# Patient Record
Sex: MALE | Race: WHITE | NOT HISPANIC OR LATINO | Employment: OTHER | URBAN - METROPOLITAN AREA
[De-identification: names, ages, dates, MRNs, and addresses within clinical notes are randomized per-mention and may not be internally consistent; named-entity substitution may affect disease eponyms.]

---

## 2017-05-15 ENCOUNTER — OFFICE VISIT (OUTPATIENT)
Dept: OPHTHALMOLOGY | Facility: CLINIC | Age: 77
End: 2017-05-15
Payer: MEDICARE

## 2017-05-15 DIAGNOSIS — Z83.511 FAMILY HISTORY OF GLAUCOMA: ICD-10-CM

## 2017-05-15 DIAGNOSIS — H35.372 EPIRETINAL MEMBRANE, LEFT: Primary | ICD-10-CM

## 2017-05-15 DIAGNOSIS — Z96.1 PSEUDOPHAKIA: ICD-10-CM

## 2017-05-15 PROCEDURE — 99999 PR PBB SHADOW E&M-EST. PATIENT-LVL II: CPT | Mod: PBBFAC,,, | Performed by: OPHTHALMOLOGY

## 2017-05-15 PROCEDURE — 92134 CPTRZ OPH DX IMG PST SGM RTA: CPT | Mod: PBBFAC,PO | Performed by: OPHTHALMOLOGY

## 2017-05-15 PROCEDURE — 99212 OFFICE O/P EST SF 10 MIN: CPT | Mod: PBBFAC,PO | Performed by: OPHTHALMOLOGY

## 2017-05-15 PROCEDURE — 92014 COMPRE OPH EXAM EST PT 1/>: CPT | Mod: S$PBB,,, | Performed by: OPHTHALMOLOGY

## 2017-05-15 NOTE — PROGRESS NOTES
SUBJECTIVE:   Bharathi Parsons is a 77 y.o. male   Uncorrected distance visual acuity was 20/20 -2 in the right eye and 20/25 -1 in the left eye.   Chief Complaint   Patient presents with    Eye Exam     yearly exam and MOCT        HPI:  HPI     Eye Exam    Additional comments: yearly exam and MOCT           Comments   1. Foveopathy OD / ERM OS  2. PCIOL OD before 2003  PCIOL OS 12/19/07 with YAG 8/2/10  3. Fhx COAG- uncle  4. Fhx PKP       Last edited by Ann Medina MA on 5/15/2017  1:10 PM. (History)        Assessment /Plan :  1. Epiretinal membrane, left Stable no change will cont to follow with yearly Exams   2. Family history of glaucoma    3. Pseudophakia Stable     RTC 1 Year Dilation,MOCT

## 2017-05-15 NOTE — MR AVS SNAPSHOT
[facility]  [address] [facility] [place] [address]  Phone: [phone]  Fax: [phone]                  Bharathi Parsons   5/15/2017 1:15 PM   Office Visit    Description:  Male : 1940   Provider:  Dudley Olivo MD   Department:  [facility] - [facility]           Reason for Visit     Eye Exam           Diagnoses this Visit        Comments    Epiretinal membrane, left    -  Primary     Family history of glaucoma         Pseudophakia                To Do List           Goals (5 Years of Data)     None      [facility]     [facility] On [facility] Nurse Care Line -  Assistance  Unless otherwise directed by your provider, please contact [facility], our nurse care line that is available for  assistance.     Registered nurses in the [facility] On Call Center provide: appointment scheduling, clinical advisement, health education, and other advisory services.  Call: [phone] (toll free)               Medications           Message regarding Medications     Verify the changes and/or additions to your medication regime listed below are the same as discussed with your clinician today.  If any of these changes or additions are incorrect, please notify your healthcare provider.        STOP taking these medications     aspirin (ECOTRIN) 81 MG EC tablet Take 81 mg by mouth.           Verify that the below list of medications is an accurate representation of the medications you are currently taking.  If none reported, the list may be blank. If incorrect, please contact your healthcare provider. Carry this list with you in case of emergency.           Current Medications     azelastine-fluticasone (DYMISTA) 137-50 mcg/spray [place] by Nasal route.    cetirizine (ZYRTEC) 10 MG tablet Take 10 mg by mouth once daily.    ezetimibe (ZETIA) 10 mg tablet Take 10 mg by mouth once daily.    IBUPROFEN ORAL Take by mouth.    omeprazole (PRILOSEC) 20 MG capsule Take 40 mg by mouth.    pitavastatin 2  mg Tab tablet Take 2 mg by mouth once daily.           Clinical Reference Information           Allergies as of 5/15/2017     No Known Allergies      Immunizations Administered on Date of Encounter - 5/15/2017     None      Orders Placed During Today's Visit      Normal Orders This Visit    Posterior Segment OCT Retina-Both eyes       [facility] Sign-Up     Activating your [facility] account is as easy as 1-2-3!     1) Visit [email or web address], select Sign Up Now, enter this activation code and your date of birth, then select Next.  [email or web address]  Expires: 6/29/2017  1:45 PM      2) Create a username and password to use when you visit [facility] in the future and select a security question in case you lose your password and select Next.    3) Enter your e-mail address and click Sign Up!    Additional Information  If you have questions, please e-mail [email or web address] or call [phone] to talk to our [facility] staff. Remember, [facility] is NOT to be used for urgent needs. For medical emergencies, dial 911.         Language Assistance Services     ATTENTION: Language assistance services are available, free of charge. Please call [phone].      ATENCIÓN: Si habla aletha, tiene a macario disposición servicios gratuitos de asistencia lingüística. Llame al [phone].     CHÚ Ý: N?u b?n nói Ti?ng Vi?t, có các d?ch v? h? tr? ngôn ng? mi?n phí dành cho b?n. G?i s? [phone].         [facility] complies with applicable Federal civil rights laws and does not discriminate on the basis of race, color, national origin, age, disability, or sex.

## 2017-06-08 ENCOUNTER — OFFICE VISIT (OUTPATIENT)
Dept: PODIATRY | Facility: CLINIC | Age: 77
End: 2017-06-08
Payer: MEDICARE

## 2017-06-08 VITALS
HEIGHT: 72 IN | DIASTOLIC BLOOD PRESSURE: 107 MMHG | HEART RATE: 73 BPM | BODY MASS INDEX: 24.11 KG/M2 | WEIGHT: 178 LBS | SYSTOLIC BLOOD PRESSURE: 191 MMHG

## 2017-06-08 DIAGNOSIS — M21.6X2 ACQUIRED PRONATION DEFORMITY OF FOOT, LEFT: ICD-10-CM

## 2017-06-08 DIAGNOSIS — L84 CORN OR CALLUS: ICD-10-CM

## 2017-06-08 DIAGNOSIS — M20.42 HAMMERTOE OF LEFT FOOT: Primary | ICD-10-CM

## 2017-06-08 PROCEDURE — 99213 OFFICE O/P EST LOW 20 MIN: CPT | Mod: PBBFAC,PO | Performed by: PODIATRIST

## 2017-06-08 PROCEDURE — 1126F AMNT PAIN NOTED NONE PRSNT: CPT | Mod: ,,, | Performed by: PODIATRIST

## 2017-06-08 PROCEDURE — 1159F MED LIST DOCD IN RCRD: CPT | Mod: ,,, | Performed by: PODIATRIST

## 2017-06-08 PROCEDURE — 99213 OFFICE O/P EST LOW 20 MIN: CPT | Mod: S$PBB,,, | Performed by: PODIATRIST

## 2017-06-08 PROCEDURE — 99999 PR PBB SHADOW E&M-EST. PATIENT-LVL III: CPT | Mod: PBBFAC,,, | Performed by: PODIATRIST

## 2017-06-08 RX ORDER — PITAVASTATIN CALCIUM 4.18 MG/1
TABLET, FILM COATED ORAL EVERY OTHER DAY
COMMUNITY
Start: 2017-06-07 | End: 2018-05-24 | Stop reason: SDUPTHER

## 2017-06-09 ENCOUNTER — OFFICE VISIT (OUTPATIENT)
Dept: PODIATRY | Facility: CLINIC | Age: 77
End: 2017-06-09
Payer: MEDICARE

## 2017-06-09 VITALS
WEIGHT: 178 LBS | DIASTOLIC BLOOD PRESSURE: 98 MMHG | HEIGHT: 72 IN | SYSTOLIC BLOOD PRESSURE: 174 MMHG | HEART RATE: 63 BPM | BODY MASS INDEX: 24.11 KG/M2

## 2017-06-09 DIAGNOSIS — M20.42 HAMMERTOE OF LEFT FOOT: Primary | ICD-10-CM

## 2017-06-09 PROCEDURE — 28232 INCISION OF TOE TENDON: CPT | Mod: PBBFAC,PO | Performed by: PODIATRIST

## 2017-06-09 PROCEDURE — 28232 INCISION OF TOE TENDON: CPT | Mod: S$PBB,,, | Performed by: PODIATRIST

## 2017-06-09 PROCEDURE — 99999 PR PBB SHADOW E&M-EST. PATIENT-LVL III: CPT | Mod: PBBFAC,,, | Performed by: PODIATRIST

## 2017-06-09 PROCEDURE — 99213 OFFICE O/P EST LOW 20 MIN: CPT | Mod: PBBFAC,PO | Performed by: PODIATRIST

## 2017-06-09 PROCEDURE — 99499 UNLISTED E&M SERVICE: CPT | Mod: S$PBB,,, | Performed by: PODIATRIST

## 2017-06-09 RX ORDER — CEPHALEXIN 500 MG/1
500 CAPSULE ORAL 4 TIMES DAILY
Qty: 28 CAPSULE | Refills: 0 | Status: SHIPPED | OUTPATIENT
Start: 2017-06-09 | End: 2018-04-25

## 2017-06-09 NOTE — PROGRESS NOTES
Surgeon: Anurag Ventura DPM  Assistant: none  Procedure: percutaneous flexor tenotomy left second toe  Pre-op Dx: hammertoe deformity left fourth toe  Post-op Dx: same  Pathology: none  Anesthesia: 3 mL 0.25% plain marcaine  Hemostasis: none  EBL: < 3 mL  Materials: none  Injectables: none  Complications: none    Procedure in Detail    A time out was called verifying the patient, site, foot and procedure. Alcohol was used to prep the skin followed by skin anesthesia of injection site with the local anesthetic described above and into the toe described above. The surgical site was prepped with betadine and sterile draping applied. A sterile 18 gauze needle was entered into the plantar  aspect of the toe adjacent to the DIPJ region and used to incise the long flexor tendon while dorsiflexing the toe. The toe was noted to reduce and become more flexible. A sterile dressing consisting of xeroform, gauze and coban applied.    The patient tolerated the procedure well without complication and related no pain during the procedure.     He is to keep dressing clean, dry and intact x 3 days then may apply neosporin and bandaid daily.    RTC 1 week for wound check.

## 2017-06-09 NOTE — PATIENT INSTRUCTIONS
Leave dressing intact x 2 days then you may remove and wash the area while showering. Apply neosporin and band aid daily.

## 2017-06-10 NOTE — PROGRESS NOTES
Subjective:      Patient ID: Bharathi Parsons is a 77 y.o. male.    Chief Complaint: Callouses (bottom of left hallux)    Presents complaining of thickened left second toenail. History of medial column arthrodesis left foot. Ambulates with tennis shoes and orthotics. Complains that the toenails are beginning to thicken and he is unable to trim them.     6/8/17: Complains of pain at tip of left second toe for several weeks. Denies trauma, changes in activity or shoe gear. Wearing [employer].  Vitals:    06/08/17 1447   BP: (!) 191/107   Pulse: 73   Weight: 80.7 kg (178 lb)   Height: 6' (1.829 m)   PainSc: 0-No pain      Past Medical History:   Diagnosis Date    DJD (degenerative joint disease)     Hypertension        Past Surgical History:   Procedure Laterality Date    CATARACT EXTRACTION W/  INTRAOCULAR LENS IMPLANT  OD before 2003    CATARACT EXTRACTION W/  INTRAOCULAR LENS IMPLANT  OS 12/19/07 with YAG    FOOT ARTHRODESIS      medial column arthrodesis left foot  2011       Family History   Problem Relation Age of Onset    Glaucoma Maternal Uncle     Cataracts Mother        Social History     Social History    Marital status:      Spouse name: N/A    Number of children: N/A    Years of education: N/A     Social History Main Topics    Smoking status: Never Smoker    Smokeless tobacco: Never Used    Alcohol use Yes    Drug use: No    Sexual activity: Not Asked     Other Topics Concern    None     Social History Narrative    None       Current Outpatient Prescriptions   Medication Sig Dispense Refill    azelastine-fluticasone (DYMISTA) 137-50 mcg/spray [place] by Nasal route.      cetirizine (ZYRTEC) 10 MG tablet Take 10 mg by mouth once daily.      ezetimibe (ZETIA) 10 mg tablet Take 10 mg by mouth once daily.      IBUPROFEN ORAL Take by mouth.      LIVALO 4 mg Tab       omeprazole (PRILOSEC) 20 MG capsule Take 40 mg by mouth.      pitavastatin 2 mg Tab tablet Take 2 mg by mouth  once daily.      cephALEXin (KEFLEX) 500 MG capsule Take 1 capsule (500 mg total) by mouth 4 (four) times daily. 28 capsule 0     No current facility-administered medications for this visit.        No Known Allergies      Review of Systems   Constitution: Negative for chills, fever, weakness and malaise/fatigue.   HENT: Negative for headaches.    Cardiovascular: Negative for chest pain and claudication.   Respiratory: Negative for cough and shortness of breath.    Skin: Positive for nail changes. Negative for color change, itching and rash.   Musculoskeletal: Positive for arthritis. Negative for muscle cramps and muscle weakness.   Gastrointestinal: Negative for nausea and vomiting.   Neurological: Negative for numbness and paresthesias.   Psychiatric/Behavioral: Negative for altered mental status.           Objective:      Physical Exam   Constitutional: He is oriented to person, place, and time. He appears well-developed and well-nourished. No distress.   Cardiovascular:   Pulses:       Dorsalis pedis pulses are 2+ on the right side, and 2+ on the left side.        Posterior tibial pulses are 2+ on the right side, and 2+ on the left side.   CFT< 3 secs all toes bilateral foot, skin temp warm bilateral foot, diminished digital hair growth bilateral foot, no lower extremity edema bilateral.       Musculoskeletal:        Feet:    Semi-rigid pes plano valgus left foot. Hallux abducto valgus with dorsal contracture left foot. Elongated left second toe with semi-reducible hammertoe deformity. Gastrocnemius equinus left. Semi-reducible hammertoe deformities toes 2-5 bilateral foot. High arched foot type with inverted forefoot right foot. No pain with ROM or MMT bilateral foot.    Neurological: He is alert and oriented to person, place, and time. He has normal strength. No sensory deficit.   Skin: Skin is warm, dry and intact. Capillary refill takes less than 2 seconds. Lesion noted. No abrasion, no bruising, no burn, no  ecchymosis, no laceration, no petechiae and no rash noted. He is not diaphoretic. No cyanosis or erythema. No pallor. Nails show no clubbing.   Raised hyperkeratotic lesion plantar first met head left foot. Hyperkeratotic lesion distal left second toe. Dystrophic, yellow, thickened toenail left second and fifth toes and incurvated medial nail border right second toe from cross over second toe. No open lesions or macerations bilateral lower extremity.               Assessment:       Encounter Diagnoses   Name Primary?    Hammertoe of left foot Yes    Acquired pronation deformity of foot, left     Corn or callus          Plan:       [facility]    Diagnoses and all orders for this visit:    Hammertoe of left foot    Acquired pronation deformity of foot, left    Corn or callus      I counseled the patient on his conditions, their implications and medical management.    Discussed conservative treatment such as crest pad, adequate room in toe box for his toes vs surgical intervention consisting of percutaneous flexor tenotomy and or PIPJ arthrodesis of the toe.    With patient's verbal consent and as a courtesy, the hyperkeratotic lesion x 1 on left foot was trimmed  to healthy appearing skin using a sterile #15 scalpel. No anesthesia required. No blood loss. She tolerated the procedure well without complications.      RTC prn and to have percutaneous tenotomy.        .

## 2017-06-19 ENCOUNTER — OFFICE VISIT (OUTPATIENT)
Dept: PODIATRY | Facility: CLINIC | Age: 77
End: 2017-06-19
Payer: MEDICARE

## 2017-06-19 VITALS
HEIGHT: 72 IN | WEIGHT: 178 LBS | DIASTOLIC BLOOD PRESSURE: 100 MMHG | SYSTOLIC BLOOD PRESSURE: 157 MMHG | BODY MASS INDEX: 24.11 KG/M2 | HEART RATE: 67 BPM

## 2017-06-19 DIAGNOSIS — Z98.890 POSTOPERATIVE STATE: Primary | ICD-10-CM

## 2017-06-19 PROCEDURE — 99024 POSTOP FOLLOW-UP VISIT: CPT | Mod: ,,, | Performed by: PODIATRIST

## 2017-06-19 PROCEDURE — 99999 PR PBB SHADOW E&M-EST. PATIENT-LVL III: CPT | Mod: PBBFAC,,, | Performed by: PODIATRIST

## 2017-06-19 PROCEDURE — 99213 OFFICE O/P EST LOW 20 MIN: CPT | Mod: PBBFAC,PO | Performed by: PODIATRIST

## 2017-06-19 RX ORDER — AZELASTINE 1 MG/ML
2 SPRAY, METERED NASAL
COMMUNITY
Start: 2017-06-12 | End: 2023-11-07 | Stop reason: SDUPTHER

## 2017-06-19 RX ORDER — FLUTICASONE PROPIONATE 50 MCG
SPRAY, SUSPENSION (ML) NASAL
COMMUNITY
Start: 2017-06-12 | End: 2023-11-07 | Stop reason: SDUPTHER

## 2017-06-19 NOTE — PATIENT INSTRUCTIONS
Fusion/arthrodesis of first metatarsal phalangeal joint vs talonavicular joint arthrodesis to block pronation(rolling in motion) of the foot

## 2017-06-20 NOTE — PROGRESS NOTES
Wound site plantar left second toe healed and toe has assumed rectus alignment. Relates no pain. He does complain of big toe over lapping second toe. Discussed that abnormal pronation of his foot contributes to the hallux abducto valgus deformity, however it is not track bound. I believe it would be better to fuse TN joint and correct abnormal pronation with possible bunionectomy procedure vs 1 MTP arthrodesis. He was not interested in further surgical procedures at this time.

## 2018-04-25 ENCOUNTER — HOSPITAL ENCOUNTER (OUTPATIENT)
Dept: RADIOLOGY | Facility: HOSPITAL | Age: 78
Discharge: HOME | End: 2018-04-25
Attending: PEDIATRICS
Payer: MEDICARE

## 2018-04-25 ENCOUNTER — OFFICE VISIT (OUTPATIENT)
Dept: INTERNAL MEDICINE | Facility: CLINIC | Age: 78
End: 2018-04-25
Payer: MEDICARE

## 2018-04-25 VITALS
TEMPERATURE: 97 F | HEIGHT: 72 IN | WEIGHT: 178.38 LBS | OXYGEN SATURATION: 95 % | SYSTOLIC BLOOD PRESSURE: 172 MMHG | HEART RATE: 93 BPM | BODY MASS INDEX: 24.16 KG/M2 | DIASTOLIC BLOOD PRESSURE: 74 MMHG

## 2018-04-25 DIAGNOSIS — K21.00 GASTROESOPHAGEAL REFLUX DISEASE WITH ESOPHAGITIS: ICD-10-CM

## 2018-04-25 DIAGNOSIS — E03.9 HYPOTHYROIDISM, UNSPECIFIED TYPE: ICD-10-CM

## 2018-04-25 DIAGNOSIS — E78.00 HYPERCHOLESTEROLEMIA: ICD-10-CM

## 2018-04-25 DIAGNOSIS — S49.91XA INJURY OF RIGHT SHOULDER, INITIAL ENCOUNTER: ICD-10-CM

## 2018-04-25 DIAGNOSIS — Z00.00 WELL ADULT EXAM: Primary | ICD-10-CM

## 2018-04-25 DIAGNOSIS — J31.0 CHRONIC RHINITIS: ICD-10-CM

## 2018-04-25 DIAGNOSIS — R03.0 ELEVATED BP WITHOUT DIAGNOSIS OF HYPERTENSION: ICD-10-CM

## 2018-04-25 PROCEDURE — 73030 X-RAY EXAM OF SHOULDER: CPT | Mod: 26,RT,, | Performed by: RADIOLOGY

## 2018-04-25 PROCEDURE — 99214 OFFICE O/P EST MOD 30 MIN: CPT | Mod: PBBFAC,25,PO | Performed by: PEDIATRICS

## 2018-04-25 PROCEDURE — 99204 OFFICE O/P NEW MOD 45 MIN: CPT | Mod: S$PBB,,, | Performed by: PEDIATRICS

## 2018-04-25 PROCEDURE — 73030 X-RAY EXAM OF SHOULDER: CPT | Mod: TC,FY,PO,RT

## 2018-04-25 PROCEDURE — 99999 PR PBB SHADOW E&M-EST. PATIENT-LVL IV: CPT | Mod: PBBFAC,,, | Performed by: PEDIATRICS

## 2018-04-25 NOTE — PROGRESS NOTES
Subjective:       Patient ID: Bharathi Parsons is a 78 y.o. male.    Chief Complaint: Establish Care and Annual Exam    New patient here to establish care, his previous MD retired.    PMH:  GERD with esophagitis and esophageal stricture. Had EGD and dilatation several years ago. PPI completely successful  Allergies: See Dr Meneses, uses intranasal steroids and astelin successfully.  Hypercholesterol: on Livalo and zetia due to crestor and other statins causing muscle achiness.    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      Review of Systems   Constitutional: Negative for fever and unexpected weight change.   HENT: Negative for congestion and rhinorrhea.    Eyes: Negative for discharge and redness.   Respiratory: Negative for cough, shortness of breath and wheezing.    Cardiovascular: Negative for chest pain, palpitations and leg swelling.   Gastrointestinal: Negative for constipation, diarrhea and vomiting.   Endocrine: Negative for cold intolerance, heat intolerance, polydipsia, polyphagia and polyuria.   Genitourinary: Negative for decreased urine volume, difficulty urinating, dysuria and hematuria.   Musculoskeletal: Positive for arthralgias. Negative for back pain, gait problem, joint swelling, myalgias, neck pain and neck stiffness.   Skin: Negative for rash and wound.   Neurological: Negative for syncope and headaches.   Psychiatric/Behavioral: Negative for behavioral problems, dysphoric mood and sleep disturbance. The patient is not nervous/anxious.        Objective:       Physical Exam   Constitutional: He is oriented to person, place, and time. He appears well-developed and well-nourished. No distress.   Younger appearing than stated age   Neck: No JVD present. No thyromegaly present.   Cardiovascular: Normal rate, regular rhythm and normal heart sounds.    No murmur heard.  Pulmonary/Chest: Effort normal and breath sounds normal. No respiratory distress. He has no wheezes. He has no rales.   Abdominal: Soft. He exhibits no distension and no mass. There is no tenderness. There is no rebound and no guarding.   Musculoskeletal: He exhibits no edema.   Mild osteoarthritic changes in hands. Can abduct right shoulder well, has mild trouble with external rotation   Lymphadenopathy:     He has no cervical adenopathy.   Neurological: He is alert and oriented to person, place, and time. No cranial nerve deficit. Coordination normal.   Skin: No rash noted.   Psychiatric: He has a normal mood and affect. His behavior is normal. Thought content normal.       Assessment:       1. Well adult exam    2. Gastroesophageal reflux disease with esophagitis    3. Chronic rhinitis    4. Elevated BP without diagnosis of hypertension    5. Injury of right shoulder, initial encounter    6. Hypothyroidism, unspecified type    7. Hypercholesterolemia        Plan:       Well adult exam    Gastroesophageal reflux disease with esophagitis    Chronic rhinitis    Elevated BP without diagnosis of hypertension  -     Comprehensive metabolic panel; Future; Expected date: 04/25/2018  -     CBC auto differential; Future; Expected date: 04/25/2018    Injury of right shoulder, initial encounter  -     X-ray Shoulder 2 or More Views Right; Future; Expected date: 04/25/2018  -     Ambulatory Referral to Physical/Occupational Therapy    Hypothyroidism, unspecified type  -     TSH; Future; Expected date: 04/25/2018    Hypercholesterolemia  -     Lipid panel; Future; Expected date: 04/25/2018    ROSEMARY to review previous labs  and notes. I am inclined to not pursue PSA and colon cancer screening based on age but will await records. Monitor B/P at his wife's office(has B/P cuff there as she is psychologist) and record. F/U in about 4 weeks.

## 2018-05-01 ENCOUNTER — PATIENT OUTREACH (OUTPATIENT)
Dept: ADMINISTRATIVE | Facility: HOSPITAL | Age: 78
End: 2018-05-01

## 2018-05-09 ENCOUNTER — PATIENT OUTREACH (OUTPATIENT)
Dept: ADMINISTRATIVE | Facility: HOSPITAL | Age: 78
End: 2018-05-09

## 2018-05-17 ENCOUNTER — LAB VISIT (OUTPATIENT)
Dept: LAB | Facility: HOSPITAL | Age: 78
End: 2018-05-17
Attending: PEDIATRICS
Payer: MEDICARE

## 2018-05-17 DIAGNOSIS — E78.00 HYPERCHOLESTEROLEMIA: ICD-10-CM

## 2018-05-17 DIAGNOSIS — R03.0 ELEVATED BP WITHOUT DIAGNOSIS OF HYPERTENSION: ICD-10-CM

## 2018-05-17 DIAGNOSIS — E03.9 HYPOTHYROIDISM, UNSPECIFIED TYPE: ICD-10-CM

## 2018-05-17 LAB
ALBUMIN SERPL BCP-MCNC: 3.7 G/DL
ALP SERPL-CCNC: 70 U/L
ALT SERPL W/O P-5'-P-CCNC: 19 U/L
ANION GAP SERPL CALC-SCNC: 7 MMOL/L
AST SERPL-CCNC: 23 U/L
BASOPHILS # BLD AUTO: 0.03 K/UL
BASOPHILS NFR BLD: 0.4 %
BILIRUB SERPL-MCNC: 0.5 MG/DL
BUN SERPL-MCNC: 16 MG/DL
CALCIUM SERPL-MCNC: 9.4 MG/DL
CHLORIDE SERPL-SCNC: 108 MMOL/L
CHOLEST SERPL-MCNC: 158 MG/DL
CHOLEST/HDLC SERPL: 3 {RATIO}
CO2 SERPL-SCNC: 26 MMOL/L
CREAT SERPL-MCNC: 1.1 MG/DL
DIFFERENTIAL METHOD: ABNORMAL
EOSINOPHIL # BLD AUTO: 0.2 K/UL
EOSINOPHIL NFR BLD: 3.1 %
ERYTHROCYTE [DISTWIDTH] IN BLOOD BY AUTOMATED COUNT: 15 %
EST. GFR  (AFRICAN AMERICAN): >60 ML/MIN/1.73 M^2
EST. GFR  (NON AFRICAN AMERICAN): >60 ML/MIN/1.73 M^2
GLUCOSE SERPL-MCNC: 93 MG/DL
HCT VFR BLD AUTO: 44.9 %
HDLC SERPL-MCNC: 52 MG/DL
HDLC SERPL: 32.9 %
HGB BLD-MCNC: 14.4 G/DL
IMM GRANULOCYTES # BLD AUTO: 0.02 K/UL
IMM GRANULOCYTES NFR BLD AUTO: 0.3 %
LDLC SERPL CALC-MCNC: 91.4 MG/DL
LYMPHOCYTES # BLD AUTO: 1.2 K/UL
LYMPHOCYTES NFR BLD: 17.4 %
MCH RBC QN AUTO: 28.7 PG
MCHC RBC AUTO-ENTMCNC: 32.1 G/DL
MCV RBC AUTO: 89 FL
MONOCYTES # BLD AUTO: 0.5 K/UL
MONOCYTES NFR BLD: 6.7 %
NEUTROPHILS # BLD AUTO: 5.1 K/UL
NEUTROPHILS NFR BLD: 72.1 %
NONHDLC SERPL-MCNC: 106 MG/DL
NRBC BLD-RTO: 0 /100 WBC
PLATELET # BLD AUTO: 393 K/UL
PMV BLD AUTO: 9.7 FL
POTASSIUM SERPL-SCNC: 4.3 MMOL/L
PROT SERPL-MCNC: 6.6 G/DL
RBC # BLD AUTO: 5.02 M/UL
SODIUM SERPL-SCNC: 141 MMOL/L
TRIGL SERPL-MCNC: 73 MG/DL
TSH SERPL DL<=0.005 MIU/L-ACNC: 2.93 UIU/ML
WBC # BLD AUTO: 7.02 K/UL

## 2018-05-17 PROCEDURE — 80061 LIPID PANEL: CPT

## 2018-05-17 PROCEDURE — 84443 ASSAY THYROID STIM HORMONE: CPT

## 2018-05-17 PROCEDURE — 85025 COMPLETE CBC W/AUTO DIFF WBC: CPT

## 2018-05-17 PROCEDURE — 36415 COLL VENOUS BLD VENIPUNCTURE: CPT | Mod: PO

## 2018-05-17 PROCEDURE — 80053 COMPREHEN METABOLIC PANEL: CPT

## 2018-05-21 ENCOUNTER — OFFICE VISIT (OUTPATIENT)
Dept: OPHTHALMOLOGY | Facility: CLINIC | Age: 78
End: 2018-05-21
Payer: MEDICARE

## 2018-05-21 DIAGNOSIS — H35.372 EPIRETINAL MEMBRANE, LEFT: Primary | ICD-10-CM

## 2018-05-21 DIAGNOSIS — Z96.1 PSEUDOPHAKIA: ICD-10-CM

## 2018-05-21 DIAGNOSIS — H35.3131 EARLY DRY STAGE NONEXUDATIVE AGE-RELATED MACULAR DEGENERATION OF BOTH EYES: ICD-10-CM

## 2018-05-21 DIAGNOSIS — H52.7 REFRACTIVE ERROR: ICD-10-CM

## 2018-05-21 DIAGNOSIS — Z83.511 FAMILY HISTORY OF GLAUCOMA: ICD-10-CM

## 2018-05-21 PROCEDURE — 99212 OFFICE O/P EST SF 10 MIN: CPT | Mod: PBBFAC,PO | Performed by: OPHTHALMOLOGY

## 2018-05-21 PROCEDURE — 92014 COMPRE OPH EXAM EST PT 1/>: CPT | Mod: S$PBB,,, | Performed by: OPHTHALMOLOGY

## 2018-05-21 PROCEDURE — 99999 PR PBB SHADOW E&M-EST. PATIENT-LVL II: CPT | Mod: PBBFAC,,, | Performed by: OPHTHALMOLOGY

## 2018-05-21 PROCEDURE — 92015 DETERMINE REFRACTIVE STATE: CPT | Mod: ,,, | Performed by: OPHTHALMOLOGY

## 2018-05-21 PROCEDURE — 92134 CPTRZ OPH DX IMG PST SGM RTA: CPT | Mod: PBBFAC,PO | Performed by: OPHTHALMOLOGY

## 2018-05-21 NOTE — PROGRESS NOTES
SUBJECTIVE:   Bharathi Parsons is a 78 y.o. male   Uncorrected distance visual acuity was 20/25 in the right eye and 20/30 +2 in the left eye.   Chief Complaint   Patient presents with    Annual Exam     epiretinal membrane OS        HPI:  HPI     Annual Exam    Additional comments: epiretinal membrane OS           Comments   Pt here for annual exam. No pain or discomfort. VA stable. No gtts.    1. Foveopathy OD / ERM OS  2. PCIOL OD before 2003  PCIOL OS 12/19/07 with YAG 8/2/10  3. Fhx COAG- uncle  4. Fhx PKP       Last edited by Brown Jackson, Patient Care Assistant on 5/21/2018  1:10   PM. (History)        Assessment /Plan :  1. Epiretinal membrane, left  -- Condition stable, no therapeutic change required. Monitoring routinely.     2. Early dry stage nonexudative age-related macular degeneration of both eyes Exam consistent with mild age related macular degeneration OD > OS. Discussed clinical condition and recommend avoidance of tobacco products. Patient advised to call immediately if any visual changes occur. Regular follow up recommended.      3. Pseudophakia  -- Condition stable, no therapeutic change required. Monitoring routinely.     4. Family history of glaucoma No evidence of glaucoma at this time but based on risk factors recommend to continue monitoring.     5. Final spec RX       RTC 1 year

## 2018-05-23 ENCOUNTER — OFFICE VISIT (OUTPATIENT)
Dept: INTERNAL MEDICINE | Facility: CLINIC | Age: 78
End: 2018-05-23
Payer: MEDICARE

## 2018-05-23 VITALS
HEIGHT: 72 IN | HEART RATE: 92 BPM | TEMPERATURE: 98 F | SYSTOLIC BLOOD PRESSURE: 136 MMHG | OXYGEN SATURATION: 95 % | DIASTOLIC BLOOD PRESSURE: 82 MMHG | BODY MASS INDEX: 24.07 KG/M2 | WEIGHT: 177.69 LBS

## 2018-05-23 DIAGNOSIS — K21.00 GASTROESOPHAGEAL REFLUX DISEASE WITH ESOPHAGITIS: Primary | ICD-10-CM

## 2018-05-23 DIAGNOSIS — I10 ESSENTIAL HYPERTENSION: ICD-10-CM

## 2018-05-23 DIAGNOSIS — J31.0 CHRONIC RHINITIS: ICD-10-CM

## 2018-05-23 PROCEDURE — 99999 PR PBB SHADOW E&M-EST. PATIENT-LVL III: CPT | Mod: PBBFAC,,, | Performed by: PEDIATRICS

## 2018-05-23 PROCEDURE — 99214 OFFICE O/P EST MOD 30 MIN: CPT | Mod: S$PBB,,, | Performed by: PEDIATRICS

## 2018-05-23 PROCEDURE — 99213 OFFICE O/P EST LOW 20 MIN: CPT | Mod: PBBFAC,PO | Performed by: PEDIATRICS

## 2018-05-23 RX ORDER — LISINOPRIL 10 MG/1
10 TABLET ORAL DAILY
Qty: 90 TABLET | Refills: 3 | Status: SHIPPED | OUTPATIENT
Start: 2018-05-23 | End: 2019-06-14 | Stop reason: SDUPTHER

## 2018-05-23 NOTE — PROGRESS NOTES
Subjective:       Patient ID: Bharathi Parsons is a 78 y.o. male.    Chief Complaint: Follow-up (4wk/labs )    F/U of possible HTN. 50-60 % readings at home high, high in office. No HTNive symptoms. Labs reviewed. No past records in. Shoulder is better in PT. GERD good on PPI. Rhinitis is controlled.      Review of Systems   Constitutional: Negative for fever and unexpected weight change.   HENT: Negative for congestion and rhinorrhea.    Eyes: Negative for discharge and redness.   Respiratory: Negative for cough and wheezing.    Cardiovascular: Negative for chest pain, palpitations and leg swelling.   Gastrointestinal: Negative for constipation, diarrhea and vomiting.   Genitourinary: Negative for decreased urine volume and difficulty urinating.   Musculoskeletal: Positive for arthralgias (improved.). Negative for joint swelling.   Skin: Negative for rash and wound.   Neurological: Negative for syncope and headaches.   Psychiatric/Behavioral: Negative for behavioral problems and sleep disturbance.       Objective:      Physical Exam   Constitutional: He is oriented to person, place, and time.   Neck: No JVD present.   Cardiovascular: Normal rate, regular rhythm and normal heart sounds.  Exam reveals no friction rub.    No murmur heard.  Pulmonary/Chest: Effort normal and breath sounds normal. No respiratory distress. He has no wheezes.   Abdominal: Soft. He exhibits no distension and no mass. There is no tenderness. There is no guarding.   Musculoskeletal: He exhibits no edema.   Lymphadenopathy:     He has no cervical adenopathy.   Neurological: He is alert and oriented to person, place, and time. No cranial nerve deficit. Coordination normal.   Skin: Capillary refill takes less than 2 seconds.   Psychiatric: He has a normal mood and affect. His behavior is normal. Judgment and thought content normal.       Assessment:       1. Gastroesophageal reflux disease with esophagitis    2. Chronic rhinitis    3. Essential  hypertension        Plan:       Gastroesophageal reflux disease with esophagitis    Chronic rhinitis    Essential hypertension  -     lisinopril 10 MG tablet; Take 1 tablet (10 mg total) by mouth once daily.  Dispense: 90 tablet; Refill: 3  -     Basic metabolic panel; Future; Expected date: 05/23/2018    SE lisinopril d/w. Low salt, rich K diet. Self monitor b/p. NV 2 weeks. F/U 3 months with labs.

## 2018-05-24 RX ORDER — PITAVASTATIN CALCIUM 4.18 MG/1
1 TABLET, FILM COATED ORAL DAILY
Qty: 30 TABLET | Refills: 11 | Status: SHIPPED | OUTPATIENT
Start: 2018-05-24 | End: 2020-07-07 | Stop reason: SDUPTHER

## 2018-06-06 ENCOUNTER — CLINICAL SUPPORT (OUTPATIENT)
Dept: INTERNAL MEDICINE | Facility: CLINIC | Age: 78
End: 2018-06-06
Payer: MEDICARE

## 2018-06-06 VITALS — SYSTOLIC BLOOD PRESSURE: 126 MMHG | DIASTOLIC BLOOD PRESSURE: 82 MMHG

## 2018-06-06 NOTE — PROGRESS NOTES
Pt came into clinic for BP Check. BP was 126/82. I advised pt if he is experiencing high BP, to call clinic and schedule an appt to be seen. He verbalized understanding.

## 2018-06-11 ENCOUNTER — OFFICE VISIT (OUTPATIENT)
Dept: PODIATRY | Facility: CLINIC | Age: 78
End: 2018-06-11
Payer: MEDICARE

## 2018-06-11 VITALS
SYSTOLIC BLOOD PRESSURE: 134 MMHG | HEIGHT: 72 IN | HEART RATE: 56 BPM | DIASTOLIC BLOOD PRESSURE: 84 MMHG | BODY MASS INDEX: 23.98 KG/M2 | WEIGHT: 177 LBS

## 2018-06-11 DIAGNOSIS — L84 CORN OR CALLUS: ICD-10-CM

## 2018-06-11 DIAGNOSIS — M20.5X2 CROSSOVER TOE, LEFT: ICD-10-CM

## 2018-06-11 DIAGNOSIS — M20.12 HALLUX VALGUS OF LEFT FOOT: Primary | ICD-10-CM

## 2018-06-11 PROCEDURE — 99999 PR PBB SHADOW E&M-EST. PATIENT-LVL III: CPT | Mod: PBBFAC,,, | Performed by: PODIATRIST

## 2018-06-11 PROCEDURE — 99213 OFFICE O/P EST LOW 20 MIN: CPT | Mod: S$PBB,,, | Performed by: PODIATRIST

## 2018-06-11 PROCEDURE — 99213 OFFICE O/P EST LOW 20 MIN: CPT | Mod: PBBFAC,PO | Performed by: PODIATRIST

## 2018-06-12 NOTE — PROGRESS NOTES
Subjective:      Patient ID: Bharathi Parsons is a 78 y.o. male.    Chief Complaint: Foot Problem (left foot big hallux bottome of foot)    Presents for an annual foot check. Relates he is getting a thick callus on the medial side of the left forefoot. History of midfoot arthrodesis left foot. States he is still very much active hiking and walking long distances. Complains that he needs to have his orthotic resurfaced but [facility] no longer does this.     Vitals:    06/11/18 1147   BP: 134/84   Pulse: (!) 56   Weight: 80.3 kg (177 lb)   Height: 6' (1.829 m)   PainSc:   2      Past Medical History:   Diagnosis Date    DJD (degenerative joint disease)     Hypertension        Past Surgical History:   Procedure Laterality Date    APPENDECTOMY      as child    CATARACT EXTRACTION W/  INTRAOCULAR LENS IMPLANT  OD before 2003    CATARACT EXTRACTION W/  INTRAOCULAR LENS IMPLANT  OS 12/19/07 with YAG    FOOT ARTHRODESIS      medial column arthrodesis left foot  2011       Family History   Problem Relation Age of Onset    Glaucoma Maternal Uncle     Cataracts Mother     Heart disease Father        Social History     Social History    Marital status:      Spouse name: N/A    Number of children: N/A    Years of education: N/A     Social History Main Topics    Smoking status: Never Smoker    Smokeless tobacco: Never Used    Alcohol use Yes    Drug use: No    Sexual activity: Not on file     Other Topics Concern    Not on file     Social History Narrative    No narrative on file       Current Outpatient Prescriptions   Medication Sig Dispense Refill    azelastine (ASTELIN) 137 mcg (0.1 %) nasal spray 2 sprays 2 (two) times daily.       cetirizine (ZYRTEC) 10 MG tablet Take 10 mg by mouth once daily.      ezetimibe (ZETIA) 10 mg tablet Take 10 mg by mouth once daily.      fluticasone (FLONASE) 50 mcg/actuation nasal spray       IBUPROFEN ORAL Take by mouth.      lisinopril 10 MG tablet Take 1  tablet (10 mg total) by mouth once daily. 90 tablet 3    LIVALO 4 mg Tab Take 1 tablet by mouth once daily. 30 tablet 11    omeprazole (PRILOSEC) 20 MG capsule Take 40 mg by mouth.       No current facility-administered medications for this visit.        Review of patient's allergies indicates:  No Known Allergies      Review of Systems   Constitution: Negative for chills, fever, weakness and malaise/fatigue.   Cardiovascular: Negative for chest pain, claudication and leg swelling.   Respiratory: Negative for cough and shortness of breath.    Skin: Negative for color change, itching, poor wound healing and rash.   Musculoskeletal: Negative for back pain, joint pain, muscle cramps and muscle weakness.   Gastrointestinal: Negative for nausea and vomiting.   Neurological: Negative for numbness and paresthesias.   Psychiatric/Behavioral: Negative for altered mental status.           Objective:      Physical Exam   Constitutional: He is oriented to person, place, and time. He appears well-developed and well-nourished. No distress.   Cardiovascular: Intact distal pulses.    Pulses:       Dorsalis pedis pulses are 2+ on the right side, and 2+ on the left side.        Posterior tibial pulses are 2+ on the right side, and 2+ on the left side.   CFT< 3 secs all toes bilateral foot, skin temp warm bilateral foot, diminished digital hair growth bilateral foot, mild lower extremity edema bilateral.     Musculoskeletal:   Track bound hallux abducto valgus that over laps the second toe left foot.     Left foot with previous midfoot arthrodesis noting available STJ and CCJ ROM at MTJ. Valgus foot type left.    No pain with ROM or MMT left ankle and foot.   Neurological: He is alert and oriented to person, place, and time. He has normal strength. No sensory deficit.   Skin: Skin is warm, dry and intact. Capillary refill takes less than 2 seconds. Lesion noted. No ecchymosis, no petechiae and no rash noted. He is not diaphoretic.  No cyanosis or erythema. No pallor. Nails show no clubbing.   Raised hyperkeratotic lesion medial 1 MTP secondary to rotated hallux base.    No open lesions or macerations bilateral lower extremity.    Skin turgor and elasticity WNLs bilateral lower extremity               Assessment:       Encounter Diagnoses   Name Primary?    Hallux valgus of left foot Yes    Crossover toe, left     Corn or callus - Left Foot          Plan:       Bharathi was seen today for foot problem.    Diagnoses and all orders for this visit:    Hallux valgus of left foot    Crossover toe, left    Corn or callus - Left Foot      I counseled the patient on his conditions, their implications and medical management.      As a courtesy the hyperkeratotic skin medial left hallux 1 MTP was trimmed with a sterile #10 blade. No pain. He tolerated the procedure well without complication.    Discussed continued conservative care such as continued use of custom orthotics, activity modifications, splinting, padding vs surgical intervention consisting of 1 MTP arthrodesis in detail.    Recommend  or [facility] or [facility] for resurfacing.    RTC prn.  .

## 2019-06-10 ENCOUNTER — LAB VISIT (OUTPATIENT)
Dept: LAB | Facility: HOSPITAL | Age: 79
End: 2019-06-10
Attending: PEDIATRICS
Payer: MEDICARE

## 2019-06-10 ENCOUNTER — OFFICE VISIT (OUTPATIENT)
Dept: INTERNAL MEDICINE | Facility: CLINIC | Age: 79
End: 2019-06-10
Payer: MEDICARE

## 2019-06-10 VITALS
TEMPERATURE: 98 F | SYSTOLIC BLOOD PRESSURE: 126 MMHG | OXYGEN SATURATION: 97 % | DIASTOLIC BLOOD PRESSURE: 78 MMHG | WEIGHT: 171.75 LBS | BODY MASS INDEX: 23.26 KG/M2 | HEIGHT: 72 IN | HEART RATE: 67 BPM

## 2019-06-10 DIAGNOSIS — J31.0 CHRONIC RHINITIS: ICD-10-CM

## 2019-06-10 DIAGNOSIS — I10 ESSENTIAL HYPERTENSION: ICD-10-CM

## 2019-06-10 DIAGNOSIS — E78.00 HYPERCHOLESTEROLEMIA: ICD-10-CM

## 2019-06-10 DIAGNOSIS — K21.00 GASTROESOPHAGEAL REFLUX DISEASE WITH ESOPHAGITIS: ICD-10-CM

## 2019-06-10 DIAGNOSIS — Z00.00 WELL ADULT EXAM: Primary | ICD-10-CM

## 2019-06-10 DIAGNOSIS — Z12.5 PROSTATE CANCER SCREENING: ICD-10-CM

## 2019-06-10 LAB
ALT SERPL W/O P-5'-P-CCNC: 18 U/L (ref 10–44)
ANION GAP SERPL CALC-SCNC: 10 MMOL/L (ref 8–16)
AST SERPL-CCNC: 20 U/L (ref 10–40)
BUN SERPL-MCNC: 27 MG/DL (ref 8–23)
CALCIUM SERPL-MCNC: 9.5 MG/DL (ref 8.7–10.5)
CHLORIDE SERPL-SCNC: 108 MMOL/L (ref 95–110)
CHOLEST SERPL-MCNC: 196 MG/DL (ref 120–199)
CHOLEST/HDLC SERPL: 4.1 {RATIO} (ref 2–5)
CO2 SERPL-SCNC: 24 MMOL/L (ref 23–29)
CREAT SERPL-MCNC: 1.3 MG/DL (ref 0.5–1.4)
EST. GFR  (AFRICAN AMERICAN): 59.2 ML/MIN/1.73 M^2
EST. GFR  (NON AFRICAN AMERICAN): 51.2 ML/MIN/1.73 M^2
GLUCOSE SERPL-MCNC: 82 MG/DL (ref 70–110)
HDLC SERPL-MCNC: 48 MG/DL (ref 40–75)
HDLC SERPL: 24.5 % (ref 20–50)
LDLC SERPL CALC-MCNC: 116.2 MG/DL (ref 63–159)
NONHDLC SERPL-MCNC: 148 MG/DL
POTASSIUM SERPL-SCNC: 4.5 MMOL/L (ref 3.5–5.1)
SODIUM SERPL-SCNC: 142 MMOL/L (ref 136–145)
TRIGL SERPL-MCNC: 159 MG/DL (ref 30–150)

## 2019-06-10 PROCEDURE — 99397 PER PM REEVAL EST PAT 65+ YR: CPT | Mod: S$PBB,,, | Performed by: PEDIATRICS

## 2019-06-10 PROCEDURE — 99397 PR PREVENTIVE VISIT,EST,65 & OVER: ICD-10-PCS | Mod: S$PBB,,, | Performed by: PEDIATRICS

## 2019-06-10 PROCEDURE — 84460 ALANINE AMINO (ALT) (SGPT): CPT

## 2019-06-10 PROCEDURE — 80048 BASIC METABOLIC PNL TOTAL CA: CPT

## 2019-06-10 PROCEDURE — 84450 TRANSFERASE (AST) (SGOT): CPT

## 2019-06-10 PROCEDURE — 84153 ASSAY OF PSA TOTAL: CPT

## 2019-06-10 PROCEDURE — 99999 PR PBB SHADOW E&M-EST. PATIENT-LVL III: CPT | Mod: PBBFAC,,, | Performed by: PEDIATRICS

## 2019-06-10 PROCEDURE — 90472 IMMUNIZATION ADMIN EACH ADD: CPT | Mod: PBBFAC

## 2019-06-10 PROCEDURE — G0009 ADMIN PNEUMOCOCCAL VACCINE: HCPCS | Mod: PBBFAC

## 2019-06-10 PROCEDURE — 99213 OFFICE O/P EST LOW 20 MIN: CPT | Mod: PBBFAC | Performed by: PEDIATRICS

## 2019-06-10 PROCEDURE — 36415 COLL VENOUS BLD VENIPUNCTURE: CPT

## 2019-06-10 PROCEDURE — 80061 LIPID PANEL: CPT

## 2019-06-10 PROCEDURE — 90471 IMMUNIZATION ADMIN: CPT | Mod: PBBFAC

## 2019-06-10 PROCEDURE — 99999 PR PBB SHADOW E&M-EST. PATIENT-LVL III: ICD-10-PCS | Mod: PBBFAC,,, | Performed by: PEDIATRICS

## 2019-06-10 NOTE — PROGRESS NOTES
Subjective:       Patient ID: Bharathi Parsons is a 79 y.o. male.    Chief Complaint: No chief complaint on file.    GERD with esophagitis and esophageal stricture. Had EGD and dilatation several years ago. PPI completely successful  Allergies: See Dr Meneses, uses intranasal steroids and astelin successfully.  Hypercholesterol: on Livalo and zetia due to crestor and other statins causing muscle achiness.  HTN:Tolerating ACE. B/P good.    Review of Systems   Constitutional: Negative for fever and unexpected weight change.   HENT: Positive for congestion, postnasal drip and rhinorrhea.         Stable allergies   Eyes: Negative for discharge and redness.   Respiratory: Negative for cough and wheezing.    Cardiovascular: Negative for chest pain, palpitations and leg swelling.   Gastrointestinal: Negative for abdominal pain, constipation, diarrhea and vomiting.   Endocrine: Negative for polydipsia, polyphagia and polyuria.   Genitourinary: Negative for decreased urine volume, difficulty urinating, dysuria and urgency.   Musculoskeletal: Positive for arthralgias (in PT for shoulder, doing well). Negative for joint swelling.   Skin: Negative for rash and wound.   Neurological: Negative for syncope and headaches.   Psychiatric/Behavioral: Negative for behavioral problems and sleep disturbance.       Objective:      Physical Exam   Constitutional: He is oriented to person, place, and time. He appears well-developed and well-nourished. No distress.   HENT:   Right Ear: External ear normal.   Left Ear: External ear normal.   Nose: Nose normal.   Mouth/Throat: Oropharynx is clear and moist. No oropharyngeal exudate.   Eyes: Pupils are equal, round, and reactive to light. Conjunctivae and EOM are normal.   Neck: Normal range of motion. No JVD present. No thyromegaly present.   Cardiovascular: Normal rate, regular rhythm and normal heart sounds.   No murmur heard.  Pulmonary/Chest: Effort normal and breath sounds normal. No  respiratory distress. He has no wheezes. He has no rales.   Abdominal: Soft. He exhibits no distension and no mass. There is no tenderness. There is no rebound and no guarding.   Musculoskeletal: He exhibits no edema.   Lymphadenopathy:     He has no cervical adenopathy.   Neurological: He is alert and oriented to person, place, and time. No cranial nerve deficit. Coordination normal.   Skin: Capillary refill takes less than 2 seconds. No rash noted.   Psychiatric: He has a normal mood and affect. His behavior is normal. Judgment and thought content normal.       Assessment:       1. Well adult exam    2. Essential hypertension    3. Hypercholesterolemia    4. Chronic rhinitis    5. Gastroesophageal reflux disease with esophagitis        Plan:       Well adult exam    Essential hypertension  -     Basic metabolic panel; Future; Expected date: 06/10/2019    Hypercholesterolemia  -     ALT (SGPT); Future; Expected date: 06/10/2019  -     AST (SGOT); Future; Expected date: 06/10/2019  -     Lipid panel; Future; Expected date: 06/10/2019  -     Lipid panel; Future; Expected date: 06/10/2019  -     ALT (SGPT); Future; Expected date: 06/10/2019  -     AST (SGOT); Future; Expected date: 06/10/2019    Chronic rhinitis    Gastroesophageal reflux disease with esophagitis    await labs. Maintain meds. D&E as tolreated. We discussed colonoscopy and prostate cancer screening, due to his excellent health and family longevity, will continue to 83 and reassess. F/U 6 months.

## 2019-06-11 LAB — COMPLEXED PSA SERPL-MCNC: 1.4 NG/ML (ref 0–4)

## 2019-06-14 DIAGNOSIS — I10 ESSENTIAL HYPERTENSION: ICD-10-CM

## 2019-06-14 RX ORDER — LISINOPRIL 10 MG/1
TABLET ORAL
Qty: 90 TABLET | Refills: 3 | Status: SHIPPED | OUTPATIENT
Start: 2019-06-14 | End: 2020-08-13

## 2019-08-21 DIAGNOSIS — I10 ESSENTIAL HYPERTENSION: ICD-10-CM

## 2019-08-21 RX ORDER — LISINOPRIL 10 MG/1
TABLET ORAL
Qty: 90 TABLET | Refills: 3 | Status: SHIPPED | OUTPATIENT
Start: 2019-08-21 | End: 2020-01-02 | Stop reason: SDUPTHER

## 2019-12-06 ENCOUNTER — LAB VISIT (OUTPATIENT)
Dept: LAB | Facility: HOSPITAL | Age: 79
End: 2019-12-06
Attending: PEDIATRICS
Payer: MEDICARE

## 2019-12-06 DIAGNOSIS — E78.00 HYPERCHOLESTEROLEMIA: ICD-10-CM

## 2019-12-06 DIAGNOSIS — I10 ESSENTIAL HYPERTENSION: ICD-10-CM

## 2019-12-06 LAB
ALT SERPL W/O P-5'-P-CCNC: 19 U/L (ref 10–44)
ANION GAP SERPL CALC-SCNC: 10 MMOL/L (ref 8–16)
AST SERPL-CCNC: 22 U/L (ref 10–40)
BUN SERPL-MCNC: 26 MG/DL (ref 8–23)
CALCIUM SERPL-MCNC: 9.3 MG/DL (ref 8.7–10.5)
CHLORIDE SERPL-SCNC: 110 MMOL/L (ref 95–110)
CHOLEST SERPL-MCNC: 212 MG/DL (ref 120–199)
CHOLEST/HDLC SERPL: 4.5 {RATIO} (ref 2–5)
CO2 SERPL-SCNC: 23 MMOL/L (ref 23–29)
CREAT SERPL-MCNC: 1 MG/DL (ref 0.5–1.4)
EST. GFR  (AFRICAN AMERICAN): >60 ML/MIN/1.73 M^2
EST. GFR  (NON AFRICAN AMERICAN): >60 ML/MIN/1.73 M^2
GLUCOSE SERPL-MCNC: 86 MG/DL (ref 70–110)
HDLC SERPL-MCNC: 47 MG/DL (ref 40–75)
HDLC SERPL: 22.2 % (ref 20–50)
LDLC SERPL CALC-MCNC: 146.4 MG/DL (ref 63–159)
NONHDLC SERPL-MCNC: 165 MG/DL
POTASSIUM SERPL-SCNC: 4.5 MMOL/L (ref 3.5–5.1)
SODIUM SERPL-SCNC: 143 MMOL/L (ref 136–145)
TRIGL SERPL-MCNC: 93 MG/DL (ref 30–150)

## 2019-12-06 PROCEDURE — 80061 LIPID PANEL: CPT

## 2019-12-06 PROCEDURE — 36415 COLL VENOUS BLD VENIPUNCTURE: CPT

## 2019-12-06 PROCEDURE — 80048 BASIC METABOLIC PNL TOTAL CA: CPT

## 2019-12-06 PROCEDURE — 84460 ALANINE AMINO (ALT) (SGPT): CPT

## 2019-12-06 PROCEDURE — 84450 TRANSFERASE (AST) (SGOT): CPT

## 2019-12-10 ENCOUNTER — DOCUMENTATION ONLY (OUTPATIENT)
Dept: INTERNAL MEDICINE | Facility: CLINIC | Age: 79
End: 2019-12-10

## 2020-01-02 ENCOUNTER — OFFICE VISIT (OUTPATIENT)
Dept: INTERNAL MEDICINE | Facility: CLINIC | Age: 80
End: 2020-01-02
Payer: MEDICARE

## 2020-01-02 VITALS
SYSTOLIC BLOOD PRESSURE: 138 MMHG | WEIGHT: 173.5 LBS | HEIGHT: 72 IN | DIASTOLIC BLOOD PRESSURE: 82 MMHG | OXYGEN SATURATION: 97 % | TEMPERATURE: 98 F | RESPIRATION RATE: 16 BRPM | BODY MASS INDEX: 23.5 KG/M2 | HEART RATE: 90 BPM

## 2020-01-02 DIAGNOSIS — J31.0 CHRONIC RHINITIS: ICD-10-CM

## 2020-01-02 DIAGNOSIS — K21.00 GASTROESOPHAGEAL REFLUX DISEASE WITH ESOPHAGITIS: ICD-10-CM

## 2020-01-02 DIAGNOSIS — I10 ESSENTIAL HYPERTENSION: Primary | ICD-10-CM

## 2020-01-02 DIAGNOSIS — Z12.5 PROSTATE CANCER SCREENING: ICD-10-CM

## 2020-01-02 DIAGNOSIS — E78.00 HYPERCHOLESTEROLEMIA: ICD-10-CM

## 2020-01-02 DIAGNOSIS — H91.93 BILATERAL HEARING LOSS, UNSPECIFIED HEARING LOSS TYPE: ICD-10-CM

## 2020-01-02 PROCEDURE — 99999 PR PBB SHADOW E&M-EST. PATIENT-LVL III: ICD-10-PCS | Mod: PBBFAC,,, | Performed by: PEDIATRICS

## 2020-01-02 PROCEDURE — 99213 OFFICE O/P EST LOW 20 MIN: CPT | Mod: PBBFAC | Performed by: PEDIATRICS

## 2020-01-02 PROCEDURE — 99999 PR PBB SHADOW E&M-EST. PATIENT-LVL III: CPT | Mod: PBBFAC,,, | Performed by: PEDIATRICS

## 2020-01-02 PROCEDURE — 99214 PR OFFICE/OUTPT VISIT, EST, LEVL IV, 30-39 MIN: ICD-10-PCS | Mod: S$PBB,,, | Performed by: PEDIATRICS

## 2020-01-02 PROCEDURE — 99214 OFFICE O/P EST MOD 30 MIN: CPT | Mod: S$PBB,,, | Performed by: PEDIATRICS

## 2020-01-02 PROCEDURE — 1126F AMNT PAIN NOTED NONE PRSNT: CPT | Mod: ,,, | Performed by: PEDIATRICS

## 2020-01-02 PROCEDURE — 1159F MED LIST DOCD IN RCRD: CPT | Mod: ,,, | Performed by: PEDIATRICS

## 2020-01-02 PROCEDURE — 1159F PR MEDICATION LIST DOCUMENTED IN MEDICAL RECORD: ICD-10-PCS | Mod: ,,, | Performed by: PEDIATRICS

## 2020-01-02 PROCEDURE — 90471 IMMUNIZATION ADMIN: CPT | Mod: PBBFAC

## 2020-01-02 PROCEDURE — 1126F PR PAIN SEVERITY QUANTIFIED, NO PAIN PRESENT: ICD-10-PCS | Mod: ,,, | Performed by: PEDIATRICS

## 2020-01-02 NOTE — PROGRESS NOTES
Subjective:       Patient ID: Bharathi Parsons is a 79 y.o. male.     Chief Complaint: f/u     GERD with esophagitis and esophageal stricture. Had EGD and dilatation several years ago. On PPI quiet  Allergies: See Dr Meneses, uses intranasal steroids and astelin successfully.  Hypercholesterol: he has not been taking Livalo but is compliant with zetia due to crestor and other statins causing muscle achiness.  HTN:Tolerating ACE. B/P good.    LABS REVIEWED AND DISCUSSED WITH PATIENT      Review of Systems   Constitutional: Negative for fever and unexpected weight change.   HENT: Positive for congestion, postnasal drip and rhinorrhea.         Stable allergies   Eyes: Negative for discharge and redness.   Respiratory: Negative for cough and wheezing.    Cardiovascular: Negative for chest pain, palpitations and leg swelling.   Gastrointestinal: Negative for abdominal pain, constipation, diarrhea and vomiting.   Endocrine: Negative for polydipsia, polyphagia and polyuria.   Genitourinary: Negative for decreased urine volume, difficulty urinating, dysuria and urgency.   Musculoskeletal: Positive for stable, mild arthralgias -shoulder, had PT. Negative for joint swelling.   Skin: Negative for rash and wound.   Neurological: Negative for syncope and headaches.   Psychiatric/Behavioral: Negative for behavioral problems and sleep disturbance.       Objective:   Physical Exam   Constitutional: He is oriented to person, place, and time. He appears well-developed and well-nourished. No distress.   HENT:   Right Ear: External ear normal.   Left Ear: External ear normal.   Nose: Nose normal.   Mouth/Throat: Oropharynx is clear and moist. No oropharyngeal exudate.   Eyes: Pupils are equal, round, and reactive to light. Conjunctivae and EOM are normal.   Neck: Normal range of motion. No JVD present. No thyromegaly present.   Cardiovascular: Normal rate, regular rhythm and normal heart sounds.   No murmur heard.  Pulmonary/Chest: Effort  normal and breath sounds normal. No respiratory distress. He has no wheezes. He has no rales.   Abdominal: Soft. He exhibits no distension and no mass. There is no tenderness. There is no rebound and no guarding.   Musculoskeletal: He exhibits no edema.   Lymphadenopathy:     He has no cervical adenopathy.   Neurological: He is alert and oriented to person, place, and time. No cranial nerve deficit. Coordination normal.   Skin: Capillary refill takes less than 2 seconds. No rash noted.   Psychiatric: He has a normal mood and affect. His behavior is normal. Judgment and thought content normal.       Assessment:       1. Well adult exam    2. Essential hypertension    3. Hypercholesterolemia    4. Chronic rhinitis    5. Gastroesophageal reflux disease with esophagitis        Plan:     Maintain meds(resume livalo if he can tolerate with zetia). D&E as tolreated. As we discussed last visit, We discussed last visit, due to his excellent health and family longevity, will continue colonoscopy and prostate cancer screening to 83 and reassess. F/U 6 months. Shingrix through pharmacy. He wishes to see audiology for hearing aids. Hep A #2

## 2020-01-03 ENCOUNTER — CLINICAL SUPPORT (OUTPATIENT)
Dept: AUDIOLOGY | Facility: CLINIC | Age: 80
End: 2020-01-03
Payer: MEDICARE

## 2020-01-03 ENCOUNTER — OFFICE VISIT (OUTPATIENT)
Dept: OTOLARYNGOLOGY | Facility: CLINIC | Age: 80
End: 2020-01-03
Payer: MEDICARE

## 2020-01-03 VITALS
TEMPERATURE: 98 F | HEART RATE: 67 BPM | HEIGHT: 72 IN | WEIGHT: 175.69 LBS | BODY MASS INDEX: 23.8 KG/M2 | SYSTOLIC BLOOD PRESSURE: 145 MMHG | DIASTOLIC BLOOD PRESSURE: 90 MMHG

## 2020-01-03 DIAGNOSIS — H91.13 PRESBYCUSIS OF BOTH EARS: Primary | ICD-10-CM

## 2020-01-03 DIAGNOSIS — H69.91 ETD (EUSTACHIAN TUBE DYSFUNCTION), RIGHT: ICD-10-CM

## 2020-01-03 DIAGNOSIS — H90.3 SENSORINEURAL HEARING LOSS, BILATERAL: Primary | ICD-10-CM

## 2020-01-03 PROCEDURE — 92557 COMPREHENSIVE HEARING TEST: CPT | Mod: PBBFAC | Performed by: AUDIOLOGIST-HEARING AID FITTER

## 2020-01-03 PROCEDURE — 1159F PR MEDICATION LIST DOCUMENTED IN MEDICAL RECORD: ICD-10-PCS | Mod: ,,, | Performed by: OTOLARYNGOLOGY

## 2020-01-03 PROCEDURE — 1126F PR PAIN SEVERITY QUANTIFIED, NO PAIN PRESENT: ICD-10-PCS | Mod: ,,, | Performed by: OTOLARYNGOLOGY

## 2020-01-03 PROCEDURE — 99999 PR PBB SHADOW E&M-EST. PATIENT-LVL III: ICD-10-PCS | Mod: PBBFAC,,, | Performed by: OTOLARYNGOLOGY

## 2020-01-03 PROCEDURE — 1159F MED LIST DOCD IN RCRD: CPT | Mod: ,,, | Performed by: OTOLARYNGOLOGY

## 2020-01-03 PROCEDURE — 92567 TYMPANOMETRY: CPT | Mod: PBBFAC | Performed by: AUDIOLOGIST-HEARING AID FITTER

## 2020-01-03 PROCEDURE — 99203 OFFICE O/P NEW LOW 30 MIN: CPT | Mod: S$PBB,,, | Performed by: OTOLARYNGOLOGY

## 2020-01-03 PROCEDURE — 99213 OFFICE O/P EST LOW 20 MIN: CPT | Mod: PBBFAC,25 | Performed by: OTOLARYNGOLOGY

## 2020-01-03 PROCEDURE — 99999 PR PBB SHADOW E&M-EST. PATIENT-LVL III: CPT | Mod: PBBFAC,,, | Performed by: OTOLARYNGOLOGY

## 2020-01-03 PROCEDURE — 1126F AMNT PAIN NOTED NONE PRSNT: CPT | Mod: ,,, | Performed by: OTOLARYNGOLOGY

## 2020-01-03 PROCEDURE — 99203 PR OFFICE/OUTPT VISIT, NEW, LEVL III, 30-44 MIN: ICD-10-PCS | Mod: S$PBB,,, | Performed by: OTOLARYNGOLOGY

## 2020-01-03 RX ORDER — IPRATROPIUM BROMIDE 42 UG/1
2 SPRAY, METERED NASAL 3 TIMES DAILY
Qty: 15 ML | Refills: 6 | Status: SHIPPED | OUTPATIENT
Start: 2020-01-03 | End: 2020-07-07

## 2020-01-03 NOTE — PROGRESS NOTES
Referring Provider:Dr. Toribio Parsons was seen 01/03/2020 for an audiological evaluation.  Patient complains of hearing loss and chronic allergy issues. He believes he needs hearing aids. Previous audiogram at an outside clinic at least 4 years ago. He takes Cetirizine and Astelin daily for allergies. He has seen Dr. Meneses in the past. Has had PET's in the past as well.    Results reveal a mild-to-severe sensorineural hearing loss 250-8000 Hz for the right ear, and a mild-to-severe sensorineural hearing loss 250-8000 Hz for the left ear.   Speech Reception Thresholds were  50 dBHL for the right ear and 50 dBHL for the left ear.   Word recognition scores were excellent for the right ear and excellent for the left ear.   Tympanograms were Type C, abnormal for the right ear and Type A, normal for the left ear.    Patient was counseled on the above findings.    Recommendations:  1. ENT: Right ETD.  2. Annual Audiograms  3. Hearing aid consultation scheduled for Monday.

## 2020-01-03 NOTE — LETTER
January 3, 2020      [place]  [id number] [facility]  [place] [address]           [facility]  [address] [facility] [address]  Phone: [phone]  Fax: [phone]          Patient: Bharathi Parsons   MR Number: [id number]   YOB: 1940   Date of Visit: 1/3/2020       Dear Dr. THELMA Rooney Jr.:    Thank you for referring Bharathi Parsons to me for evaluation. Attached you will find relevant portions of my assessment and plan of care.    If you have questions, please do not hesitate to call me. I look forward to following Bharathi Parsons along with you.    Sincerely,    Lokesh Lino MD    Enclosure  CC:  No Recipients    If you would like to receive this communication electronically, please contact [email or web address] or [phone] to request more information on [employer] access.    For providers and/or their staff who would like to refer a patient to [facility], please contact us through our one-stop-shop provider referral line, [facility], at [phone].    If you feel you have received this communication in error or would no longer like to receive these types of communications, please e-mail [email or web address]

## 2020-01-06 ENCOUNTER — CLINICAL SUPPORT (OUTPATIENT)
Dept: AUDIOLOGY | Facility: CLINIC | Age: 80
End: 2020-01-06
Payer: MEDICARE

## 2020-01-06 DIAGNOSIS — Z71.89 HEARING AID CONSULTATION: Primary | ICD-10-CM

## 2020-01-06 NOTE — PROGRESS NOTES
Bharathi Elmoreeth was seen 01/06/2020 for a hearing aid consult to discuss hearing aids.     The following aids will be ordered:    A pair of Phonak Audeo M70 R's  Color: P5  Speaker Units:  2 R/L M or P ?  Pito:  Power     I will call patient once the hearing aids are received to set up the hearing aid fitting appointment.

## 2020-01-29 ENCOUNTER — CLINICAL SUPPORT (OUTPATIENT)
Dept: AUDIOLOGY | Facility: CLINIC | Age: 80
End: 2020-01-29
Payer: MEDICARE

## 2020-01-29 DIAGNOSIS — Z46.1 ENCOUNTER FOR HEARING AID FITTING: Primary | ICD-10-CM

## 2020-02-05 ENCOUNTER — CLINICAL SUPPORT (OUTPATIENT)
Dept: AUDIOLOGY | Facility: CLINIC | Age: 80
End: 2020-02-05
Payer: MEDICARE

## 2020-02-05 DIAGNOSIS — Z46.1 ENCOUNTER FOR FITTING AND ADJUSTMENT OF HEARING AID OF BOTH EARS: Primary | ICD-10-CM

## 2020-02-05 NOTE — PROGRESS NOTES
Bharathi Parsons was seen 02/05/2020 for hearing aid follow-up.  Patient reports he is doing well and has no trouble since his fitting. He was able to figure out his [employer] store password to download the [employer] today. I increased acclimation to 90% today in software. Downloaded [employer] and paired patients hearing aids to it. Gave tutorial on [employer]. He will see how he likes it and let me know if he has any difficulty. We scheduled a 2 week f/u but I told him to let me if he needed to be seen sooner.

## 2020-02-05 NOTE — PROGRESS NOTES
Bharathi Parsons was seen on 02/05/2020 for a hearing aid fitting.  Settings were set to prescribed level 80% acclimation based on current audiogram.  Patient was counseled on insertion, cleaning and maintenance. Patient was given a copy of the hearing aid purchase agreement and will pay in full today.  Patient's one month trail period will begin today. Patient has been scheduled for a hearing aid follow up in one week.  He could not log into his [employer] store today to download the [employer] federico. Will download next week when he returns.    :  Canara   Model:  HackSurfereo M 70 T  Color: Champagne  Speaker link: 2 M L/R  Right aid sn#: [id number]  Left aid sn#: [id number]  Dome size: Small Power  Repair and L/D warranty: 4/15/2023

## 2020-02-20 ENCOUNTER — CLINICAL SUPPORT (OUTPATIENT)
Dept: AUDIOLOGY | Facility: CLINIC | Age: 80
End: 2020-02-20
Payer: MEDICARE

## 2020-02-20 DIAGNOSIS — Z46.1 ENCOUNTER FOR FITTING AND ADJUSTMENT OF HEARING AID OF BOTH EARS: Primary | ICD-10-CM

## 2020-02-20 NOTE — PROGRESS NOTES
Bharathi Parsons was seen 02/20/2020 for hearing aid follow-up.  Patient reports he is finding women's voices too shrill and is hearing more background noise than speech up close. Reduced acclimation to 80% and increased moderate and loud speech sound (750-3 K Hz) two clicks in software. Patient reported my voice sounded much more natural than it did previously. Asked him to try these settings and return in 2 weeks. We will work on increasing his acclimation a little more slowly. He agreed.

## 2020-03-05 ENCOUNTER — CLINICAL SUPPORT (OUTPATIENT)
Dept: AUDIOLOGY | Facility: CLINIC | Age: 80
End: 2020-03-05
Payer: MEDICARE

## 2020-03-05 DIAGNOSIS — Z46.1 ENCOUNTER FOR FITTING AND ADJUSTMENT OF HEARING AID OF BOTH EARS: Primary | ICD-10-CM

## 2020-03-05 NOTE — PROGRESS NOTES
Bharathi SULLIVAN Jaqueline was seen 03/05/2020 for hearing aid follow-up.  Patient reports he is very pleased with aids since last adjustment. He would like another bump in mid frequency range for more speech clarity. I agreed and increased 750-3 K Hz . Went over how to change wax guards today. Patient was able to turn wheel successfully. He will call back if further adjustment is needed. Otherwise will see him PRN.

## 2020-06-18 ENCOUNTER — PATIENT OUTREACH (OUTPATIENT)
Dept: ADMINISTRATIVE | Facility: HOSPITAL | Age: 80
End: 2020-06-18

## 2020-06-18 NOTE — PROGRESS NOTES
Pre Visit Chart Audit Complete:     Health Maintenance: Updated  Immunizations: Abstracted  Care Everywhere: Abstracted   [facility] Search:  No results  Health Maintenance Due   Topic Date Due    Shingles Vaccine (1 of 2) [birth year]/1988

## 2020-06-22 ENCOUNTER — LAB VISIT (OUTPATIENT)
Dept: LAB | Facility: HOSPITAL | Age: 80
End: 2020-06-22
Attending: PEDIATRICS
Payer: MEDICARE

## 2020-06-22 DIAGNOSIS — Z12.5 PROSTATE CANCER SCREENING: ICD-10-CM

## 2020-06-22 DIAGNOSIS — E78.00 HYPERCHOLESTEROLEMIA: ICD-10-CM

## 2020-06-22 DIAGNOSIS — I10 ESSENTIAL HYPERTENSION: ICD-10-CM

## 2020-06-22 LAB
ALT SERPL W/O P-5'-P-CCNC: 21 U/L (ref 10–44)
ANION GAP SERPL CALC-SCNC: 10 MMOL/L (ref 8–16)
AST SERPL-CCNC: 23 U/L (ref 10–40)
BUN SERPL-MCNC: 25 MG/DL (ref 8–23)
CALCIUM SERPL-MCNC: 9.4 MG/DL (ref 8.7–10.5)
CHLORIDE SERPL-SCNC: 106 MMOL/L (ref 95–110)
CHOLEST SERPL-MCNC: 211 MG/DL (ref 120–199)
CHOLEST/HDLC SERPL: 4.3 {RATIO} (ref 2–5)
CO2 SERPL-SCNC: 24 MMOL/L (ref 23–29)
COMPLEXED PSA SERPL-MCNC: 1.4 NG/ML (ref 0–4)
CREAT SERPL-MCNC: 1.2 MG/DL (ref 0.5–1.4)
EST. GFR  (AFRICAN AMERICAN): >60 ML/MIN/1.73 M^2
EST. GFR  (NON AFRICAN AMERICAN): 56 ML/MIN/1.73 M^2
GLUCOSE SERPL-MCNC: 84 MG/DL (ref 70–110)
HDLC SERPL-MCNC: 49 MG/DL (ref 40–75)
HDLC SERPL: 23.2 % (ref 20–50)
LDLC SERPL CALC-MCNC: 142.2 MG/DL (ref 63–159)
NONHDLC SERPL-MCNC: 162 MG/DL
POTASSIUM SERPL-SCNC: 5.1 MMOL/L (ref 3.5–5.1)
SODIUM SERPL-SCNC: 140 MMOL/L (ref 136–145)
TRIGL SERPL-MCNC: 99 MG/DL (ref 30–150)

## 2020-06-22 PROCEDURE — 84460 ALANINE AMINO (ALT) (SGPT): CPT

## 2020-06-22 PROCEDURE — 80048 BASIC METABOLIC PNL TOTAL CA: CPT

## 2020-06-22 PROCEDURE — 84450 TRANSFERASE (AST) (SGOT): CPT

## 2020-06-22 PROCEDURE — 80061 LIPID PANEL: CPT

## 2020-06-22 PROCEDURE — 84153 ASSAY OF PSA TOTAL: CPT

## 2020-06-22 PROCEDURE — 36415 COLL VENOUS BLD VENIPUNCTURE: CPT

## 2020-06-23 ENCOUNTER — TELEPHONE (OUTPATIENT)
Dept: INTERNAL MEDICINE | Facility: CLINIC | Age: 80
End: 2020-06-23

## 2020-06-23 NOTE — TELEPHONE ENCOUNTER
----- Message from Tessa Cain sent at 6/23/2020 10:14 AM CDT -----  Contact: Bharathi Garvin was expose to the COVID19 and he does not have any signs of symptoms and he wants to know what should he do regards to getting tested. Please call him at [phone].    Thanks  Td

## 2020-06-23 NOTE — TELEPHONE ENCOUNTER
S/w pt stating he was exp to someone 06/16/20 that was dx this wk positive COVID-19, pt unsure of exact date. Pt denies s/s as reviewed as this time. Advised pt d/t exposure per CDC guidelines pt needs to self-isolated and quarantine for 14d from today, wear mask, practice handwashing, and to call our office ASAP if develops any s/s as reviewed. Pt voiced understanding, confirmed quarantine instrc, and to CB PRN

## 2020-06-30 ENCOUNTER — TELEPHONE (OUTPATIENT)
Dept: INTERNAL MEDICINE | Facility: CLINIC | Age: 80
End: 2020-06-30

## 2020-06-30 NOTE — TELEPHONE ENCOUNTER
Dr. Rooney out of clinic 06/22/20 - 07/06/20 d/t medical emergency, called pt to r/s 07/02/20 appt. No answer, lvm requesting pt return call to clinic to r/s appt. [employer] message sent as well.

## 2020-07-07 ENCOUNTER — OFFICE VISIT (OUTPATIENT)
Dept: INTERNAL MEDICINE | Facility: CLINIC | Age: 80
End: 2020-07-07
Payer: MEDICARE

## 2020-07-07 VITALS
HEIGHT: 72 IN | HEART RATE: 85 BPM | SYSTOLIC BLOOD PRESSURE: 132 MMHG | OXYGEN SATURATION: 97 % | RESPIRATION RATE: 16 BRPM | BODY MASS INDEX: 22.97 KG/M2 | WEIGHT: 169.56 LBS | TEMPERATURE: 98 F | DIASTOLIC BLOOD PRESSURE: 82 MMHG

## 2020-07-07 DIAGNOSIS — I10 ESSENTIAL HYPERTENSION: Primary | ICD-10-CM

## 2020-07-07 DIAGNOSIS — E78.00 HYPERCHOLESTEROLEMIA: ICD-10-CM

## 2020-07-07 DIAGNOSIS — K21.00 GASTROESOPHAGEAL REFLUX DISEASE WITH ESOPHAGITIS: ICD-10-CM

## 2020-07-07 DIAGNOSIS — J31.0 CHRONIC RHINITIS: ICD-10-CM

## 2020-07-07 PROCEDURE — 99214 OFFICE O/P EST MOD 30 MIN: CPT | Mod: PBBFAC | Performed by: PEDIATRICS

## 2020-07-07 PROCEDURE — 99214 PR OFFICE/OUTPT VISIT, EST, LEVL IV, 30-39 MIN: ICD-10-PCS | Mod: S$PBB,,, | Performed by: PEDIATRICS

## 2020-07-07 PROCEDURE — 99999 PR PBB SHADOW E&M-EST. PATIENT-LVL IV: CPT | Mod: PBBFAC,,, | Performed by: PEDIATRICS

## 2020-07-07 PROCEDURE — 99214 OFFICE O/P EST MOD 30 MIN: CPT | Mod: S$PBB,,, | Performed by: PEDIATRICS

## 2020-07-07 PROCEDURE — 99999 PR PBB SHADOW E&M-EST. PATIENT-LVL IV: ICD-10-PCS | Mod: PBBFAC,,, | Performed by: PEDIATRICS

## 2020-07-07 RX ORDER — PITAVASTATIN CALCIUM 4.18 MG/1
1 TABLET, FILM COATED ORAL DAILY
Qty: 30 TABLET | Refills: 11 | Status: SHIPPED | OUTPATIENT
Start: 2020-07-07 | End: 2021-08-09

## 2020-07-07 NOTE — PROGRESS NOTES
Subjective:       Patient ID: Bharathi Parsons is a 80 y.o. male.     Chief Complaint: f/u     GERD with esophagitis and esophageal stricture. Had EGD and dilatation several years ago. On PPI quiet  Allergies: See Dr Meneses, uses intranasal steroids and astelin successfully.  Hypercholesterol: he still has not been taking Livalo (but is compliant with zetia) due to crestor and other statins causing muscle achiness.  HTN:Tolerating ACE. B/P good.     LABS REVIEWED AND DISCUSSED WITH PATIENT        Review of Systems   Constitutional: Negative for fever and unexpected weight change.   HENT: Positive for congestion, postnasal drip and rhinorrhea.         Stable allergies   Eyes: Negative for discharge and redness.   Respiratory: Negative for cough and wheezing.    Cardiovascular: Negative for chest pain, palpitations and leg swelling.   Gastrointestinal: Negative for abdominal pain, constipation, diarrhea and vomiting.   Endocrine: Negative for polydipsia, polyphagia and polyuria.   Genitourinary: Negative for decreased urine volume, difficulty urinating, dysuria and urgency.   Musculoskeletal: Positive for stable, mild arthralgias -shoulder, had PT. Negative for joint swelling.   Skin: Negative for rash and wound.   Neurological: Negative for syncope and headaches.   Psychiatric/Behavioral: Negative for behavioral problems and sleep disturbance.       Objective:   Physical Exam   Constitutional: He is oriented to person, place, and time. He appears well-developed and well-nourished. No distress.   Neck: Normal range of motion. No JVD present. No thyromegaly present.   Cardiovascular: Normal rate, regular rhythm and normal heart sounds.   No murmur heard.  Pulmonary/Chest: Effort normal and breath sounds normal. No respiratory distress. He has no wheezes. He has no rales.   Abdominal: Soft. He exhibits no distension and no mass. There is no tenderness. There is no rebound and no guarding.   Musculoskeletal: He exhibits  no edema.   Lymphadenopathy:     He has no cervical adenopathy.   Neurological: He is alert and oriented to person, place, and time. No cranial nerve deficit. Coordination normal.   Skin: Capillary refill takes less than 2 seconds. No rash noted.   Psychiatric: He has a normal mood and affect. His behavior is normal. Judgment and thought content normal.       Assessment:       1. Well adult exam    2. Essential hypertension    3. Hypercholesterolemia    4. Chronic rhinitis    5. Gastroesophageal reflux disease with esophagitis        Plan:     Maintain meds(resume livalo). D&E as tolreated. As we discussed last visit, We discussed last visit, due to his excellent health and family longevity, will continue colonoscopy and prostate cancer screening to 83 and reassess. F/U 6 months. Shingrix through pharmacy.

## 2020-07-10 ENCOUNTER — TELEPHONE (OUTPATIENT)
Dept: INTERNAL MEDICINE | Facility: CLINIC | Age: 80
End: 2020-07-10

## 2020-07-10 NOTE — TELEPHONE ENCOUNTER
----- Message from Temi Owen sent at 7/10/2020  9:57 AM CDT -----  Type:  Pharmacy Calling to Clarify an RX    Name of Caller:Korin  Pharmacy Name:[facility]  Prescription Name:Livalo  What do they need to clarify?:Prior Authorization  Best Call Back Number:[phone]  Additional Information:

## 2020-07-10 NOTE — TELEPHONE ENCOUNTER
Returned call to pt's pharm, no ans, no vm. PA request already rec'vd, once PA initiated to ins and response rec'vd from ins, will notify pt's pharm.

## 2020-07-10 NOTE — TELEPHONE ENCOUNTER
Per pharm fax request rec'vd, initiated prior auth request to pt's pharm for Livalo 4mg rx #30/30, included documented statin intolerance, submitted online via [email or web address] Request [id number]; returned fax to pt's pharm notifying them PA request initiated and awaiting ins response, F#[phone]. Fax transmission confirmed.

## 2020-08-20 ENCOUNTER — TELEPHONE (OUTPATIENT)
Dept: INTERNAL MEDICINE | Facility: CLINIC | Age: 80
End: 2020-08-20

## 2020-08-20 NOTE — TELEPHONE ENCOUNTER
LATE ENTRY 07/15/20: Faxed PA APPROVAL rec'vd from pt's ins on Livalo 4mg rx DOS 07/12/2020 - 12/31/2020 to pt's pharm instrc them to fill rx and notify pt when rx ready, fax transmission confirmed F#[phone].

## 2020-10-01 ENCOUNTER — PATIENT MESSAGE (OUTPATIENT)
Dept: OTHER | Facility: OTHER | Age: 80
End: 2020-10-01

## 2020-10-12 ENCOUNTER — PATIENT OUTREACH (OUTPATIENT)
Dept: ADMINISTRATIVE | Facility: HOSPITAL | Age: 80
End: 2020-10-12

## 2020-12-11 ENCOUNTER — PATIENT MESSAGE (OUTPATIENT)
Dept: OTHER | Facility: OTHER | Age: 80
End: 2020-12-11

## 2021-01-05 ENCOUNTER — LAB VISIT (OUTPATIENT)
Dept: LAB | Facility: HOSPITAL | Age: 81
End: 2021-01-05
Attending: PEDIATRICS
Payer: MEDICARE

## 2021-01-05 DIAGNOSIS — E78.00 HYPERCHOLESTEROLEMIA: ICD-10-CM

## 2021-01-05 LAB
ALT SERPL W/O P-5'-P-CCNC: 19 U/L (ref 10–44)
AST SERPL-CCNC: 21 U/L (ref 10–40)
CHOLEST SERPL-MCNC: 181 MG/DL (ref 120–199)
CHOLEST/HDLC SERPL: 3.5 {RATIO} (ref 2–5)
HDLC SERPL-MCNC: 51 MG/DL (ref 40–75)
HDLC SERPL: 28.2 % (ref 20–50)
LDLC SERPL CALC-MCNC: 110.6 MG/DL (ref 63–159)
NONHDLC SERPL-MCNC: 130 MG/DL
TRIGL SERPL-MCNC: 97 MG/DL (ref 30–150)

## 2021-01-05 PROCEDURE — 36415 COLL VENOUS BLD VENIPUNCTURE: CPT

## 2021-01-05 PROCEDURE — 84460 ALANINE AMINO (ALT) (SGPT): CPT

## 2021-01-05 PROCEDURE — 84450 TRANSFERASE (AST) (SGOT): CPT

## 2021-01-05 PROCEDURE — 80061 LIPID PANEL: CPT

## 2021-01-06 ENCOUNTER — PATIENT MESSAGE (OUTPATIENT)
Dept: ADMINISTRATIVE | Facility: OTHER | Age: 81
End: 2021-01-06

## 2021-01-10 ENCOUNTER — IMMUNIZATION (OUTPATIENT)
Dept: INTERNAL MEDICINE | Facility: CLINIC | Age: 81
End: 2021-01-10
Payer: MEDICARE

## 2021-01-10 DIAGNOSIS — Z23 NEED FOR VACCINATION: ICD-10-CM

## 2021-01-10 PROCEDURE — 91300 COVID-19, MRNA, LNP-S, PF, 30 MCG/0.3 ML DOSE VACCINE: CPT | Mod: PBBFAC

## 2021-01-12 ENCOUNTER — OFFICE VISIT (OUTPATIENT)
Dept: INTERNAL MEDICINE | Facility: CLINIC | Age: 81
End: 2021-01-12
Payer: MEDICARE

## 2021-01-12 VITALS
BODY MASS INDEX: 23.44 KG/M2 | SYSTOLIC BLOOD PRESSURE: 138 MMHG | HEART RATE: 99 BPM | DIASTOLIC BLOOD PRESSURE: 78 MMHG | OXYGEN SATURATION: 97 % | RESPIRATION RATE: 18 BRPM | TEMPERATURE: 98 F | HEIGHT: 72 IN | WEIGHT: 173.06 LBS

## 2021-01-12 DIAGNOSIS — Z12.5 PROSTATE CANCER SCREENING: ICD-10-CM

## 2021-01-12 DIAGNOSIS — J31.0 CHRONIC RHINITIS: ICD-10-CM

## 2021-01-12 DIAGNOSIS — K21.00 GASTROESOPHAGEAL REFLUX DISEASE WITH ESOPHAGITIS WITHOUT HEMORRHAGE: ICD-10-CM

## 2021-01-12 DIAGNOSIS — E78.00 HYPERCHOLESTEROLEMIA: ICD-10-CM

## 2021-01-12 DIAGNOSIS — I10 ESSENTIAL HYPERTENSION: Primary | ICD-10-CM

## 2021-01-12 PROCEDURE — 99214 PR OFFICE/OUTPT VISIT, EST, LEVL IV, 30-39 MIN: ICD-10-PCS | Mod: S$PBB,,, | Performed by: PEDIATRICS

## 2021-01-12 PROCEDURE — 99214 OFFICE O/P EST MOD 30 MIN: CPT | Mod: S$PBB,,, | Performed by: PEDIATRICS

## 2021-01-12 PROCEDURE — 99999 PR PBB SHADOW E&M-EST. PATIENT-LVL IV: ICD-10-PCS | Mod: PBBFAC,,, | Performed by: PEDIATRICS

## 2021-01-12 PROCEDURE — 99214 OFFICE O/P EST MOD 30 MIN: CPT | Mod: PBBFAC | Performed by: PEDIATRICS

## 2021-01-12 PROCEDURE — 99999 PR PBB SHADOW E&M-EST. PATIENT-LVL IV: CPT | Mod: PBBFAC,,, | Performed by: PEDIATRICS

## 2021-01-31 ENCOUNTER — IMMUNIZATION (OUTPATIENT)
Dept: INTERNAL MEDICINE | Facility: CLINIC | Age: 81
End: 2021-01-31
Payer: MEDICARE

## 2021-01-31 DIAGNOSIS — Z23 NEED FOR VACCINATION: Primary | ICD-10-CM

## 2021-01-31 PROCEDURE — 0002A COVID-19, MRNA, LNP-S, PF, 30 MCG/0.3 ML DOSE VACCINE: CPT | Mod: PBBFAC

## 2021-01-31 PROCEDURE — 91300 COVID-19, MRNA, LNP-S, PF, 30 MCG/0.3 ML DOSE VACCINE: CPT | Mod: PBBFAC

## 2021-03-03 ENCOUNTER — TELEPHONE (OUTPATIENT)
Dept: INTERNAL MEDICINE | Facility: CLINIC | Age: 81
End: 2021-03-03

## 2021-06-08 ENCOUNTER — CLINICAL SUPPORT (OUTPATIENT)
Dept: AUDIOLOGY | Facility: CLINIC | Age: 81
End: 2021-06-08
Payer: MEDICARE

## 2021-06-08 DIAGNOSIS — Z46.1 ENCOUNTER FOR FITTING AND ADJUSTMENT OF HEARING AID OF BOTH EARS: Primary | ICD-10-CM

## 2021-06-17 ENCOUNTER — CLINICAL SUPPORT (OUTPATIENT)
Dept: AUDIOLOGY | Facility: CLINIC | Age: 81
End: 2021-06-17
Payer: MEDICARE

## 2021-06-17 DIAGNOSIS — Z46.1 ENCOUNTER FOR FITTING AND ADJUSTMENT OF HEARING AID OF BOTH EARS: Primary | ICD-10-CM

## 2021-06-17 DIAGNOSIS — H69.91 ETD (EUSTACHIAN TUBE DYSFUNCTION), RIGHT: ICD-10-CM

## 2021-06-17 DIAGNOSIS — H90.3 SENSORINEURAL HEARING LOSS, BILATERAL: Primary | ICD-10-CM

## 2021-06-17 PROCEDURE — 92552 PURE TONE AUDIOMETRY AIR: CPT | Mod: PBBFAC | Performed by: AUDIOLOGIST-HEARING AID FITTER

## 2021-06-17 PROCEDURE — 92567 TYMPANOMETRY: CPT | Mod: PBBFAC | Performed by: AUDIOLOGIST-HEARING AID FITTER

## 2021-06-23 ENCOUNTER — OFFICE VISIT (OUTPATIENT)
Dept: DERMATOLOGY | Facility: CLINIC | Age: 81
End: 2021-06-23
Payer: MEDICARE

## 2021-06-23 DIAGNOSIS — L82.1 SEBORRHEIC KERATOSES: ICD-10-CM

## 2021-06-23 DIAGNOSIS — D18.00 HEMANGIOMA, UNSPECIFIED SITE: ICD-10-CM

## 2021-06-23 DIAGNOSIS — L57.0 ACTINIC KERATOSIS: Primary | ICD-10-CM

## 2021-06-23 PROCEDURE — 17000 DESTRUCT PREMALG LESION: CPT | Mod: S$PBB,,, | Performed by: STUDENT IN AN ORGANIZED HEALTH CARE EDUCATION/TRAINING PROGRAM

## 2021-06-23 PROCEDURE — 99213 OFFICE O/P EST LOW 20 MIN: CPT | Mod: PBBFAC | Performed by: STUDENT IN AN ORGANIZED HEALTH CARE EDUCATION/TRAINING PROGRAM

## 2021-06-23 PROCEDURE — 17000 DESTRUCT PREMALG LESION: CPT | Mod: PBBFAC | Performed by: STUDENT IN AN ORGANIZED HEALTH CARE EDUCATION/TRAINING PROGRAM

## 2021-06-23 PROCEDURE — 99202 OFFICE O/P NEW SF 15 MIN: CPT | Mod: 25,S$PBB,, | Performed by: STUDENT IN AN ORGANIZED HEALTH CARE EDUCATION/TRAINING PROGRAM

## 2021-06-23 PROCEDURE — 17000 PR DESTRUCTION(LASER SURGERY,CRYOSURGERY,CHEMOSURGERY),PREMALIGNANT LESIONS,FIRST LESION: ICD-10-PCS | Mod: S$PBB,,, | Performed by: STUDENT IN AN ORGANIZED HEALTH CARE EDUCATION/TRAINING PROGRAM

## 2021-06-23 PROCEDURE — 99999 PR PBB SHADOW E&M-EST. PATIENT-LVL III: ICD-10-PCS | Mod: PBBFAC,,, | Performed by: STUDENT IN AN ORGANIZED HEALTH CARE EDUCATION/TRAINING PROGRAM

## 2021-06-23 PROCEDURE — 99999 PR PBB SHADOW E&M-EST. PATIENT-LVL III: CPT | Mod: PBBFAC,,, | Performed by: STUDENT IN AN ORGANIZED HEALTH CARE EDUCATION/TRAINING PROGRAM

## 2021-06-23 PROCEDURE — 99202 PR OFFICE/OUTPT VISIT, NEW, LEVL II, 15-29 MIN: ICD-10-PCS | Mod: 25,S$PBB,, | Performed by: STUDENT IN AN ORGANIZED HEALTH CARE EDUCATION/TRAINING PROGRAM

## 2021-06-23 RX ORDER — MUPIROCIN 20 MG/G
OINTMENT TOPICAL
Qty: 30 G | Refills: 1 | Status: SHIPPED | OUTPATIENT
Start: 2021-06-23 | End: 2021-07-21

## 2021-07-12 ENCOUNTER — PATIENT MESSAGE (OUTPATIENT)
Dept: INTERNAL MEDICINE | Facility: CLINIC | Age: 81
End: 2021-07-12

## 2021-07-12 DIAGNOSIS — Z12.5 PROSTATE CANCER SCREENING: Primary | ICD-10-CM

## 2021-07-14 ENCOUNTER — TELEPHONE (OUTPATIENT)
Dept: OPHTHALMOLOGY | Facility: CLINIC | Age: 81
End: 2021-07-14

## 2021-07-15 ENCOUNTER — LAB VISIT (OUTPATIENT)
Dept: LAB | Facility: HOSPITAL | Age: 81
End: 2021-07-15
Attending: PEDIATRICS
Payer: MEDICARE

## 2021-07-15 DIAGNOSIS — E78.00 HYPERCHOLESTEROLEMIA: ICD-10-CM

## 2021-07-15 DIAGNOSIS — Z12.5 PROSTATE CANCER SCREENING: ICD-10-CM

## 2021-07-15 DIAGNOSIS — I10 ESSENTIAL HYPERTENSION: ICD-10-CM

## 2021-07-15 LAB
ALBUMIN SERPL BCP-MCNC: 3.8 G/DL (ref 3.5–5.2)
ALP SERPL-CCNC: 63 U/L (ref 55–135)
ALT SERPL W/O P-5'-P-CCNC: 18 U/L (ref 10–44)
ANION GAP SERPL CALC-SCNC: 9 MMOL/L (ref 8–16)
AST SERPL-CCNC: 21 U/L (ref 10–40)
BILIRUB SERPL-MCNC: 0.4 MG/DL (ref 0.1–1)
BUN SERPL-MCNC: 28 MG/DL (ref 8–23)
CALCIUM SERPL-MCNC: 9.5 MG/DL (ref 8.7–10.5)
CHLORIDE SERPL-SCNC: 106 MMOL/L (ref 95–110)
CHOLEST SERPL-MCNC: 146 MG/DL (ref 120–199)
CHOLEST/HDLC SERPL: 2.9 {RATIO} (ref 2–5)
CO2 SERPL-SCNC: 24 MMOL/L (ref 23–29)
CREAT SERPL-MCNC: 1.5 MG/DL (ref 0.5–1.4)
EST. GFR  (AFRICAN AMERICAN): 49.1 ML/MIN/1.73 M^2
EST. GFR  (NON AFRICAN AMERICAN): 42.4 ML/MIN/1.73 M^2
GLUCOSE SERPL-MCNC: 86 MG/DL (ref 70–110)
HDLC SERPL-MCNC: 50 MG/DL (ref 40–75)
HDLC SERPL: 34.2 % (ref 20–50)
LDLC SERPL CALC-MCNC: 82 MG/DL (ref 63–159)
NONHDLC SERPL-MCNC: 96 MG/DL
POTASSIUM SERPL-SCNC: 4.9 MMOL/L (ref 3.5–5.1)
PROT SERPL-MCNC: 6.9 G/DL (ref 6–8.4)
SODIUM SERPL-SCNC: 139 MMOL/L (ref 136–145)
TRIGL SERPL-MCNC: 70 MG/DL (ref 30–150)

## 2021-07-15 PROCEDURE — 80053 COMPREHEN METABOLIC PANEL: CPT | Performed by: PEDIATRICS

## 2021-07-15 PROCEDURE — 84153 ASSAY OF PSA TOTAL: CPT | Performed by: PEDIATRICS

## 2021-07-15 PROCEDURE — 80061 LIPID PANEL: CPT | Performed by: PEDIATRICS

## 2021-07-15 PROCEDURE — 36415 COLL VENOUS BLD VENIPUNCTURE: CPT | Performed by: PEDIATRICS

## 2021-07-16 DIAGNOSIS — N17.9 AKI (ACUTE KIDNEY INJURY): Primary | ICD-10-CM

## 2021-07-16 LAB — COMPLEXED PSA SERPL-MCNC: 1.3 NG/ML (ref 0–4)

## 2021-07-19 ENCOUNTER — TELEPHONE (OUTPATIENT)
Dept: INTERNAL MEDICINE | Facility: CLINIC | Age: 81
End: 2021-07-19

## 2021-07-21 ENCOUNTER — LAB VISIT (OUTPATIENT)
Dept: LAB | Facility: HOSPITAL | Age: 81
End: 2021-07-21
Attending: PEDIATRICS
Payer: MEDICARE

## 2021-07-21 ENCOUNTER — OFFICE VISIT (OUTPATIENT)
Dept: INTERNAL MEDICINE | Facility: CLINIC | Age: 81
End: 2021-07-21
Payer: MEDICARE

## 2021-07-21 VITALS
HEART RATE: 82 BPM | SYSTOLIC BLOOD PRESSURE: 136 MMHG | OXYGEN SATURATION: 98 % | WEIGHT: 170 LBS | TEMPERATURE: 99 F | BODY MASS INDEX: 23.03 KG/M2 | HEIGHT: 72 IN | DIASTOLIC BLOOD PRESSURE: 72 MMHG

## 2021-07-21 DIAGNOSIS — N17.9 AKI (ACUTE KIDNEY INJURY): ICD-10-CM

## 2021-07-21 DIAGNOSIS — E78.00 HYPERCHOLESTEROLEMIA: ICD-10-CM

## 2021-07-21 DIAGNOSIS — I10 ESSENTIAL HYPERTENSION: Primary | ICD-10-CM

## 2021-07-21 DIAGNOSIS — K21.00 GASTROESOPHAGEAL REFLUX DISEASE WITH ESOPHAGITIS WITHOUT HEMORRHAGE: ICD-10-CM

## 2021-07-21 LAB
ANION GAP SERPL CALC-SCNC: 10 MMOL/L (ref 8–16)
BUN SERPL-MCNC: 31 MG/DL (ref 8–23)
CALCIUM SERPL-MCNC: 9.8 MG/DL (ref 8.7–10.5)
CHLORIDE SERPL-SCNC: 105 MMOL/L (ref 95–110)
CO2 SERPL-SCNC: 24 MMOL/L (ref 23–29)
CREAT SERPL-MCNC: 1.3 MG/DL (ref 0.5–1.4)
EST. GFR  (AFRICAN AMERICAN): 58.3 ML/MIN/1.73 M^2
EST. GFR  (NON AFRICAN AMERICAN): 50.5 ML/MIN/1.73 M^2
GLUCOSE SERPL-MCNC: 70 MG/DL (ref 70–110)
POTASSIUM SERPL-SCNC: 4.5 MMOL/L (ref 3.5–5.1)
SODIUM SERPL-SCNC: 139 MMOL/L (ref 136–145)

## 2021-07-21 PROCEDURE — 36415 COLL VENOUS BLD VENIPUNCTURE: CPT | Performed by: PEDIATRICS

## 2021-07-21 PROCEDURE — 99214 OFFICE O/P EST MOD 30 MIN: CPT | Mod: PBBFAC | Performed by: PEDIATRICS

## 2021-07-21 PROCEDURE — 99214 PR OFFICE/OUTPT VISIT, EST, LEVL IV, 30-39 MIN: ICD-10-PCS | Mod: S$PBB,,, | Performed by: PEDIATRICS

## 2021-07-21 PROCEDURE — 80048 BASIC METABOLIC PNL TOTAL CA: CPT | Performed by: PEDIATRICS

## 2021-07-21 PROCEDURE — 99999 PR PBB SHADOW E&M-EST. PATIENT-LVL IV: ICD-10-PCS | Mod: PBBFAC,,, | Performed by: PEDIATRICS

## 2021-07-21 PROCEDURE — 99999 PR PBB SHADOW E&M-EST. PATIENT-LVL IV: CPT | Mod: PBBFAC,,, | Performed by: PEDIATRICS

## 2021-07-21 PROCEDURE — 99214 OFFICE O/P EST MOD 30 MIN: CPT | Mod: S$PBB,,, | Performed by: PEDIATRICS

## 2021-07-21 RX ORDER — CETIRIZINE HYDROCHLORIDE 10 MG/1
TABLET ORAL
COMMUNITY
Start: 2021-05-24

## 2021-07-27 ENCOUNTER — PATIENT MESSAGE (OUTPATIENT)
Dept: INTERNAL MEDICINE | Facility: CLINIC | Age: 81
End: 2021-07-27

## 2021-08-16 ENCOUNTER — OFFICE VISIT (OUTPATIENT)
Dept: OPHTHALMOLOGY | Facility: CLINIC | Age: 81
End: 2021-08-16
Payer: MEDICARE

## 2021-08-16 DIAGNOSIS — H52.7 REFRACTIVE ERROR: ICD-10-CM

## 2021-08-16 DIAGNOSIS — H35.372 EPIRETINAL MEMBRANE (ERM) OF LEFT EYE: ICD-10-CM

## 2021-08-16 DIAGNOSIS — Z96.1 PSEUDOPHAKIA: Primary | ICD-10-CM

## 2021-08-16 DIAGNOSIS — H35.3131 EARLY DRY STAGE NONEXUDATIVE AGE-RELATED MACULAR DEGENERATION OF BOTH EYES: ICD-10-CM

## 2021-08-16 PROCEDURE — 92015 PR REFRACTION: ICD-10-PCS | Mod: ,,, | Performed by: OPHTHALMOLOGY

## 2021-08-16 PROCEDURE — 92134 CPTRZ OPH DX IMG PST SGM RTA: CPT | Mod: PBBFAC | Performed by: OPHTHALMOLOGY

## 2021-08-16 PROCEDURE — 92004 PR EYE EXAM, NEW PATIENT,COMPREHESV: ICD-10-PCS | Mod: S$PBB,,, | Performed by: OPHTHALMOLOGY

## 2021-08-16 PROCEDURE — 92134 POSTERIOR SEGMENT OCT RETINA (OCULAR COHERENCE TOMOGRAPHY)-BOTH EYES: ICD-10-PCS | Mod: 26,S$PBB,, | Performed by: OPHTHALMOLOGY

## 2021-08-16 PROCEDURE — 92015 DETERMINE REFRACTIVE STATE: CPT | Mod: ,,, | Performed by: OPHTHALMOLOGY

## 2021-08-16 PROCEDURE — 99999 PR PBB SHADOW E&M-EST. PATIENT-LVL II: ICD-10-PCS | Mod: PBBFAC,,, | Performed by: OPHTHALMOLOGY

## 2021-08-16 PROCEDURE — 99999 PR PBB SHADOW E&M-EST. PATIENT-LVL II: CPT | Mod: PBBFAC,,, | Performed by: OPHTHALMOLOGY

## 2021-08-16 PROCEDURE — 99212 OFFICE O/P EST SF 10 MIN: CPT | Mod: PBBFAC | Performed by: OPHTHALMOLOGY

## 2021-08-16 PROCEDURE — 92004 COMPRE OPH EXAM NEW PT 1/>: CPT | Mod: S$PBB,,, | Performed by: OPHTHALMOLOGY

## 2021-09-21 ENCOUNTER — IMMUNIZATION (OUTPATIENT)
Dept: PHARMACY | Facility: CLINIC | Age: 81
End: 2021-09-21
Payer: MEDICARE

## 2021-09-21 DIAGNOSIS — Z23 NEED FOR VACCINATION: Primary | ICD-10-CM

## 2021-10-18 ENCOUNTER — PATIENT OUTREACH (OUTPATIENT)
Dept: ADMINISTRATIVE | Facility: OTHER | Age: 81
End: 2021-10-18

## 2021-10-19 ENCOUNTER — OFFICE VISIT (OUTPATIENT)
Dept: DERMATOLOGY | Facility: CLINIC | Age: 81
End: 2021-10-19
Payer: MEDICARE

## 2021-10-19 DIAGNOSIS — L57.0 ACTINIC KERATOSES: ICD-10-CM

## 2021-10-19 DIAGNOSIS — L30.9 ECZEMA, UNSPECIFIED TYPE: Primary | ICD-10-CM

## 2021-10-19 PROCEDURE — 17000 PR DESTRUCTION(LASER SURGERY,CRYOSURGERY,CHEMOSURGERY),PREMALIGNANT LESIONS,FIRST LESION: ICD-10-PCS | Mod: S$PBB,,, | Performed by: STUDENT IN AN ORGANIZED HEALTH CARE EDUCATION/TRAINING PROGRAM

## 2021-10-19 PROCEDURE — 17003 DESTRUCT PREMALG LES 2-14: CPT | Mod: S$PBB,,, | Performed by: STUDENT IN AN ORGANIZED HEALTH CARE EDUCATION/TRAINING PROGRAM

## 2021-10-19 PROCEDURE — 17000 DESTRUCT PREMALG LESION: CPT | Mod: S$PBB,,, | Performed by: STUDENT IN AN ORGANIZED HEALTH CARE EDUCATION/TRAINING PROGRAM

## 2021-10-19 PROCEDURE — 99214 PR OFFICE/OUTPT VISIT, EST, LEVL IV, 30-39 MIN: ICD-10-PCS | Mod: 25,S$PBB,, | Performed by: STUDENT IN AN ORGANIZED HEALTH CARE EDUCATION/TRAINING PROGRAM

## 2021-10-19 PROCEDURE — 99213 OFFICE O/P EST LOW 20 MIN: CPT | Mod: PBBFAC | Performed by: STUDENT IN AN ORGANIZED HEALTH CARE EDUCATION/TRAINING PROGRAM

## 2021-10-19 PROCEDURE — 17003 DESTRUCT PREMALG LES 2-14: CPT | Mod: PBBFAC | Performed by: STUDENT IN AN ORGANIZED HEALTH CARE EDUCATION/TRAINING PROGRAM

## 2021-10-19 PROCEDURE — 99214 OFFICE O/P EST MOD 30 MIN: CPT | Mod: 25,S$PBB,, | Performed by: STUDENT IN AN ORGANIZED HEALTH CARE EDUCATION/TRAINING PROGRAM

## 2021-10-19 PROCEDURE — 99999 PR PBB SHADOW E&M-EST. PATIENT-LVL III: ICD-10-PCS | Mod: PBBFAC,,, | Performed by: STUDENT IN AN ORGANIZED HEALTH CARE EDUCATION/TRAINING PROGRAM

## 2021-10-19 PROCEDURE — 17000 DESTRUCT PREMALG LESION: CPT | Mod: PBBFAC | Performed by: STUDENT IN AN ORGANIZED HEALTH CARE EDUCATION/TRAINING PROGRAM

## 2021-10-19 PROCEDURE — 99999 PR PBB SHADOW E&M-EST. PATIENT-LVL III: CPT | Mod: PBBFAC,,, | Performed by: STUDENT IN AN ORGANIZED HEALTH CARE EDUCATION/TRAINING PROGRAM

## 2021-10-19 PROCEDURE — 17003 DESTRUCTION, PREMALIGNANT LESIONS; SECOND THROUGH 14 LESIONS: ICD-10-PCS | Mod: S$PBB,,, | Performed by: STUDENT IN AN ORGANIZED HEALTH CARE EDUCATION/TRAINING PROGRAM

## 2021-10-19 RX ORDER — TRIAMCINOLONE ACETONIDE 1 MG/G
CREAM TOPICAL 2 TIMES DAILY
Qty: 80 G | Refills: 1 | Status: SHIPPED | OUTPATIENT
Start: 2021-10-19

## 2021-11-10 ENCOUNTER — PATIENT OUTREACH (OUTPATIENT)
Dept: ADMINISTRATIVE | Facility: HOSPITAL | Age: 81
End: 2021-11-10
Payer: MEDICARE

## 2022-01-14 ENCOUNTER — LAB VISIT (OUTPATIENT)
Dept: LAB | Facility: HOSPITAL | Age: 82
End: 2022-01-14
Attending: PEDIATRICS
Payer: MEDICARE

## 2022-01-14 DIAGNOSIS — I10 ESSENTIAL HYPERTENSION: ICD-10-CM

## 2022-01-14 DIAGNOSIS — E78.00 HYPERCHOLESTEROLEMIA: ICD-10-CM

## 2022-01-14 LAB
ALBUMIN SERPL BCP-MCNC: 4 G/DL (ref 3.5–5.2)
ALP SERPL-CCNC: 71 U/L (ref 55–135)
ALT SERPL W/O P-5'-P-CCNC: 24 U/L (ref 10–44)
ANION GAP SERPL CALC-SCNC: 6 MMOL/L (ref 8–16)
AST SERPL-CCNC: 25 U/L (ref 10–40)
BILIRUB SERPL-MCNC: 0.4 MG/DL (ref 0.1–1)
BUN SERPL-MCNC: 24 MG/DL (ref 8–23)
CALCIUM SERPL-MCNC: 9.7 MG/DL (ref 8.7–10.5)
CHLORIDE SERPL-SCNC: 103 MMOL/L (ref 95–110)
CHOLEST SERPL-MCNC: 158 MG/DL (ref 120–199)
CHOLEST/HDLC SERPL: 3 {RATIO} (ref 2–5)
CO2 SERPL-SCNC: 27 MMOL/L (ref 23–29)
CREAT SERPL-MCNC: 1.2 MG/DL (ref 0.5–1.4)
EST. GFR  (AFRICAN AMERICAN): >60 ML/MIN/1.73 M^2
EST. GFR  (NON AFRICAN AMERICAN): 55.6 ML/MIN/1.73 M^2
GLUCOSE SERPL-MCNC: 86 MG/DL (ref 70–110)
HDLC SERPL-MCNC: 53 MG/DL (ref 40–75)
HDLC SERPL: 33.5 % (ref 20–50)
LDLC SERPL CALC-MCNC: 90 MG/DL (ref 63–159)
NONHDLC SERPL-MCNC: 105 MG/DL
POTASSIUM SERPL-SCNC: 5.6 MMOL/L (ref 3.5–5.1)
PROT SERPL-MCNC: 6.4 G/DL (ref 6–8.4)
SODIUM SERPL-SCNC: 136 MMOL/L (ref 136–145)
TRIGL SERPL-MCNC: 75 MG/DL (ref 30–150)

## 2022-01-14 PROCEDURE — 80061 LIPID PANEL: CPT | Performed by: PEDIATRICS

## 2022-01-14 PROCEDURE — 36415 COLL VENOUS BLD VENIPUNCTURE: CPT | Performed by: PEDIATRICS

## 2022-01-14 PROCEDURE — 80053 COMPREHEN METABOLIC PANEL: CPT | Performed by: PEDIATRICS

## 2022-01-21 ENCOUNTER — OFFICE VISIT (OUTPATIENT)
Dept: INTERNAL MEDICINE | Facility: CLINIC | Age: 82
End: 2022-01-21
Payer: MEDICARE

## 2022-01-21 VITALS
DIASTOLIC BLOOD PRESSURE: 84 MMHG | RESPIRATION RATE: 18 BRPM | WEIGHT: 171.75 LBS | TEMPERATURE: 96 F | SYSTOLIC BLOOD PRESSURE: 126 MMHG | BODY MASS INDEX: 23.29 KG/M2 | HEART RATE: 99 BPM | OXYGEN SATURATION: 99 %

## 2022-01-21 DIAGNOSIS — J31.0 CHRONIC RHINITIS: ICD-10-CM

## 2022-01-21 DIAGNOSIS — E78.00 HYPERCHOLESTEROLEMIA: ICD-10-CM

## 2022-01-21 DIAGNOSIS — Z12.5 PROSTATE CANCER SCREENING: ICD-10-CM

## 2022-01-21 DIAGNOSIS — I10 ESSENTIAL HYPERTENSION: Primary | ICD-10-CM

## 2022-01-21 DIAGNOSIS — K21.00 GASTROESOPHAGEAL REFLUX DISEASE WITH ESOPHAGITIS WITHOUT HEMORRHAGE: ICD-10-CM

## 2022-01-21 PROCEDURE — 99999 PR PBB SHADOW E&M-EST. PATIENT-LVL III: ICD-10-PCS | Mod: PBBFAC,,, | Performed by: PEDIATRICS

## 2022-01-21 PROCEDURE — 99999 PR PBB SHADOW E&M-EST. PATIENT-LVL III: CPT | Mod: PBBFAC,,, | Performed by: PEDIATRICS

## 2022-01-21 PROCEDURE — 99214 OFFICE O/P EST MOD 30 MIN: CPT | Mod: S$PBB,,, | Performed by: PEDIATRICS

## 2022-01-21 PROCEDURE — 99214 PR OFFICE/OUTPT VISIT, EST, LEVL IV, 30-39 MIN: ICD-10-PCS | Mod: S$PBB,,, | Performed by: PEDIATRICS

## 2022-01-21 PROCEDURE — 99213 OFFICE O/P EST LOW 20 MIN: CPT | Mod: PBBFAC | Performed by: PEDIATRICS

## 2022-01-21 NOTE — PROGRESS NOTES
Subjective:       Patient ID: Bharathi Parsons is a 81 y.o. male.    Chief Complaint: Follow-up    Bharathi Parsons is a 81 y.o. male who presents to clinic for a follow up    1) GERD with esophagitis and esophageal stricture. Had EGD and dilatation several years ago. On PPI quiet  2) Allergies: See Dr Meneses, uses intranasal steroids and astelin successfully.  3) Hypercholesterol: Is taking Livalo (4 mg 1/2 a day) and zetia due to crestor and other statins causing muscle achiness.  4) HTN:Tolerating ACE. B/P good.     LABS REVIEWED AND DISCUSSED WITH PATIENT: CMP and lipid panel    Review of Systems   Constitutional: Negative for activity change, appetite change, chills, diaphoresis, fatigue, fever and unexpected weight change.   HENT: Negative for nasal congestion, ear pain, mouth sores, nosebleeds, postnasal drip, rhinorrhea, sneezing and sore throat.    Eyes: Negative for photophobia, pain, discharge, redness and visual disturbance.   Respiratory: Negative for cough, chest tightness, shortness of breath, wheezing and stridor.    Cardiovascular: Negative for chest pain, palpitations and leg swelling.   Gastrointestinal: Negative for abdominal distention, blood in stool, constipation, diarrhea, nausea and vomiting.   Genitourinary: Negative for decreased urine volume, difficulty urinating, dysuria, flank pain, frequency, genital sores, hematuria and urgency.   Musculoskeletal: Negative for arthralgias, back pain, joint swelling, neck pain and neck stiffness.   Integumentary:  Negative for color change, pallor, rash and wound.   Neurological: Negative for dizziness, syncope, speech difficulty, weakness, light-headedness and headaches.   Hematological: Negative for adenopathy. Does not bruise/bleed easily.   Psychiatric/Behavioral: Negative for confusion, decreased concentration, dysphoric mood, hallucinations, sleep disturbance and suicidal ideas. The patient is not nervous/anxious.    All other systems reviewed and  are negative.        Objective:      Physical Exam  Vitals and nursing note reviewed.   Constitutional:       General: He is not in acute distress.     Appearance: He is well-developed.   Neck:      Thyroid: No thyromegaly.      Vascular: No JVD.   Cardiovascular:      Rate and Rhythm: Normal rate and regular rhythm.      Heart sounds: Normal heart sounds. No murmur heard.      Pulmonary:      Effort: Pulmonary effort is normal. No respiratory distress.      Breath sounds: Normal breath sounds. No wheezing or rales.   Abdominal:      General: There is no distension.      Palpations: Abdomen is soft. There is no mass.      Tenderness: There is no abdominal tenderness. There is no guarding.   Musculoskeletal:      Right lower leg: No edema.      Left lower leg: No edema.   Lymphadenopathy:      Cervical: No cervical adenopathy.   Skin:     Capillary Refill: Capillary refill takes less than 2 seconds.      Findings: No rash.   Neurological:      General: No focal deficit present.      Mental Status: He is alert and oriented to person, place, and time.      Cranial Nerves: No cranial nerve deficit.      Coordination: Coordination normal.   Psychiatric:         Mood and Affect: Mood normal.         Behavior: Behavior normal.         Thought Content: Thought content normal.         Judgment: Judgment normal.         Assessment:       Problem List Items Addressed This Visit     Gastroesophageal reflux disease with esophagitis    Chronic rhinitis    Essential hypertension - Primary    Relevant Orders    Comprehensive Metabolic Panel    Hypercholesterolemia    Relevant Orders    Lipid Panel      Other Visit Diagnoses     Prostate cancer screening        Relevant Orders    PSA, Screening          Plan:     Essential hypertension  -     Comprehensive Metabolic Panel; Future; Expected date: 01/21/2022    Hypercholesterolemia  -     Lipid Panel; Future; Expected date: 01/21/2022    Gastroesophageal reflux disease with  esophagitis without hemorrhage    Chronic rhinitis    Prostate cancer screening  -     PSA, Screening; Future; Expected date: 01/21/2023    B/P and lipids at goal. He is doing well. Maintain meds. F/U 6 months for yearly.  Scribe Attestation:   I, Mina Hdz, am scribing for, and in the presence of, Dr. Festus Rooney Jr. I performed the above scribed service and the documentation accurately describes the services I performed. I attest to the accuracy of the note.    I, Dr. Festus Rooney Jr, reviewed documentation as scribed above. I personally performed the services described in this documentation.  I agree that the record reflects my personal performance and is accurate and complete. Festus Rooney Jr., MD.  01/21/2022

## 2022-02-14 ENCOUNTER — OFFICE VISIT (OUTPATIENT)
Dept: DERMATOLOGY | Facility: CLINIC | Age: 82
End: 2022-02-14
Payer: MEDICARE

## 2022-02-14 DIAGNOSIS — L57.0 ACTINIC KERATOSES: ICD-10-CM

## 2022-02-14 DIAGNOSIS — D48.5 NEOPLASM OF UNCERTAIN BEHAVIOR OF SKIN: Primary | ICD-10-CM

## 2022-02-14 PROCEDURE — 99999 PR PBB SHADOW E&M-EST. PATIENT-LVL III: CPT | Mod: PBBFAC,,, | Performed by: STUDENT IN AN ORGANIZED HEALTH CARE EDUCATION/TRAINING PROGRAM

## 2022-02-14 PROCEDURE — 88304 PR  SURG PATH,LEVEL III: ICD-10-PCS | Mod: 26,,, | Performed by: DERMATOLOGY

## 2022-02-14 PROCEDURE — 99499 UNLISTED E&M SERVICE: CPT | Mod: S$PBB,,, | Performed by: STUDENT IN AN ORGANIZED HEALTH CARE EDUCATION/TRAINING PROGRAM

## 2022-02-14 PROCEDURE — 17004 PR DESTRUCTION, PREMALIGNANT LESIONS; 15 OR MORE LESIONS: ICD-10-PCS | Mod: S$PBB,,, | Performed by: STUDENT IN AN ORGANIZED HEALTH CARE EDUCATION/TRAINING PROGRAM

## 2022-02-14 PROCEDURE — 17004 DESTROY PREMAL LESIONS 15/>: CPT | Mod: S$PBB,,, | Performed by: STUDENT IN AN ORGANIZED HEALTH CARE EDUCATION/TRAINING PROGRAM

## 2022-02-14 PROCEDURE — 17004 DESTROY PREMAL LESIONS 15/>: CPT | Mod: PBBFAC | Performed by: STUDENT IN AN ORGANIZED HEALTH CARE EDUCATION/TRAINING PROGRAM

## 2022-02-14 PROCEDURE — 99999 PR PBB SHADOW E&M-EST. PATIENT-LVL III: ICD-10-PCS | Mod: PBBFAC,,, | Performed by: STUDENT IN AN ORGANIZED HEALTH CARE EDUCATION/TRAINING PROGRAM

## 2022-02-14 PROCEDURE — 99499 NO LOS: ICD-10-PCS | Mod: S$PBB,,, | Performed by: STUDENT IN AN ORGANIZED HEALTH CARE EDUCATION/TRAINING PROGRAM

## 2022-02-14 PROCEDURE — 88304 TISSUE EXAM BY PATHOLOGIST: CPT | Mod: 26,,, | Performed by: DERMATOLOGY

## 2022-02-14 PROCEDURE — 88304 TISSUE EXAM BY PATHOLOGIST: CPT | Performed by: DERMATOLOGY

## 2022-02-14 PROCEDURE — 11104 PR PUNCH BIOPSY, SKIN, SINGLE LESION: ICD-10-PCS | Mod: S$PBB,XS,, | Performed by: STUDENT IN AN ORGANIZED HEALTH CARE EDUCATION/TRAINING PROGRAM

## 2022-02-14 PROCEDURE — 11104 PUNCH BX SKIN SINGLE LESION: CPT | Mod: 59,PBBFAC | Performed by: STUDENT IN AN ORGANIZED HEALTH CARE EDUCATION/TRAINING PROGRAM

## 2022-02-14 PROCEDURE — 99213 OFFICE O/P EST LOW 20 MIN: CPT | Mod: PBBFAC,25 | Performed by: STUDENT IN AN ORGANIZED HEALTH CARE EDUCATION/TRAINING PROGRAM

## 2022-02-14 PROCEDURE — 11104 PUNCH BX SKIN SINGLE LESION: CPT | Mod: S$PBB,XS,, | Performed by: STUDENT IN AN ORGANIZED HEALTH CARE EDUCATION/TRAINING PROGRAM

## 2022-02-15 NOTE — PROGRESS NOTES
Patient Information  Name: Bharathi Parsons  : 1940  MRN: [id number]     Referring Physician:  Dr. Zamora ref. provider found   Primary Care Physician:  Dr. DAMON Rooney Jr, MD   Date of Visit: 2022      Subjective:       Bharathi Parsons is a 81 y.o. male who presents for   Chief Complaint   Patient presents with    pre-cancer     Pre-cancer spot on face      HPI  Patient with new complaint of lesion(s)  Location: scalp  Duration: months  Symptoms: scaly  Relieving factors/Previous treatments: none    Patient with new complaint of lesion(s)  Location: posterior neck  Duration: years  Symptoms: tender  Relieving factors/Previous treatments: none      Patient was last seen:10/19/2021     Prior notes by myself reviewed.   Clinical documentation obtained by nursing staff reviewed.    Review of Systems   Skin: Negative for itching and rash.        Objective:    Physical Exam   Constitutional: He appears well-developed and well-nourished. No distress.   Neurological: He is alert and oriented to person, place, and time. He is not disoriented.   Psychiatric: He has a normal mood and affect.   Skin:   Areas Examined (abnormalities noted in diagram):   Head / Face Inspection Performed  Neck Inspection Performed  Chest / Axilla Inspection Performed  Back Inspection Performed  RUE Inspected  LUE Inspection Performed              Diagram Legend     Erythematous scaling macule/papule c/w actinic keratosis       Vascular papule c/w angioma      Pigmented verrucoid papule/plaque c/w seborrheic keratosis      Yellow umbilicated papule c/w sebaceous hyperplasia      Irregularly shaped tan macule c/w lentigo     1-2 mm smooth white papules consistent with Milia      Movable subcutaneous cyst with punctum c/w epidermal inclusion cyst      Subcutaneous movable cyst c/w pilar cyst      Firm pink to brown papule c/w dermatofibroma      Pedunculated fleshy papule(s) c/w skin tag(s)      Evenly pigmented macule c/w junctional  nevus     Mildly variegated pigmented, slightly irregular-bordered macule c/w mildly atypical nevus      Flesh colored to evenly pigmented papule c/w intradermal nevus       Pink pearly papule/plaque c/w basal cell carcinoma      Erythematous hyperkeratotic cursted plaque c/w SCC      Surgical scar with no sign of skin cancer recurrence      Open and closed comedones      Inflammatory papules and pustules      Verrucoid papule consistent consistent with wart     Erythematous eczematous patches and plaques     Dystrophic onycholytic nail with subungual debris c/w onychomycosis     Umbilicated papule    Erythematous-base heme-crusted tan verrucoid plaque consistent with inflamed seborrheic keratosis     Erythematous Silvery Scaling Plaque c/w Psoriasis     See annotation          [] Data reviewed  [] Independent review of test  [] Management discussed with another provider    Assessment / Plan:      Pathology Orders:     Normal Orders This Visit    Specimen to Pathology, Dermatology     Comments:    Number of Specimens:->1  ------------------------->-------------------------  Spec 1 Procedure:->Biopsy  Spec 1 Clinical Impression:->EIC vs other  Spec 1 Source:->posterior neck  Release to patient->Immediate    Questions:    Procedure Type: Dermatology and skin neoplasms    Number of Specimens: 1    ------------------------: -------------------------    Spec 1 Procedure: Biopsy    Spec 1 Clinical Impression: EIC vs other    Spec 1 Source: posterior neck    Release to patient: Immediate        Neoplasm of uncertain behavior of skin  -     Specimen to Pathology, Dermatology  Punch biopsy procedure note:  Punch biopsy performed after verbal consent obtained. Area marked and prepped with alcohol. Approximately 1cc of 1% lidocaine with epinephrine injected. 5 mm disposable punch used to remove lesion. Hemostasis obtained and biopsy site closed with 1 - 2 prolene sutures. Wound care instructions reviewed with patient and  handout given.    Actinic keratoses  Cryosurgery Procedure Note    Verbal consent from the patient is obtained including, but not limited to, risk of hypopigmentation/hyperpigmentation, scar, recurrence of lesion. The patient is aware of the precancerous quality and need for treatment of these lesions. Liquid nitrogen cryosurgery is applied to the 20 actinic keratoses, as detailed in the physical exam, to produce a freeze injury. The patient is aware that blisters may form and is instructed on wound care with gentle cleansing and use of vaseline ointment to keep moist until healed. The patient is supplied a handout on cryosurgery and is instructed to call if lesions do not completely resolve.             LOS NUMBER AND COMPLEXITY OF PROBLEMS    COMPLEXITY OF DATA RISK TOTAL TIME (m)   53572  17003 [] 1 self-limited or minor problem [] Minimal to none [] No treatment recommended or patient to monitor 15-29  10-19   95260  10668 Low  [] 2 or > self limited or minor problems  [] 1 stable chronic illness  [] 1 acute, uncomplicated illness or injury Limited (2)  [] Prior external notes from each unique source  [] Review result of each unique test  [] Order each unique test []  Low  OTC medications, minor skin biopsy 30-44  20-29   88771  07613 Moderate  []  1 or > chronic illness with progression, exacerbation or SE of treatment  []  2 or more stable chronic illnesses  []  1 acute illness with systemic symptoms  []  1 acute complicated injury  []  1 undiagnosed new problem with uncertain prognosis Moderate (1/3 below)  []  3 or more data items        *Now includes assessment requiring independent historian  []  Independent interpretation of a test  []  Discuss management/test with another provider Moderate  []  Prescription drug mgmt  []  Minor surgery with risk discussed  []  Mgmt limited by social determinates 45-59  30-39   85452  41507 High  []  1 or more chronic illness with severe exacerbation, progression or SE of  treatment  []  1 acute or chronic illness/injury that poses a threat to life or bodily function Extensive (2/3 below)  []  3 or more data items        *Now includes assessment requiring independent historian.  []  Independent interpretation of a test  []  Discuss management/test with another provider High  []  Major surgery with risk discussed  []  Drug therapy requiring intensive monitoring for toxicity  []  Hospitalization  []  Decision for DNR 60-74  40-54      No follow-ups on file.    Ann Sanches MD, FAAD  [facility]

## 2022-02-21 ENCOUNTER — HOSPITAL ENCOUNTER (OUTPATIENT)
Dept: RADIOLOGY | Facility: CLINIC | Age: 82
Discharge: HOME | End: 2022-02-21
Attending: PHYSICIAN ASSISTANT
Payer: MEDICARE

## 2022-02-21 ENCOUNTER — OFFICE VISIT (OUTPATIENT)
Dept: URGENT CARE | Facility: CLINIC | Age: 82
End: 2022-02-21
Payer: MEDICARE

## 2022-02-21 VITALS
OXYGEN SATURATION: 98 % | HEART RATE: 91 BPM | TEMPERATURE: 98 F | SYSTOLIC BLOOD PRESSURE: 137 MMHG | DIASTOLIC BLOOD PRESSURE: 91 MMHG | RESPIRATION RATE: 18 BRPM

## 2022-02-21 DIAGNOSIS — M25.562 ACUTE PAIN OF LEFT KNEE: Primary | ICD-10-CM

## 2022-02-21 DIAGNOSIS — M25.562 ACUTE PAIN OF LEFT KNEE: ICD-10-CM

## 2022-02-21 PROCEDURE — 99214 PR OFFICE/OUTPT VISIT, EST, LEVL IV, 30-39 MIN: ICD-10-PCS | Mod: S$GLB,,, | Performed by: PHYSICIAN ASSISTANT

## 2022-02-21 PROCEDURE — 73562 X-RAY EXAM OF KNEE 3: CPT | Mod: LT,S$GLB,, | Performed by: RADIOLOGY

## 2022-02-21 PROCEDURE — 99214 OFFICE O/P EST MOD 30 MIN: CPT | Mod: S$GLB,,, | Performed by: PHYSICIAN ASSISTANT

## 2022-02-21 PROCEDURE — 73562 XR KNEE 3 VIEW LEFT: ICD-10-PCS | Mod: LT,S$GLB,, | Performed by: RADIOLOGY

## 2022-02-21 NOTE — PROGRESS NOTES
Subjective:       Patient ID: Bharathi Parsons is a 81 y.o. male.    Vitals:  temperature is 98.2 °F (36.8 °C). His blood pressure is 137/91 (abnormal) and his pulse is 91. His respiration is 18 and oxygen saturation is 98%.     Chief Complaint: Leg Injury (PT PRESENTS TO / WITH LEFT LEG INJURY SINCE Saturday,PT STATED HE GOT UP OUT HIS CHAIR AND HIS LEG GAVE OUT ON HIM AND ALL OF SUDDEN HE CANT WALK ON IT ANYMORE.)     80 yo male with hx of arthritis and HTN presents with L knee pain that started 3 days ago.  Pt recalls standing up from sofa, able to bear weight, but when he tried to take a step forward he had pain. He took some Ibuprofen for relief.  Pt states his pain was better this morning but gets progressively worse throughout the days.  He denies falling or hearing a pop.  Patient remarks that at home his wife commented that his leg looked swollen.  He typically walks unassisted and leans on counter surfaces.  Denies recent surgery or being bed ridden.  Denies any hx of gout. Denies CP, SOB, rashes, hip, thigh, or ankle pain.  NKDA.      Leg Pain   The incident occurred 2 days ago. The incident occurred at home. There was no injury mechanism. The pain is present in the left leg. The quality of the pain is described as aching. The pain is at a severity of 7/10. The pain is mild. Associated symptoms include an inability to bear weight. The symptoms are aggravated by weight bearing. He has tried nothing for the symptoms. The treatment provided no relief.       Constitution: Negative for chills and fever.   Cardiovascular: Negative for chest pain and leg swelling.   Respiratory: Negative for cough and shortness of breath.    Musculoskeletal: Positive for joint swelling, arthritis and pain with walking. Negative for trauma, joint pain and gout.   Skin: Negative for color change, rash, wound and erythema.   Neurological: Negative for passing out, loss of balance and loss of consciousness.       Objective:       Physical Exam   Constitutional: He is oriented to person, place, and time. He is cooperative.  Non-toxic appearance. He does not appear ill. No distress.   Cardiovascular: Normal rate, regular rhythm, normal heart sounds and normal pulses.   Pulmonary/Chest: Effort normal and breath sounds normal. No stridor. No respiratory distress. He has no wheezes. He has no rhonchi. He has no rales.   Abdominal: Normal appearance.   Musculoskeletal:         General: Swelling present. No tenderness or signs of injury.      Right knee: He exhibits no swelling, no erythema and no bony tenderness. No tenderness found.      Left knee: He exhibits swelling. He exhibits no erythema and no bony tenderness. No tenderness found.      Right ankle: Normal. No tenderness.      Left ankle: Normal. No tenderness.      Right lower leg: He exhibits no tenderness and no bony tenderness. No edema.      Left lower leg: He exhibits no tenderness and no bony tenderness. No edema.      Comments: Difficulty assessing FROM due to patient being in wheelchair. Patient able to flex and extend bilateral knees without pain. Able to flex bilateral hips without pain.  FROM on bilateral ankles. L knee is warm and edematous. No pain or cyst behind the knees. No erythema or overlying rash noted.    Neurological: He is alert and oriented to person, place, and time.   Skin: Skin is warm, dry, not diaphoretic, not pale and no rash. No bruising and No erythema jaundice  Psychiatric: His behavior is normal. Mood normal.   Nursing note and vitals reviewed.        Assessment:       1. Acute pain of left knee          Plan:         Acute pain of left knee  -     Cancel: X-Ray Knee Complete 4 or More Views Left; Future; Expected date: 02/21/2022  -     XR KNEE 3 VIEW LEFT; Future; Expected date: 02/21/2022  -     IMMOBILIZER FOR HOME USE  -     Ambulatory referral/consult to Orthopedics           Medical Decision Making:   Initial Assessment:   82 yo male with hx of  arthritis presents with unilateral L knee pain that started 3 days ago after trying to get up and walk from couch.  Denies fall or trauma.   Ibuprofen used for relief. States he feels better today and in the mornings but progressively gets worse, causing shorter walking distances for each attempt.   Patient walks unassisted and resting comfortably in room.     Differential Diagnosis:   DVT unlikely from Well's Score. No recent surgeries, no shortness of breath or cough, posterior knee pain, VSS.  Septic joint unlikely due to painless joint mobilization, patient is afebrile, and no overlying erythema      Independently Interpreted Test(s):   I have ordered and independently interpreted X-rays - see summary below.  Clinical Tests:   Radiological Study: Ordered and Reviewed  Urgent Care Management:  Ordered L knee X ray due to swelling and pain   Continue to take Ibuprofen/Tylenol as needed for pain          Xray shows concerns for patella displacement of left knee on wet read.  Pending formal read per radiologist.  Will update patient with new findings.  Will place patient in knee immobilizer.  Recommend decreased use of knee and minimize ambulation without assistance.  Referral placed to ortho.  Tylenol and/or Ibuprofen as needed for pain.  Return to clinic with new or worsening symptoms.         Pain Refusal Text: I offered to prescribe pain medication but the patient refused to take this medication.

## 2022-02-22 ENCOUNTER — TELEPHONE (OUTPATIENT)
Dept: SPORTS MEDICINE | Facility: CLINIC | Age: 82
End: 2022-02-22
Payer: MEDICARE

## 2022-02-22 NOTE — TELEPHONE ENCOUNTER
Called patient left message to return call to discuss further       ----- Message from Loretta Davidson sent at 2/22/2022  2:53 PM CST -----  Contact: self/[phone]  Type:  Sooner Apoointment Request    Caller is requesting a sooner appointment.  Caller declined first available appointment listed below.  Caller will not accept being placed on the waitlist and is requesting a message be sent to doctor.  Name of Caller:Bharathi Parsons  When is the first available appointment?  Symptoms:painful knee  Would the patient rather a call back or a response via [facility]? Call back   Best Call Back Number:[phone]  Additional Information:

## 2022-02-24 ENCOUNTER — OFFICE VISIT (OUTPATIENT)
Dept: SPORTS MEDICINE | Facility: CLINIC | Age: 82
End: 2022-02-24
Payer: MEDICARE

## 2022-02-24 VITALS — WEIGHT: 171.75 LBS | BODY MASS INDEX: 23.26 KG/M2 | HEIGHT: 72 IN

## 2022-02-24 DIAGNOSIS — M25.462 EFFUSION OF LEFT KNEE: Primary | ICD-10-CM

## 2022-02-24 DIAGNOSIS — S83.002A ACQUIRED SUBLUXATION OF LEFT PATELLA, INITIAL ENCOUNTER: ICD-10-CM

## 2022-02-24 DIAGNOSIS — Z79.899 DVT PROPHYLAXIS: ICD-10-CM

## 2022-02-24 DIAGNOSIS — M25.562 LEFT KNEE PAIN, UNSPECIFIED CHRONICITY: ICD-10-CM

## 2022-02-24 DIAGNOSIS — R60.0 LOCALIZED EDEMA: ICD-10-CM
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
## 2022-02-24 DIAGNOSIS — M25.562 LEFT KNEE PAIN, UNSPECIFIED CHRONICITY: Primary | ICD-10-CM

## 2022-02-24 PROCEDURE — 99999 PR PBB SHADOW E&M-EST. PATIENT-LVL III: ICD-10-PCS | Mod: PBBFAC,,, | Performed by: FAMILY MEDICINE

## 2022-02-24 PROCEDURE — 99214 PR OFFICE/OUTPT VISIT, EST, LEVL IV, 30-39 MIN: ICD-10-PCS | Mod: 25,S$PBB,, | Performed by: FAMILY MEDICINE

## 2022-02-24 PROCEDURE — 20610 DRAIN/INJ JOINT/BURSA W/O US: CPT | Mod: PBBFAC | Performed by: FAMILY MEDICINE

## 2022-02-24 PROCEDURE — 20610 LARGE JOINT ASPIRATION/INJECTION: L KNEE: ICD-10-PCS | Mod: S$PBB,LT,, | Performed by: FAMILY MEDICINE

## 2022-02-24 PROCEDURE — 99214 OFFICE O/P EST MOD 30 MIN: CPT | Mod: 25,S$PBB,, | Performed by: FAMILY MEDICINE

## 2022-02-24 PROCEDURE — 99213 OFFICE O/P EST LOW 20 MIN: CPT | Mod: PBBFAC,25 | Performed by: FAMILY MEDICINE

## 2022-02-24 PROCEDURE — 99999 PR PBB SHADOW E&M-EST. PATIENT-LVL III: CPT | Mod: PBBFAC,,, | Performed by: FAMILY MEDICINE

## 2022-02-24 RX ORDER — BETAMETHASONE SODIUM PHOSPHATE AND BETAMETHASONE ACETATE 3; 3 MG/ML; MG/ML
6 INJECTION, SUSPENSION INTRA-ARTICULAR; INTRALESIONAL; INTRAMUSCULAR; SOFT TISSUE
Status: DISCONTINUED | OUTPATIENT
Start: 2022-02-24 | End: 2022-02-24 | Stop reason: HOSPADM

## 2022-02-24 RX ADMIN — BETAMETHASONE SODIUM PHOSPHATE AND BETAMETHASONE ACETATE 6 MG: 3; 3 INJECTION, SUSPENSION INTRA-ARTICULAR; INTRALESIONAL; INTRAMUSCULAR at 03:02

## 2022-02-24 NOTE — PROGRESS NOTES
Subjective:     Patient ID: Bharathi Parsons is a 81 y.o. male.    Chief Complaint: Pain of the Left Knee      HPI:  Patient states he is added left ankle problems through the years and foot problems but not knee problems.      For 5 days ago after walking to an event with his wife  leaning on him for assistance he noticed more pain in the left knee and some swelling the next morning which has persisted.  He did not noticed warmth or redness and has not had gout mentions that he had some redness at the knee.    Patient states the problem is slightly better but he still has pain 7/10 and he has difficulty standing up and ambulating because of the severe pain with weight-bearing.  New line has very minimal pain when sitting.    No previous DVTs.    Past Medical History:   Diagnosis Date    DJD (degenerative joint disease)     Hypertension      Past Surgical History:   Procedure Laterality Date    APPENDECTOMY      as child    CATARACT EXTRACTION W/  INTRAOCULAR LENS IMPLANT  OD before 2003    CATARACT EXTRACTION W/  INTRAOCULAR LENS IMPLANT  OS 12/19/07 with YAG    FOOT ARTHRODESIS      medial column arthrodesis left foot  2011     Family History   Problem Relation Age of Onset    Glaucoma Maternal Uncle     Cataracts Mother     Heart disease Father      Social History     Socioeconomic History    Marital status:    Tobacco Use    Smoking status: Never Smoker    Smokeless tobacco: Never Used   Substance and Sexual Activity    Alcohol use: Yes    Drug use: No   Social History Narrative    , No smokers in household, 1 Dog.       Current Outpatient Medications:     azelastine (ASTELIN) 137 mcg (0.1 %) nasal spray, 2 sprays by Nasal route as needed. , Disp: , Rfl:     cetirizine (ZYRTEC) 10 MG tablet, , Disp: , Rfl:     ezetimibe (ZETIA) 10 mg tablet, Take 10 mg by mouth once daily., Disp: , Rfl:     fluticasone (FLONASE) 50 mcg/actuation nasal spray, , Disp: , Rfl:     IBUPROFEN ORAL, Take  by mouth as needed. , Disp: , Rfl:     lisinopriL 10 MG tablet, Take 1 tablet by mouth once daily, Disp: 90 tablet, Rfl: 3    LIVALO 4 mg Tab, Take 1 tablet by mouth once daily, Disp: 90 tablet, Rfl: 3    triamcinolone acetonide 0.1% (KENALOG) 0.1 % cream, Apply topically 2 (two) times daily., Disp: 80 g, Rfl: 1  Review of patient's allergies indicates:  No Known Allergies  Review of Systems   Constitutional: Negative for chills, fever and weight loss.   Respiratory: Negative for shortness of breath.    Cardiovascular: Negative for chest pain and palpitations.       Objective:   Body mass index is 23.29 kg/m².  There were no vitals filed for this visit.        Ortho/SPM Exam-alert and oriented thin well-nourished well-developed, sitting wheelchair in no acute distress    Respiratory effort is normal    Left knee-no acute deformity redness or abnormal warmth    2+ diffuse edema of the joint is noted    Mild tenderness at the joint lines    Range of motion is 5° to 100°./able to set on exam table with assistance    Patient can extend the knee against gravity to 5° but has some discomfort with terminal extension.  Patient feels the most pain in the popliteal region on this motion.    Mild varus and valgus laxity and negative Lachman's    Psychiatric good affect cognition    Plan-aspirate fluid from but edematous knee and inject cortisone.  Ice or cool compresses.  Knee stabilizer brace.    Ultrasound to rule out DVT and to check for possible ruptured Baker cyst  Patient Instructions   Knee brace supplied today in use as much as needed.    Ice for 15 minutes 3 times a day    Tylenol twice a day as needed for pain    Ultrasound within 24 hours to rule out DVT in checked for popliteal cyst      IMAGING: XR KNEE 3 VIEW LEFT  Narrative: EXAMINATION:  XR KNEE 3 VIEW LEFT    CLINICAL HISTORY:  Pain in left knee    TECHNIQUE:  AP, lateral, and Merchant views of the left knee were  performed.    COMPARISON:  None    FINDINGS:  There is mild-to-moderate joint space narrowing and osteophyte formation seen involving all 3 compartments of the left knee.  No acute fracture or dislocation.  Vascular calcifications are noted.  No significant joint effusion suggested.  There is some faint calcification seen medial to the joint line which is likely representative some chondrocalcinosis within the meniscus.  Impression: As above    Electronically signed by: Rubin Díaz DO  Date:    02/21/2022  Time:    12:39       Radiographs / Imaging : Relevant imaging results reviewed by me and interpreted by me, discussed with the patient and / or family -x-ray reviewed by me and agree with report      Assessment:     Encounter Diagnoses   Name Primary?    Effusion of left knee Yes    Left knee pain, unspecified chronicity     Acquired subluxation of left patella, initial encounter         Plan:   Effusion of left knee  -     Large Joint Aspiration/Injection: L knee    Left knee pain, unspecified chronicity    Acquired subluxation of left patella, initial encounter        The patient verbalized good understanding of the medical issues discussed today and expressed appreciation for the care provided.  Patient was given the opportunity to ask questions and be an active participant in their medical care. Patient had no further questions or concerns at this time.     The patient was encouraged to participate in appropriate physical activity.      Saad Price M.D.  [facility]        This note was dictated using voice recognition software and may have sound a like errors.

## 2022-02-24 NOTE — PATIENT INSTRUCTIONS
Knee brace supplied today in use as much as needed.    Ice for 15 minutes 3 times a day    Tylenol twice a day as needed for pain    Ultrasound within 24 hours to rule out DVT in checked for popliteal cyst

## 2022-02-24 NOTE — PROCEDURES
Large Joint Aspiration/Injection: L knee    Date/Time: 2/24/2022 3:20 PM  Performed by: Saad Price MD  Authorized by: Saad Price MD     Indications:  Pain  Site marked: the procedure site was marked    Timeout: prior to procedure the correct patient, procedure, and site was verified    Prep: patient was prepped and draped in usual sterile fashion    Local anesthetic:  Bupivacaine 0.25% without epinephrine and lidocaine 1% without epinephrine  Approach:  Anterolateral  Location:  Knee  Site:  L knee  Medications:  6 mg betamethasone acetate-betamethasone sodium phosphate 6 mg/mL  Patient tolerance:  Patient tolerated the procedure well with no immediate complications     3 cc serosanguineous fluid aspirated before injection

## 2022-02-28 ENCOUNTER — TELEPHONE (OUTPATIENT)
Dept: SPORTS MEDICINE | Facility: CLINIC | Age: 82
End: 2022-02-28
Payer: MEDICARE

## 2022-02-28 ENCOUNTER — CLINICAL SUPPORT (OUTPATIENT)
Dept: DERMATOLOGY | Facility: CLINIC | Age: 82
End: 2022-02-28
Payer: MEDICARE

## 2022-02-28 ENCOUNTER — PATIENT MESSAGE (OUTPATIENT)
Dept: SPORTS MEDICINE | Facility: CLINIC | Age: 82
End: 2022-02-28
Payer: MEDICARE

## 2022-02-28 ENCOUNTER — HOSPITAL ENCOUNTER (OUTPATIENT)
Dept: RADIOLOGY | Facility: HOSPITAL | Age: 82
Discharge: HOME | End: 2022-02-28
Attending: FAMILY MEDICINE
Payer: MEDICARE

## 2022-02-28 DIAGNOSIS — R60.0 LOCALIZED EDEMA: ICD-10-CM

## 2022-02-28 DIAGNOSIS — Z48.02 VISIT FOR SUTURE REMOVAL: Primary | ICD-10-CM
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
## 2022-02-28 PROCEDURE — 93971 EXTREMITY STUDY: CPT | Mod: 26,LT,, | Performed by: RADIOLOGY

## 2022-02-28 PROCEDURE — 93971 EXTREMITY STUDY: CPT | Mod: TC,LT

## 2022-02-28 PROCEDURE — 99024 POSTOP FOLLOW-UP VISIT: CPT | Mod: POP,,, | Performed by: STUDENT IN AN ORGANIZED HEALTH CARE EDUCATION/TRAINING PROGRAM

## 2022-02-28 PROCEDURE — 93971 US LOWER EXTREMITY VEINS LEFT: ICD-10-PCS | Mod: 26,LT,, | Performed by: RADIOLOGY

## 2022-02-28 PROCEDURE — 99024 PR POST-OP FOLLOW-UP VISIT: ICD-10-PCS | Mod: POP,,, | Performed by: STUDENT IN AN ORGANIZED HEALTH CARE EDUCATION/TRAINING PROGRAM

## 2022-02-28 NOTE — TELEPHONE ENCOUNTER
Attempted to discuss ultrasound results with patient and send referral to appropriate dept. Line busy.

## 2022-03-02 ENCOUNTER — OFFICE VISIT (OUTPATIENT)
Dept: SPORTS MEDICINE | Facility: CLINIC | Age: 82
End: 2022-03-02
Payer: MEDICARE

## 2022-03-02 VITALS — WEIGHT: 171 LBS | HEIGHT: 72 IN | BODY MASS INDEX: 23.16 KG/M2

## 2022-03-02 DIAGNOSIS — M25.562 LEFT KNEE PAIN, UNSPECIFIED CHRONICITY: Primary | ICD-10-CM

## 2022-03-02 PROCEDURE — 20610 DRAIN/INJ JOINT/BURSA W/O US: CPT | Mod: PBBFAC | Performed by: FAMILY MEDICINE

## 2022-03-02 PROCEDURE — 99214 PR OFFICE/OUTPT VISIT, EST, LEVL IV, 30-39 MIN: ICD-10-PCS | Mod: 25,S$PBB,, | Performed by: FAMILY MEDICINE

## 2022-03-02 PROCEDURE — 99213 OFFICE O/P EST LOW 20 MIN: CPT | Mod: PBBFAC | Performed by: FAMILY MEDICINE

## 2022-03-02 PROCEDURE — 99999 PR PBB SHADOW E&M-EST. PATIENT-LVL III: ICD-10-PCS | Mod: PBBFAC,,, | Performed by: FAMILY MEDICINE

## 2022-03-02 PROCEDURE — 99999 PR PBB SHADOW E&M-EST. PATIENT-LVL III: CPT | Mod: PBBFAC,,, | Performed by: FAMILY MEDICINE

## 2022-03-02 PROCEDURE — 20610 LARGE JOINT ASPIRATION/INJECTION: L KNEE: ICD-10-PCS | Mod: S$PBB,LT,, | Performed by: FAMILY MEDICINE

## 2022-03-02 PROCEDURE — 99214 OFFICE O/P EST MOD 30 MIN: CPT | Mod: 25,S$PBB,, | Performed by: FAMILY MEDICINE

## 2022-03-02 RX ORDER — BETAMETHASONE SODIUM PHOSPHATE AND BETAMETHASONE ACETATE 3; 3 MG/ML; MG/ML
6 INJECTION, SUSPENSION INTRA-ARTICULAR; INTRALESIONAL; INTRAMUSCULAR; SOFT TISSUE
Status: DISCONTINUED | OUTPATIENT
Start: 2022-03-02 | End: 2022-03-02 | Stop reason: HOSPADM

## 2022-03-02 RX ADMIN — BETAMETHASONE SODIUM PHOSPHATE AND BETAMETHASONE ACETATE 6 MG: 3; 3 INJECTION, SUSPENSION INTRA-ARTICULAR; INTRALESIONAL; INTRAMUSCULAR at 03:03

## 2022-03-02 NOTE — PROCEDURES
Large Joint Aspiration/Injection: L knee    Date/Time: 3/2/2022 3:00 PM  Performed by: Saad Price MD  Authorized by: Saad Price MD     Indications:  Pain  Site marked: the procedure site was marked    Timeout: prior to procedure the correct patient, procedure, and site was verified    Prep: patient was prepped and draped in usual sterile fashion    Local anesthetic:  Bupivacaine 0.25% without epinephrine and lidocaine 1% with epinephrine  Approach:  Anterolateral  Location:  Knee  Site:  L knee  Medications:  6 mg betamethasone acetate-betamethasone sodium phosphate 6 mg/mL

## 2022-03-02 NOTE — PATIENT INSTRUCTIONS
Ice for 15 minutes to 3 times a day for the next 2 days decreased activity during that time protect knee from stress or injury    Tylenol as needed    Recheck here in about 4 weeks own or if any problem    Continue wearing brace

## 2022-03-02 NOTE — PROGRESS NOTES
Subjective:     Patient ID: Bharathi Parsons is a 81 y.o. male.    Chief Complaint: Pain of the Left Knee      HPI:  Patient had been doing well but the last few days left knee has been hurting worse particularly in the anterior aspect.  No noticeable swelling however.    Patient been walking the dog and doing other activities until the worsens.  Pain is 6 7/10.  No recent fall      Recent ultrasound revealed small popliteal cyst but no DVT.  Patient has sinus start with cortisone injection as treatment but we offered to refer her for more advanced complex treatment if he desires letter.    Past Medical History:   Diagnosis Date    DJD (degenerative joint disease)     Hypertension      Past Surgical History:   Procedure Laterality Date    APPENDECTOMY      as child    CATARACT EXTRACTION W/  INTRAOCULAR LENS IMPLANT  OD before 2003    CATARACT EXTRACTION W/  INTRAOCULAR LENS IMPLANT  OS 12/19/07 with YAG    FOOT ARTHRODESIS      medial column arthrodesis left foot  2011     Family History   Problem Relation Age of Onset    Glaucoma Maternal Uncle     Cataracts Mother     Heart disease Father      Social History     Socioeconomic History    Marital status:    Tobacco Use    Smoking status: Never Smoker    Smokeless tobacco: Never Used   Substance and Sexual Activity    Alcohol use: Yes    Drug use: No   Social History Narrative    , No smokers in household, 1 Dog.       Current Outpatient Medications:     azelastine (ASTELIN) 137 mcg (0.1 %) nasal spray, 2 sprays by Nasal route as needed. , Disp: , Rfl:     cetirizine (ZYRTEC) 10 MG tablet, , Disp: , Rfl:     ezetimibe (ZETIA) 10 mg tablet, Take 10 mg by mouth once daily., Disp: , Rfl:     fluticasone (FLONASE) 50 mcg/actuation nasal spray, , Disp: , Rfl:     IBUPROFEN ORAL, Take by mouth as needed. , Disp: , Rfl:     lisinopriL 10 MG tablet, Take 1 tablet by mouth once daily, Disp: 90 tablet, Rfl: 3    LIVALO 4 mg Tab, Take 1 tablet  by mouth once daily, Disp: 90 tablet, Rfl: 3    triamcinolone acetonide 0.1% (KENALOG) 0.1 % cream, Apply topically 2 (two) times daily., Disp: 80 g, Rfl: 1  Review of patient's allergies indicates:  No Known Allergies  Review of Systems   Constitutional: Negative for chills, fever and weight loss.   Respiratory: Negative for shortness of breath.    Cardiovascular: Negative for chest pain and palpitations.       Objective:   Body mass index is 23.19 kg/m².  There were no vitals filed for this visit.        Ortho/SPM Exam-alert and oriented well-nourished well-developed pleasant adult male in wheelchair at present in no acute distress    Respiratory effort normal    Left knee-chronic bony changes but no acute deformity    Mild diffuse atrophy of musculature    Range of motion 5-90 degrees    1+ varus and valgus laxity and negative Lachman's    Mild tenderness at the medial and lateral joint lines    Neurovascular intact    Psychiatric good affect cognition    Discussion-and plan-cortisone injection to decrease pain improve mobility today.  Use ice and Voltaren gel    Avoid painful movements for the next few weeks and recheck here in 3-4 weeks or sooner if any problem    Patient Instructions   Ice for 15 minutes to 3 times a day for the next 2 days decreased activity during that time protect knee from stress or injury    Tylenol as needed    Recheck here in about 4 weeks own or if any problem    Continue wearing brace      IMAGING: US Lower Extremity Veins Left  Narrative: EXAMINATION:  US LOWER EXTREMITY VEINS LEFT    CLINICAL HISTORY:  Unspecified fall, initial encounter    TECHNIQUE:  Duplex and color flow Doppler evaluation and graded compression of the left lower extremity veins was performed.    COMPARISON:  None.    FINDINGS:  Left thigh veins: The common femoral, femoral, popliteal, upper greater saphenous, and deep femoral veins are patent and free of thrombus. The veins are normally compressible and have  normal phasic flow and augmentation response.    Left calf veins: The visualized calf veins are patent.    Miscellaneous: There is a 4.6 x 1.6 x 2.7 cm multi septated cystic lesion within the popliteal fossa likely representing a Baker cyst.  Impression: 1.  No evidence of deep venous thrombosis in the left lower extremity.    2.  4.6 x 1.6 x 2.7 cm multiseptated cystic lesion within the left popliteal fossa favored to represent a Baker cyst given its location.    Electronically signed by: Isiah Lockhart  Date:    02/28/2022  Time:    15:05       Radiographs / Imaging : Relevant imaging results reviewed by me and interpreted by me, discussed with the patient and / or family x-ray reviewed by me and agree with report and discussed his ultrasound as well.      Assessment:     Encounter Diagnosis   Name Primary?    Left knee pain, unspecified chronicity Yes        Plan:   Left knee pain, unspecified chronicity  -     Large Joint Aspiration/Injection: L knee        The patient verbalized good understanding of the medical issues discussed today and expressed appreciation for the care provided.  Patient was given the opportunity to ask questions and be an active participant in their medical care. Patient had no further questions or concerns at this time.     The patient was encouraged to participate in appropriate physical activity.      Saad Price M.D.  [facility]        This note was dictated using voice recognition software and may have sound a like errors.

## 2022-03-03 ENCOUNTER — OFFICE VISIT (OUTPATIENT)
Dept: INTERNAL MEDICINE | Facility: CLINIC | Age: 82
End: 2022-03-03
Payer: MEDICARE

## 2022-03-03 VITALS
TEMPERATURE: 98 F | OXYGEN SATURATION: 97 % | HEIGHT: 72 IN | HEART RATE: 118 BPM | WEIGHT: 168.88 LBS | SYSTOLIC BLOOD PRESSURE: 138 MMHG | BODY MASS INDEX: 22.87 KG/M2 | DIASTOLIC BLOOD PRESSURE: 82 MMHG

## 2022-03-03 DIAGNOSIS — M23.312 INTERNAL DERANGEMENT OF LEFT KNEE INVOLVING ANTERIOR HORN OF MEDIAL MENISCUS: Primary | ICD-10-CM

## 2022-03-03 PROCEDURE — 99999 PR PBB SHADOW E&M-EST. PATIENT-LVL IV: ICD-10-PCS | Mod: PBBFAC,,, | Performed by: PEDIATRICS

## 2022-03-03 PROCEDURE — 99213 PR OFFICE/OUTPT VISIT, EST, LEVL III, 20-29 MIN: ICD-10-PCS | Mod: S$PBB,,, | Performed by: PEDIATRICS

## 2022-03-03 PROCEDURE — 99213 OFFICE O/P EST LOW 20 MIN: CPT | Mod: S$PBB,,, | Performed by: PEDIATRICS

## 2022-03-03 PROCEDURE — 99999 PR PBB SHADOW E&M-EST. PATIENT-LVL IV: CPT | Mod: PBBFAC,,, | Performed by: PEDIATRICS

## 2022-03-03 PROCEDURE — 99214 OFFICE O/P EST MOD 30 MIN: CPT | Mod: PBBFAC | Performed by: PEDIATRICS

## 2022-03-03 NOTE — PROGRESS NOTES
Subjective:       Patient ID: Bharathi Parsons is a 81 y.o. male.    Chief Complaint: Knee Pain    Bharathi Parsons is a 81 y.o. male who presents to clinic for L knee pain. He believes it started with stepping wrong in a parking lot with helping wife walk. He also says it could have worsened from going upstairs at his house wrong. Has been using a knee brace and cane. He called me for an appointment but in meantime, has seen Dr. Price which has given an injection yesterday . Worsens with walking and stairs. Says there will sometimes be a sharp pain with straightening his leg but will quickly go away. Mild instability, uses cane with uneven surfaces.    Review of Systems   Constitutional: Negative for activity change, appetite change, chills, diaphoresis, fatigue, fever and unexpected weight change.   HENT: Negative for nasal congestion, ear pain, mouth sores, nosebleeds, postnasal drip, rhinorrhea, sneezing and sore throat.    Eyes: Negative for photophobia, pain, discharge, redness and visual disturbance.   Respiratory: Negative for cough, chest tightness, shortness of breath, wheezing and stridor.    Cardiovascular: Negative for chest pain, palpitations and leg swelling.   Gastrointestinal: Negative for abdominal distention, blood in stool, constipation, diarrhea, nausea and vomiting.   Genitourinary: Negative for decreased urine volume, difficulty urinating, dysuria, flank pain, frequency, genital sores, hematuria and urgency.   Musculoskeletal: Positive for arthralgias (L knee) and myalgias. Negative for back pain, joint swelling, neck pain and neck stiffness.   Integumentary:  Negative for color change, pallor, rash and wound.   Neurological: Negative for dizziness, syncope, speech difficulty, weakness, light-headedness and headaches.   Hematological: Negative for adenopathy. Does not bruise/bleed easily.   Psychiatric/Behavioral: Negative for confusion, decreased concentration, dysphoric mood, hallucinations,  sleep disturbance and suicidal ideas. The patient is not nervous/anxious.    All other systems reviewed and are negative.        Objective:      Physical Exam  Vitals and nursing note reviewed.   Constitutional:       General: He is not in acute distress.     Appearance: He is well-developed.   Neck:      Thyroid: No thyromegaly.      Vascular: No JVD.   Cardiovascular:      Rate and Rhythm: Normal rate and regular rhythm.      Heart sounds: Normal heart sounds. No murmur heard.  Pulmonary:      Effort: Pulmonary effort is normal. No respiratory distress.      Breath sounds: Normal breath sounds. No wheezing or rales.   Abdominal:      General: There is no distension.      Palpations: Abdomen is soft. There is no mass.      Tenderness: There is no abdominal tenderness. There is no guarding.   Musculoskeletal:      Right lower leg: No edema.      Left lower leg: No edema.      Comments: osteoarthritis changes of L knee, bakers cysts possibly palpated, mild tenderness at medial joint line, otherwise   Lymphadenopathy:      Cervical: No cervical adenopathy.   Skin:     Capillary Refill: Capillary refill takes less than 2 seconds.      Findings: No rash.   Neurological:      General: No focal deficit present.      Mental Status: He is alert and oriented to person, place, and time.      Cranial Nerves: No cranial nerve deficit.      Coordination: Coordination normal.   Psychiatric:         Mood and Affect: Mood normal.         Behavior: Behavior normal.         Thought Content: Thought content normal.         Judgment: Judgment normal.         Assessment:       Problem List Items Addressed This Visit    None     Visit Diagnoses     Internal derangement of left knee involving anterior horn of medial meniscus    -  Primary    Relevant Orders    Ambulatory referral/consult to Physical/Occupational Therapy          Plan:     Internal derangement of left knee involving anterior horn of medial meniscus  -     Ambulatory  referral/consult to Physical/Occupational Therapy; Future; Expected date: 03/10/2022    Suspect meniscal injury. Await prednisone affects, use knee sleeve and cane. Start PT in about 1 week and if lack of improvement, get MRI and reconsult Dr Price.  Amanuel Attestation:   I, Mina Hdz, am scribing for, and in the presence of, Dr. Festus Rooney Jr. I performed the above scribed service and the documentation accurately describes the services I performed. I attest to the accuracy of the note.    I, Dr. Festus Rooney Jr, reviewed documentation as scribed above. I personally performed the services described in this documentation.  I agree that the record reflects my personal performance and is accurate and complete. Festus Rooney Jr., MD.  03/03/2022

## 2022-03-11 ENCOUNTER — TELEPHONE (OUTPATIENT)
Dept: ADMINISTRATIVE | Facility: HOSPITAL | Age: 82
End: 2022-03-11
Payer: MEDICARE

## 2022-03-15 LAB
FINAL PATHOLOGIC DIAGNOSIS: NORMAL
GROSS: NORMAL
Lab: NORMAL
MICROSCOPIC EXAM: NORMAL

## 2022-04-12 ENCOUNTER — TELEPHONE (OUTPATIENT)
Dept: SPORTS MEDICINE | Facility: CLINIC | Age: 82
End: 2022-04-12
Payer: MEDICARE

## 2022-04-19 ENCOUNTER — TELEPHONE (OUTPATIENT)
Dept: DERMATOLOGY | Facility: CLINIC | Age: 82
End: 2022-04-19
Payer: MEDICARE

## 2022-04-19 NOTE — TELEPHONE ENCOUNTER
returned pt's call about biopsy results . Pt understood. ----- Message from Gail Brito sent at 4/19/2022 10:20 AM CDT -----  Contact: [phone]  Patient is returning a phone call.  Who left a message for the patient: Dr Sanches's office  Does patient know what this is regarding:  no  Would you like a call back, or a response through your [facility] portal?:  phone  Comments:

## 2022-04-27 ENCOUNTER — OFFICE VISIT (OUTPATIENT)
Dept: SPORTS MEDICINE | Facility: CLINIC | Age: 82
End: 2022-04-27
Payer: MEDICARE

## 2022-04-27 ENCOUNTER — HOSPITAL ENCOUNTER (OUTPATIENT)
Dept: RADIOLOGY | Facility: HOSPITAL | Age: 82
Discharge: HOME | End: 2022-04-27
Attending: FAMILY MEDICINE
Payer: MEDICARE

## 2022-04-27 VITALS — HEIGHT: 72 IN | WEIGHT: 168.88 LBS | BODY MASS INDEX: 22.87 KG/M2

## 2022-04-27 DIAGNOSIS — M25.561 RIGHT KNEE PAIN, UNSPECIFIED CHRONICITY: Primary | ICD-10-CM

## 2022-04-27 DIAGNOSIS — M25.561 RIGHT KNEE PAIN, UNSPECIFIED CHRONICITY: ICD-10-CM

## 2022-04-27 DIAGNOSIS — M25.562 LEFT KNEE PAIN, UNSPECIFIED CHRONICITY: Primary | ICD-10-CM

## 2022-04-27 DIAGNOSIS — M17.0 BILATERAL PRIMARY OSTEOARTHRITIS OF KNEE: ICD-10-CM

## 2022-04-27 PROCEDURE — 99214 OFFICE O/P EST MOD 30 MIN: CPT | Mod: S$PBB,,, | Performed by: FAMILY MEDICINE

## 2022-04-27 PROCEDURE — 99999 PR PBB SHADOW E&M-EST. PATIENT-LVL III: ICD-10-PCS | Mod: PBBFAC,,, | Performed by: FAMILY MEDICINE

## 2022-04-27 PROCEDURE — 99999 PR PBB SHADOW E&M-EST. PATIENT-LVL III: CPT | Mod: PBBFAC,,, | Performed by: FAMILY MEDICINE

## 2022-04-27 PROCEDURE — 99213 OFFICE O/P EST LOW 20 MIN: CPT | Mod: PBBFAC | Performed by: FAMILY MEDICINE

## 2022-04-27 PROCEDURE — 73562 X-RAY EXAM OF KNEE 3: CPT | Mod: TC,LT

## 2022-04-27 PROCEDURE — 73564 XR KNEE ORTHO RIGHT WITH FLEXION: ICD-10-PCS | Mod: 26,RT,, | Performed by: RADIOLOGY

## 2022-04-27 PROCEDURE — 99214 PR OFFICE/OUTPT VISIT, EST, LEVL IV, 30-39 MIN: ICD-10-PCS | Mod: S$PBB,,, | Performed by: FAMILY MEDICINE

## 2022-04-27 PROCEDURE — 73562 X-RAY EXAM OF KNEE 3: CPT | Mod: 26,LT,, | Performed by: RADIOLOGY

## 2022-04-27 PROCEDURE — 73564 X-RAY EXAM KNEE 4 OR MORE: CPT | Mod: 26,RT,, | Performed by: RADIOLOGY

## 2022-04-27 PROCEDURE — 73562 XR KNEE ORTHO RIGHT WITH FLEXION: ICD-10-PCS | Mod: 26,LT,, | Performed by: RADIOLOGY

## 2022-04-27 NOTE — PROGRESS NOTES
Subjective:     Patient ID: Bharathi Parsons is a 82 y.o. male.    Chief Complaint: Pain of the Left Knee      HPI: Patient is being seen for a left knee follow up visit. Reports great improvement with knee pain and strength. Denies any pain during office visit, but states pain can be 4/10 if left knee is flexed too far backwards. Participating in physical therapy once a week.     Patient is happy with results from his cortisone injections and the physical therapy.  He has an avid hiker.  He would like to get Visco injections to hopefully provide longer-term relief for his knees.    Past Medical History:   Diagnosis Date    DJD (degenerative joint disease)     Hypertension      Past Surgical History:   Procedure Laterality Date    APPENDECTOMY      as child    CATARACT EXTRACTION W/  INTRAOCULAR LENS IMPLANT  OD before 2003    CATARACT EXTRACTION W/  INTRAOCULAR LENS IMPLANT  OS 12/19/07 with YAG    FOOT ARTHRODESIS      medial column arthrodesis left foot  2011     Family History   Problem Relation Age of Onset    Glaucoma Maternal Uncle     Cataracts Mother     Heart disease Father      Social History     Socioeconomic History    Marital status:    Tobacco Use    Smoking status: Never Smoker    Smokeless tobacco: Never Used   Substance and Sexual Activity    Alcohol use: Yes    Drug use: No   Social History Narrative    , No smokers in household, 1 Dog.       Current Outpatient Medications:     azelastine (ASTELIN) 137 mcg (0.1 %) nasal spray, 2 sprays by Nasal route as needed. , Disp: , Rfl:     cetirizine (ZYRTEC) 10 MG tablet, , Disp: , Rfl:     ezetimibe (ZETIA) 10 mg tablet, Take 10 mg by mouth once daily., Disp: , Rfl:     fluticasone (FLONASE) 50 mcg/actuation nasal spray, , Disp: , Rfl:     IBUPROFEN ORAL, Take by mouth as needed. , Disp: , Rfl:     lisinopriL 10 MG tablet, Take 1 tablet by mouth once daily, Disp: 90 tablet, Rfl: 3    LIVALO 4 mg Tab, Take 1 tablet by  mouth once daily, Disp: 90 tablet, Rfl: 3    triamcinolone acetonide 0.1% (KENALOG) 0.1 % cream, Apply topically 2 (two) times daily. (Patient not taking: Reported on 4/27/2022), Disp: 80 g, Rfl: 1  Review of patient's allergies indicates:  No Known Allergies  Review of Systems   Constitutional: Negative for chills, fever and weight loss.   Respiratory: Negative for shortness of breath.    Cardiovascular: Negative for chest pain and palpitations.       Objective:   Body mass index is 22.9 kg/m².  There were no vitals filed for this visit.        Ortho/SPM Exam-alert and oriented well-nourished well-developed ambulatory in no acute distress    Respiratory effort normal    Bilateral knees-no acute deformity swelling or discoloration    Range of motion 0-110 degrees with some crepitus    The joints are stable with negative Lachman's    Mild tenderness at the knee joint lines when palpated    Strength is 4/5    Neurovascular intact     Psychiatric good affect cognition    Plan-patient will continue home exercise program and walking and we will order Visco injections bilateral    MEDICAL NECESSITY FOR VISCOSUPPLEMENTATION: After thorough evaluation of the patient, I have determined that visco-supplementation is medically necessary. The patient has painful DJD of the knee with failure of conservative therapy including lifestyle modifications and rehabilitation exercises. Oral analgesis/NSAIDs have not adequately controlled symptoms and there is radiographic evidence of joint space narrowing, subchondral sclerosis, and some early osteophytic changes, or in lack of radiographic evidence, there is arthroscopic or other evidence of chondrosis.  Kellgren- Ravi grade 2 or greater.     Patient Instructions   Apply ice to affected area three times a day for (15) fifteen minutes for the next 48 hours, and reduce activity during that time.  Voltaren gel three times a day to affected area if recommended.  Oral medication if  recommended.  Physical therapy if recommended at home or at clinic.  Keep recheck visit.     Will update right knee x-rays today    We will order viscosupplementation gel injections for the patient to inject in about 4 weeks.      IMAGING: X-ray Knee Ortho Right with Flexion  Narrative: EXAMINATION:  XR KNEE ORTHO RIGHT WITH FLEXION    CLINICAL HISTORY:  Pain in right knee    TECHNIQUE:  AP standing as well as PA flexion standing and Merchant views of both knees were performed.  A lateral view of the right knee is also performed.    COMPARISON:  Knee radiographs February 21, 2022    FINDINGS:  Generalized osteopenia.  No right knee fracture identified.  Moderate medial compartment predominant tricompartmental right knee osteoarthritis.  No osseous erosion or suspicious osseous lesion.  No right knee effusion.  There is an 8 mm calcification projecting within the posterior aspect of the right knee joint suspicious for intra-articular loose body.  Subtle tricompartmental chondrocalcinosis.  No acute soft tissue abnormality identified.    Limited evaluation of the left knee demonstrates mild to moderate lateral compartment predominant tricompartmental osteoarthritis.  Impression: As above.    Electronically signed by: Isiah Lockhart  Date:    04/27/2022  Time:    16:03       Radiographs / Imaging : Relevant imaging results reviewed by me and interpreted by me, discussed with the patient and / or family     Note-patient would like to start Visco injections since he is a serious hiker    MEDICAL NECESSITY FOR VISCOSUPPLEMENTATION: After thorough evaluation of the patient, I have determined that visco-supplementation is medically necessary. The patient has painful DJD of the knee with failure of conservative therapy including lifestyle modifications and rehabilitation exercises. Oral analgesis/NSAIDs have not adequately controlled symptoms and there is radiographic evidence of joint space narrowing, subchondral  sclerosis, and some early osteophytic changes, or in lack of radiographic evidence, there is arthroscopic or other evidence of chondrosis.  Kellgren- Ravi grade 2 or greater. Bilateral.  Assessment:     Encounter Diagnoses   Name Primary?    Left knee pain, unspecified chronicity Yes    Bilateral primary osteoarthritis of knee         Plan:   Left knee pain, unspecified chronicity    Bilateral primary osteoarthritis of knee        The patient verbalized good understanding of the medical issues discussed today and expressed appreciation for the care provided.  Patient was given the opportunity to ask questions and be an active participant in their medical care. Patient had no further questions or concerns at this time.     The patient was encouraged to participate in appropriate physical activity.      Saad Price M.D.  [facility]        This note was dictated using voice recognition software and may have sound a like errors.

## 2022-04-27 NOTE — PATIENT INSTRUCTIONS
Apply ice to affected area three times a day for (15) fifteen minutes for the next 48 hours, and reduce activity during that time.  Voltaren gel three times a day to affected area if recommended.  Oral medication if recommended.  Physical therapy if recommended at home or at clinic.  Keep recheck visit.     Will update right knee x-rays today    We will order viscosupplementation gel injections for the patient to inject in about 4 weeks.

## 2022-04-29 DIAGNOSIS — M17.0 BILATERAL PRIMARY OSTEOARTHRITIS OF KNEE: Primary | ICD-10-CM

## 2022-04-29 DIAGNOSIS — M25.562 LEFT KNEE PAIN, UNSPECIFIED CHRONICITY: Primary | ICD-10-CM

## 2022-05-04 ENCOUNTER — PATIENT MESSAGE (OUTPATIENT)
Dept: SPORTS MEDICINE | Facility: CLINIC | Age: 82
End: 2022-05-04
Payer: MEDICARE

## 2022-05-04 ENCOUNTER — IMMUNIZATION (OUTPATIENT)
Dept: PHARMACY | Facility: CLINIC | Age: 82
End: 2022-05-04
Payer: MEDICARE

## 2022-05-04 DIAGNOSIS — Z23 NEED FOR VACCINATION: Primary | ICD-10-CM

## 2022-05-23 ENCOUNTER — TELEPHONE (OUTPATIENT)
Dept: SPORTS MEDICINE | Facility: CLINIC | Age: 82
End: 2022-05-23
Payer: MEDICARE

## 2022-05-23 ENCOUNTER — PATIENT MESSAGE (OUTPATIENT)
Dept: SPORTS MEDICINE | Facility: CLINIC | Age: 82
End: 2022-05-23
Payer: MEDICARE

## 2022-06-02 ENCOUNTER — OFFICE VISIT (OUTPATIENT)
Dept: SPORTS MEDICINE | Facility: CLINIC | Age: 82
End: 2022-06-02
Payer: MEDICARE

## 2022-06-02 VITALS — BODY MASS INDEX: 22.75 KG/M2 | HEIGHT: 72 IN | WEIGHT: 168 LBS

## 2022-06-02 DIAGNOSIS — M17.0 BILATERAL PRIMARY OSTEOARTHRITIS OF KNEE: Primary | ICD-10-CM

## 2022-06-02 PROCEDURE — 20610 DRAIN/INJ JOINT/BURSA W/O US: CPT | Mod: 50,PBBFAC | Performed by: FAMILY MEDICINE

## 2022-06-02 PROCEDURE — 99499 UNLISTED E&M SERVICE: CPT | Mod: S$PBB,,, | Performed by: FAMILY MEDICINE

## 2022-06-02 PROCEDURE — 20610 LARGE JOINT ASPIRATION/INJECTION: BILATERAL KNEE: ICD-10-PCS | Mod: 50,S$PBB,, | Performed by: FAMILY MEDICINE

## 2022-06-02 PROCEDURE — 99999 PR PBB SHADOW E&M-EST. PATIENT-LVL III: CPT | Mod: PBBFAC,,, | Performed by: FAMILY MEDICINE

## 2022-06-02 PROCEDURE — 99499 NO LOS: ICD-10-PCS | Mod: S$PBB,,, | Performed by: FAMILY MEDICINE

## 2022-06-02 PROCEDURE — 99999 PR PBB SHADOW E&M-EST. PATIENT-LVL III: ICD-10-PCS | Mod: PBBFAC,,, | Performed by: FAMILY MEDICINE

## 2022-06-02 PROCEDURE — 99213 OFFICE O/P EST LOW 20 MIN: CPT | Mod: PBBFAC,25 | Performed by: FAMILY MEDICINE

## 2022-06-02 RX ADMIN — Medication 10 MG: at 09:06

## 2022-06-02 NOTE — PROCEDURES
Large Joint Aspiration/Injection: bilateral knee    Date/Time: 6/2/2022 9:00 AM  Performed by: Saad Price MD  Authorized by: Saad Price MD     Consent Done?:  Yes (Verbal)  Indications:  Arthritis and pain  Site marked: the procedure site was marked    Timeout: prior to procedure the correct patient, procedure, and site was verified    Prep: patient was prepped and draped in usual sterile fashion    Approach:  Anterolateral  Location:  Knee  Laterality:  Bilateral  Site:  Bilateral knee  Medications (Right):  10 mg sodium hyaluronate (EUFLEXXA) 10 mg/mL(mw 2.4 -3.6 million)  Medications (Left):  10 mg sodium hyaluronate (EUFLEXXA) 10 mg/mL(mw 2.4 -3.6 million)  Patient tolerance:  Patient tolerated the procedure well with no immediate complications

## 2022-06-16 ENCOUNTER — OFFICE VISIT (OUTPATIENT)
Dept: SPORTS MEDICINE | Facility: CLINIC | Age: 82
End: 2022-06-16
Payer: MEDICARE

## 2022-06-16 VITALS — WEIGHT: 168 LBS | HEIGHT: 72 IN | BODY MASS INDEX: 22.75 KG/M2

## 2022-06-16 DIAGNOSIS — M17.0 BILATERAL PRIMARY OSTEOARTHRITIS OF KNEE: Primary | ICD-10-CM

## 2022-06-16 PROCEDURE — 99499 NO LOS: ICD-10-PCS | Mod: S$PBB,,, | Performed by: FAMILY MEDICINE

## 2022-06-16 PROCEDURE — 20610 LARGE JOINT ASPIRATION/INJECTION: BILATERAL KNEE: ICD-10-PCS | Mod: 50,S$PBB,, | Performed by: FAMILY MEDICINE

## 2022-06-16 PROCEDURE — 99999 PR PBB SHADOW E&M-EST. PATIENT-LVL III: ICD-10-PCS | Mod: PBBFAC,,, | Performed by: FAMILY MEDICINE

## 2022-06-16 PROCEDURE — 99213 OFFICE O/P EST LOW 20 MIN: CPT | Mod: PBBFAC | Performed by: FAMILY MEDICINE

## 2022-06-16 PROCEDURE — 20610 DRAIN/INJ JOINT/BURSA W/O US: CPT | Mod: 50,PBBFAC | Performed by: FAMILY MEDICINE

## 2022-06-16 PROCEDURE — 99499 UNLISTED E&M SERVICE: CPT | Mod: S$PBB,,, | Performed by: FAMILY MEDICINE

## 2022-06-16 PROCEDURE — 99999 PR PBB SHADOW E&M-EST. PATIENT-LVL III: CPT | Mod: PBBFAC,,, | Performed by: FAMILY MEDICINE

## 2022-06-16 RX ADMIN — Medication 10 MG: at 09:06

## 2022-06-16 NOTE — PROCEDURES
Large Joint Aspiration/Injection: bilateral knee    Date/Time: 6/16/2022 9:40 AM  Performed by: Saad Price MD  Authorized by: Saad Price MD     Consent Done?:  Yes (Verbal)  Indications:  Arthritis and pain  Site marked: the procedure site was marked    Timeout: prior to procedure the correct patient, procedure, and site was verified    Prep: patient was prepped and draped in usual sterile fashion    Approach:  Anterolateral  Location:  Knee  Laterality:  Bilateral  Site:  Bilateral knee  Medications (Right):  10 mg sodium hyaluronate (EUFLEXXA) 10 mg/mL(mw 2.4 -3.6 million)  Medications (Left):  10 mg sodium hyaluronate (EUFLEXXA) 10 mg/mL(mw 2.4 -3.6 million)  Patient tolerance:  Patient tolerated the procedure well with no immediate complications

## 2022-06-16 NOTE — PATIENT INSTRUCTIONS
Apply ice to affected area three times a day for (15) fifteen minutes for the next 48 hours, and reduce activity during that time.  Voltaren gel three times a day to affected area if recommended.  Oral medication if recommended.  Physical therapy if recommended at home or at clinic.  Keep recheck visit.    None

## 2022-06-23 ENCOUNTER — OFFICE VISIT (OUTPATIENT)
Dept: SPORTS MEDICINE | Facility: CLINIC | Age: 82
End: 2022-06-23
Payer: MEDICARE

## 2022-06-23 VITALS — HEIGHT: 72 IN | BODY MASS INDEX: 22.75 KG/M2 | WEIGHT: 168 LBS

## 2022-06-23 DIAGNOSIS — M17.0 BILATERAL PRIMARY OSTEOARTHRITIS OF KNEE: Primary | ICD-10-CM

## 2022-06-23 PROCEDURE — 99213 OFFICE O/P EST LOW 20 MIN: CPT | Mod: PBBFAC,25 | Performed by: FAMILY MEDICINE

## 2022-06-23 PROCEDURE — 99999 PR PBB SHADOW E&M-EST. PATIENT-LVL III: ICD-10-PCS | Mod: PBBFAC,,, | Performed by: FAMILY MEDICINE

## 2022-06-23 PROCEDURE — 99499 NO LOS: ICD-10-PCS | Mod: S$PBB,,, | Performed by: FAMILY MEDICINE

## 2022-06-23 PROCEDURE — 99499 UNLISTED E&M SERVICE: CPT | Mod: S$PBB,,, | Performed by: FAMILY MEDICINE

## 2022-06-23 PROCEDURE — 99999 PR PBB SHADOW E&M-EST. PATIENT-LVL III: CPT | Mod: PBBFAC,,, | Performed by: FAMILY MEDICINE

## 2022-06-23 PROCEDURE — 20610 LARGE JOINT ASPIRATION/INJECTION: BILATERAL KNEE: ICD-10-PCS | Mod: 50,S$PBB,, | Performed by: FAMILY MEDICINE

## 2022-06-23 PROCEDURE — 20610 DRAIN/INJ JOINT/BURSA W/O US: CPT | Mod: 50,PBBFAC | Performed by: FAMILY MEDICINE

## 2022-06-23 RX ADMIN — Medication 10 MG: at 09:06

## 2022-06-23 NOTE — PROCEDURES
Large Joint Aspiration/Injection: bilateral knee    Date/Time: 6/23/2022 9:20 AM  Performed by: Saad Price MD  Authorized by: Saad Price MD     Consent Done?:  Yes (Verbal)  Indications:  Arthritis and pain  Site marked: the procedure site was marked    Timeout: prior to procedure the correct patient, procedure, and site was verified    Prep: patient was prepped and draped in usual sterile fashion    Approach:  Anterolateral  Location:  Knee  Laterality:  Bilateral  Site:  Bilateral knee  Medications (Right):  10 mg sodium hyaluronate (EUFLEXXA) 10 mg/mL(mw 2.4 -3.6 million)  Medications (Left):  10 mg sodium hyaluronate (EUFLEXXA) 10 mg/mL(mw 2.4 -3.6 million)  Patient tolerance:  Patient tolerated the procedure well with no immediate complications

## 2022-06-29 DIAGNOSIS — I10 ESSENTIAL HYPERTENSION: ICD-10-CM

## 2022-06-29 NOTE — TELEPHONE ENCOUNTER
No new care gaps identified.  [facility] Embedded Care Gaps. Reference number: [id number]. 6/29/2022   11:07:07 AM ELSA

## 2022-06-30 RX ORDER — LISINOPRIL 10 MG/1
TABLET ORAL
Qty: 90 TABLET | Refills: 3 | Status: SHIPPED | OUTPATIENT
Start: 2022-06-30 | End: 2023-07-27

## 2022-06-30 NOTE — TELEPHONE ENCOUNTER
Refill Routing Note   Medication(s) are not appropriate for processing by [facility] for the following reason(s):      - Required laboratory values are abnormal    [facility] action(s):  Defer          Medication reconciliation completed: No     Appointments  past 12m or future 3m with PCP    Date Provider   Last Visit   3/3/2022 THELMA Rooney Jr., MD   Next Visit   Visit date not found THELMA Rooney Jr., MD   ED visits in past 90 days: 0        Note composed:9:52 PM 06/29/2022

## 2022-07-21 ENCOUNTER — LAB VISIT (OUTPATIENT)
Dept: LAB | Facility: HOSPITAL | Age: 82
End: 2022-07-21
Attending: PEDIATRICS
Payer: MEDICARE

## 2022-07-21 DIAGNOSIS — Z12.5 PROSTATE CANCER SCREENING: ICD-10-CM

## 2022-07-21 DIAGNOSIS — E78.00 HYPERCHOLESTEROLEMIA: ICD-10-CM

## 2022-07-21 DIAGNOSIS — I10 ESSENTIAL HYPERTENSION: ICD-10-CM

## 2022-07-21 LAB
ALBUMIN SERPL BCP-MCNC: 3.6 G/DL (ref 3.5–5.2)
ALP SERPL-CCNC: 59 U/L (ref 55–135)
ALT SERPL W/O P-5'-P-CCNC: 19 U/L (ref 10–44)
ANION GAP SERPL CALC-SCNC: 11 MMOL/L (ref 8–16)
AST SERPL-CCNC: 22 U/L (ref 10–40)
BILIRUB SERPL-MCNC: 0.3 MG/DL (ref 0.1–1)
BUN SERPL-MCNC: 28 MG/DL (ref 8–23)
CALCIUM SERPL-MCNC: 9.1 MG/DL (ref 8.7–10.5)
CHLORIDE SERPL-SCNC: 110 MMOL/L (ref 95–110)
CHOLEST SERPL-MCNC: 140 MG/DL (ref 120–199)
CHOLEST/HDLC SERPL: 2.9 {RATIO} (ref 2–5)
CO2 SERPL-SCNC: 19 MMOL/L (ref 23–29)
COMPLEXED PSA SERPL-MCNC: 1.4 NG/ML (ref 0–4)
CREAT SERPL-MCNC: 1.2 MG/DL (ref 0.5–1.4)
EST. GFR  (AFRICAN AMERICAN): >60 ML/MIN/1.73 M^2
EST. GFR  (NON AFRICAN AMERICAN): 55.2 ML/MIN/1.73 M^2
GLUCOSE SERPL-MCNC: 77 MG/DL (ref 70–110)
HDLC SERPL-MCNC: 49 MG/DL (ref 40–75)
HDLC SERPL: 35 % (ref 20–50)
LDLC SERPL CALC-MCNC: 78.8 MG/DL (ref 63–159)
NONHDLC SERPL-MCNC: 91 MG/DL
POTASSIUM SERPL-SCNC: 4.4 MMOL/L (ref 3.5–5.1)
PROT SERPL-MCNC: 6.4 G/DL (ref 6–8.4)
SODIUM SERPL-SCNC: 140 MMOL/L (ref 136–145)
TRIGL SERPL-MCNC: 61 MG/DL (ref 30–150)

## 2022-07-21 PROCEDURE — 80061 LIPID PANEL: CPT | Performed by: PEDIATRICS

## 2022-07-21 PROCEDURE — 80053 COMPREHEN METABOLIC PANEL: CPT | Performed by: PEDIATRICS

## 2022-07-21 PROCEDURE — 36415 COLL VENOUS BLD VENIPUNCTURE: CPT | Performed by: PEDIATRICS

## 2022-07-21 PROCEDURE — 84153 ASSAY OF PSA TOTAL: CPT | Performed by: PEDIATRICS

## 2022-07-25 ENCOUNTER — TELEPHONE (OUTPATIENT)
Dept: INTERNAL MEDICINE | Facility: CLINIC | Age: 82
End: 2022-07-25
Payer: MEDICARE

## 2022-08-04 ENCOUNTER — OFFICE VISIT (OUTPATIENT)
Dept: SPORTS MEDICINE | Facility: CLINIC | Age: 82
End: 2022-08-04
Payer: MEDICARE

## 2022-08-04 VITALS — HEIGHT: 72 IN | BODY MASS INDEX: 22.75 KG/M2 | WEIGHT: 168 LBS

## 2022-08-04 DIAGNOSIS — M17.0 BILATERAL PRIMARY OSTEOARTHRITIS OF KNEE: Primary | ICD-10-CM

## 2022-08-04 PROCEDURE — 99999 PR PBB SHADOW E&M-EST. PATIENT-LVL III: ICD-10-PCS | Mod: PBBFAC,,, | Performed by: FAMILY MEDICINE

## 2022-08-04 PROCEDURE — 99214 OFFICE O/P EST MOD 30 MIN: CPT | Mod: S$PBB,,, | Performed by: FAMILY MEDICINE

## 2022-08-04 PROCEDURE — 99213 OFFICE O/P EST LOW 20 MIN: CPT | Mod: PBBFAC | Performed by: FAMILY MEDICINE

## 2022-08-04 PROCEDURE — 99214 PR OFFICE/OUTPT VISIT, EST, LEVL IV, 30-39 MIN: ICD-10-PCS | Mod: S$PBB,,, | Performed by: FAMILY MEDICINE

## 2022-08-04 PROCEDURE — 99999 PR PBB SHADOW E&M-EST. PATIENT-LVL III: CPT | Mod: PBBFAC,,, | Performed by: FAMILY MEDICINE

## 2022-08-04 NOTE — PATIENT INSTRUCTIONS
Patient Education  Work with physical therapist for functional exercises especially for inclined work and altitude.       Exercise and Movement   The Basics   Written by the doctors and editors at [facility]   What are the benefits of movement? -- Moving your body has many benefits. It can:  Burn calories, which helps people control their weight  Help control blood sugar levels in people with diabetes  Lower blood pressure, especially in people with high blood pressure  Lower stress and help with depression and anxiety  Keep bones strong, so they don't get thin and break easily  Lower the chance of dying from heart disease  Adding even small amounts of physical activity to your daily routine can improve your health.  What are the main types of exercise? -- There are 3 main types of exercise. They are:  Aerobic exercise - Aerobic exercise raises a person's heart rate. Examples of aerobic exercise are walking, running, dancing, riding a bike, or swimming.  Resistance training - Resistance training helps make your muscles stronger. People can do this type of exercise using weights, exercise bands, or weight machines. You can also do this type of exercise using your own body weight, as with push-ups, or by lifting items in your home, like jugs of water.   Stretching - Stretching exercises help your muscles and joints move more easily.  It's important to have all 3 types of exercise in your exercise program. That way, your body, muscles, and joints can be as healthy as possible.  Should I talk to my doctor or nurse before I start exercising? -- If you have not exercised before or have not exercised in a long time, talk with your doctor or nurse before you start a very active exercise program.  If you have heart disease or risk factors for heart disease (like high blood pressure or diabetes), your doctor or nurse might recommend that you have an exercise test before starting an exercise program.  When you start an exercise  program, start slowly. For example, do the exercise at a slow pace or for a few minutes only. Over time, you can exercise faster and for longer periods of time.  What should I do when I exercise? -- Each time you exercise, you should:  Warm up - Warming up can help keep you from hurting your muscles when you exercise. To warm up, do a light aerobic exercise (such as walking slowly) or stretch for 5 to 10 minutes.  Work out - You should try to get a mix of aerobic exercise, resistance training, and stretching. During an aerobic workout, you can walk fast, swim, run, or use an exercise machine, for example. Other activities, like dancing or playing tennis, are also forms of aerobic exercise. You should also take time to stretch all of your joints, including your neck, shoulders, back, hips, and knees. At least 2 times a week, you can do resistance training exercises as part of your workout.  Cool down - Cooling down helps keep you from feeling dizzy after you exercise and helps prevent muscle cramps. To cool down, you can stretch or do a light aerobic exercise for 5 minutes.  Some people go to a gym or do group exercise classes. But you can exercise even without these things. Some exercises can be done even in a small space. You can also try online videos or smartphone apps to get ideas for different types of exercise.   How often should I exercise? -- Doctors recommend that people exercise at least 30 minutes a day, on 5 or more days of the week.  If you can't exercise for 30 minutes straight, try to exercise for 10 minutes at a time, 3 or 4 times a day. Even exercising for shorter amounts of time is good for you, especially if it means spending less time sitting.   When should I call my doctor or nurse? -- If you have any of the following symptoms when you exercise, stop exercising and call your doctor or nurse right away:  Pain or pressure in your chest, arms, throat, jaw, or back  Nausea or vomiting  Feeling like  your heart is fluttering or racing very fast  Feeling dizzy or faint  What if I don't have time to exercise? -- Many people have very busy lives and might not think that they have time to exercise. But it's important to try to find time to exercise, even if you are tired or work a lot. Exercise can increase your energy level, which can make you feel better and might even help you get more work done.  Even if it's hard to set aside a lot of time to exercise, you can still improve your health by moving your body more. There are many ways that you can be more active. For example, you can:  Take the stairs instead of the elevator  Park in a parking space that is farther away from the door  Take a longer route when you walk from one place to another  Spending a lot of time sitting still - for example, watching television or working on the computer - can be bad for your health. Try to get up and move around whenever you can. Even small amounts of movement, like taking short walks, doing household chores, or gardening, can help improve your health. Finding activities you enjoy, or doing them with other people, can help you add more movement into your daily life.  What else should I do when I exercise? -- To exercise safely and avoid problems, it's important to:  Drink fluids during and after exercising (but avoid drinks with a lot of caffeine or sugar)  Avoid exercising outside if it is too hot or cold  Wear layers of clothes, so that you can take them off if you get too hot  Wear shoes that fit well and support your feet  Be aware of your surroundings if you exercise outside  All topics are updated as new evidence becomes available and our peer review process is complete.  This topic retrieved from [employer] on: Sep 21, 2021.  Topic 24949 Version 21.0  Release: 29.4.2 - C29.263  © 2021 UpToDate, Inc. and/or its affiliates. All rights reserved.  Consumer Information Use and Disclaimer   This information is not specific  "medical advice and does not replace information you receive from your health care provider. This is only a brief summary of general information. It does NOT include all information about conditions, illnesses, injuries, tests, procedures, treatments, therapies, discharge instructions or life-style choices that may apply to you. You must talk with your health care provider for complete information about your health and treatment options. This information should not be used to decide whether or not to accept your health care provider's advice, instructions or recommendations. Only your health care provider has the knowledge and training to provide advice that is right for you. The use of this information is governed by the [employer] End User License Agreement, available at https://[email or web address].[email or web address]/en/solutions/[email or web address]/[email or web address]/[email or web address].The use of [employer] content is governed by the [employer] Terms of Use. ©2021 UpToDate, Inc. All rights reserved.  Copyright   © 2021 UpToDate, Inc. and/or its affiliates. All rights reserved.  Patient Education       Knee Pain   The Basics   Written by the doctors and editors at [employer]   What causes knee pain? -- Many different conditions can cause knee pain. Some of the most common are listed below.  Bending or using the knee too much - This can cause pain in the front of the knee that worsens with running, climbing steps, or sitting for a long time.  Arthritis - Arthritis is a general term that means inflammation of the joints. There are lots of types of arthritis. The most common type, called osteoarthritis, often comes with age. It can cause pain, stiffness, and swelling (figure 1).  Bursitis - Bursitis happens when fluid-filled sacs around the knee (called "bursae") get irritated or swollen (figure 2). Bursitis can cause pain and swelling.  A collection of fluid in the knee - This can happen after a knee injury.  A tear in the meniscus - The meniscus is a cushion of rubbery material " "(cartilage) between the thigh bone and the leg bone (figure 3).  A tear in a ligament - Ligaments are bands of tissue that connect one bone to another. There are 4 ligaments in each knee (figure 3).  Muscle strain - Different leg muscles move the knee joint, causing the knee to bend and straighten. If one of these muscles doesn't work well, moving the knee can cause pain.  Other knee injuries, a knee joint infection, or a condition called gout, which causes crystals to form inside joints.  Conditions that don't involve the knee - For example, problems in the hip can sometimes cause knee pain.  Is there anything I can do on my own to feel better? -- Yes. To ease your symptoms, you can:  Put ice on the knee to reduce pain and swelling - For the first few weeks after an injury, or after an activity that makes your pain worse, you can try icing your knee. Put a cold gel pack, bag of ice, or bag of frozen vegetables on the injured area every 1 to 2 hours, for 15 minutes each time. Put a thin towel between the ice (or other cold object) and your skin. To reduce swelling, sit or lie down and raise your leg above the level of your heart when you put ice on it.  Rest your knee and avoid movements that worsen the pain - Try not to squat, kneel, or run. Also, don't use exercise machines, such as stair steppers or rowing machines. Instead, you can walk or swim (the front and back crawl strokes) for exercise.  Take a pain-relieving medicine, such as acetaminophen (sample brand name: Tylenol) or ibuprofen (sample brand names: Advil, Motrin).  Should I see a doctor or nurse? -- See your doctor or nurse if:  You are unable to put weight on your knee, your knee "locks" in place, or your knee "gives out"  Your knee is very swollen and painful  You have a fever with knee pain, swelling, and redness  Your knee pain doesn't get better or gets worse after you treat it on your own for a few days  How is knee pain treated? -- The right " "treatment for knee pain depends on what is causing it. Treatments might include:  Wearing a knee brace or shoe insert  Doing exercises to strengthen and stretch the muscles that move the knee joint - Ask your doctor or nurse which exercises can help with the cause of your pain.  Having physical therapy  Getting a shot of medicine in the knee  Other medicines  Surgery  All topics are updated as new evidence becomes available and our peer review process is complete.  This topic retrieved from [employer] on: Sep 21, 2021.  Topic 07589 Version 11.0  Release: 29.4.2 - C29.263  © 2021 UpToDate, Inc. and/or its affiliates. All rights reserved.  figure 1: Knee osteoarthritis     This drawing shows a normal knee joint next to a knee joint with osteoarthritis (OA). In the OA joint, the cartilage covering the ends of the bones roughens and becomes thin, while the bone underneath the cartilage grows thicker. Bony growths called "osteophytes" can form. The space between the bones also becomes narrower.  Graphic 878740 Version 2.0    figure 2: Knee bursa (prepatellar bursa)     Graphic 70805 Version 3.0    figure 3: Front view of the knee     This drawing shows the inner parts of the knee as seen from the front. A small bone (called the patella or the "knee cap") that sits in front of the knee has been removed so that you can see what is under that bone. The anterior cruciate ligament (ACL) is in the middle in white. It connects the thigh bone (called the "femur") to the shin bone (called the "tibia"). The meniscus is a cushion of rubbery material (cartilage) between the thigh bone and the shin bone.  Graphic 01048 Version 5.0    Consumer Information Use and Disclaimer   This information is not specific medical advice and does not replace information you receive from your health care provider. This is only a brief summary of general information. It does NOT include all information about conditions, illnesses, injuries, tests, " procedures, treatments, therapies, discharge instructions or life-style choices that may apply to you. You must talk with your health care provider for complete information about your health and treatment options. This information should not be used to decide whether or not to accept your health care provider's advice, instructions or recommendations. Only your health care provider has the knowledge and training to provide advice that is right for you. The use of this information is governed by the [employer] End User License Agreement, available at https://www.[employer]/en/solutions/[email or web address]/about/berkley.The use of [employer] content is governed by the [employer] Terms of Use. ©2021 [employer] All rights reserved.  Copyright   © 2021 UpToDate, Inc. and/or its affiliates. All rights reserved.

## 2022-08-04 NOTE — PROGRESS NOTES
Subjective:     Patient ID: Bharathi Parsons is a 82 y.o. male.    Chief Complaint: Pain of the Right Knee and Pain of the Left Knee      HPI: Patient is being seen for bilateral knee follow up after receiving Euflexxa injections at his last office visit. Reports his knees has been doing very well. Denies any pain during today's office, but states his pain is 2/10 when he crosses his legs. Takes ibuprofen as needed to help with pain relief. Is currently participating in physical therapy at [facility] once a week.     Last visco 6/23/22.[place] trip few [place].    Past Medical History:   Diagnosis Date    DJD (degenerative joint disease)     Hypertension      Past Surgical History:   Procedure Laterality Date    APPENDECTOMY      as child    CATARACT EXTRACTION W/  INTRAOCULAR LENS IMPLANT  OD before 2003    CATARACT EXTRACTION W/  INTRAOCULAR LENS IMPLANT  OS 12/19/07 with YAG    FOOT ARTHRODESIS      medial column arthrodesis left foot  2011     Family History   Problem Relation Age of Onset    Glaucoma Maternal Uncle     Cataracts Mother     Heart disease Father      Social History     Socioeconomic History    Marital status:    Tobacco Use    Smoking status: Never Smoker    Smokeless tobacco: Never Used   Substance and Sexual Activity    Alcohol use: Yes    Drug use: No   Social History Narrative    , No smokers in household, 1 Dog.       Current Outpatient Medications:     azelastine (ASTELIN) 137 mcg (0.1 %) nasal spray, 2 sprays by Nasal route as needed. , Disp: , Rfl:     cetirizine (ZYRTEC) 10 MG tablet, , Disp: , Rfl:     COVID-19 vac, jose,Pfizer,,PF, (PFIZER COVID-19 JOSE VACCN,PF,) 30 mcg/0.3 mL injection, Inject into the muscle., Disp: 0.3 mL, Rfl: 0    ezetimibe (ZETIA) 10 mg tablet, Take 10 mg by mouth once daily., Disp: , Rfl:     fluticasone (FLONASE) 50 mcg/actuation nasal spray, , Disp: , Rfl:     IBUPROFEN ORAL, Take by mouth as needed. , Disp: , Rfl:      lisinopriL 10 MG tablet, Take 1 tablet by mouth once daily, Disp: 90 tablet, Rfl: 3    LIVALO 4 mg Tab, Take 1 tablet by mouth once daily, Disp: 90 tablet, Rfl: 3    triamcinolone acetonide 0.1% (KENALOG) 0.1 % cream, Apply topically 2 (two) times daily., Disp: 80 g, Rfl: 1  Review of patient's allergies indicates:  No Known Allergies  Review of Systems   Constitutional: Negative for chills, fever and weight loss.   Respiratory: Negative for shortness of breath.    Cardiovascular: Negative for chest pain and palpitations.       Objective:   Body mass index is 22.78 kg/m².  There were no vitals filed for this visit.        Ortho/SPM Exam-= alert and oriented well-nourished well-developed ambulatory no acute distress    Respiratory effort normal    Knees-no acute deformity    No swelling or discoloration    Range of motion within normal limits for age in good stability with negative Lachman's    Strength is improving and 4 over    Range of motion is good    Neurovascular intact    Psychiatric good affect and cognition    Plan-that a good discussion own functional Training to get his knees in leg strength ready for a mound trip that he has in a few months and he will coordinate this with his physical therapy training.  At this point he does want to get Visco every 6 months for now.    Patient Instructions   Patient Education  Work with physical therapist for functional exercises especially for inclined work and altitude.       Exercise and Movement   The Basics   Written by the doctors and editors at [facility]   What are the benefits of movement? -- Moving your body has many benefits. It can:  · Burn calories, which helps people control their weight  · Help control blood sugar levels in people with diabetes  · Lower blood pressure, especially in people with high blood pressure  · Lower stress and help with depression and anxiety  · Keep bones strong, so they don't get thin and break easily  · Lower the chance of dying  from heart disease  Adding even small amounts of physical activity to your daily routine can improve your health.  What are the main types of exercise? -- There are 3 main types of exercise. They are:  · Aerobic exercise - Aerobic exercise raises a person's heart rate. Examples of aerobic exercise are walking, running, dancing, riding a bike, or swimming.  · Resistance training - Resistance training helps make your muscles stronger. People can do this type of exercise using weights, exercise bands, or weight machines. You can also do this type of exercise using your own body weight, as with push-ups, or by lifting items in your home, like jugs of water.   · Stretching - Stretching exercises help your muscles and joints move more easily.  It's important to have all 3 types of exercise in your exercise program. That way, your body, muscles, and joints can be as healthy as possible.  Should I talk to my doctor or nurse before I start exercising? -- If you have not exercised before or have not exercised in a long time, talk with your doctor or nurse before you start a very active exercise program.  If you have heart disease or risk factors for heart disease (like high blood pressure or diabetes), your doctor or nurse might recommend that you have an exercise test before starting an exercise program.  When you start an exercise program, start slowly. For example, do the exercise at a slow pace or for a few minutes only. Over time, you can exercise faster and for longer periods of time.  What should I do when I exercise? -- Each time you exercise, you should:  · Warm up - Warming up can help keep you from hurting your muscles when you exercise. To warm up, do a light aerobic exercise (such as walking slowly) or stretch for 5 to 10 minutes.  · Work out - You should try to get a mix of aerobic exercise, resistance training, and stretching. During an aerobic workout, you can walk fast, swim, run, or use an exercise machine,  for example. Other activities, like dancing or playing tennis, are also forms of aerobic exercise. You should also take time to stretch all of your joints, including your neck, shoulders, back, hips, and knees. At least 2 times a week, you can do resistance training exercises as part of your workout.  · Cool down - Cooling down helps keep you from feeling dizzy after you exercise and helps prevent muscle cramps. To cool down, you can stretch or do a light aerobic exercise for 5 minutes.  Some people go to a gym or do group exercise classes. But you can exercise even without these things. Some exercises can be done even in a small space. You can also try online videos or smartphone apps to get ideas for different types of exercise.   How often should I exercise? -- Doctors recommend that people exercise at least 30 minutes a day, on 5 or more days of the week.  If you can't exercise for 30 minutes straight, try to exercise for 10 minutes at a time, 3 or 4 times a day. Even exercising for shorter amounts of time is good for you, especially if it means spending less time sitting.   When should I call my doctor or nurse? -- If you have any of the following symptoms when you exercise, stop exercising and call your doctor or nurse right away:  · Pain or pressure in your chest, arms, throat, jaw, or back  · Nausea or vomiting  · Feeling like your heart is fluttering or racing very fast  · Feeling dizzy or faint  What if I don't have time to exercise? -- Many people have very busy lives and might not think that they have time to exercise. But it's important to try to find time to exercise, even if you are tired or work a lot. Exercise can increase your energy level, which can make you feel better and might even help you get more work done.  Even if it's hard to set aside a lot of time to exercise, you can still improve your health by moving your body more. There are many ways that you can be more active. For example, you  can:  · Take the stairs instead of the elevator  · Park in a parking space that is farther away from the door  · Take a longer route when you walk from one place to another  Spending a lot of time sitting still - for example, watching television or working on the computer - can be bad for your health. Try to get up and move around whenever you can. Even small amounts of movement, like taking short walks, doing household chores, or gardening, can help improve your health. Finding activities you enjoy, or doing them with other people, can help you add more movement into your daily life.  What else should I do when I exercise? -- To exercise safely and avoid problems, it's important to:  · Drink fluids during and after exercising (but avoid drinks with a lot of caffeine or sugar)  · Avoid exercising outside if it is too hot or cold  · Wear layers of clothes, so that you can take them off if you get too hot  · Wear shoes that fit well and support your feet  · Be aware of your surroundings if you exercise outside  All topics are updated as new evidence becomes available and our peer review process is complete.  This topic retrieved from [employer] on: Sep 21, 2021.  Topic 73394 Version 21.0  Release: 29.4.2 - C29.263  © 2021 UpToDate, Inc. and/or its affiliates. All rights reserved.  Consumer Information Use and Disclaimer   This information is not specific medical advice and does not replace information you receive from your health care provider. This is only a brief summary of general information. It does NOT include all information about conditions, illnesses, injuries, tests, procedures, treatments, therapies, discharge instructions or life-style choices that may apply to you. You must talk with your health care provider for complete information about your health and treatment options. This information should not be used to decide whether or not to accept your health care provider's advice, instructions or  "recommendations. Only your health care provider has the knowledge and training to provide advice that is right for you. The use of this information is governed by the [employer] End User License Agreement, available at https://www.[employer]Audio.OwnLocal/en/solutions/QR Wild/about/berkley.The use of [employer] content is governed by the [employer] Terms of Use. ©2021 UpToDate, Inc. All rights reserved.  Copyright   © 2021 UpToDate, Inc. and/or its affiliates. All rights reserved.  Patient Education       Knee Pain   The Basics   Written by the doctors and editors at [employer]   What causes knee pain? -- Many different conditions can cause knee pain. Some of the most common are listed below.  · Bending or using the knee too much - This can cause pain in the front of the knee that worsens with running, climbing steps, or sitting for a long time.  · Arthritis - Arthritis is a general term that means inflammation of the joints. There are lots of types of arthritis. The most common type, called osteoarthritis, often comes with age. It can cause pain, stiffness, and swelling (figure 1).  · Bursitis - Bursitis happens when fluid-filled sacs around the knee (called "bursae") get irritated or swollen (figure 2). Bursitis can cause pain and swelling.  · A collection of fluid in the knee - This can happen after a knee injury.  · A tear in the meniscus - The meniscus is a cushion of rubbery material (cartilage) between the thigh bone and the leg bone (figure 3).  · A tear in a ligament - Ligaments are bands of tissue that connect one bone to another. There are 4 ligaments in each knee (figure 3).  · Muscle strain - Different leg muscles move the knee joint, causing the knee to bend and straighten. If one of these muscles doesn't work well, moving the knee can cause pain.  · Other knee injuries, a knee joint infection, or a condition called gout, which causes crystals to form inside joints.  · Conditions that don't involve the knee - For " "example, problems in the hip can sometimes cause knee pain.  Is there anything I can do on my own to feel better? -- Yes. To ease your symptoms, you can:  · Put ice on the knee to reduce pain and swelling - For the first few weeks after an injury, or after an activity that makes your pain worse, you can try icing your knee. Put a cold gel pack, bag of ice, or bag of frozen vegetables on the injured area every 1 to 2 hours, for 15 minutes each time. Put a thin towel between the ice (or other cold object) and your skin. To reduce swelling, sit or lie down and raise your leg above the level of your heart when you put ice on it.  · Rest your knee and avoid movements that worsen the pain - Try not to squat, kneel, or run. Also, don't use exercise machines, such as stair steppers or rowing machines. Instead, you can walk or swim (the front and back crawl strokes) for exercise.  · Take a pain-relieving medicine, such as acetaminophen (sample brand name: Tylenol) or ibuprofen (sample brand names: Advil, Motrin).  Should I see a doctor or nurse? -- See your doctor or nurse if:  · You are unable to put weight on your knee, your knee "locks" in place, or your knee "gives out"  · Your knee is very swollen and painful  · You have a fever with knee pain, swelling, and redness  · Your knee pain doesn't get better or gets worse after you treat it on your own for a few days  How is knee pain treated? -- The right treatment for knee pain depends on what is causing it. Treatments might include:  · Wearing a knee brace or shoe insert  · Doing exercises to strengthen and stretch the muscles that move the knee joint - Ask your doctor or nurse which exercises can help with the cause of your pain.  · Having physical therapy  · Getting a shot of medicine in the knee  · Other medicines  · Surgery  All topics are updated as new evidence becomes available and our peer review process is complete.  This topic retrieved from [employer] on: Sep 21, " "2021.  Topic 59042 Version 11.0  Release: 29.4.2 - C29.263  © 2021 UpToDate, Inc. and/or its affiliates. All rights reserved.  figure 1: Knee osteoarthritis     This drawing shows a normal knee joint next to a knee joint with osteoarthritis (OA). In the OA joint, the cartilage covering the ends of the bones roughens and becomes thin, while the bone underneath the cartilage grows thicker. Bony growths called "osteophytes" can form. The space between the bones also becomes narrower.  Graphic 730113 Version 2.0    figure 2: Knee bursa (prepatellar bursa)     Graphic 10683 Version 3.0    figure 3: Front view of the knee     This drawing shows the inner parts of the knee as seen from the front. A small bone (called the patella or the "knee cap") that sits in front of the knee has been removed so that you can see what is under that bone. The anterior cruciate ligament (ACL) is in the middle in white. It connects the thigh bone (called the "femur") to the shin bone (called the "tibia"). The meniscus is a cushion of rubbery material (cartilage) between the thigh bone and the shin bone.  Graphic 82854 Version 5.0    Consumer Information Use and Disclaimer   This information is not specific medical advice and does not replace information you receive from your health care provider. This is only a brief summary of general information. It does NOT include all information about conditions, illnesses, injuries, tests, procedures, treatments, therapies, discharge instructions or life-style choices that may apply to you. You must talk with your health care provider for complete information about your health and treatment options. This information should not be used to decide whether or not to accept your health care provider's advice, instructions or recommendations. Only your health care provider has the knowledge and training to provide advice that is right for you. The use of this information is governed by the [employer] End User " License Agreement, available at https://www.[employer]/en/solutions/lexicomp/about/berkley.The use of [employer] content is governed by the [employer] Terms of Use. ©2021 UpToDate, Inc. All rights reserved.  Copyright   © 2021 UpToDate, Inc. and/or its affiliates. All rights reserved.        IMAGING: X-ray Knee Ortho Right with Flexion  Narrative: EXAMINATION:  XR KNEE ORTHO RIGHT WITH FLEXION    CLINICAL HISTORY:  Pain in right knee    TECHNIQUE:  AP standing as well as PA flexion standing and Merchant views of both knees were performed.  A lateral view of the right knee is also performed.    COMPARISON:  Knee radiographs February 21, 2022    FINDINGS:  Generalized osteopenia.  No right knee fracture identified.  Moderate medial compartment predominant tricompartmental right knee osteoarthritis.  No osseous erosion or suspicious osseous lesion.  No right knee effusion.  There is an 8 mm calcification projecting within the posterior aspect of the right knee joint suspicious for intra-articular loose body.  Subtle tricompartmental chondrocalcinosis.  No acute soft tissue abnormality identified.    Limited evaluation of the left knee demonstrates mild to moderate lateral compartment predominant tricompartmental osteoarthritis.  Impression: As above.    Electronically signed by: Isiah Lockhart  Date:    04/27/2022  Time:    16:03       Radiographs / Imaging : Relevant imaging results reviewed by me and interpreted by me, discussed with the patient and / or family -x-rays reviewed by me and agree with report and discussed with patient      Assessment:     Encounter Diagnosis   Name Primary?    Bilateral primary osteoarthritis of knee Yes        Plan:   Bilateral primary osteoarthritis of knee        The patient verbalized good understanding of the medical issues discussed today and expressed appreciation for the care provided.  Patient was given the opportunity to ask questions and be an active participant in their  medical care. Patient had no further questions or concerns at this time.     The patient was encouraged to participate in appropriate physical activity.      Saad Price M.D.  [facility]        This note was dictated using voice recognition software and may have sound a like errors.

## 2022-08-22 ENCOUNTER — OFFICE VISIT (OUTPATIENT)
Dept: OPHTHALMOLOGY | Facility: CLINIC | Age: 82
End: 2022-08-22
Payer: MEDICARE

## 2022-08-22 DIAGNOSIS — H35.3131 EARLY DRY STAGE NONEXUDATIVE AGE-RELATED MACULAR DEGENERATION OF BOTH EYES: ICD-10-CM

## 2022-08-22 DIAGNOSIS — Z96.1 PSEUDOPHAKIA: Primary | ICD-10-CM

## 2022-08-22 DIAGNOSIS — H52.7 REFRACTIVE ERROR: ICD-10-CM

## 2022-08-22 DIAGNOSIS — H35.372 EPIRETINAL MEMBRANE (ERM) OF LEFT EYE: ICD-10-CM

## 2022-08-22 DIAGNOSIS — G43.109 OCULAR MIGRAINE: ICD-10-CM

## 2022-08-22 PROCEDURE — 92015 PR REFRACTION: ICD-10-PCS | Mod: ,,, | Performed by: OPHTHALMOLOGY

## 2022-08-22 PROCEDURE — 99999 PR PBB SHADOW E&M-EST. PATIENT-LVL II: ICD-10-PCS | Mod: PBBFAC,,, | Performed by: OPHTHALMOLOGY

## 2022-08-22 PROCEDURE — 92134 CPTRZ OPH DX IMG PST SGM RTA: CPT | Mod: PBBFAC | Performed by: OPHTHALMOLOGY

## 2022-08-22 PROCEDURE — 92014 COMPRE OPH EXAM EST PT 1/>: CPT | Mod: S$PBB,,, | Performed by: OPHTHALMOLOGY

## 2022-08-22 PROCEDURE — 92015 DETERMINE REFRACTIVE STATE: CPT | Mod: ,,, | Performed by: OPHTHALMOLOGY

## 2022-08-22 PROCEDURE — 99212 OFFICE O/P EST SF 10 MIN: CPT | Mod: PBBFAC | Performed by: OPHTHALMOLOGY

## 2022-08-22 PROCEDURE — 92134 POSTERIOR SEGMENT OCT RETINA (OCULAR COHERENCE TOMOGRAPHY)-BOTH EYES: ICD-10-PCS | Mod: 26,S$PBB,, | Performed by: OPHTHALMOLOGY

## 2022-08-22 PROCEDURE — 99999 PR PBB SHADOW E&M-EST. PATIENT-LVL II: CPT | Mod: PBBFAC,,, | Performed by: OPHTHALMOLOGY

## 2022-08-22 PROCEDURE — 92014 PR EYE EXAM, EST PATIENT,COMPREHESV: ICD-10-PCS | Mod: S$PBB,,, | Performed by: OPHTHALMOLOGY

## 2022-08-22 NOTE — PROGRESS NOTES
SUBJECTIVE  Bharathi Parsons is 82 y.o. male  Corrected distance visual acuity was 20/20- in the right eye and 20/25- in the left eye.   Chief Complaint   Patient presents with    Annual Exam      Yearly with MOCT          HPI     Annual Exam      Additional comments:  Yearly with MOCT              Comments     States that his vision is and     1. Foveopathy OD / ERM OS   2. 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 Fhx COAG- uncle   4. Fhx PKP   5. Dry AMD (mild)            Last edited by Rita Ward on 8/22/2022  1:06 PM. (History)         Assessment /Plan :  1. Pseudophakia  -- Condition stable, no therapeutic change required. Monitoring routinely.     2. Early dry stage nonexudative age-related macular degeneration of both eyes Exam consistent with age related macular degeneration  with elevated risk findings. Discussed clinical condition and recommend avoidance of tobacco products.Patient instructed in the use of daily Amsler Grid, AREDS II formula. Patient advised to call immediately if any Amsler Grid / visual changes occur. Regular follow up recommended.      3. Epiretinal membrane (ERM) of left eye - monitor for now   4. Ocular migraine - discussed reassurance and precautions   5. Refractive error PAL Rx   RTC in 1 year for dilation and MOCT or prn any changes

## 2022-09-08 ENCOUNTER — OFFICE VISIT (OUTPATIENT)
Dept: INTERNAL MEDICINE | Facility: CLINIC | Age: 82
End: 2022-09-08
Payer: MEDICARE

## 2022-09-08 VITALS
DIASTOLIC BLOOD PRESSURE: 72 MMHG | SYSTOLIC BLOOD PRESSURE: 116 MMHG | WEIGHT: 172.19 LBS | BODY MASS INDEX: 23.35 KG/M2 | HEART RATE: 88 BPM | OXYGEN SATURATION: 98 %

## 2022-09-08 DIAGNOSIS — I10 ESSENTIAL HYPERTENSION: ICD-10-CM

## 2022-09-08 DIAGNOSIS — Z00.00 WELL ADULT EXAM: Primary | ICD-10-CM

## 2022-09-08 DIAGNOSIS — M17.0 BILATERAL PRIMARY OSTEOARTHRITIS OF KNEE: ICD-10-CM

## 2022-09-08 DIAGNOSIS — E78.00 HYPERCHOLESTEROLEMIA: ICD-10-CM

## 2022-09-08 DIAGNOSIS — D69.2 OTHER NONTHROMBOCYTOPENIC PURPURA: ICD-10-CM

## 2022-09-08 DIAGNOSIS — N18.31 STAGE 3A CHRONIC KIDNEY DISEASE: ICD-10-CM

## 2022-09-08 DIAGNOSIS — K21.00 GASTROESOPHAGEAL REFLUX DISEASE WITH ESOPHAGITIS WITHOUT HEMORRHAGE: ICD-10-CM

## 2022-09-08 PROCEDURE — 99397 PER PM REEVAL EST PAT 65+ YR: CPT | Mod: S$PBB,GZ,, | Performed by: PEDIATRICS

## 2022-09-08 PROCEDURE — 99999 PR PBB SHADOW E&M-EST. PATIENT-LVL IV: ICD-10-PCS | Mod: PBBFAC,,, | Performed by: PEDIATRICS

## 2022-09-08 PROCEDURE — 99397 PR PREVENTIVE VISIT,EST,65 & OVER: ICD-10-PCS | Mod: S$PBB,GZ,, | Performed by: PEDIATRICS

## 2022-09-08 PROCEDURE — 99214 OFFICE O/P EST MOD 30 MIN: CPT | Mod: PBBFAC | Performed by: PEDIATRICS

## 2022-09-08 PROCEDURE — 99999 PR PBB SHADOW E&M-EST. PATIENT-LVL IV: CPT | Mod: PBBFAC,,, | Performed by: PEDIATRICS

## 2022-09-08 RX ORDER — PANTOPRAZOLE SODIUM 40 MG/1
TABLET, DELAYED RELEASE ORAL
COMMUNITY
Start: 2022-06-29 | End: 2024-03-15 | Stop reason: SDUPTHER

## 2022-09-08 NOTE — PROGRESS NOTES
Subjective:       Patient ID: Bharathi Parsons is a 82 y.o. male.    Chief Complaint: Follow-up (6 month)    Bharathi Parsons is a 82 y.o. male who presents to clinic for a follow up/annual    1) GERD with esophagitis and esophageal stricture. Had EGD and dilatation recently by Dr Darby. On PPI, has flares with missing doses.   2) Allergies: See Dr Meneses, uses intranasal steroids and astelin successfully. Always has some degree of Sx  3) Hypercholesterol: Is taking Livalo (4 mg 1/2 a day) and zetia due to crestor and other statins causing muscle achiness.  4) HTN:Tolerating ACE. B/P good.   5) senile purpura: no other bleeding gums, hematuria, rectal bleeding, Evon  6) CKD3: creatinine improved to 1.2, EGFR is low due to age    LABS REVIEWED AND DISCUSSED WITH PATIENT: CMP, lipids, PSA     Review of Systems   Constitutional:  Negative for activity change, appetite change, chills, diaphoresis, fatigue, fever and unexpected weight change.   HENT:  Negative for nasal congestion, ear pain, mouth sores, nosebleeds, postnasal drip, rhinorrhea, sneezing and sore throat.    Eyes:  Negative for photophobia, pain, discharge, redness and visual disturbance.   Respiratory:  Negative for cough, chest tightness, shortness of breath, wheezing and stridor.    Cardiovascular:  Negative for chest pain, palpitations and leg swelling.   Gastrointestinal:  Negative for abdominal distention, blood in stool, constipation, diarrhea, nausea and vomiting.   Genitourinary:  Negative for decreased urine volume, difficulty urinating, dysuria, flank pain, frequency, genital sores, hematuria and urgency.   Musculoskeletal:  Positive for arthralgias (good with Sx Tx and PT). Negative for back pain, joint swelling, neck pain and neck stiffness.   Integumentary:  Negative for color change, pallor, rash and wound.   Neurological:  Negative for dizziness, syncope, speech difficulty, weakness, light-headedness and headaches.   Hematological:  Negative  for adenopathy. Bruises/bleeds easily (mild extremity bruising).   Psychiatric/Behavioral:  Negative for confusion, decreased concentration, dysphoric mood, hallucinations, sleep disturbance and suicidal ideas. The patient is not nervous/anxious.    All other systems reviewed and are negative.      Objective:      Physical Exam  Vitals and nursing note reviewed.   Constitutional:       General: He is not in acute distress.     Appearance: He is well-developed.   Neck:      Thyroid: No thyromegaly.      Vascular: No JVD.   Cardiovascular:      Rate and Rhythm: Normal rate and regular rhythm.      Heart sounds: Normal heart sounds. No murmur heard.  Pulmonary:      Effort: Pulmonary effort is normal. No respiratory distress.      Breath sounds: Normal breath sounds. No wheezing or rales.   Abdominal:      General: There is no distension.      Palpations: Abdomen is soft. There is no mass.      Tenderness: There is no abdominal tenderness. There is no guarding.   Musculoskeletal:      Right lower leg: No edema.      Left lower leg: No edema.   Lymphadenopathy:      Cervical: No cervical adenopathy.   Skin:     Capillary Refill: Capillary refill takes less than 2 seconds.      Findings: Bruising (minimal purpura on forearms) present. No rash.   Neurological:      General: No focal deficit present.      Mental Status: He is alert and oriented to person, place, and time.      Cranial Nerves: No cranial nerve deficit.      Coordination: Coordination normal.      Gait: Gait abnormal (mild limp due to DJD).   Psychiatric:         Mood and Affect: Mood normal.         Behavior: Behavior normal.         Thought Content: Thought content normal.         Judgment: Judgment normal.       Assessment:       Problem List Items Addressed This Visit       Bilateral primary osteoarthritis of knee    Essential hypertension    Relevant Orders    Comprehensive Metabolic Panel    Gastroesophageal reflux disease with esophagitis     Hypercholesterolemia    Relevant Orders    Lipid Panel    Other nonthrombocytopenic purpura    Stage 3a chronic kidney disease    Relevant Orders    Comprehensive Metabolic Panel     Other Visit Diagnoses       Well adult exam    -  Primary            Plan:     Well adult exam    Bilateral primary osteoarthritis of knee    Essential hypertension  -     Comprehensive Metabolic Panel; Future; Expected date: 09/08/2022    Gastroesophageal reflux disease with esophagitis without hemorrhage    Hypercholesterolemia  -     Lipid Panel; Future; Expected date: 09/08/2022    Stage 3a chronic kidney disease  -     Comprehensive Metabolic Panel; Future; Expected date: 09/08/2022    Other nonthrombocytopenic purpura      At goal for B/P, lipids, and A1c. Maintain meds, self monitoring D&E, weight moderation. F/U 6 months with labs. Due to his family longevity he wishes to go for PSA for one more year. Vaccines discussed.     Scribe Attestation:   I, Mina Hdz, am scribing for, and in the presence of, Dr. Festus Rooney Jr. I performed the above scribed service and the documentation accurately describes the services I performed. I attest to the accuracy of the note.    I, Dr. Festus Rooney Jr, reviewed documentation as scribed above. I personally performed the services described in this documentation.  I agree that the record reflects my personal performance and is accurate and complete. Festus Rooney Jr., MD.  09/08/2022

## 2022-09-26 ENCOUNTER — CLINICAL SUPPORT (OUTPATIENT)
Dept: AUDIOLOGY | Facility: CLINIC | Age: 82
End: 2022-09-26
Payer: MEDICARE

## 2022-09-26 ENCOUNTER — PATIENT MESSAGE (OUTPATIENT)
Dept: INTERNAL MEDICINE | Facility: CLINIC | Age: 82
End: 2022-09-26
Payer: MEDICARE

## 2022-09-26 DIAGNOSIS — H91.90 HEARING LOSS, UNSPECIFIED HEARING LOSS TYPE, UNSPECIFIED LATERALITY: Primary | ICD-10-CM

## 2022-09-26 DIAGNOSIS — Z97.4 WEARS HEARING AID IN BOTH EARS: Primary | ICD-10-CM

## 2022-09-26 NOTE — PROGRESS NOTES
Patient reports reduced clarity for women's voices while watching TV. HA check revealed impacted cerumen in each wax guard. Domes and guards changed. Patient reports much better clarity. He miranda snot a slight tinny quality to my voice. We have had this issue in the past though. I would like to repeat his audiogram and ensure no changes to hearing before reprogramming. He will request  a referral from Dr. Rooney.

## 2022-09-29 ENCOUNTER — PATIENT MESSAGE (OUTPATIENT)
Dept: INTERNAL MEDICINE | Facility: CLINIC | Age: 82
End: 2022-09-29
Payer: MEDICARE

## 2022-10-03 ENCOUNTER — CLINICAL SUPPORT (OUTPATIENT)
Dept: AUDIOLOGY | Facility: CLINIC | Age: 82
End: 2022-10-03
Payer: MEDICARE

## 2022-10-03 DIAGNOSIS — Z46.1 HEARING AID FITTING OR ADJUSTMENT: Primary | ICD-10-CM

## 2022-10-03 DIAGNOSIS — H69.91 ETD (EUSTACHIAN TUBE DYSFUNCTION), RIGHT: Primary | ICD-10-CM

## 2022-10-03 DIAGNOSIS — H90.3 SENSORINEURAL HEARING LOSS, BILATERAL: ICD-10-CM

## 2022-10-03 PROCEDURE — 92567 TYMPANOMETRY: CPT | Mod: PBBFAC | Performed by: AUDIOLOGIST-HEARING AID FITTER

## 2022-10-03 PROCEDURE — 92551 PR PURE TONE HEARING TEST, AIR: ICD-10-PCS | Mod: ,,, | Performed by: AUDIOLOGIST-HEARING AID FITTER

## 2022-10-03 PROCEDURE — 92556 SPEECH AUDIOMETRY COMPLETE: CPT | Mod: PBBFAC | Performed by: AUDIOLOGIST-HEARING AID FITTER

## 2022-10-03 PROCEDURE — 99211 OFF/OP EST MAY X REQ PHY/QHP: CPT | Mod: PBBFAC | Performed by: AUDIOLOGIST-HEARING AID FITTER

## 2022-10-03 PROCEDURE — 92551 PURE TONE HEARING TEST AIR: CPT | Mod: ,,, | Performed by: AUDIOLOGIST-HEARING AID FITTER

## 2022-10-03 PROCEDURE — 99999 PR PBB SHADOW E&M-EST. PATIENT-LVL I: CPT | Mod: PBBFAC,,, | Performed by: AUDIOLOGIST-HEARING AID FITTER

## 2022-10-03 PROCEDURE — 99999 PR PBB SHADOW E&M-EST. PATIENT-LVL I: ICD-10-PCS | Mod: PBBFAC,,, | Performed by: AUDIOLOGIST-HEARING AID FITTER

## 2022-10-03 NOTE — PROGRESS NOTES
Referring Provider:Dr. Toribio Parsons was seen 10/03/2022 for an audiological evaluation.  Patient is here for his annual audiogram. He reports intermittent ETD. He is on several nasal sprays prescribed by  at [facility].     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   Speech Reception Thresholds were  55 dBHL for the right ear and 55 dBHL for the left ear.   Word recognition scores were excellent for the right ear and excellent for the left ear.   Tympanograms were Type C, abnormal for the right ear and Type A, normal for the left ear.    Patient was counseled on the above findings.    Recommendations:  1. HA adjustment  2. Annual Audiograms  3. Possible allergy testing that was previously recommended by Dr. Meneses.

## 2022-10-03 NOTE — PROGRESS NOTES
HA's adjusted to today's audiogram. Patient has tolerance issues with high frequency sounds. They become shrill quickly. Decreased 8k 2 clicks but increased moderate speech sounds from 750-3k Hz. He reported good clarity and did not hear norma shrillness. WCB as needed. He baldev lso return in early March to [facility] in for a battery repair due to his warranty expiring in April.

## 2022-10-25 ENCOUNTER — PATIENT MESSAGE (OUTPATIENT)
Dept: RESEARCH | Facility: HOSPITAL | Age: 82
End: 2022-10-25
Payer: MEDICARE

## 2022-11-29 ENCOUNTER — OFFICE VISIT (OUTPATIENT)
Dept: SPORTS MEDICINE | Facility: CLINIC | Age: 82
End: 2022-11-29
Payer: MEDICARE

## 2022-11-29 ENCOUNTER — HOSPITAL ENCOUNTER (OUTPATIENT)
Dept: RADIOLOGY | Facility: HOSPITAL | Age: 82
Discharge: HOME | End: 2022-11-29
Attending: FAMILY MEDICINE
Payer: MEDICARE

## 2022-11-29 VITALS — HEIGHT: 72 IN | BODY MASS INDEX: 23.32 KG/M2 | WEIGHT: 172.19 LBS

## 2022-11-29 DIAGNOSIS — M25.512 BILATERAL SHOULDER PAIN, UNSPECIFIED CHRONICITY: Primary | ICD-10-CM

## 2022-11-29 DIAGNOSIS — M25.511 BILATERAL SHOULDER PAIN, UNSPECIFIED CHRONICITY: Primary | ICD-10-CM

## 2022-11-29 DIAGNOSIS — M17.0 BILATERAL PRIMARY OSTEOARTHRITIS OF KNEE: Primary | ICD-10-CM

## 2022-11-29 DIAGNOSIS — M25.512 BILATERAL SHOULDER PAIN, UNSPECIFIED CHRONICITY: ICD-10-CM

## 2022-11-29 DIAGNOSIS — M25.511 BILATERAL SHOULDER PAIN, UNSPECIFIED CHRONICITY: ICD-10-CM

## 2022-11-29 DIAGNOSIS — M19.011 PRIMARY OSTEOARTHRITIS OF RIGHT SHOULDER: ICD-10-CM

## 2022-11-29 PROCEDURE — 73030 X-RAY EXAM OF SHOULDER: CPT | Mod: 26,RT,, | Performed by: RADIOLOGY

## 2022-11-29 PROCEDURE — 73030 XR SHOULDER COMPLETE 2 OR MORE VIEWS RIGHT: ICD-10-PCS | Mod: 26,RT,, | Performed by: RADIOLOGY

## 2022-11-29 PROCEDURE — 73030 X-RAY EXAM OF SHOULDER: CPT | Mod: TC,RT

## 2022-11-29 PROCEDURE — 99214 PR OFFICE/OUTPT VISIT, EST, LEVL IV, 30-39 MIN: ICD-10-PCS | Mod: S$PBB,,, | Performed by: FAMILY MEDICINE

## 2022-11-29 PROCEDURE — 99213 OFFICE O/P EST LOW 20 MIN: CPT | Mod: PBBFAC | Performed by: FAMILY MEDICINE

## 2022-11-29 PROCEDURE — 99999 PR PBB SHADOW E&M-EST. PATIENT-LVL III: ICD-10-PCS | Mod: PBBFAC,,, | Performed by: FAMILY MEDICINE

## 2022-11-29 PROCEDURE — 99999 PR PBB SHADOW E&M-EST. PATIENT-LVL III: CPT | Mod: PBBFAC,,, | Performed by: FAMILY MEDICINE

## 2022-11-29 PROCEDURE — 99214 OFFICE O/P EST MOD 30 MIN: CPT | Mod: S$PBB,,, | Performed by: FAMILY MEDICINE

## 2022-11-29 RX ORDER — DICLOFENAC SODIUM 10 MG/G
2 GEL TOPICAL 3 TIMES DAILY
Qty: 1 EACH | Refills: 5 | Status: SHIPPED | OUTPATIENT
Start: 2022-11-29 | End: 2023-11-07 | Stop reason: SDUPTHER

## 2022-11-29 NOTE — PROGRESS NOTES
Subjective:     Patient ID: Bharathi Parsons is a 82 y.o. male.    Chief Complaint: Pain of the Right Shoulder, Pain of the Right Knee, and Pain of the Left Knee      HPI: Patient is being seen for bilateral knee follow up since last office visit on 8/4/22. Received Euflexxa injections at his office visit on 6/23/22. Says the injections worked well and he would like to receive them again. Denies any pain at today's office visit. Uses voltaren gel twice a day to help with pain relief. Is participating in physical therapy once a week at [facility].    Trip to [place] last month and injured right shoulder from carrying heavy camera equipment.  Some crepitus and pain on the posterior aspect of the shoulder particularly when lifting objects.  Occasionally lifts an object and has free fall when lowering his arm.    Past Medical History:   Diagnosis Date    DJD (degenerative joint disease)     Hypertension      Past Surgical History:   Procedure Laterality Date    APPENDECTOMY      as child    CATARACT EXTRACTION W/  INTRAOCULAR LENS IMPLANT  OD before 2003    CATARACT EXTRACTION W/  INTRAOCULAR LENS IMPLANT  OS 12/19/07 with YAG    FOOT ARTHRODESIS      medial column arthrodesis left foot  2011     Family History   Problem Relation Age of Onset    Glaucoma Maternal Uncle     Cataracts Mother     Heart disease Father      Social History     Socioeconomic History    Marital status:    Tobacco Use    Smoking status: Never    Smokeless tobacco: Never   Substance and Sexual Activity    Alcohol use: Yes    Drug use: No   Social History Narrative    , No smokers in household, 1 Dog.       Current Outpatient Medications:     azelastine (ASTELIN) 137 mcg (0.1 %) nasal spray, 2 sprays by Nasal route as needed. , Disp: , Rfl:     cetirizine (ZYRTEC) 10 MG tablet, , Disp: , Rfl:     COVID-19 vac, jose,Pfizer,,PF, (PFIZER COVID-19 JOSE VACCN,PF,) 30 mcg/0.3 mL injection, Inject into the muscle.,  Disp: 0.3 mL, Rfl: 0    ezetimibe (ZETIA) 10 mg tablet, Take 10 mg by mouth once daily., Disp: , Rfl:     fluticasone (FLONASE) 50 mcg/actuation nasal spray, , Disp: , Rfl:     IBUPROFEN ORAL, Take by mouth as needed. , Disp: , Rfl:     lisinopriL 10 MG tablet, Take 1 tablet by mouth once daily, Disp: 90 tablet, Rfl: 3    LIVALO 4 mg Tab, Take 1 tablet by mouth once daily, Disp: 90 tablet, Rfl: 3    pantoprazole (PROTONIX) 40 MG tablet, TAKE 1 TABLET BY MOUTH ONCE DAILY BEFORE MEAL(S), Disp: , Rfl:     triamcinolone acetonide 0.1% (KENALOG) 0.1 % cream, Apply topically 2 (two) times daily., Disp: 80 g, Rfl: 1  Review of patient's allergies indicates:  No Known Allergies  Review of Systems   Constitutional:  Negative for chills, fever and weight loss.   Respiratory:  Negative for shortness of breath.    Cardiovascular:  Negative for chest pain and palpitations.     Objective:   Body mass index is 23.35 kg/m².  There were no vitals filed for this visit.        Ortho/SPM Exam-alert and oriented well-nourished well-developed ambulatory no acute distress and accompanied by his wife today     Respiratory effort is normal     Right shoulder-no acute deformity but chronic bony changes and atrophy noted    No point tenderness to palpation   Crepitus with movement of the joint   limited internal and external rotation with the shoulder at 70° abduction  Strength 3/5   Neurovascular intact    Bilateral knees-bony changes noted but no swelling and nontender palpation, stable joint with crepitus    Psychiatric good affect and cognition    Plan-physical therapy for right shoulder-has osteoarthritis of the shoulder and possibly torn rotator cuff  Order Visco gel 6 month interval for bilateral knees    Continue Voltaren gel    Patient Instructions   MEDICAL NECESSITY FOR VISCOSUPPLEMENTATION: After thorough evaluation of the patient, I have determined that visco-supplementation is medically necessary. The patient has painful DJD of  "the knee with failure of conservative therapy including lifestyle modifications and rehabilitation exercises. Oral analgesis/NSAIDs have not adequately controlled symptoms and there is radiographic evidence of joint space narrowing, subchondral sclerosis, and some early osteophytic changes, or in lack of radiographic evidence, there is arthroscopic or other evidence of chondrosis.  Kellgren- Ravi grade 2 or greater.  Bilateral    Voltaren gel over-the-counter or prescription    Start physical therapy for right shoulderPatient Education       Rotator Cuff Injury   The Basics   Written by the doctors and editors at [facility]   What is a rotator cuff injury? -- A rotator cuff injury is a condition that can cause shoulder pain. The rotator cuff is made up of 4 shoulder muscles and their tendons. Tendons are strong bands of tissue that connect muscles to bones.  People can get different types of rotator cuff injuries. One common injury is "tendinopathy," which is when people have a problem with 1 of their tendons. In most people with tendinopathy, the tendons are not inflamed or swollen. If they do get inflamed or swollen, doctors call it "tendinitis." Tendinopathy and tendinitis can happen if people use their rotator cuff muscles too much or do a lot of activity with their arms overhead.  Another type of rotator cuff injury is a tear in a tendon. Tears can happen if a person falls on the shoulder or moves the shoulder too fast and with too much force. Tears can also happen as a tendon wears out over time.  What are the symptoms of a rotator cuff injury? -- Most people with tendinopathy or tendinitis have pain where the shoulder meets the top of the arm and down the outer part of the upper arm. The pain is usually worse when they move their arm over their head or lie on their shoulder.  People with a torn tendon usually have shoulder pain and might also have trouble raising their arm overhead.  Will I need tests? -- " You might. Your doctor or nurse will talk with you and do an exam. If he or she suspects a tear, you might need an imaging test of the shoulder. Imaging tests create pictures of the inside of the body.  How is a rotator cuff injury treated? -- Many rotator cuff injuries get better on their own, but they can take months to heal completely. To help your shoulder get better, you can:  Rest your shoulder - Avoid doing activities that cause pain or strain your shoulder, such as raising your arm overhead or reaching behind you. In general, try to keep your arm down, close to, and in front of your body. But you can move your shoulder gently if you need to.  Ice your shoulder - Put a cold gel pack, bag of ice, or bag of frozen vegetables on the injured area every 1 to 2 hours, for 15 minutes each time, as needed. Put a thin towel between the ice (or other cold object) and your skin.  Take medicine to reduce the pain and swelling - Your doctor or nurse might recommend that you take ibuprofen (sample brand names: Advil, Motrin) or naproxen (sample brand names: Aleve, Naprosyn).  Some tears, especially large tears, might need to be treated with surgery.  Is there anything I can do on my own to feel better? -- Yes. Different exercises can help your shoulder feel better. Ask your doctor or nurse which exercises you should do, when to start them, and how often to do them.  Some exercises help keep your shoulder from getting too stiff. One of these is called the pendulum stretch. To do this exercise, let your arm relax and hang down. Move your arm back and forth, then side to side, and then around in small circles (figure 1). Your doctor or nurse can also show you other stretches that you can do.  Other exercises can help strengthen your shoulder muscles. Your doctor, nurse, or physical therapist (exercise expert) can show you how to do these types of exercises.  When you do shoulder exercises, it's important to:  Warm up your  shoulder first by taking a hot shower or bath, or massaging the area  Start slowly and make the exercises harder over time  Know that some soreness is normal. If you have sharp or tearing pain, stop what you're doing and let your doctor or nurse know.  What if my symptoms don't get better? -- If your symptoms don't get better, talk with your doctor or nurse about other possible treatments, such as:  Getting a shot of medicine into your shoulder  Surgery  All topics are updated as new evidence becomes available and our peer review process is complete.  This topic retrieved from [employer] on: Sep 21, 2021.  Topic 49363 Version 5.0  Release: 29.4.2 - C29.263  © 2021 UpToDate, Inc. and/or its affiliates. All rights reserved.  figure 1: Pendulum swing     To do this exercise, you can sit or stand. Relax your arm and let it hang down. Move your arm back and forth, then side to side, and then around in small circles in both directions. After about a week, you can make the exercise harder by making bigger movements or holding a weight in your hand.  Graphic 52111 Version 3.0    Consumer Information Use and Disclaimer   This information is not specific medical advice and does not replace information you receive from your health care provider. This is only a brief summary of general information. It does NOT include all information about conditions, illnesses, injuries, tests, procedures, treatments, therapies, discharge instructions or life-style choices that may apply to you. You must talk with your health care provider for complete information about your health and treatment options. This information should not be used to decide whether or not to accept your health care provider's advice, instructions or recommendations. Only your health care provider has the knowledge and training to provide advice that is right for you. The use of this information is governed by the [employer] End User License Agreement, available at  https://www.wolTurbulenzuwer.com/en/solutions/lexicomp/about/berkley.The use of [employer] content is governed by the [employer] Terms of Use. ©2021 UpToDate, Inc. All rights reserved.  Copyright   © 2021 UpToDate, Inc. and/or its affiliates. All rights reserved.    IMAGING: Posterior Segment OCT Retina-Both eyes  Epiretinal membrane OS       Radiographs / Imaging : Relevant imaging results reviewed by me and interpreted by me, discussed with the patient and / or family -agree with x-ray reports and will obtain updated shoulder x-ray      Assessment:     Encounter Diagnoses   Name Primary?    Bilateral primary osteoarthritis of knee Yes    Primary osteoarthritis of right shoulder         Plan:   Bilateral primary osteoarthritis of knee    Primary osteoarthritis of right shoulder      The patient verbalized good understanding of the medical issues discussed today and expressed appreciation for the care provided.  Patient was given the opportunity to ask questions and be an active participant in their medical care. Patient had no further questions or concerns at this time.     The patient was encouraged to participate in appropriate physical activity.      Saad Price M.D.  [facility]        This note was dictated using voice recognition software and may have sound a like errors.

## 2022-11-29 NOTE — PATIENT INSTRUCTIONS
"MEDICAL NECESSITY FOR VISCOSUPPLEMENTATION: After thorough evaluation of the patient, I have determined that visco-supplementation is medically necessary. The patient has painful DJD of the knee with failure of conservative therapy including lifestyle modifications and rehabilitation exercises. Oral analgesis/NSAIDs have not adequately controlled symptoms and there is radiographic evidence of joint space narrowing, subchondral sclerosis, and some early osteophytic changes, or in lack of radiographic evidence, there is arthroscopic or other evidence of chondrosis.  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 -- A rotator cuff injury is a condition that can cause shoulder pain. The rotator cuff is made up of 4 shoulder muscles and their tendons. Tendons are strong bands of tissue that connect muscles to bones.  People can get different types of rotator cuff injuries. One common injury is "tendinopathy," which is when people have a problem with 1 of their tendons. In most people with tendinopathy, the tendons are not inflamed or swollen. If they do get inflamed or swollen, doctors call it "tendinitis." Tendinopathy and tendinitis can happen if people use their rotator cuff muscles too much or do a lot of activity with their arms overhead.  Another type of rotator cuff injury is a tear in a tendon. Tears can happen if a person falls on the shoulder or moves the shoulder too fast and with too much force. Tears can also happen as a tendon wears out over time.  What are the symptoms of a rotator cuff injury? -- Most people with tendinopathy or tendinitis have pain where the shoulder meets the top of the arm and down the outer part of the upper arm. The pain is usually worse when they move " their arm over their head or lie on their shoulder.  People with a torn tendon usually have shoulder pain and might also have trouble raising their arm overhead.  Will I need tests? -- You might. Your doctor or nurse will talk with you and do an exam. If he or she suspects a tear, you might need an imaging test of the shoulder. Imaging tests create pictures of the inside of the body.  How is a rotator cuff injury treated? -- Many rotator cuff injuries get better on their own, but they can take months to heal completely. To help your shoulder get better, you can:  Rest your shoulder - Avoid doing activities that cause pain or strain your shoulder, such as raising your arm overhead or reaching behind you. In general, try to keep your arm down, close to, and in front of your body. But you can move your shoulder gently if you need to.  Ice your shoulder - Put a cold gel pack, bag of ice, or bag of frozen vegetables on the injured area every 1 to 2 hours, for 15 minutes each time, as needed. Put a thin towel between the ice (or other cold object) and your skin.  Take medicine to reduce the pain and swelling - Your doctor or nurse might recommend that you take ibuprofen (sample brand names: Advil, Motrin) or naproxen (sample brand names: Aleve, Naprosyn).  Some tears, especially large tears, might need to be treated with surgery.  Is there anything I can do on my own to feel better? -- Yes. Different exercises can help your shoulder feel better. Ask your doctor or nurse which exercises you should do, when to start them, and how often to do them.  Some exercises help keep your shoulder from getting too stiff. One of these is called the pendulum stretch. To do this exercise, let your arm relax and hang down. Move your arm back and forth, then side to side, and then around in small circles (figure 1). Your doctor or nurse can also show you other stretches that you can do.  Other exercises can help strengthen your shoulder  muscles. Your doctor, nurse, or physical therapist (exercise expert) can show you how to do these types of exercises.  When you do shoulder exercises, it's important to:  Warm up your shoulder first by taking a hot shower or bath, or massaging the area  Start slowly and make the exercises harder over time  Know that some soreness is normal. If you have sharp or tearing pain, stop what you're doing and let your doctor or nurse know.  What if my symptoms don't get better? -- If your symptoms don't get better, talk with your doctor or nurse about other possible treatments, such as:  Getting a shot of medicine into your shoulder  Surgery  All topics are updated as new evidence becomes available and our peer review process is complete.  This topic retrieved from [employer] on: Sep 21, 2021.  Topic 65090 Version 5.0  Release: 29.4.2 - C29.263  © 2021 UpToDate, Inc. and/or its affiliates. All rights reserved.  figure 1: Pendulum swing     To do this exercise, you can sit or stand. Relax your arm and let it hang down. Move your arm back and forth, then side to side, and then around in small circles in both directions. After about a week, you can make the exercise harder by making bigger movements or holding a weight in your hand.  Graphic 18092 Version 3.0    Consumer Information Use and Disclaimer   This information is not specific medical advice and does not replace information you receive from your health care provider. This is only a brief summary of general information. It does NOT include all information about conditions, illnesses, injuries, tests, procedures, treatments, therapies, discharge instructions or life-style choices that may apply to you. You must talk with your health care provider for complete information about your health and treatment options. This information should not be used to decide whether or not to accept your health care provider's advice, instructions or recommendations. Only your health care  provider has the knowledge and training to provide advice that is right for you. The use of this information is governed by the [employer] End User License Agreement, available at https://www.[employer].Other Machine/en/solutions/[place]/about/berkley.The use of [employer] content is governed by the [employer] Terms of Use. ©2021 UpToDate, Inc. All rights reserved.  Copyright   © 2021 UpToDate, Inc. and/or its affiliates. All rights reserved.

## 2022-11-30 DIAGNOSIS — M19.011 PRIMARY OSTEOARTHRITIS OF RIGHT SHOULDER: ICD-10-CM

## 2022-11-30 DIAGNOSIS — M17.0 BILATERAL PRIMARY OSTEOARTHRITIS OF KNEE: Primary | ICD-10-CM

## 2022-12-08 ENCOUNTER — TELEPHONE (OUTPATIENT)
Dept: ADMINISTRATIVE | Facility: HOSPITAL | Age: 82
End: 2022-12-08
Payer: MEDICARE

## 2023-01-05 ENCOUNTER — OFFICE VISIT (OUTPATIENT)
Dept: INTERNAL MEDICINE | Facility: CLINIC | Age: 83
End: 2023-01-05
Payer: MEDICARE

## 2023-01-05 VITALS
DIASTOLIC BLOOD PRESSURE: 80 MMHG | RESPIRATION RATE: 20 BRPM | WEIGHT: 167.75 LBS | HEIGHT: 72 IN | SYSTOLIC BLOOD PRESSURE: 152 MMHG | BODY MASS INDEX: 22.72 KG/M2 | HEART RATE: 80 BPM

## 2023-01-05 DIAGNOSIS — N18.31 STAGE 3A CHRONIC KIDNEY DISEASE: ICD-10-CM

## 2023-01-05 DIAGNOSIS — M17.0 BILATERAL PRIMARY OSTEOARTHRITIS OF KNEE: ICD-10-CM

## 2023-01-05 DIAGNOSIS — J31.0 CHRONIC RHINITIS: ICD-10-CM

## 2023-01-05 DIAGNOSIS — D69.2 OTHER NONTHROMBOCYTOPENIC PURPURA: ICD-10-CM

## 2023-01-05 DIAGNOSIS — H91.90 HEARING LOSS, UNSPECIFIED HEARING LOSS TYPE, UNSPECIFIED LATERALITY: ICD-10-CM

## 2023-01-05 DIAGNOSIS — Z00.00 ENCOUNTER FOR PREVENTATIVE ADULT HEALTH CARE EXAMINATION: Primary | ICD-10-CM

## 2023-01-05 DIAGNOSIS — K21.00 GASTROESOPHAGEAL REFLUX DISEASE WITH ESOPHAGITIS, UNSPECIFIED WHETHER HEMORRHAGE: ICD-10-CM

## 2023-01-05 DIAGNOSIS — Z96.1 PSEUDOPHAKIA: ICD-10-CM

## 2023-01-05 DIAGNOSIS — Z00.00 ENCOUNTER FOR PREVENTIVE HEALTH EXAMINATION: ICD-10-CM

## 2023-01-05 DIAGNOSIS — M19.011 PRIMARY OSTEOARTHRITIS OF RIGHT SHOULDER: ICD-10-CM

## 2023-01-05 DIAGNOSIS — E78.00 HYPERCHOLESTEROLEMIA: ICD-10-CM

## 2023-01-05 DIAGNOSIS — Z74.09 OTHER REDUCED MOBILITY: ICD-10-CM

## 2023-01-05 DIAGNOSIS — S83.002S: ICD-10-CM

## 2023-01-05 DIAGNOSIS — I10 ESSENTIAL HYPERTENSION: ICD-10-CM

## 2023-01-05 DIAGNOSIS — Z96.1 LENS REPLACED BY OTHER MEANS: ICD-10-CM

## 2023-01-05 DIAGNOSIS — H35.372 EPIRETINAL MEMBRANE (ERM) OF LEFT EYE: ICD-10-CM

## 2023-01-05 DIAGNOSIS — Z59.9 FINANCIAL DIFFICULTY: ICD-10-CM

## 2023-01-05 PROCEDURE — 99999 PR PBB SHADOW E&M-EST. PATIENT-LVL V: ICD-10-PCS | Mod: PBBFAC,,, | Performed by: NURSE PRACTITIONER

## 2023-01-05 PROCEDURE — 99215 OFFICE O/P EST HI 40 MIN: CPT | Mod: PBBFAC | Performed by: NURSE PRACTITIONER

## 2023-01-05 PROCEDURE — G0439 PPPS, SUBSEQ VISIT: HCPCS | Mod: ,,, | Performed by: NURSE PRACTITIONER

## 2023-01-05 PROCEDURE — 99999 PR PBB SHADOW E&M-EST. PATIENT-LVL V: CPT | Mod: PBBFAC,,, | Performed by: NURSE PRACTITIONER

## 2023-01-05 PROCEDURE — G0439 PR MEDICARE ANNUAL WELLNESS SUBSEQUENT VISIT: ICD-10-PCS | Mod: ,,, | Performed by: NURSE PRACTITIONER

## 2023-01-05 SDOH — SOCIAL DETERMINANTS OF HEALTH (SDOH): PROBLEM RELATED TO HOUSING AND ECONOMIC CIRCUMSTANCES, UNSPECIFIED: Z59.9

## 2023-01-05 NOTE — PATIENT INSTRUCTIONS
Counseling and Referral of Other Preventative  (Italic type indicates deductible and co-insurance are waived)    Patient Name: Bharathi Parsons  Today's Date: 1/5/2023    Health Maintenance       Date Due Completion Date    Colonoscopy 01/21/2023 1/21/2013    Lipid Panel 07/21/2027 7/21/2022    TETANUS VACCINE 08/24/2027 8/24/2017        No orders of the defined types were placed in this encounter.      The following information is provided to all patients.  This information is to help you find resources for any of the problems found today that may be affecting your health:                Living healthy guide: www.[facility].[facility]      Understanding Diabetes: www.diabetes.org      Eating healthy: www.cdc.gov/healthyweight      CDC home safety checklist: www.cdc.gov/steadi/patient.html      Agency on Aging: www.goea.[place].[facility]      Alcoholics anonymous (AA): www.aa.org      Physical Activity: www.jose [place]in.nih.gov/pk5dzez      Tobacco use: www.quitwithusla.org

## 2023-01-05 NOTE — PROGRESS NOTES
Bharathi Parsons presented for a  Medicare AWV and comprehensive Health Risk Assessment today. The following components were reviewed and updated:    Medical history  Family History  Social history  Allergies and Current Medications  Health Risk Assessment  Health Maintenance  Care Team         ** See Completed Assessments for Annual Wellness Visit within the encounter summary.**         The following assessments were completed:  Living Situation  CAGE  Depression Screening  Timed Get Up and Go  Whisper Test  Cognitive Function Screening  Nutrition Screening  ADL Screening  PAQ Screening        Vitals:    01/05/23 1419   BP: (!) 152/80   BP Location: Right arm   Patient Position: Sitting   Pulse: 80   Resp: 20   Weight: 76.1 kg (167 lb 12.3 oz)   Height:    Pain scale 6' (1.829 m)    0     Body mass index is 22.75 kg/m².  Physical Exam  Constitutional:       General: He is not in acute distress.     Appearance: He is not ill-appearing or diaphoretic.   HENT:      Head: Normocephalic and atraumatic.      Comments: Hearing aids     Mouth/Throat:      Mouth: Mucous membranes are moist.   Eyes:      General:         Right eye: No discharge.         Left eye: No discharge.      Extraocular Movements: Extraocular movements intact.      Conjunctiva/sclera: Conjunctivae normal.   Cardiovascular:      Rate and Rhythm: Normal rate and regular rhythm.   Pulmonary:      Effort: Pulmonary effort is normal. No respiratory distress.   Abdominal:      General: There is no distension.      Palpations: Abdomen is soft.      Tenderness: There is no abdominal tenderness. There is no guarding.   Genitourinary:     Comments: Not examined  Musculoskeletal:      Cervical back: Normal range of motion and neck supple. No rigidity or tenderness.   Skin:     General: Skin is warm and dry.   Neurological:      General: No focal deficit present.      Mental Status: He is alert and oriented to person, place, and time. Mental status is at baseline.       Cranial Nerves: Cranial nerve deficit present.      Comments: [facility]   Psychiatric:         Mood and Affect: Mood normal.         Behavior: Behavior normal.         Thought Content: Thought content normal.         Judgment: Judgment normal.           Diagnoses and health risks identified today and associated recommendations/orders:    1. Encounter for preventative adult health care examination    Review for opioid screening: Patient does not have Rx for Opioids  Review for substance use disorder: Patient does not use substance per chart    Patient reports drinks 1 drink 2-3 x month    I offered to discuss advanced care planning, including how to pick a person who would make decisions for you if you were unable to make them for yourself, called a health care power of , and what kind of decisions you might make such as use of life sustaining treatments such as ventilators and tube feeding when faced with a life limiting illness recorded on a living will that they will need to know. (How you want to be cared for as you near the end of your natural life)     X  Patient has advanced directives written and agrees to provide copies to the institution.    2. Other nonthrombocytopenic purpura  Monitor  Follow up as directed    3. Stage 3a chronic kidney disease  Monitor  Stable  Follow up with PCP    4. Pseudophakia, Epiretinal membrane (ERM) of left eye, Lens replaced by other means - Both Eyes  Monitor  Follow up with Ophtho/ sports medicine as directed    5. Financial difficulty  Monitor  Patient reports trouble paying for medication   referral ordered    6. Primary osteoarthritis of right shoulder, Bilateral primary osteoarthritis of knee, Acquired subluxation of left patella, sequela, Other reduced mobility  Monitor  Follow up with Ortho as directed  Continue home voltaren    7. Chronic rhinitis  Monitor  Stable  Continue home astelin, zyrtec, flonase    8.  Hypercholesterolemia  Monitor  Stable  Continue home zetia, livalo    9. Essential hypertension  Monitor  Stable  Continue home lisinopril    10. Gastroesophageal reflux disease with esophagitis, unspecified whether hemorrhage   Monitor  Stable  Continue home protonix    11. Hearing Impairment  Monitor  Continue hearing aids       Provided Bharathi with a 5-10 year written screening schedule and personal prevention plan. Recommendations were developed using the USPSTF age appropriate recommendations. Education, counseling, and referrals were provided as needed. After Visit Summary printed and given to patient which includes a list of additional screenings\tests needed.    Follow up in about 1 year (around 1/5/2024) for Annual wellness visit.    CHANTE Calloway

## 2023-01-06 ENCOUNTER — OFFICE VISIT (OUTPATIENT)
Dept: DERMATOLOGY | Facility: CLINIC | Age: 83
End: 2023-01-06
Payer: MEDICARE

## 2023-01-06 DIAGNOSIS — L72.0 EPIDERMAL INCLUSION CYST: ICD-10-CM

## 2023-01-06 DIAGNOSIS — L57.0 ACTINIC KERATOSES: Primary | ICD-10-CM

## 2023-01-06 PROCEDURE — 17004 DESTROY PREMAL LESIONS 15/>: CPT | Mod: S$PBB,,, | Performed by: STUDENT IN AN ORGANIZED HEALTH CARE EDUCATION/TRAINING PROGRAM

## 2023-01-06 PROCEDURE — 99213 PR OFFICE/OUTPT VISIT, EST, LEVL III, 20-29 MIN: ICD-10-PCS | Mod: 25,S$PBB,, | Performed by: STUDENT IN AN ORGANIZED HEALTH CARE EDUCATION/TRAINING PROGRAM

## 2023-01-06 PROCEDURE — 17004 PR DESTRUCTION, PREMALIGNANT LESIONS; 15 OR MORE LESIONS: ICD-10-PCS | Mod: S$PBB,,, | Performed by: STUDENT IN AN ORGANIZED HEALTH CARE EDUCATION/TRAINING PROGRAM

## 2023-01-06 PROCEDURE — 99999 PR PBB SHADOW E&M-EST. PATIENT-LVL III: CPT | Mod: PBBFAC,,, | Performed by: STUDENT IN AN ORGANIZED HEALTH CARE EDUCATION/TRAINING PROGRAM

## 2023-01-06 PROCEDURE — 99213 OFFICE O/P EST LOW 20 MIN: CPT | Mod: 25,S$PBB,, | Performed by: STUDENT IN AN ORGANIZED HEALTH CARE EDUCATION/TRAINING PROGRAM

## 2023-01-06 PROCEDURE — 99999 PR PBB SHADOW E&M-EST. PATIENT-LVL III: ICD-10-PCS | Mod: PBBFAC,,, | Performed by: STUDENT IN AN ORGANIZED HEALTH CARE EDUCATION/TRAINING PROGRAM

## 2023-01-06 PROCEDURE — 99213 OFFICE O/P EST LOW 20 MIN: CPT | Mod: PBBFAC,PO,25 | Performed by: STUDENT IN AN ORGANIZED HEALTH CARE EDUCATION/TRAINING PROGRAM

## 2023-01-06 PROCEDURE — 17004 DESTROY PREMAL LESIONS 15/>: CPT | Mod: PBBFAC,PO | Performed by: STUDENT IN AN ORGANIZED HEALTH CARE EDUCATION/TRAINING PROGRAM

## 2023-01-06 NOTE — PATIENT INSTRUCTIONS

## 2023-01-06 NOTE — PROGRESS NOTES
Patient Information  Name: Bharathi Parsons  : 1940  MRN: [id number]     Referring Physician:  Dr. Zamora ref. provider found   Primary Care Physician:  Dr. DAMON Rooney Jr, MD   Date of Visit: 2023      Subjective:       Bharathi Parsons is a 82 y.o. male who presents for   Chief Complaint   Patient presents with    Lesion     Skin lesion on left cheek. Treated with cryo X 1 year. Spot is now red and patient complains that area becomes dry and shaving irritates it. Patient has noticed new spot on right cheek and states area feels tender at times.      HPI  Skin lesion on left cheek. Treated with cryo X 1 year. Spot is now red and patient complains that area becomes dry and shaving irritates it. Patient has noticed new spot on right cheek and states area feels tender at times.     Patient was last seen:Visit date not found     Prior notes by myself reviewed.   Clinical documentation obtained by nursing staff reviewed.    Review of Systems   Skin:  Negative for itching and rash.      Objective:    Physical Exam   Constitutional: He appears well-developed and well-nourished. No distress.   Neurological: He is alert and oriented to person, place, and time. He is not disoriented.   Psychiatric: He has a normal mood and affect.   Skin:   Areas Examined (abnormalities noted in diagram):   Head / Face Inspection Performed  Neck Inspection Performed  RUE Inspected  LUE Inspection Performed                 Diagram Legend     Erythematous scaling macule/papule c/w actinic keratosis       Vascular papule c/w angioma      Pigmented verrucoid papule/plaque c/w seborrheic keratosis      Yellow umbilicated papule c/w sebaceous hyperplasia      Irregularly shaped tan macule c/w lentigo     1-2 mm smooth white papules consistent with Milia      Movable subcutaneous cyst with punctum c/w epidermal inclusion cyst      Subcutaneous movable cyst c/w pilar cyst      Firm pink to brown papule c/w dermatofibroma      Pedunculated  fleshy papule(s) c/w skin tag(s)      Evenly pigmented macule c/w junctional nevus     Mildly variegated pigmented, slightly irregular-bordered macule c/w mildly atypical nevus      Flesh colored to evenly pigmented papule c/w intradermal nevus       Pink pearly papule/plaque c/w basal cell carcinoma      Erythematous hyperkeratotic cursted plaque c/w SCC      Surgical scar with no sign of skin cancer recurrence      Open and closed comedones      Inflammatory papules and pustules      Verrucoid papule consistent consistent with wart     Erythematous eczematous patches and plaques     Dystrophic onycholytic nail with subungual debris c/w onychomycosis     Umbilicated papule    Erythematous-base heme-crusted tan verrucoid plaque consistent with inflamed seborrheic keratosis     Erythematous Silvery Scaling Plaque c/w Psoriasis     See annotation      No images are attached to the encounter or orders placed in the encounter.    [] Data reviewed  [] Independent review of test  [] Management discussed with another provider    Assessment / Plan:        Actinic keratoses  Cryosurgery Procedure Note    Verbal consent from the patient is obtained including, but not limited to, risk of hypopigmentation/hyperpigmentation, scar, recurrence of lesion. The patient is aware of the precancerous quality and need for treatment of these lesions. Liquid nitrogen cryosurgery is applied to the 19 actinic keratoses, as detailed in the physical exam, to produce a freeze injury. The patient is aware that blisters may form and is instructed on wound care with gentle cleansing and use of vaseline ointment to keep moist until healed. The patient is supplied a handout on cryosurgery and is instructed to call if lesions do not completely resolve.    Epidermal inclusion cyst  Patient has been informed of the diagnosis, the planned procedure, the risks, benefits, and alternatives associated with the procedure. All questions were answered. Patient  would like to proceed with scheduling for surgery.             LOS NUMBER AND COMPLEXITY OF PROBLEMS    COMPLEXITY OF DATA RISK TOTAL TIME (m)   24737  72570 [] 1 self-limited or minor problem [x] Minimal to none [] No treatment recommended or patient to monitor 15-29  10-19   55690  66573 Low  [] 2 or > self limited or minor problems  [] 1 stable chronic illness  [] 1 acute, uncomplicated illness or injury Limited (2)  [] Prior external notes from each unique source  [] Review result of each unique test  [] Order each unique test [x]  Low  OTC medications, minor skin biopsy 30-44  20-29   39161  06428 Moderate  [x]  1 or > chronic illness with progression, exacerbation or SE of treatment  []  2 or more stable chronic illnesses  []  1 acute illness with systemic symptoms  []  1 acute complicated injury  []  1 undiagnosed new problem with uncertain prognosis Moderate (1/3 below)  []  3 or more data items        *Now includes assessment requiring independent historian  []  Independent interpretation of a test  []  Discuss management/test with another provider Moderate  []  Prescription drug mgmt  []  Minor surgery with risk discussed  []  Mgmt limited by social determinates 45-59  30-39   52765  41828 High  []  1 or more chronic illness with severe exacerbation, progression or SE of treatment  []  1 acute or chronic illness/injury that poses a threat to life or bodily function Extensive (2/3 below)  []  3 or more data items        *Now includes assessment requiring independent historian.  []  Independent interpretation of a test  []  Discuss management/test with another provider High  []  Major surgery with risk discussed  []  Drug therapy requiring intensive monitoring for toxicity  []  Hospitalization  []  Decision for DNR 60-74  40-54      No follow-ups on file.    Ann Sanches MD, FAAD  [facility]

## 2023-01-17 ENCOUNTER — OFFICE VISIT (OUTPATIENT)
Dept: SPORTS MEDICINE | Facility: CLINIC | Age: 83
End: 2023-01-17
Payer: MEDICARE

## 2023-01-17 ENCOUNTER — PATIENT MESSAGE (OUTPATIENT)
Dept: DERMATOLOGY | Facility: CLINIC | Age: 83
End: 2023-01-17
Payer: MEDICARE

## 2023-01-17 VITALS — BODY MASS INDEX: 22.72 KG/M2 | HEIGHT: 72 IN | WEIGHT: 167.75 LBS

## 2023-01-17 DIAGNOSIS — M17.0 BILATERAL PRIMARY OSTEOARTHRITIS OF KNEE: Primary | ICD-10-CM

## 2023-01-17 PROCEDURE — 99999 PR PBB SHADOW E&M-EST. PATIENT-LVL III: ICD-10-PCS | Mod: PBBFAC,,, | Performed by: FAMILY MEDICINE

## 2023-01-17 PROCEDURE — 99499 NO LOS: ICD-10-PCS | Mod: S$PBB,,, | Performed by: FAMILY MEDICINE

## 2023-01-17 PROCEDURE — 20610 DRAIN/INJ JOINT/BURSA W/O US: CPT | Mod: 50,PBBFAC | Performed by: FAMILY MEDICINE

## 2023-01-17 PROCEDURE — 99499 UNLISTED E&M SERVICE: CPT | Mod: S$PBB,,, | Performed by: FAMILY MEDICINE

## 2023-01-17 PROCEDURE — 20610 LARGE JOINT ASPIRATION/INJECTION: BILATERAL KNEE: ICD-10-PCS | Mod: 50,S$PBB,, | Performed by: FAMILY MEDICINE

## 2023-01-17 PROCEDURE — 99213 OFFICE O/P EST LOW 20 MIN: CPT | Mod: PBBFAC,25 | Performed by: FAMILY MEDICINE

## 2023-01-17 PROCEDURE — 99999 PR PBB SHADOW E&M-EST. PATIENT-LVL III: CPT | Mod: PBBFAC,,, | Performed by: FAMILY MEDICINE

## 2023-01-17 RX ADMIN — Medication 20 MG: at 02:01

## 2023-01-17 NOTE — PATIENT INSTRUCTIONS
Apply ice to affected area three times a day for (15) fifteen minutes for the next 48 hours, and reduce activity during that time.  Voltaren gel three times a day to affected area if recommended.  Oral medication if recommended.  Physical therapy if recommended at home or at clinic.  Keep recheck visit.     Patient Education       Viscosupplementation   Why is this procedure done?   Your joints are where two bones meet. The ends of the bones are covered with a protective coating of cartilage. This helps them glide smoothly during movement. A fluid also helps the joint move more smoothly. With years of use, the cartilage may begin to wear away. This causes pain in the joints. This procedure is done to relieve the pain. A special fluid is used to help the bones glide more easily. It also acts like a shock absorber. This is most often done on the knee joints.  What will the results be?   Lubricates the joint  Less pain in the joints during movement or weight bearing  Improved joint mobility or movement  What happens before the procedure?   Your doctor may ask you to try other ways to treat osteoarthritis or OA, such as exercise, physical therapy, drugs for pain, applying hot compress, and losing weight.  Talk to your doctor about all the drugs you are taking. Be sure to include all prescription and over-the-counter (OTC) drugs, and herbal supplements. Tell the doctor about any drug allergy. Bring a list of drugs you take with you. Some drugs may interact with the injection.  What happens during the procedure?   Your doctor will clean the skin area where the injection will be given. You will be given a drug called local anesthesia. This will numb your skin and you will not feel any pain.  In some cases, your doctor might use imaging like x-ray or ultrasound to make sure they inject the right spot.  If you have excess fluid in your joint, your doctor may remove the fluid before beginning.  A needle will be used to inject  the hyaluronic acid into the joint. Hyaluronic acid is a natural fluid in your body. It lubricates the joint to ease body movements.  The doctor will cover the injection site with a small bandage to prevent infection.  The procedure may only take a couple of minutes.  What happens after the procedure?   You may feel slight pain, warmth, and swelling around the joint area.  You will be able to go home after the injection.  What care is needed at home?   Ask your doctor what you need to do when you go home. Make sure you ask questions if you do not understand what the doctor says. This way you will know what you need to do.  Place an ice pack or a bag of frozen peas wrapped in a towel over the painful part. Never put ice right on the skin. Do not leave the ice on more than 10 to 15 minutes at a time.  Rest for the first few days after the procedure. Avoid strenuous activities like heavy lifting, jogging, standing for a long time, and hard exercise.  What follow-up care is needed?   Your doctor may ask you to make visits to the office to check on your progress. Be sure to keep these visits. Your doctor may want you to have more injections.  What lifestyle changes are needed?   Ask your doctor about when you can go back to your normal activities like exercise or work.  What problems could happen?   Pain, redness, and swelling around the injection site  Infection of the joint  Fever  Allergic reaction  Itching  Rash  Bruising  Bleeding in the joint  No change in pain after injection  Where can I learn more?   [employer]  http://orthoinfo.aaos.org/topic.cfm?wdjqz=u18947   Last Reviewed Date   2021-03-30  Consumer Information Use and Disclaimer   This information is not specific medical advice and does not replace information you receive from your health care provider. This is only a brief summary of general information. It does NOT include all information about conditions, illnesses,  injuries, tests, procedures, treatments, therapies, discharge instructions or life-style choices that may apply to you. You must talk with your health care provider for complete information about your health and treatment options. This information should not be used to decide whether or not to accept your health care providers advice, instructions or recommendations. Only your health care provider has the knowledge and training to provide advice that is right for you.  Copyright   Copyright © 2021 [employer] and its affiliates and/or licensors. All rights reserved.

## 2023-01-17 NOTE — PROCEDURES
Large Joint Aspiration/Injection: bilateral knee    Date/Time: 1/17/2023 2:00 PM  Performed by: Saad Price MD  Authorized by: Saad Price MD     Consent Done?:  Yes (Verbal)  Indications:  Arthritis and pain  Site marked: the procedure site was marked    Timeout: prior to procedure the correct patient, procedure, and site was verified    Prep: patient was prepped and draped in usual sterile fashion    Approach:  Anterolateral  Location:  Knee  Laterality:  Bilateral  Site:  Bilateral knee  Medications (Right):  20 mg sodium hyaluronate (EUFLEXXA) 10 mg/mL(mw 2.4 -3.6 million)  Medications (Left):  20 mg sodium hyaluronate (EUFLEXXA) 10 mg/mL(mw 2.4 -3.6 million)  Patient tolerance:  Patient tolerated the procedure well with no immediate complications 1 of 3

## 2023-01-18 ENCOUNTER — OUTPATIENT CASE MANAGEMENT (OUTPATIENT)
Dept: ADMINISTRATIVE | Facility: OTHER | Age: 83
End: 2023-01-18
Payer: MEDICARE

## 2023-01-18 NOTE — LETTER
January 19, 2023    Bharathi Parsons  [address] [place] [address]             [facility] [address] [place] [address] Dear Bharathi Parsons:    I am writing from the Outpatient Complex Care Management Department at [facility].  I received a referral from CHANTE Mcqueen to contact you  regarding any needs you may have. I have attempted to contact you  by phone two times unsuccessfully.  Please contact the Outpatient Complex Care Management Department at [phone] if you would like to discuss your needs.      Sincerely,         Deborah Rojo LMSW

## 2023-01-18 NOTE — PROGRESS NOTES
Attempt #:  1  This LMSW attempted to reach patient/caregiver to provide resource, Patient report not being avilable patient ask that SW contact him on tomorrow.

## 2023-01-19 ENCOUNTER — TELEPHONE (OUTPATIENT)
Dept: DERMATOLOGY | Facility: CLINIC | Age: 83
End: 2023-01-19
Payer: MEDICARE

## 2023-01-19 NOTE — TELEPHONE ENCOUNTER
Returned patient call. All questions answered.   ----- Message from Liliana Padgett sent at 1/19/2023  1:41 PM CST -----  Contact: [phone]  Patient would like to consult with a nurse in regards to his scheduled procedure on 1/24 patient stated he needs to discuss details. Please call back at [phone]. Thanks jerica

## 2023-01-19 NOTE — PROGRESS NOTES
2nd attempt    This LMSW attempted to reach patient/caregiver to provide resource and left msg requesting a return call.  Letter with contact information was sent via Patient portal to patient/caregiver.  Referral source notified.

## 2023-01-24 ENCOUNTER — PROCEDURE VISIT (OUTPATIENT)
Dept: DERMATOLOGY | Facility: CLINIC | Age: 83
End: 2023-01-24
Payer: MEDICARE

## 2023-01-24 ENCOUNTER — OFFICE VISIT (OUTPATIENT)
Dept: SPORTS MEDICINE | Facility: CLINIC | Age: 83
End: 2023-01-24
Payer: MEDICARE

## 2023-01-24 VITALS — WEIGHT: 167.75 LBS | HEIGHT: 72 IN | BODY MASS INDEX: 22.72 KG/M2

## 2023-01-24 DIAGNOSIS — L72.0 EPIDERMAL INCLUSION CYST: Primary | ICD-10-CM

## 2023-01-24 DIAGNOSIS — M17.0 BILATERAL PRIMARY OSTEOARTHRITIS OF KNEE: Primary | ICD-10-CM

## 2023-01-24 PROCEDURE — 11421 PR EXC SKIN BENIG 0.6-1 CM REMAINDR BODY: ICD-10-PCS | Mod: S$PBB,,, | Performed by: STUDENT IN AN ORGANIZED HEALTH CARE EDUCATION/TRAINING PROGRAM

## 2023-01-24 PROCEDURE — 99213 OFFICE O/P EST LOW 20 MIN: CPT | Mod: PBBFAC,25 | Performed by: FAMILY MEDICINE

## 2023-01-24 PROCEDURE — 11421 EXC H-F-NK-SP B9+MARG 0.6-1: CPT | Mod: PBBFAC | Performed by: STUDENT IN AN ORGANIZED HEALTH CARE EDUCATION/TRAINING PROGRAM

## 2023-01-24 PROCEDURE — 11421 EXC H-F-NK-SP B9+MARG 0.6-1: CPT | Mod: S$PBB,,, | Performed by: STUDENT IN AN ORGANIZED HEALTH CARE EDUCATION/TRAINING PROGRAM

## 2023-01-24 PROCEDURE — 20610 DRAIN/INJ JOINT/BURSA W/O US: CPT | Mod: 50,PBBFAC | Performed by: FAMILY MEDICINE

## 2023-01-24 PROCEDURE — 99499 NO LOS: ICD-10-PCS | Mod: S$PBB,,, | Performed by: FAMILY MEDICINE

## 2023-01-24 PROCEDURE — 88304 TISSUE EXAM BY PATHOLOGIST: CPT | Mod: 26,,, | Performed by: PATHOLOGY

## 2023-01-24 PROCEDURE — 88304 TISSUE EXAM BY PATHOLOGIST: CPT | Performed by: PATHOLOGY

## 2023-01-24 PROCEDURE — 99499 UNLISTED E&M SERVICE: CPT | Mod: S$PBB,,, | Performed by: FAMILY MEDICINE

## 2023-01-24 PROCEDURE — 99499 UNLISTED E&M SERVICE: CPT | Mod: S$PBB,,, | Performed by: STUDENT IN AN ORGANIZED HEALTH CARE EDUCATION/TRAINING PROGRAM

## 2023-01-24 PROCEDURE — 99999 PR PBB SHADOW E&M-EST. PATIENT-LVL III: ICD-10-PCS | Mod: PBBFAC,,, | Performed by: FAMILY MEDICINE

## 2023-01-24 PROCEDURE — 99499 NO LOS: ICD-10-PCS | Mod: S$PBB,,, | Performed by: STUDENT IN AN ORGANIZED HEALTH CARE EDUCATION/TRAINING PROGRAM

## 2023-01-24 PROCEDURE — 88304 PR  SURG PATH,LEVEL III: ICD-10-PCS | Mod: 26,,, | Performed by: PATHOLOGY

## 2023-01-24 PROCEDURE — 20610 LARGE JOINT ASPIRATION/INJECTION: BILATERAL KNEE: ICD-10-PCS | Mod: 50,S$PBB,, | Performed by: FAMILY MEDICINE

## 2023-01-24 PROCEDURE — 99999 PR PBB SHADOW E&M-EST. PATIENT-LVL III: CPT | Mod: PBBFAC,,, | Performed by: FAMILY MEDICINE

## 2023-01-24 RX ORDER — BEMPEDOIC ACID AND EZETIMIBE 180; 10 MG/1; MG/1
1 TABLET, FILM COATED ORAL DAILY
Qty: 30 TABLET | Refills: 12 | Status: SHIPPED | OUTPATIENT
Start: 2023-01-24

## 2023-01-24 RX ADMIN — Medication 20 MG: at 01:01

## 2023-01-24 NOTE — PROCEDURES
Large Joint Aspiration/Injection: bilateral knee    Date/Time: 1/24/2023 1:40 PM  Performed by: Saad Price MD  Authorized by: Saad Price MD     Consent Done?:  Yes (Verbal)  Indications:  Pain and arthritis  Site marked: the procedure site was marked    Timeout: prior to procedure the correct patient, procedure, and site was verified    Prep: patient was prepped and draped in usual sterile fashion    Approach:  Anterolateral  Location:  Knee  Laterality:  Bilateral  Site:  Bilateral knee  Medications (Right):  20 mg sodium hyaluronate (EUFLEXXA) 10 mg/mL(mw 2.4 -3.6 million)  Medications (Left):  20 mg sodium hyaluronate (EUFLEXXA) 10 mg/mL(mw 2.4 -3.6 million)  Patient tolerance:  Patient tolerated the procedure well with no immediate complications

## 2023-01-24 NOTE — PATIENT INSTRUCTIONS

## 2023-01-24 NOTE — PROGRESS NOTES
Patient Information  Name: Bharathi Parsons  : 1940  MRN: [id number]     Referring Physician:  Dr. Zamora ref. provider found   Primary Care Physician:  Dr. DAMON Rooney Jr, MD   Date of Visit: 2023      Subjective:       Bharathi Parsons is a 82 y.o. male who presents for [facility]  Dermatologic Surgery preoperative checklist:    Pacemaker/Defibrillator:  no    Anticoagulants:  no    Implants requiring prophylaxis:  no    Any other conditions affecting surgery:  no    Patient was last seen:Visit date not found     Prior notes by myself reviewed.   Clinical documentation obtained by nursing staff reviewed.    Review of Systems   Skin:  Negative for itching and rash.      Objective:    Physical Exam   Constitutional: He appears well-developed and well-nourished. No distress.   Neurological: He is alert and oriented to person, place, and time. He is not disoriented.   Psychiatric: He has a normal mood and affect.   Skin:   Areas Examined (abnormalities noted in diagram):   Scalp / Hair Palpated and Inspected            Diagram Legend     Erythematous scaling macule/papule c/w actinic keratosis       Vascular papule c/w angioma      Pigmented verrucoid papule/plaque c/w seborrheic keratosis      Yellow umbilicated papule c/w sebaceous hyperplasia      Irregularly shaped tan macule c/w lentigo     1-2 mm smooth white papules consistent with Milia      Movable subcutaneous cyst with punctum c/w epidermal inclusion cyst      Subcutaneous movable cyst c/w pilar cyst      Firm pink to brown papule c/w dermatofibroma      Pedunculated fleshy papule(s) c/w skin tag(s)      Evenly pigmented macule c/w junctional nevus     Mildly variegated pigmented, slightly irregular-bordered macule c/w mildly atypical nevus      Flesh colored to evenly pigmented papule c/w intradermal nevus       Pink pearly papule/plaque c/w basal cell carcinoma      Erythematous hyperkeratotic cursted plaque c/w SCC      Surgical scar with no  sign of skin cancer recurrence      Open and closed comedones      Inflammatory papules and pustules      Verrucoid papule consistent consistent with wart     Erythematous eczematous patches and plaques     Dystrophic onycholytic nail with subungual debris c/w onychomycosis     Umbilicated papule    Erythematous-base heme-crusted tan verrucoid plaque consistent with inflamed seborrheic keratosis     Erythematous Silvery Scaling Plaque c/w Psoriasis     See annotation      No images are attached to the encounter or orders placed in the encounter.    [] Data reviewed  [] Independent review of test  [] Management discussed with another provider    Assessment / Plan:      Pathology Orders:       Normal Orders This Visit    Specimen to Pathology, Dermatology     Questions:    Procedure Type: Dermatology and skin neoplasms    Number of Specimens: 1    ------------------------: -------------------------    Spec 1 Procedure: Excision >2cm    Spec 1 Clinical Impression: EIC    Spec 1 Source: posterior scalp    Release to patient: Immediate          Epidermal inclusion cyst  -     Specimen to Pathology, Dermatology  PROCEDURE: Elliptical excision with simple repair    ANESTHETIC: 3 cc 1% Xylocaine with Epinephrine 1:100,000, buffered    SURGEON:  Ann Sanches M.D.    ASSISTANTS: Venessa Carreno MA    PREOPERATIVE DIAGNOSIS:  EIC    POSTOPERATIVE DIAGNOSIS: Same as preoperative diagnosis    PATHOLOGIC DIAGNOSIS: Pending    LOCATION: posterior scalp    INITIAL LESION SIZE: 0.6 cm    EXCISED DIAMETER: 0.6 cm    PREPARATION: The diagnosis, procedure, alternatives, benefits and risks, including but not limited to:  infection, bleeding/bruising, drug reactions, pain, scar or cosmetic defect, local sensation disturbances, wound dehiscence (separation of wound edges after sutures removed) and/or recurrence of present condition were explained to the patient.  The patient elected to proceed.  Patient's identity was verified and the  site was verified.    PROCEDURE: The location noted above was prepped, draped, and anesthetized in the usual sterile fashion per Ann Sanches MD. Small fusiform excision performed with a 6mm punch tool overlying clinical lesion. The lesion was dissected out in toto. Otained hemostasis. Blood loss was minimal. The wound was then approximated with vertical mattress sutures of 4-0 Prolene, approximately 1 in number.    The patient tolerated the procedure well.    The area was cleaned and dressed appropriately and the patient was given wound care instructions, as well as an appointment for follow-up evaluation.    LENGTH OF REPAIR: 0.8 cm              LOS NUMBER AND COMPLEXITY OF PROBLEMS    COMPLEXITY OF DATA RISK TOTAL TIME (m)   43815  [id number] [] 1 self-limited or minor problem [] Minimal to none [] No treatment recommended or patient to monitor 15-29  10-19   50083  85389 Low  [] 2 or > self limited or minor problems  [] 1 stable chronic illness  [] 1 acute, uncomplicated illness or injury Limited (2)  [] Prior external notes from each unique source  [] Review result of each unique test  [] Order each unique test []  Low  OTC medications, minor skin biopsy 30-44  20-29   23753  36816 Moderate  []  1 or > chronic illness with progression, exacerbation or SE of treatment  []  2 or more stable chronic illnesses  []  1 acute illness with systemic symptoms  []  1 acute complicated injury  []  1 undiagnosed new problem with uncertain prognosis Moderate (1/3 below)  []  3 or more data items        *Now includes assessment requiring independent historian  []  Independent interpretation of a test  []  Discuss management/test with another provider Moderate  []  Prescription drug mgmt  []  Minor surgery with risk discussed  []  Mgmt limited by social determinates 45-59  30-39   76893  52257 High  []  1 or more chronic illness with severe exacerbation, progression or SE of treatment  []  1 acute or chronic illness/injury that  poses a threat to life or bodily function Extensive (2/3 below)  []  3 or more data items        *Now includes assessment requiring independent historian.  []  Independent interpretation of a test  []  Discuss management/test with another provider High  []  Major surgery with risk discussed  []  Drug therapy requiring intensive monitoring for toxicity  []  Hospitalization  []  Decision for DNR 60-74  40-54      No follow-ups on file.    Ann Sanches MD, FAAD  [facility]

## 2023-01-27 LAB
FINAL PATHOLOGIC DIAGNOSIS: NORMAL
Lab: NORMAL

## 2023-01-31 ENCOUNTER — OFFICE VISIT (OUTPATIENT)
Dept: SPORTS MEDICINE | Facility: CLINIC | Age: 83
End: 2023-01-31
Payer: MEDICARE

## 2023-01-31 VITALS — WEIGHT: 167.75 LBS | BODY MASS INDEX: 22.72 KG/M2 | HEIGHT: 72 IN

## 2023-01-31 DIAGNOSIS — M17.0 BILATERAL PRIMARY OSTEOARTHRITIS OF KNEE: Primary | ICD-10-CM

## 2023-01-31 PROCEDURE — 20610 DRAIN/INJ JOINT/BURSA W/O US: CPT | Mod: 50,PBBFAC | Performed by: FAMILY MEDICINE

## 2023-01-31 PROCEDURE — 99999 PR PBB SHADOW E&M-EST. PATIENT-LVL III: CPT | Mod: PBBFAC,,, | Performed by: FAMILY MEDICINE

## 2023-01-31 PROCEDURE — 99999 PR PBB SHADOW E&M-EST. PATIENT-LVL III: ICD-10-PCS | Mod: PBBFAC,,, | Performed by: FAMILY MEDICINE

## 2023-01-31 PROCEDURE — 99499 UNLISTED E&M SERVICE: CPT | Mod: S$PBB,,, | Performed by: FAMILY MEDICINE

## 2023-01-31 PROCEDURE — 99213 OFFICE O/P EST LOW 20 MIN: CPT | Mod: PBBFAC,25 | Performed by: FAMILY MEDICINE

## 2023-01-31 PROCEDURE — 20610 LARGE JOINT ASPIRATION/INJECTION: BILATERAL KNEE: ICD-10-PCS | Mod: 50,S$PBB,, | Performed by: FAMILY MEDICINE

## 2023-01-31 PROCEDURE — 99499 NO LOS: ICD-10-PCS | Mod: S$PBB,,, | Performed by: FAMILY MEDICINE

## 2023-01-31 RX ADMIN — Medication 20 MG: at 01:01

## 2023-01-31 NOTE — PATIENT INSTRUCTIONS
Patient Education       Viscosupplementation   Why is this procedure done?   Your joints are where two bones meet. The ends of the bones are covered with a protective coating of cartilage. This helps them glide smoothly during movement. A fluid also helps the joint move more smoothly. With years of use, the cartilage may begin to wear away. This causes pain in the joints. This procedure is done to relieve the pain. A special fluid is used to help the bones glide more easily. It also acts like a shock absorber. This is most often done on the knee joints.  What will the results be?   Lubricates the joint  Less pain in the joints during movement or weight bearing  Improved joint mobility or movement  What happens before the procedure?   Your doctor may ask you to try other ways to treat osteoarthritis or OA, such as exercise, physical therapy, drugs for pain, applying hot compress, and losing weight.  Talk to your doctor about all the drugs you are taking. Be sure to include all prescription and over-the-counter (OTC) drugs, and herbal supplements. Tell the doctor about any drug allergy. Bring a list of drugs you take with you. Some drugs may interact with the injection.  What happens during the procedure?   Your doctor will clean the skin area where the injection will be given. You will be given a drug called local anesthesia. This will numb your skin and you will not feel any pain.  In some cases, your doctor might use imaging like x-ray or ultrasound to make sure they inject the right spot.  If you have excess fluid in your joint, your doctor may remove the fluid before beginning.  A needle will be used to inject the hyaluronic acid into the joint. Hyaluronic acid is a natural fluid in your body. It lubricates the joint to ease body movements.  The doctor will cover the injection site with a small bandage to prevent infection.  The procedure may only take a couple of minutes.  What happens after the procedure?   You  may feel slight pain, warmth, and swelling around the joint area.  You will be able to go home after the injection.  What care is needed at home?   Ask your doctor what you need to do when you go home. Make sure you ask questions if you do not understand what the doctor says. This way you will know what you need to do.  Place an ice pack or a bag of frozen peas wrapped in a towel over the painful part. Never put ice right on the skin. Do not leave the ice on more than 10 to 15 minutes at a time.  Rest for the first few days after the procedure. Avoid strenuous activities like heavy lifting, jogging, standing for a long time, and hard exercise.  What follow-up care is needed?   Your doctor may ask you to make visits to the office to check on your progress. Be sure to keep these visits. Your doctor may want you to have more injections.  What lifestyle changes are needed?   Ask your doctor about when you can go back to your normal activities like exercise or work.  What problems could happen?   Pain, redness, and swelling around the injection site  Infection of the joint  Fever  Allergic reaction  Itching  Rash  Bruising  Bleeding in the joint  No change in pain after injection  Where can I learn more?   [employer]  http://orthoinfo.aaos.org/topic.cfm?bzquy=m82292   Last Reviewed Date   2021-03-30  Consumer Information Use and Disclaimer   This information is not specific medical advice and does not replace information you receive from your health care provider. This is only a brief summary of general information. It does NOT include all information about conditions, illnesses, injuries, tests, procedures, treatments, therapies, discharge instructions or life-style choices that may apply to you. You must talk with your health care provider for complete information about your health and treatment options. This information should not be used to decide whether or not to accept your health  care providers advice, instructions or recommendations. Only your health care provider has the knowledge and training to provide advice that is right for you.  Copyright   Copyright © 2021 [employer] and its affiliates and/or licensors. All rights reserved.

## 2023-01-31 NOTE — PROCEDURES
Large Joint Aspiration/Injection: bilateral knee    Date/Time: 1/31/2023 1:40 PM  Performed by: Saad Price MD  Authorized by: Saad Price MD     Consent Done?:  Yes (Verbal)  Indications:  Pain and arthritis  Site marked: the procedure site was marked    Timeout: prior to procedure the correct patient, procedure, and site was verified    Prep: patient was prepped and draped in usual sterile fashion    Approach:  Anterolateral  Location:  Knee  Laterality:  Bilateral  Site:  Bilateral knee  Medications (Right):  20 mg sodium hyaluronate (EUFLEXXA) 10 mg/mL(mw 2.4 -3.6 million)  Medications (Left):  20 mg sodium hyaluronate (EUFLEXXA) 10 mg/mL(mw 2.4 -3.6 million)  Patient tolerance:  Patient tolerated the procedure well with no immediate complications

## 2023-02-07 ENCOUNTER — CLINICAL SUPPORT (OUTPATIENT)
Dept: DERMATOLOGY | Facility: CLINIC | Age: 83
End: 2023-02-07
Payer: MEDICARE

## 2023-02-16 ENCOUNTER — TELEPHONE (OUTPATIENT)
Dept: INTERNAL MEDICINE | Facility: CLINIC | Age: 83
End: 2023-02-16
Payer: MEDICARE

## 2023-02-28 ENCOUNTER — LAB VISIT (OUTPATIENT)
Dept: LAB | Facility: HOSPITAL | Age: 83
End: 2023-02-28
Attending: PEDIATRICS
Payer: MEDICARE

## 2023-02-28 DIAGNOSIS — E78.00 HYPERCHOLESTEROLEMIA: ICD-10-CM

## 2023-02-28 DIAGNOSIS — N18.31 STAGE 3A CHRONIC KIDNEY DISEASE: ICD-10-CM

## 2023-02-28 DIAGNOSIS — I10 ESSENTIAL HYPERTENSION: ICD-10-CM

## 2023-02-28 LAB
ALBUMIN SERPL BCP-MCNC: 3.7 G/DL (ref 3.5–5.2)
ALP SERPL-CCNC: 71 U/L (ref 55–135)
ALT SERPL W/O P-5'-P-CCNC: 18 U/L (ref 10–44)
ANION GAP SERPL CALC-SCNC: 7 MMOL/L (ref 8–16)
AST SERPL-CCNC: 23 U/L (ref 10–40)
BILIRUB SERPL-MCNC: 0.4 MG/DL (ref 0.1–1)
BUN SERPL-MCNC: 24 MG/DL (ref 8–23)
CALCIUM SERPL-MCNC: 9.1 MG/DL (ref 8.7–10.5)
CHLORIDE SERPL-SCNC: 110 MMOL/L (ref 95–110)
CHOLEST SERPL-MCNC: 154 MG/DL (ref 120–199)
CHOLEST/HDLC SERPL: 3.6 {RATIO} (ref 2–5)
CO2 SERPL-SCNC: 24 MMOL/L (ref 23–29)
CREAT SERPL-MCNC: 1.3 MG/DL (ref 0.5–1.4)
EST. GFR  (NO RACE VARIABLE): 54.2 ML/MIN/1.73 M^2
GLUCOSE SERPL-MCNC: 86 MG/DL (ref 70–110)
HDLC SERPL-MCNC: 43 MG/DL (ref 40–75)
HDLC SERPL: 27.9 % (ref 20–50)
LDLC SERPL CALC-MCNC: 93.4 MG/DL (ref 63–159)
NONHDLC SERPL-MCNC: 111 MG/DL
POTASSIUM SERPL-SCNC: 5.4 MMOL/L (ref 3.5–5.1)
PROT SERPL-MCNC: 6.2 G/DL (ref 6–8.4)
SODIUM SERPL-SCNC: 141 MMOL/L (ref 136–145)
TRIGL SERPL-MCNC: 88 MG/DL (ref 30–150)

## 2023-02-28 PROCEDURE — 80061 LIPID PANEL: CPT | Performed by: PEDIATRICS

## 2023-02-28 PROCEDURE — 36415 COLL VENOUS BLD VENIPUNCTURE: CPT | Performed by: PEDIATRICS

## 2023-02-28 PROCEDURE — 80053 COMPREHEN METABOLIC PANEL: CPT | Performed by: PEDIATRICS

## 2023-03-06 ENCOUNTER — CLINICAL SUPPORT (OUTPATIENT)
Dept: AUDIOLOGY | Facility: CLINIC | Age: 83
End: 2023-03-06
Payer: MEDICARE

## 2023-03-06 DIAGNOSIS — Z92.89 HISTORY OF USE OF HEARING AID: Primary | ICD-10-CM

## 2023-03-08 ENCOUNTER — OFFICE VISIT (OUTPATIENT)
Dept: INTERNAL MEDICINE | Facility: CLINIC | Age: 83
End: 2023-03-08
Payer: MEDICARE

## 2023-03-08 VITALS
WEIGHT: 168.88 LBS | HEIGHT: 72 IN | SYSTOLIC BLOOD PRESSURE: 134 MMHG | OXYGEN SATURATION: 96 % | DIASTOLIC BLOOD PRESSURE: 86 MMHG | TEMPERATURE: 97 F | BODY MASS INDEX: 22.87 KG/M2 | HEART RATE: 80 BPM

## 2023-03-08 DIAGNOSIS — E78.00 HYPERCHOLESTEROLEMIA: ICD-10-CM

## 2023-03-08 DIAGNOSIS — I10 ESSENTIAL HYPERTENSION: ICD-10-CM

## 2023-03-08 DIAGNOSIS — K21.00 GASTROESOPHAGEAL REFLUX DISEASE WITH ESOPHAGITIS, UNSPECIFIED WHETHER HEMORRHAGE: ICD-10-CM

## 2023-03-08 DIAGNOSIS — M17.0 BILATERAL PRIMARY OSTEOARTHRITIS OF KNEE: ICD-10-CM

## 2023-03-08 DIAGNOSIS — N18.31 STAGE 3A CHRONIC KIDNEY DISEASE: Primary | ICD-10-CM

## 2023-03-08 DIAGNOSIS — D69.2 OTHER NONTHROMBOCYTOPENIC PURPURA: ICD-10-CM

## 2023-03-08 DIAGNOSIS — Z12.5 PROSTATE CANCER SCREENING: ICD-10-CM

## 2023-03-08 PROCEDURE — 99214 PR OFFICE/OUTPT VISIT, EST, LEVL IV, 30-39 MIN: ICD-10-PCS | Mod: S$PBB,,, | Performed by: PEDIATRICS

## 2023-03-08 PROCEDURE — 99999 PR PBB SHADOW E&M-EST. PATIENT-LVL III: ICD-10-PCS | Mod: PBBFAC,,, | Performed by: PEDIATRICS

## 2023-03-08 PROCEDURE — 99214 OFFICE O/P EST MOD 30 MIN: CPT | Mod: S$PBB,,, | Performed by: PEDIATRICS

## 2023-03-08 PROCEDURE — 99213 OFFICE O/P EST LOW 20 MIN: CPT | Mod: PBBFAC | Performed by: PEDIATRICS

## 2023-03-08 PROCEDURE — 99999 PR PBB SHADOW E&M-EST. PATIENT-LVL III: CPT | Mod: PBBFAC,,, | Performed by: PEDIATRICS

## 2023-03-08 RX ORDER — PITAVASTATIN CALCIUM 4.18 MG/1
1 TABLET, FILM COATED ORAL
COMMUNITY
Start: 2023-01-26 | End: 2024-01-23

## 2023-03-08 NOTE — PROGRESS NOTES
Subjective:       Patient ID: Bharathi Parsons is a 82 y.o. male.    Chief Complaint: Follow-up    Bharathi Parsons is a 82 y.o. male who presents to clinic for a follow up     1) GERD with esophagitis and esophageal stricture. Had EGD and dilatation by Dr Darby. On PPI, has flares with missing doses.   2) Allergies: See Dr Meneses, uses intranasal steroids and astelin successfully. Always has some degree of Sx  3) Hypercholesterol: Is taking Livalo (4 mg 1/2 a day) and zetia due to crestor and other statins causing muscle achiness.  4) HTN:Tolerating ACE. B/P good.   5) senile purpura: no other bleeding gums, hematuria, rectal bleeding, Evon  6) CKD3: creatinine stable EGFR is low due to age     LABS REVIEWED AND DISCUSSED WITH PATIENT: CMP, lipids    Review of Systems   Constitutional:  Negative for fever and unexpected weight change.   HENT:  Negative for congestion and rhinorrhea.    Eyes:  Negative for discharge and redness.   Respiratory:  Negative for cough, shortness of breath and wheezing.    Gastrointestinal:  Negative for constipation, diarrhea and vomiting.   Genitourinary:  Negative for decreased urine volume and difficulty urinating.   Musculoskeletal:  Negative for arthralgias and joint swelling.   Skin:  Negative for rash and wound.   Neurological:  Negative for syncope and headaches.   Psychiatric/Behavioral:  Negative for behavioral problems and sleep disturbance.      Objective:      Physical Exam  Vitals and nursing note reviewed.   Constitutional:       General: He is not in acute distress.     Appearance: He is well-developed.   Neck:      Thyroid: No thyromegaly.      Vascular: No JVD.   Cardiovascular:      Rate and Rhythm: Normal rate and regular rhythm.      Heart sounds: Normal heart sounds. No murmur heard.  Pulmonary:      Effort: Pulmonary effort is normal. No respiratory distress.      Breath sounds: Normal breath sounds. No wheezing or rales.   Abdominal:      General: There is no  distension.      Palpations: Abdomen is soft. There is no mass.      Tenderness: There is no abdominal tenderness. There is no guarding.   Musculoskeletal:      Right lower leg: No edema.      Left lower leg: No edema.   Lymphadenopathy:      Cervical: No cervical adenopathy.   Skin:     Capillary Refill: Capillary refill takes less than 2 seconds.      Findings: No rash.   Neurological:      General: No focal deficit present.      Mental Status: He is alert and oriented to person, place, and time.      Cranial Nerves: No cranial nerve deficit.      Coordination: Coordination normal.   Psychiatric:         Mood and Affect: Mood normal.         Behavior: Behavior normal.         Thought Content: Thought content normal.         Judgment: Judgment normal.       Assessment:       1. Stage 3a chronic kidney disease    2. Hypercholesterolemia    3. Essential hypertension    4. Bilateral primary osteoarthritis of knee    5. Other nonthrombocytopenic purpura    6. Gastroesophageal reflux disease with esophagitis, unspecified whether hemorrhage    7. Prostate cancer screening          Plan:       Stage 3a chronic kidney disease    Hypercholesterolemia  -     Lipid Panel; Future; Expected date: 03/08/2023  -     Comprehensive Metabolic Panel; Future; Expected date: 03/08/2023    Essential hypertension    Bilateral primary osteoarthritis of knee    Other nonthrombocytopenic purpura  -     CBC Auto Differential; Future; Expected date: 03/08/2023    Gastroesophageal reflux disease with esophagitis, unspecified whether hemorrhage    Prostate cancer screening  -     PSA, Screening; Future; Expected date: 03/08/2023    Overall he is stable and at goal. Maintain meds and subspecialty care. F/U 6 months with labs.

## 2023-03-08 NOTE — PROGRESS NOTES
Sending left HA off for repair under warranty. Will send right HA when left returns. Will extend warrant prior to next month.

## 2023-03-16 ENCOUNTER — OFFICE VISIT (OUTPATIENT)
Dept: SPORTS MEDICINE | Facility: CLINIC | Age: 83
End: 2023-03-16
Payer: MEDICARE

## 2023-03-16 VITALS — BODY MASS INDEX: 22.75 KG/M2 | HEIGHT: 72 IN | WEIGHT: 168 LBS | RESPIRATION RATE: 18 BRPM

## 2023-03-16 DIAGNOSIS — M17.0 BILATERAL PRIMARY OSTEOARTHRITIS OF KNEE: Primary | ICD-10-CM

## 2023-03-16 PROCEDURE — 99999 PR PBB SHADOW E&M-EST. PATIENT-LVL III: CPT | Mod: PBBFAC,,, | Performed by: FAMILY MEDICINE

## 2023-03-16 PROCEDURE — 99214 OFFICE O/P EST MOD 30 MIN: CPT | Mod: S$PBB,,, | Performed by: FAMILY MEDICINE

## 2023-03-16 PROCEDURE — 99213 OFFICE O/P EST LOW 20 MIN: CPT | Mod: PBBFAC | Performed by: FAMILY MEDICINE

## 2023-03-16 PROCEDURE — 99214 PR OFFICE/OUTPT VISIT, EST, LEVL IV, 30-39 MIN: ICD-10-PCS | Mod: S$PBB,,, | Performed by: FAMILY MEDICINE

## 2023-03-16 PROCEDURE — 99999 PR PBB SHADOW E&M-EST. PATIENT-LVL III: ICD-10-PCS | Mod: PBBFAC,,, | Performed by: FAMILY MEDICINE

## 2023-03-16 NOTE — PATIENT INSTRUCTIONS
MEDICAL NECESSITY FOR VISCOSUPPLEMENTATION: After thorough evaluation of the patient, I have determined that visco-supplementation is medically necessary. The patient has painful DJD of the knee with failure of conservative therapy including lifestyle modifications and rehabilitation exercises. Oral analgesis/NSAIDs have not adequately controlled symptoms and there is radiographic evidence of joint space narrowing, subchondral sclerosis, and some early osteophytic changes Kellgren- Ravi grade 2 or greater, or in lack of radiographic evidence, there is arthroscopic or other evidence of chondrosis.     Keep 5 month recheck in 3 months from now

## 2023-03-16 NOTE — PROGRESS NOTES
Subjective:     Patient ID: Bharathi Parsons is a 82 y.o. male.    Chief Complaint: Follow-up (Bilateral primary osteoarthritis of knee , Bilateral Euflexxa  1/31/23)      HPI: Patient is being seen for bilateral knee follow up after receiving Euflexxa injections at his office visit in January 2023. States the injections are working well for him. Denies any pain at today's office visit. Does not have to take anything by mouth to help with pain relief. Has participated in physical therapy in November 2022 for shoulder pain.     Reports having a fall 2 weeks ago. Says he wasn't paying attention to where he was stepping. Did not feel the need to go to an urgent care to be assessed.     Past Medical History:   Diagnosis Date    DJD (degenerative joint disease)     Esophageal stricture     Hypertension      Past Surgical History:   Procedure Laterality Date    APPENDECTOMY      as child    CATARACT EXTRACTION W/  INTRAOCULAR LENS IMPLANT  OD before 2003    CATARACT EXTRACTION W/  INTRAOCULAR LENS IMPLANT  OS 12/19/07 with YAG    ESOPHAGEAL DILATION      x 2 in past last one done in  2022    FOOT ARTHRODESIS      medial column arthrodesis left foot  2011     Family History   Problem Relation Age of Onset    Glaucoma Maternal Uncle     Cataracts Mother     Heart disease Father      Social History     Socioeconomic History    Marital status:    Tobacco Use    Smoking status: Never     Passive exposure: Never    Smokeless tobacco: Never   Substance and Sexual Activity    Alcohol use: Yes     Comment: Ocasionally    Drug use: No    Sexual activity: Not Currently     Partners: Female   Social History Narrative    , No smokers in household, 1 Dog.     Social Determinants of Health     Financial Resource Strain: Low Risk     Difficulty of Paying Living Expenses: Not hard at all   Food Insecurity: No Food Insecurity    Worried About Running Out of Food in the Last Year: Never true    Ran Out of  Food in the Last Year: Never true   Transportation Needs: No Transportation Needs    Lack of Transportation (Medical): No    Lack of Transportation (Non-Medical): No   Physical Activity: Inactive    Days of Exercise per Week: 0 days    Minutes of Exercise per Session: 0 min   Stress: No Stress Concern Present    Feeling of Stress : Not at all   Social Connections: Socially Integrated    Frequency of Communication with Friends and Family: More than three times a week    Frequency of Social Gatherings with Friends and Family: Three times a week    Attends Orthodox Services: More than 4 times per year    Active Member of Clubs or Organizations: No    Attends Club or Organization Meetings: More than 4 times per year    Marital Status:    Housing Stability: Low Risk     Unable to Pay for Housing in the Last Year: No    Number of Places Lived in the Last Year: 1    Unstable Housing in the Last Year: No       Current Outpatient Medications:     azelastine (ASTELIN) 137 mcg (0.1 %) nasal spray, 2 sprays by Nasal route as needed. , Disp: , Rfl:     bempedoic acid-ezetimibe (NEXLIZET) 180-10 mg Tab, Take 1 tablet by mouth Daily., Disp: 30 tablet, Rfl: 12    cetirizine (ZYRTEC) 10 MG tablet, , Disp: , Rfl:     COVID-19 vac, jose,Pfizer,,PF, (PFIZER COVID-19 JOSE VACCN,PF,) 30 mcg/0.3 mL injection, Inject into the muscle., Disp: 0.3 mL, Rfl: 0    diclofenac sodium (VOLTAREN) 1 % Gel, Apply 2 g topically 3 (three) times daily., Disp: 1 each, Rfl: 5    fluticasone (FLONASE) 50 mcg/actuation nasal spray, , Disp: , Rfl:     IBUPROFEN ORAL, Take by mouth as needed. , Disp: , Rfl:     lisinopriL 10 MG tablet, Take 1 tablet by mouth once daily, Disp: 90 tablet, Rfl: 3    LIVALO 4 mg Tab, Take 1 tablet by mouth., Disp: , Rfl:     pantoprazole (PROTONIX) 40 MG tablet, TAKE 1 TABLET BY MOUTH ONCE DAILY BEFORE MEAL(S), Disp: , Rfl:     triamcinolone acetonide 0.1% (KENALOG) 0.1 % cream, Apply topically 2  (two) times daily., Disp: 80 g, Rfl: 1  Review of patient's allergies indicates:  No Known Allergies  Review of Systems   Constitutional:  Negative for chills, fever and weight loss.   Respiratory:  Negative for shortness of breath.    Cardiovascular:  Negative for chest pain and palpitations.     Objective:   Body mass index is 22.78 kg/m².  Vitals:    03/16/23 1108   Resp: 18           Ortho/SPM Exam    There are no Patient Instructions on file for this visit.    IMAGING: X-ray Shoulder 2 or More Views Right  Narrative: EXAMINATION:  XR SHOULDER COMPLETE 2 OR MORE VIEWS RIGHT    CLINICAL HISTORY:  Pain in right shoulder    TECHNIQUE:  Two or three views of the right shoulder were preformed.    COMPARISON:  04/25/2018    FINDINGS:  No acute fracture or dislocation.  No significant AC joint arthropathy there is severe joint space narrowing osteophyte formation seen associated with the glenohumeral joint with multiple calcification seen inferior to the joint and also some faint calcification seen superior to the joint.  Findings likely represent a combination of loose bodies and or calcium hydroxyapatite deposition.  Impression: 1.  As above    Electronically signed by: Rubin Díaz DO  Date:    11/29/2022  Time:    14:16       Radiographs / Imaging : Relevant imaging results reviewed by me and interpreted by me, discussed with the patient and / or family       Assessment:     No diagnosis found.     Plan:   There are no diagnoses linked to this encounter.    The patient verbalized good understanding of the medical issues discussed today and expressed appreciation for the care provided.  Patient was given the opportunity to ask questions and be an active participant in their medical care. Patient had no further questions or concerns at this time.     The patient was encouraged to participate in appropriate physical activity.      Saad Price M.D.  [facility]        This note was dictated using  voice recognition software and may have sound a like errors.

## 2023-03-16 NOTE — PROGRESS NOTES
Subjective:     Patient ID: Bharathi Parsons is a 82 y.o. male.    Chief Complaint: Follow-up (Bilateral primary osteoarthritis of knee , Bilateral Euflexxa  1/31/23)      HPI: Patient is being seen for bilateral knee follow up after receiving Euflexxa injections at his office visit in January 2023. States the injections are working well for him. Denies any pain at today's office visit. Does not have to take anything by mouth to help with pain relief. Has participated in physical therapy in November 2022 for shoulder pain.     Reports having a fall 2 weeks ago. Says he wasn't paying attention to where he was stepping- and apparently there was an unusual step-off.  Did not feel the need to go to an urgent care to be assessed.  No head injury and no injury to his knee.    Past Medical History:   Diagnosis Date    DJD (degenerative joint disease)     Esophageal stricture     Hypertension      Past Surgical History:   Procedure Laterality Date    APPENDECTOMY      as child    CATARACT EXTRACTION W/  INTRAOCULAR LENS IMPLANT  OD before 2003    CATARACT EXTRACTION W/  INTRAOCULAR LENS IMPLANT  OS 12/19/07 with YAG    ESOPHAGEAL DILATION      x 2 in past last one done in  2022    FOOT ARTHRODESIS      medial column arthrodesis left foot  2011     Family History   Problem Relation Age of Onset    Glaucoma Maternal Uncle     Cataracts Mother     Heart disease Father      Social History     Socioeconomic History    Marital status:    Tobacco Use    Smoking status: Never     Passive exposure: Never    Smokeless tobacco: Never   Substance and Sexual Activity    Alcohol use: Yes     Comment: Ocasionally    Drug use: No    Sexual activity: Not Currently     Partners: Female   Social History Narrative    , No smokers in household, 1 Dog.     Social Determinants of Health     Financial Resource Strain: Low Risk     Difficulty of Paying Living Expenses: Not hard at all   Food Insecurity: No Food Insecurity    Worried  About Running Out of Food in the Last Year: Never true    Ran Out of Food in the Last Year: Never true   Transportation Needs: No Transportation Needs    Lack of Transportation (Medical): No    Lack of Transportation (Non-Medical): No   Physical Activity: Inactive    Days of Exercise per Week: 0 days    Minutes of Exercise per Session: 0 min   Stress: No Stress Concern Present    Feeling of Stress : Not at all   Social Connections: Socially Integrated    Frequency of Communication with Friends and Family: More than three times a week    Frequency of Social Gatherings with Friends and Family: Three times a week    Attends Anabaptism Services: More than 4 times per year    Active Member of Clubs or Organizations: No    Attends Club or Organization Meetings: More than 4 times per year    Marital Status:    Housing Stability: Low Risk     Unable to Pay for Housing in the Last Year: No    Number of Places Lived in the Last Year: 1    Unstable Housing in the Last Year: No       Current Outpatient Medications:     azelastine (ASTELIN) 137 mcg (0.1 %) nasal spray, 2 sprays by Nasal route as needed. , Disp: , Rfl:     bempedoic acid-ezetimibe (NEXLIZET) 180-10 mg Tab, Take 1 tablet by mouth Daily., Disp: 30 tablet, Rfl: 12    cetirizine (ZYRTEC) 10 MG tablet, , Disp: , Rfl:     COVID-19 vac, jose,Pfizer,,PF, (PFIZER COVID-19 JOSE VACCN,PF,) 30 mcg/0.3 mL injection, Inject into the muscle., Disp: 0.3 mL, Rfl: 0    diclofenac sodium (VOLTAREN) 1 % Gel, Apply 2 g topically 3 (three) times daily., Disp: 1 each, Rfl: 5    fluticasone (FLONASE) 50 mcg/actuation nasal spray, , Disp: , Rfl:     IBUPROFEN ORAL, Take by mouth as needed. , Disp: , Rfl:     lisinopriL 10 MG tablet, Take 1 tablet by mouth once daily, Disp: 90 tablet, Rfl: 3    LIVALO 4 mg Tab, Take 1 tablet by mouth., Disp: , Rfl:     pantoprazole (PROTONIX) 40 MG tablet, TAKE 1 TABLET BY MOUTH ONCE DAILY BEFORE MEAL(S), Disp: , Rfl:     triamcinolone acetonide  0.1% (KENALOG) 0.1 % cream, Apply topically 2 (two) times daily., Disp: 80 g, Rfl: 1  Review of patient's allergies indicates:  No Known Allergies  Review of Systems   Constitutional:  Negative for chills, fever and weight loss.   Respiratory:  Negative for shortness of breath.    Cardiovascular:  Negative for chest pain and palpitations.     Objective:   Body mass index is 22.78 kg/m².  Vitals:    03/16/23 1108   Resp: 18           Ortho/SPM Exam-alert and oriented well-nourished well-developed ambulatory adult male no acute distress     Respiratory effort appears normal    Bilateral knee exam  No acute deformity, genu varum, healing abrasion present on Right anterior knee  No tenderness to palpation  Range of motion 0-120, crepitus noted  Strength 5/5 knee flexion/extension   Varus/valgus stable  Neurovascular intact    Psychiatric good affect and cognition    Plan-recheck in 3-4 months to document knee status and plan to reorder bilateral Visco for the 6 month interval.  Continue to do home exercise program and be proactive in preventing falls.    Patient Instructions   MEDICAL NECESSITY FOR VISCOSUPPLEMENTATION: After thorough evaluation of the patient, I have determined that visco-supplementation is medically necessary. The patient has painful DJD of the knee with failure of conservative therapy including lifestyle modifications and rehabilitation exercises. Oral analgesis/NSAIDs have not adequately controlled symptoms and there is radiographic evidence of joint space narrowing, subchondral sclerosis, and some early osteophytic changes Kellgren- Ravi grade 2 or greater, or in lack of radiographic evidence, there is arthroscopic or other evidence of chondrosis.     Keep 5 month recheck in 3 months from now    IMAGING: X-ray Shoulder 2 or More Views Right  Narrative: EXAMINATION:  XR SHOULDER COMPLETE 2 OR MORE VIEWS RIGHT    CLINICAL HISTORY:  Pain in right shoulder    TECHNIQUE:  Two or three views of the  right shoulder were preformed.    COMPARISON:  04/25/2018    FINDINGS:  No acute fracture or dislocation.  No significant AC joint arthropathy there is severe joint space narrowing osteophyte formation seen associated with the glenohumeral joint with multiple calcification seen inferior to the joint and also some faint calcification seen superior to the joint.  Findings likely represent a combination of loose bodies and or calcium hydroxyapatite deposition.  Impression: 1.  As above    Electronically signed by: Rubin Díaz DO  Date:    11/29/2022  Time:    14:16       Radiographs / Imaging : Relevant imaging results reviewed by me and interpreted by me, discussed with the patient and / or family -agree with x-ray report for knees      Assessment:     Encounter Diagnosis   Name Primary?    Bilateral primary osteoarthritis of knee Yes        Plan:   Bilateral primary osteoarthritis of knee        The patient verbalized good understanding of the medical issues discussed today and expressed appreciation for the care provided.  Patient was given the opportunity to ask questions and be an active participant in their medical care. Patient had no further questions or concerns at this time.     The patient was encouraged to participate in appropriate physical activity.      Saad Price M.D.  [facility]        This note was dictated using voice recognition software and may have sound a like errors.

## 2023-03-27 ENCOUNTER — PATIENT MESSAGE (OUTPATIENT)
Dept: AUDIOLOGY | Facility: CLINIC | Age: 83
End: 2023-03-27
Payer: MEDICARE

## 2023-03-30 ENCOUNTER — CLINICAL SUPPORT (OUTPATIENT)
Dept: AUDIOLOGY | Facility: CLINIC | Age: 83
End: 2023-03-30
Payer: MEDICARE

## 2023-03-30 DIAGNOSIS — Z46.1 HEARING AID FITTING OR ADJUSTMENT: Primary | ICD-10-CM

## 2023-03-30 PROCEDURE — V5014 PR HEARING AID REPAIR/MODIFYING: ICD-10-PCS | Mod: ,,, | Performed by: AUDIOLOGIST

## 2023-03-30 PROCEDURE — V5014 HEARING AID REPAIR/MODIFYING: HCPCS | Mod: ,,, | Performed by: AUDIOLOGIST

## 2023-03-31 NOTE — PROGRESS NOTES
Bharathi Parsons was seen 2023 for a hearing aid follow-up appointment.  He has been having trouble with his newly repaired left aid due to the retention wire put on incorrectly. Retention wire replaced and aid in good working order. Newly repaired right aid dispensed today. He is having some issues with understanding female voices. I have not seen Mr. Parsons before since he is a former patient of JOLANTA Welch. From her notes it looks like several adjustments have been made over the past 3 years for similar issues. It was decided that I would have the aids reset to factory settings and plan to adjust from there. After reset, he reported improvement in my voice quality, but 100% of target was too sharp. Aids set at 90% of target today and auto acclimatization was also set to reach 100% in 7 days. Aids paired to his Iphone.       Warranty will be renewed for 4th year and will  4/15/24.      Patient will return in 3 weeks for follow-up. He will plan to bring [employer] ID and password so that we can download and pair aids into [employer].

## 2023-04-20 ENCOUNTER — CLINICAL SUPPORT (OUTPATIENT)
Dept: AUDIOLOGY | Facility: CLINIC | Age: 83
End: 2023-04-20
Payer: MEDICARE

## 2023-04-20 DIAGNOSIS — Z46.1 HEARING AID FITTING OR ADJUSTMENT: Primary | ICD-10-CM

## 2023-04-21 NOTE — PROGRESS NOTES
Bharathi Parsons was seen 04/20/2023 for a hearing aid follow-up appointment.  He is doing well with his hearing aids, but finds female voices too shrill (especially on television). Gain was reduced for female speech at high frequencies and increased at low frequencies by 1. He appreciated a difference in the quality in my voice after the change and no other adjustments were made. Aids were paired to the [employer] and he will use it as needed for volume change etc. I showed him the pre loaded TV program in the event he would want to try that for more support.     We reviewed filter change with [facility] disc and he understood.     Patient will call for any future hearing aid follow-up appointments as needed.

## 2023-06-06 ENCOUNTER — OFFICE VISIT (OUTPATIENT)
Dept: DERMATOLOGY | Facility: CLINIC | Age: 83
End: 2023-06-06
Payer: MEDICARE

## 2023-06-06 DIAGNOSIS — Z12.83 SKIN CANCER SCREENING: Primary | ICD-10-CM

## 2023-06-06 DIAGNOSIS — D18.00 HEMANGIOMA, UNSPECIFIED SITE: ICD-10-CM

## 2023-06-06 DIAGNOSIS — L81.4 SOLAR LENTIGO: ICD-10-CM

## 2023-06-06 DIAGNOSIS — L57.0 ACTINIC KERATOSES: ICD-10-CM

## 2023-06-06 DIAGNOSIS — L82.1 SEBORRHEIC KERATOSES: ICD-10-CM

## 2023-06-06 PROCEDURE — 99213 OFFICE O/P EST LOW 20 MIN: CPT | Mod: 25,S$PBB,, | Performed by: STUDENT IN AN ORGANIZED HEALTH CARE EDUCATION/TRAINING PROGRAM

## 2023-06-06 PROCEDURE — 99999 PR PBB SHADOW E&M-EST. PATIENT-LVL III: CPT | Mod: PBBFAC,,, | Performed by: STUDENT IN AN ORGANIZED HEALTH CARE EDUCATION/TRAINING PROGRAM

## 2023-06-06 PROCEDURE — 17003 DESTRUCTION, PREMALIGNANT LESIONS; SECOND THROUGH 14 LESIONS: ICD-10-PCS | Mod: S$PBB,,, | Performed by: STUDENT IN AN ORGANIZED HEALTH CARE EDUCATION/TRAINING PROGRAM

## 2023-06-06 PROCEDURE — 99999 PR PBB SHADOW E&M-EST. PATIENT-LVL III: ICD-10-PCS | Mod: PBBFAC,,, | Performed by: STUDENT IN AN ORGANIZED HEALTH CARE EDUCATION/TRAINING PROGRAM

## 2023-06-06 PROCEDURE — 17003 DESTRUCT PREMALG LES 2-14: CPT | Mod: S$PBB,,, | Performed by: STUDENT IN AN ORGANIZED HEALTH CARE EDUCATION/TRAINING PROGRAM

## 2023-06-06 PROCEDURE — 17003 DESTRUCT PREMALG LES 2-14: CPT | Mod: PBBFAC | Performed by: STUDENT IN AN ORGANIZED HEALTH CARE EDUCATION/TRAINING PROGRAM

## 2023-06-06 PROCEDURE — 17000 DESTRUCT PREMALG LESION: CPT | Mod: S$PBB,,, | Performed by: STUDENT IN AN ORGANIZED HEALTH CARE EDUCATION/TRAINING PROGRAM

## 2023-06-06 PROCEDURE — 99213 PR OFFICE/OUTPT VISIT, EST, LEVL III, 20-29 MIN: ICD-10-PCS | Mod: 25,S$PBB,, | Performed by: STUDENT IN AN ORGANIZED HEALTH CARE EDUCATION/TRAINING PROGRAM

## 2023-06-06 PROCEDURE — 99213 OFFICE O/P EST LOW 20 MIN: CPT | Mod: PBBFAC,25 | Performed by: STUDENT IN AN ORGANIZED HEALTH CARE EDUCATION/TRAINING PROGRAM

## 2023-06-06 PROCEDURE — 17000 PR DESTRUCTION(LASER SURGERY,CRYOSURGERY,CHEMOSURGERY),PREMALIGNANT LESIONS,FIRST LESION: ICD-10-PCS | Mod: S$PBB,,, | Performed by: STUDENT IN AN ORGANIZED HEALTH CARE EDUCATION/TRAINING PROGRAM

## 2023-06-06 PROCEDURE — 17000 DESTRUCT PREMALG LESION: CPT | Mod: PBBFAC | Performed by: STUDENT IN AN ORGANIZED HEALTH CARE EDUCATION/TRAINING PROGRAM

## 2023-06-06 NOTE — PROGRESS NOTES
Patient Information  Name: Bharathi Parsons  : 1940  MRN: [id number]     Referring Physician:  Dr. Zamora ref. provider found   Primary Care Physician:  Dr. DAMON Rooney Jr, MD   Date of Visit: 2023      Subjective:       Bharathi Parsons is a 83 y.o. male who presents for   Chief Complaint   Patient presents with    skin lesion      On face and head. Pre skin cancers.      HPI    Patient was last seen:2023     Prior notes by myself reviewed.   Clinical documentation obtained by nursing staff reviewed.    Review of Systems     Objective:    Physical Exam   Constitutional: He appears well-developed and well-nourished. No distress.   Neurological: He is alert and oriented to person, place, and time. He is not disoriented.   Psychiatric: He has a normal mood and affect.   Skin:   Areas Examined (abnormalities noted in diagram):   Head / Face Inspection Performed  Neck Inspection Performed  Chest / Axilla Inspection Performed  Back Inspection Performed  RUE Inspected  LUE Inspection Performed                 Diagram Legend     Erythematous scaling macule/papule c/w actinic keratosis       Vascular papule c/w angioma      Pigmented verrucoid papule/plaque c/w seborrheic keratosis      Yellow umbilicated papule c/w sebaceous hyperplasia      Irregularly shaped tan macule c/w lentigo     1-2 mm smooth white papules consistent with Milia      Movable subcutaneous cyst with punctum c/w epidermal inclusion cyst      Subcutaneous movable cyst c/w pilar cyst      Firm pink to brown papule c/w dermatofibroma      Pedunculated fleshy papule(s) c/w skin tag(s)      Evenly pigmented macule c/w junctional nevus     Mildly variegated pigmented, slightly irregular-bordered macule c/w mildly atypical nevus      Flesh colored to evenly pigmented papule c/w intradermal nevus       Pink pearly papule/plaque c/w basal cell carcinoma      Erythematous hyperkeratotic cursted plaque c/w SCC      Surgical scar with no sign of skin  cancer recurrence      Open and closed comedones      Inflammatory papules and pustules      Verrucoid papule consistent consistent with wart     Erythematous eczematous patches and plaques     Dystrophic onycholytic nail with subungual debris c/w onychomycosis     Umbilicated papule    Erythematous-base heme-crusted tan verrucoid plaque consistent with inflamed seborrheic keratosis     Erythematous Silvery Scaling Plaque c/w Psoriasis     See annotation      No images are attached to the encounter or orders placed in the encounter.    [] Data reviewed  [] Independent review of test  [] Management discussed with another provider    Assessment / Plan:        Skin cancer screening  Upper body skin examination performed today including at least 6 points as noted in physical examination. No lesions suspicious for malignancy noted.    Recommend daily sun protection/avoidance and use of at least SPF 30, broad spectrum sunscreen (OTC drug).     Seborrheic keratoses  These are benign inherited growths without a malignant potential. Reassurance given to patient. No treatment is necessary.     Solar lentigo  This is a benign hyperpigmented sun induced lesion. Recommend daily sun protection/avoidance and use of at least SPF 30, broad spectrum sunscreen (OTC drug) will reduce the number of new lesions. Treatment of these benign lesions are considered cosmetic.    Hemangioma, unspecified site  This is a benign vascular lesion. Reassurance given. No treatment required.     Actinic keratoses  Cryosurgery Procedure Note    Verbal consent from the patient is obtained including, but not limited to, risk of hypopigmentation/hyperpigmentation, scar, recurrence of lesion. The patient is aware of the precancerous quality and need for treatment of these lesions. Liquid nitrogen cryosurgery is applied to the 13 actinic keratoses, as detailed in the physical exam, to produce a freeze injury. The patient is aware that blisters may form and  is instructed on wound care with gentle cleansing and use of vaseline ointment to keep moist until healed. The patient is supplied a handout on cryosurgery and is instructed to call if lesions do not completely resolve.             LOS NUMBER AND COMPLEXITY OF PROBLEMS    COMPLEXITY OF DATA RISK TOTAL TIME (m)   10657  39554 [] 1 self-limited or minor problem [x] Minimal to none [] No treatment recommended or patient to monitor 15-29  10-19   19598  92167 Low  [] 2 or > self limited or minor problems  [] 1 stable chronic illness  [] 1 acute, uncomplicated illness or injury Limited (2)  [] Prior external notes from each unique source  [] Review result of each unique test  [] Order each unique test [x]  Low  OTC medications, minor skin biopsy 30-44  20-29   65671  13345 Moderate  []  1 or > chronic illness with progression, exacerbation or SE of treatment  [x]  2 or more stable chronic illnesses  []  1 acute illness with systemic symptoms  []  1 acute complicated injury  []  1 undiagnosed new problem with uncertain prognosis Moderate (1/3 below)  []  3 or more data items        *Now includes assessment requiring independent historian  []  Independent interpretation of a test  []  Discuss management/test with another provider Moderate  []  Prescription drug mgmt  []  Minor surgery with risk discussed  []  Mgmt limited by social determinates 45-59  30-39   12017  27042 High  []  1 or more chronic illness with severe exacerbation, progression or SE of treatment  []  1 acute or chronic illness/injury that poses a threat to life or bodily function Extensive (2/3 below)  []  3 or more data items        *Now includes assessment requiring independent historian.  []  Independent interpretation of a test  []  Discuss management/test with another provider High  []  Major surgery with risk discussed  []  Drug therapy requiring intensive monitoring for toxicity  []  Hospitalization  []  Decision for DNR 60-74  40-54      No  follow-ups on file.    Ann Sanches MD, FAAD  [facility]

## 2023-06-06 NOTE — PATIENT INSTRUCTIONS

## 2023-06-07 ENCOUNTER — TELEPHONE (OUTPATIENT)
Dept: SPORTS MEDICINE | Facility: CLINIC | Age: 83
End: 2023-06-07
Payer: MEDICARE

## 2023-06-07 DIAGNOSIS — M25.562 PAIN IN BOTH KNEES, UNSPECIFIED CHRONICITY: Primary | ICD-10-CM

## 2023-06-07 DIAGNOSIS — M25.561 PAIN IN BOTH KNEES, UNSPECIFIED CHRONICITY: Primary | ICD-10-CM

## 2023-06-08 ENCOUNTER — OFFICE VISIT (OUTPATIENT)
Dept: SPORTS MEDICINE | Facility: CLINIC | Age: 83
End: 2023-06-08
Payer: MEDICARE

## 2023-06-08 ENCOUNTER — HOSPITAL ENCOUNTER (OUTPATIENT)
Dept: RADIOLOGY | Facility: HOSPITAL | Age: 83
Discharge: HOME | End: 2023-06-08
Attending: STUDENT IN AN ORGANIZED HEALTH CARE EDUCATION/TRAINING PROGRAM
Payer: MEDICARE

## 2023-06-08 DIAGNOSIS — G89.29 CHRONIC PAIN OF BOTH KNEES: ICD-10-CM

## 2023-06-08 DIAGNOSIS — M25.561 CHRONIC PAIN OF BOTH KNEES: ICD-10-CM

## 2023-06-08 DIAGNOSIS — M25.562 PAIN IN BOTH KNEES, UNSPECIFIED CHRONICITY: ICD-10-CM

## 2023-06-08 DIAGNOSIS — M17.0 BILATERAL PRIMARY OSTEOARTHRITIS OF KNEE: Primary | ICD-10-CM

## 2023-06-08 DIAGNOSIS — M25.561 PAIN IN BOTH KNEES, UNSPECIFIED CHRONICITY: ICD-10-CM

## 2023-06-08 DIAGNOSIS — M25.562 CHRONIC PAIN OF BOTH KNEES: ICD-10-CM

## 2023-06-08 PROCEDURE — 99213 OFFICE O/P EST LOW 20 MIN: CPT | Mod: S$PBB,,, | Performed by: STUDENT IN AN ORGANIZED HEALTH CARE EDUCATION/TRAINING PROGRAM

## 2023-06-08 PROCEDURE — 99999 PR PBB SHADOW E&M-EST. PATIENT-LVL III: CPT | Mod: PBBFAC,,, | Performed by: STUDENT IN AN ORGANIZED HEALTH CARE EDUCATION/TRAINING PROGRAM

## 2023-06-08 PROCEDURE — 73564 X-RAY EXAM KNEE 4 OR MORE: CPT | Mod: 26,50,, | Performed by: RADIOLOGY

## 2023-06-08 PROCEDURE — 99213 PR OFFICE/OUTPT VISIT, EST, LEVL III, 20-29 MIN: ICD-10-PCS | Mod: S$PBB,,, | Performed by: STUDENT IN AN ORGANIZED HEALTH CARE EDUCATION/TRAINING PROGRAM

## 2023-06-08 PROCEDURE — 99213 OFFICE O/P EST LOW 20 MIN: CPT | Mod: PBBFAC | Performed by: STUDENT IN AN ORGANIZED HEALTH CARE EDUCATION/TRAINING PROGRAM

## 2023-06-08 PROCEDURE — 73564 XR KNEE ORTHO BILAT WITH FLEXION: ICD-10-PCS | Mod: 26,50,, | Performed by: RADIOLOGY

## 2023-06-08 PROCEDURE — 99999 PR PBB SHADOW E&M-EST. PATIENT-LVL III: ICD-10-PCS | Mod: PBBFAC,,, | Performed by: STUDENT IN AN ORGANIZED HEALTH CARE EDUCATION/TRAINING PROGRAM

## 2023-06-08 PROCEDURE — 73564 X-RAY EXAM KNEE 4 OR MORE: CPT | Mod: TC,50

## 2023-06-08 NOTE — PROGRESS NOTES
Subjective:     Patient ID: Bharathi Parsons is a 83 y.o. male.    Chief Complaint: Pain of the Right Knee and Pain of the Left Knee    Today, he is now approx 4.5 months out from last Euflexxa series. He is feeling very good at this time. No consistent pain, discomfort, swelling, etc. He is having some discomfort when he goes up/down stairs, but overall he is feeling very good.    PREVIOUS HISTORY - 3/16/23  Patient is being seen for bilateral knee follow up after receiving Euflexxa injections at his office visit in January 2023. States the injections are working well for him. Denies any pain at today's office visit. Does not have to take anything by mouth to help with pain relief. Has participated in physical therapy in November 2022 for shoulder pain.     Reports having a fall 2 weeks ago. Says he wasn't paying attention to where he was stepping- and apparently there was an unusual step-off.  Did not feel the need to go to an urgent care to be assessed.  No head injury and no injury to his knee.    Past Medical History:   Diagnosis Date    DJD (degenerative joint disease)     Esophageal stricture     Hypertension      Past Surgical History:   Procedure Laterality Date    APPENDECTOMY      as child    CATARACT EXTRACTION W/  INTRAOCULAR LENS IMPLANT  OD before 2003    CATARACT EXTRACTION W/  INTRAOCULAR LENS IMPLANT  OS 12/19/07 with YAG    ESOPHAGEAL DILATION      x 2 in past last one done in  2022    FOOT ARTHRODESIS      medial column arthrodesis left foot  2011     Family History   Problem Relation Age of Onset    Glaucoma Maternal Uncle     Cataracts Mother     Heart disease Father      Social History     Socioeconomic History    Marital status:    Tobacco Use    Smoking status: Never     Passive exposure: Never    Smokeless tobacco: Never   Substance and Sexual Activity    Alcohol use: Yes     Comment: Ocasionally    Drug use: No    Sexual activity: Not Currently     Partners: Female   Social History  Narrative    , No smokers in household, 1 Dog.     Social Determinants of Health     Financial Resource Strain: Low Risk     Difficulty of Paying Living Expenses: Not hard at all   Food Insecurity: No Food Insecurity    Worried About Running Out of Food in the Last Year: Never true    Ran Out of Food in the Last Year: Never true   Transportation Needs: No Transportation Needs    Lack of Transportation (Medical): No    Lack of Transportation (Non-Medical): No   Physical Activity: Inactive    Days of Exercise per Week: 0 days    Minutes of Exercise per Session: 0 min   Stress: No Stress Concern Present    Feeling of Stress : Not at all   Social Connections: Socially Integrated    Frequency of Communication with Friends and Family: More than three times a week    Frequency of Social Gatherings with Friends and Family: Three times a week    Attends Mu-ism Services: More than 4 times per year    Active Member of Clubs or Organizations: No    Attends Club or Organization Meetings: More than 4 times per year    Marital Status:    Housing Stability: Low Risk     Unable to Pay for Housing in the Last Year: No    Number of Places Lived in the Last Year: 1    Unstable Housing in the Last Year: No       Current Outpatient Medications:     azelastine (ASTELIN) 137 mcg (0.1 %) nasal spray, 2 sprays by Nasal route as needed. , Disp: , Rfl:     bempedoic acid-ezetimibe (NEXLIZET) 180-10 mg Tab, Take 1 tablet by mouth Daily., Disp: 30 tablet, Rfl: 12    cetirizine (ZYRTEC) 10 MG tablet, , Disp: , Rfl:     COVID-19 vac, jose,Pfizer,,PF, (PFIZER COVID-19 JOSE VACCN,PF,) 30 mcg/0.3 mL injection, Inject into the muscle., Disp: 0.3 mL, Rfl: 0    diclofenac sodium (VOLTAREN) 1 % Gel, Apply 2 g topically 3 (three) times daily., Disp: 1 each, Rfl: 5    fluticasone (FLONASE) 50 mcg/actuation nasal spray, , Disp: , Rfl:     IBUPROFEN ORAL, Take by mouth as needed. , Disp: , Rfl:     lisinopriL 10 MG tablet, Take 1 tablet by  mouth once daily, Disp: 90 tablet, Rfl: 3    LIVALO 4 mg Tab, Take 1 tablet by mouth., Disp: , Rfl:     pantoprazole (PROTONIX) 40 MG tablet, TAKE 1 TABLET BY MOUTH ONCE DAILY BEFORE MEAL(S), Disp: , Rfl:     triamcinolone acetonide 0.1% (KENALOG) 0.1 % cream, Apply topically 2 (two) times daily., Disp: 80 g, Rfl: 1  Review of patient's allergies indicates:  No Known Allergies  Review of Systems   Constitutional:  Negative for chills, fever and weight loss.   Respiratory:  Negative for shortness of breath.    Cardiovascular:  Negative for chest pain and palpitations.     Objective:   There is no height or weight on file to calculate BMI.  There were no vitals filed for this visit.    Physical Exam  Constitutional:       Appearance: Normal appearance.   HENT:      Head: Normocephalic and atraumatic.   Cardiovascular:      Rate and Rhythm: Normal rate.   Pulmonary:      Effort: Pulmonary effort is normal.   Skin:     General: Skin is warm and dry.   Neurological:      Mental Status: He is alert.       Right Knee Exam     Muscle Strength   The patient has normal right knee strength.    Tenderness   The patient is experiencing no tenderness.     Range of Motion   The patient has normal right knee ROM.    Other   Swelling: none      Left Knee Exam     Muscle Strength   The patient has normal left knee strength.    Tenderness   The patient is experiencing no tenderness.     Range of Motion   The patient has normal left knee ROM.    Other   Swelling: none              Patient Instructions   Assessment:  Bharathi Parsons is a 83 y.o. male with a chief complaint of Pain of the Right Knee and Pain of the Left Knee    Encounter Diagnoses   Name Primary?    Bilateral primary osteoarthritis of knee Yes    Chronic pain of both knees       Plan:  Knee pain is fairly well-controlled at this time, only with more discomfort going up and downstairs.  Overall he is feeling pretty good.  Recommend to defer further viscosupplementation at  this time, until it becomes more problematic.  He is typically needed it every 6 months in the past, we are now about 4 and half months out from last injection series.  We will monitor as we go and re-evaluate as needed.    Follow-up:  As needed or sooner if there are any problems between now and then.    Thank you for choosing [facility] and Dr. James Viera for your orthopedic & sports medicine care. It is our goal to provide you with exceptional care that will help keep you healthy, active, and get you back in the game.    Please do not hesitate to reach out to us via email, phone, or MyChart with any questions, concerns, or feedback.    If you are experiencing pain/discomfort ,or have questions after 5pm and would like to be connected to the [facility] on-call team, please call this number and specify which Sports Medicine provider is treating you: [phone]         James Viera MD [facility]  Primary Care Sports Medicine        This note was dictated using voice recognition software and may have sound a like errors.

## 2023-06-08 NOTE — PATIENT INSTRUCTIONS
Assessment:  Bharathi Parsons is a 83 y.o. male with a chief complaint of Pain of the Right Knee and Pain of the Left Knee    Encounter Diagnoses   Name Primary?    Bilateral primary osteoarthritis of knee Yes    Chronic pain of both knees       Plan:  Knee pain is fairly well-controlled at this time, only with more discomfort going up and downstairs.  Overall he is feeling pretty good.  Recommend to defer further viscosupplementation at this time, until it becomes more problematic.  He is typically needed it every 6 months in the past, we are now about 4 and half months out from last injection series.  We will monitor as we go and re-evaluate as needed.    Follow-up:  As needed or sooner if there are any problems between now and then.    Thank you for choosing [facility] and Dr. James Viera for your orthopedic & sports medicine care. It is our goal to provide you with exceptional care that will help keep you healthy, active, and get you back in the game.    Please do not hesitate to reach out to us via email, phone, or MyChart with any questions, concerns, or feedback.    If you are experiencing pain/discomfort ,or have questions after 5pm and would like to be connected to the [facility] on-call team, please call this number and specify which Sports Medicine provider is treating you: [phone]

## 2023-07-26 DIAGNOSIS — I10 ESSENTIAL HYPERTENSION: ICD-10-CM

## 2023-07-26 NOTE — TELEPHONE ENCOUNTER
No care due was identified.  [facility] Embedded Care Due Messages. Reference number: [id number].   7/26/2023 3:44:59 PM CDT

## 2023-07-27 RX ORDER — LISINOPRIL 10 MG/1
TABLET ORAL
Qty: 90 TABLET | Refills: 3 | Status: SHIPPED | OUTPATIENT
Start: 2023-07-27

## 2023-07-27 NOTE — TELEPHONE ENCOUNTER
Refill Routing Note   Medication(s) are not appropriate for processing by [facility] for the following reason(s):      Required labs abnormal    [facility] action(s):  Defer Care Due:  None identified              Appointments  past 12m or future 3m with PCP    Date Provider   Last Visit   3/8/2023 Festus Rooney MD   Next Visit   9/15/2023 Festus Rooney MD   ED visits in past 90 days: 0        Note composed:7:50 PM 07/26/2023

## 2023-08-28 ENCOUNTER — OFFICE VISIT (OUTPATIENT)
Dept: OPHTHALMOLOGY | Facility: CLINIC | Age: 83
End: 2023-08-28
Payer: MEDICARE

## 2023-08-28 DIAGNOSIS — Z96.1 PSEUDOPHAKIA: Primary | ICD-10-CM

## 2023-08-28 DIAGNOSIS — H35.3131 EARLY DRY STAGE NONEXUDATIVE AGE-RELATED MACULAR DEGENERATION OF BOTH EYES: ICD-10-CM

## 2023-08-28 DIAGNOSIS — H35.372 EPIRETINAL MEMBRANE (ERM) OF LEFT EYE: ICD-10-CM

## 2023-08-28 PROCEDURE — 92134 CPTRZ OPH DX IMG PST SGM RTA: CPT | Mod: PBBFAC | Performed by: OPHTHALMOLOGY

## 2023-08-28 PROCEDURE — 99999 PR PBB SHADOW E&M-EST. PATIENT-LVL III: ICD-10-PCS | Mod: PBBFAC,,, | Performed by: OPHTHALMOLOGY

## 2023-08-28 PROCEDURE — 99999 PR PBB SHADOW E&M-EST. PATIENT-LVL III: CPT | Mod: PBBFAC,,, | Performed by: OPHTHALMOLOGY

## 2023-08-28 PROCEDURE — 99213 OFFICE O/P EST LOW 20 MIN: CPT | Mod: PBBFAC | Performed by: OPHTHALMOLOGY

## 2023-08-28 PROCEDURE — 92014 PR EYE EXAM, EST PATIENT,COMPREHESV: ICD-10-PCS | Mod: S$PBB,,, | Performed by: OPHTHALMOLOGY

## 2023-08-28 PROCEDURE — 92014 COMPRE OPH EXAM EST PT 1/>: CPT | Mod: S$PBB,,, | Performed by: OPHTHALMOLOGY

## 2023-08-28 PROCEDURE — 92134 POSTERIOR SEGMENT OCT RETINA (OCULAR COHERENCE TOMOGRAPHY)-BOTH EYES: ICD-10-PCS | Mod: 26,S$PBB,, | Performed by: OPHTHALMOLOGY

## 2023-08-28 NOTE — PROGRESS NOTES
SUBJECTIVE  Bharathi Parsons is 83 y.o. male  Corrected distance visual acuity was 20/25 +1 in the right eye and 20/25 in the left eye. Corrected near visual acuity was J2 in the right eye and J2 in the left eye.   Chief Complaint   Patient presents with    Annual Exam     1 year Pseudo and AMD Annual follow up. [facility] is Doing well, no changes. No ocular symptoms. No pain or irritation. Not on any gtts.          HPI     Annual Exam     Additional comments: 1 year Pseudo and AMD Annual follow up. VA is Doing   well, no changes. No ocular symptoms. No pain or irritation. Not on any   gtts.           Comments    Dr. Parsons    1. Foveopathy OD / ERM OS   2. 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 Fhx COAG- uncle   4. Fhx PKP   5. Dry AMD (mild)  6. Ocular Migraines    AREDS 2  +HAG            Last edited by Chino Gallagher on 8/28/2023  2:01 PM.         Assessment /Plan :  1. Pseudophakia  -- Condition stable, no therapeutic change required. Monitoring routinely.     2. Early dry stage nonexudative age-related macular degeneration of both eyes  -- Condition stable, no therapeutic change required. Monitoring routinely.     3. Epiretinal membrane (ERM) of left eye - monitor for now         RTC in 1 year for dilation, MOCT and GOCT or prn any changes

## 2023-09-05 ENCOUNTER — OFFICE VISIT (OUTPATIENT)
Dept: DERMATOLOGY | Facility: CLINIC | Age: 83
End: 2023-09-05
Payer: MEDICARE

## 2023-09-05 DIAGNOSIS — L57.0 ACTINIC KERATOSES: Primary | ICD-10-CM

## 2023-09-05 PROCEDURE — 99999 PR PBB SHADOW E&M-EST. PATIENT-LVL II: ICD-10-PCS | Mod: PBBFAC,,, | Performed by: STUDENT IN AN ORGANIZED HEALTH CARE EDUCATION/TRAINING PROGRAM

## 2023-09-05 PROCEDURE — 17000 DESTRUCT PREMALG LESION: CPT | Mod: S$PBB,,, | Performed by: STUDENT IN AN ORGANIZED HEALTH CARE EDUCATION/TRAINING PROGRAM

## 2023-09-05 PROCEDURE — 99499 UNLISTED E&M SERVICE: CPT | Mod: S$PBB,,, | Performed by: STUDENT IN AN ORGANIZED HEALTH CARE EDUCATION/TRAINING PROGRAM

## 2023-09-05 PROCEDURE — 99999 PR PBB SHADOW E&M-EST. PATIENT-LVL II: CPT | Mod: PBBFAC,,, | Performed by: STUDENT IN AN ORGANIZED HEALTH CARE EDUCATION/TRAINING PROGRAM

## 2023-09-05 PROCEDURE — 17003 DESTRUCT PREMALG LES 2-14: CPT | Mod: PBBFAC | Performed by: STUDENT IN AN ORGANIZED HEALTH CARE EDUCATION/TRAINING PROGRAM

## 2023-09-05 PROCEDURE — 17000 DESTRUCT PREMALG LESION: CPT | Mod: PBBFAC | Performed by: STUDENT IN AN ORGANIZED HEALTH CARE EDUCATION/TRAINING PROGRAM

## 2023-09-05 PROCEDURE — 17000 PR DESTRUCTION(LASER SURGERY,CRYOSURGERY,CHEMOSURGERY),PREMALIGNANT LESIONS,FIRST LESION: ICD-10-PCS | Mod: S$PBB,,, | Performed by: STUDENT IN AN ORGANIZED HEALTH CARE EDUCATION/TRAINING PROGRAM

## 2023-09-05 PROCEDURE — 99212 OFFICE O/P EST SF 10 MIN: CPT | Mod: PBBFAC | Performed by: STUDENT IN AN ORGANIZED HEALTH CARE EDUCATION/TRAINING PROGRAM

## 2023-09-05 PROCEDURE — 17003 DESTRUCTION, PREMALIGNANT LESIONS; SECOND THROUGH 14 LESIONS: ICD-10-PCS | Mod: S$PBB,,, | Performed by: STUDENT IN AN ORGANIZED HEALTH CARE EDUCATION/TRAINING PROGRAM

## 2023-09-05 PROCEDURE — 17003 DESTRUCT PREMALG LES 2-14: CPT | Mod: S$PBB,,, | Performed by: STUDENT IN AN ORGANIZED HEALTH CARE EDUCATION/TRAINING PROGRAM

## 2023-09-05 PROCEDURE — 99499 NO LOS: ICD-10-PCS | Mod: S$PBB,,, | Performed by: STUDENT IN AN ORGANIZED HEALTH CARE EDUCATION/TRAINING PROGRAM

## 2023-09-05 NOTE — PROGRESS NOTES
Patient Information  Name: Bharathi Parsons  : 1940  MRN: [id number]     Referring Physician:  Dr. Zamora ref. provider found   Primary Care Physician:  Dr. Rooney, Festus Sinha MD   Date of Visit: 2023      Subjective:       Bharathi Parsons is a 83 y.o. male who presents for AK follow up  HPI  Patient with hx of AK, here for follow up.    Patient was last seen:2023     Prior notes by myself reviewed.   Clinical documentation obtained by nursing staff reviewed.    Review of Systems   Skin:  Negative for itching and rash.        Objective:    Physical Exam   Constitutional: He appears well-developed and well-nourished. No distress.   Neurological: He is alert and oriented to person, place, and time. He is not disoriented.   Psychiatric: He has a normal mood and affect.   Skin:   Areas Examined (abnormalities noted in diagram):   Scalp / Hair Palpated and Inspected  Head / Face Inspection Performed  Neck Inspection Performed  RUE Inspected  LUE Inspection Performed                   Diagram Legend     Erythematous scaling macule/papule c/w actinic keratosis       Vascular papule c/w angioma      Pigmented verrucoid papule/plaque c/w seborrheic keratosis      Yellow umbilicated papule c/w sebaceous hyperplasia      Irregularly shaped tan macule c/w lentigo     1-2 mm smooth white papules consistent with Milia      Movable subcutaneous cyst with punctum c/w epidermal inclusion cyst      Subcutaneous movable cyst c/w pilar cyst      Firm pink to brown papule c/w dermatofibroma      Pedunculated fleshy papule(s) c/w skin tag(s)      Evenly pigmented macule c/w junctional nevus     Mildly variegated pigmented, slightly irregular-bordered macule c/w mildly atypical nevus      Flesh colored to evenly pigmented papule c/w intradermal nevus       Pink pearly papule/plaque c/w basal cell carcinoma      Erythematous hyperkeratotic cursted plaque c/w SCC      Surgical scar with no sign of skin cancer recurrence       Open and closed comedones      Inflammatory papules and pustules      Verrucoid papule consistent consistent with wart     Erythematous eczematous patches and plaques     Dystrophic onycholytic nail with subungual debris c/w onychomycosis     Umbilicated papule    Erythematous-base heme-crusted tan verrucoid plaque consistent with inflamed seborrheic keratosis     Erythematous Silvery Scaling Plaque c/w Psoriasis     See annotation      No images are attached to the encounter or orders placed in the encounter.    [] Data reviewed  [] Independent review of test  [] Management discussed with another provider    Assessment / Plan:        Actinic keratoses  Cryosurgery Procedure Note    Verbal consent from the patient is obtained including, but not limited to, risk of hypopigmentation/hyperpigmentation, scar, recurrence of lesion. The patient is aware of the precancerous quality and need for treatment of these lesions. Liquid nitrogen cryosurgery is applied to the 14 actinic keratoses, as detailed in the physical exam, to produce a freeze injury. The patient is aware that blisters may form and is instructed on wound care with gentle cleansing and use of vaseline ointment to keep moist until healed. The patient is supplied a handout on cryosurgery and is instructed to call if lesions do not completely resolve.             LOS NUMBER AND COMPLEXITY OF PROBLEMS    COMPLEXITY OF DATA RISK TOTAL TIME (m)   00587  [id number] [] 1 self-limited or minor problem [] Minimal to none [] No treatment recommended or patient to monitor 15-29  10-19   97520  23759 Low  [] 2 or > self limited or minor problems  [] 1 stable chronic illness  [] 1 acute, uncomplicated illness or injury Limited (2)  [] Prior external notes from each unique source  [] Review result of each unique test  [] Order each unique test []  Low  OTC medications, minor skin biopsy 30-44 20-29   95080  39476 Moderate  []  1 or > chronic illness with progression,  exacerbation or SE of treatment  []  2 or more stable chronic illnesses  []  1 acute illness with systemic symptoms  []  1 acute complicated injury  []  1 undiagnosed new problem with uncertain prognosis Moderate (1/3 below)  []  3 or more data items        *Now includes assessment requiring independent historian  []  Independent interpretation of a test  []  Discuss management/test with another provider Moderate  []  Prescription drug mgmt  []  Minor surgery with risk discussed  []  Mgmt limited by social determinates 45-59  30-39   90553  95496 High  []  1 or more chronic illness with severe exacerbation, progression or SE of treatment  []  1 acute or chronic illness/injury that poses a threat to life or bodily function Extensive (2/3 below)  []  3 or more data items        *Now includes assessment requiring independent historian.  []  Independent interpretation of a test  []  Discuss management/test with another provider High  []  Major surgery with risk discussed  []  Drug therapy requiring intensive monitoring for toxicity  []  Hospitalization  []  Decision for DNR 60-74  40-54      No follow-ups on file.    Ann Sanches MD, FAAD  [facility]

## 2023-09-05 NOTE — PATIENT INSTRUCTIONS

## 2023-09-08 ENCOUNTER — LAB VISIT (OUTPATIENT)
Dept: LAB | Facility: HOSPITAL | Age: 83
End: 2023-09-08
Attending: PEDIATRICS
Payer: MEDICARE

## 2023-09-08 DIAGNOSIS — E78.00 HYPERCHOLESTEROLEMIA: ICD-10-CM

## 2023-09-08 DIAGNOSIS — D69.2 OTHER NONTHROMBOCYTOPENIC PURPURA: ICD-10-CM

## 2023-09-08 DIAGNOSIS — Z12.5 PROSTATE CANCER SCREENING: ICD-10-CM

## 2023-09-08 LAB
ALBUMIN SERPL BCP-MCNC: 3.7 G/DL (ref 3.5–5.2)
ALP SERPL-CCNC: 65 U/L (ref 55–135)
ALT SERPL W/O P-5'-P-CCNC: 15 U/L (ref 10–44)
ANION GAP SERPL CALC-SCNC: 6 MMOL/L (ref 8–16)
AST SERPL-CCNC: 19 U/L (ref 10–40)
BASOPHILS # BLD AUTO: 0.08 K/UL (ref 0–0.2)
BASOPHILS NFR BLD: 0.5 % (ref 0–1.9)
BILIRUB SERPL-MCNC: 0.4 MG/DL (ref 0.1–1)
BUN SERPL-MCNC: 28 MG/DL (ref 8–23)
CALCIUM SERPL-MCNC: 8.9 MG/DL (ref 8.7–10.5)
CHLORIDE SERPL-SCNC: 111 MMOL/L (ref 95–110)
CHOLEST SERPL-MCNC: 123 MG/DL (ref 120–199)
CHOLEST/HDLC SERPL: 2.7 {RATIO} (ref 2–5)
CO2 SERPL-SCNC: 23 MMOL/L (ref 23–29)
COMPLEXED PSA SERPL-MCNC: 1.1 NG/ML (ref 0–4)
CREAT SERPL-MCNC: 1.6 MG/DL (ref 0.5–1.4)
DIFFERENTIAL METHOD: ABNORMAL
EOSINOPHIL # BLD AUTO: 1.7 K/UL (ref 0–0.5)
EOSINOPHIL NFR BLD: 11.9 % (ref 0–8)
ERYTHROCYTE [DISTWIDTH] IN BLOOD BY AUTOMATED COUNT: 17 % (ref 11.5–14.5)
EST. GFR  (NO RACE VARIABLE): 42 ML/MIN/1.73 M^2
GLUCOSE SERPL-MCNC: 85 MG/DL (ref 70–110)
HCT VFR BLD AUTO: 46.5 % (ref 40–54)
HDLC SERPL-MCNC: 46 MG/DL (ref 40–75)
HDLC SERPL: 37.4 % (ref 20–50)
HGB BLD-MCNC: 14.7 G/DL (ref 14–18)
IMM GRANULOCYTES # BLD AUTO: 0.11 K/UL (ref 0–0.04)
IMM GRANULOCYTES NFR BLD AUTO: 0.8 % (ref 0–0.5)
LDLC SERPL CALC-MCNC: 68 MG/DL (ref 63–159)
LYMPHOCYTES # BLD AUTO: 1.4 K/UL (ref 1–4.8)
LYMPHOCYTES NFR BLD: 9.8 % (ref 18–48)
MCH RBC QN AUTO: 27.3 PG (ref 27–31)
MCHC RBC AUTO-ENTMCNC: 31.6 G/DL (ref 32–36)
MCV RBC AUTO: 86 FL (ref 82–98)
MONOCYTES # BLD AUTO: 0.7 K/UL (ref 0.3–1)
MONOCYTES NFR BLD: 4.6 % (ref 4–15)
NEUTROPHILS # BLD AUTO: 10.6 K/UL (ref 1.8–7.7)
NEUTROPHILS NFR BLD: 72.4 % (ref 38–73)
NONHDLC SERPL-MCNC: 77 MG/DL
NRBC BLD-RTO: 0 /100 WBC
PLATELET # BLD AUTO: 652 K/UL (ref 150–450)
PMV BLD AUTO: 10.1 FL (ref 9.2–12.9)
POTASSIUM SERPL-SCNC: 4.7 MMOL/L (ref 3.5–5.1)
PROT SERPL-MCNC: 6.4 G/DL (ref 6–8.4)
RBC # BLD AUTO: 5.39 M/UL (ref 4.6–6.2)
SODIUM SERPL-SCNC: 140 MMOL/L (ref 136–145)
TRIGL SERPL-MCNC: 45 MG/DL (ref 30–150)
WBC # BLD AUTO: 14.59 K/UL (ref 3.9–12.7)

## 2023-09-08 PROCEDURE — 85025 COMPLETE CBC W/AUTO DIFF WBC: CPT | Performed by: PEDIATRICS

## 2023-09-08 PROCEDURE — 80053 COMPREHEN METABOLIC PANEL: CPT | Performed by: PEDIATRICS

## 2023-09-08 PROCEDURE — 80061 LIPID PANEL: CPT | Performed by: PEDIATRICS

## 2023-09-08 PROCEDURE — 36415 COLL VENOUS BLD VENIPUNCTURE: CPT | Performed by: PEDIATRICS

## 2023-09-08 PROCEDURE — 84153 ASSAY OF PSA TOTAL: CPT | Performed by: PEDIATRICS

## 2023-09-15 ENCOUNTER — OFFICE VISIT (OUTPATIENT)
Dept: INTERNAL MEDICINE | Facility: CLINIC | Age: 83
End: 2023-09-15
Payer: MEDICARE

## 2023-09-15 ENCOUNTER — LAB VISIT (OUTPATIENT)
Dept: LAB | Facility: HOSPITAL | Age: 83
End: 2023-09-15
Attending: PEDIATRICS
Payer: MEDICARE

## 2023-09-15 VITALS
WEIGHT: 162.94 LBS | HEIGHT: 72 IN | DIASTOLIC BLOOD PRESSURE: 66 MMHG | BODY MASS INDEX: 22.07 KG/M2 | HEART RATE: 81 BPM | RESPIRATION RATE: 16 BRPM | SYSTOLIC BLOOD PRESSURE: 128 MMHG | TEMPERATURE: 97 F | OXYGEN SATURATION: 99 %

## 2023-09-15 DIAGNOSIS — D69.2 OTHER NONTHROMBOCYTOPENIC PURPURA: ICD-10-CM

## 2023-09-15 DIAGNOSIS — D72.829 LEUKOCYTOSIS, UNSPECIFIED TYPE: ICD-10-CM

## 2023-09-15 DIAGNOSIS — I10 ESSENTIAL HYPERTENSION: Primary | ICD-10-CM

## 2023-09-15 DIAGNOSIS — K21.00 GASTROESOPHAGEAL REFLUX DISEASE WITH ESOPHAGITIS, UNSPECIFIED WHETHER HEMORRHAGE: ICD-10-CM

## 2023-09-15 DIAGNOSIS — E78.00 HYPERCHOLESTEROLEMIA: ICD-10-CM

## 2023-09-15 DIAGNOSIS — Z12.11 COLON CANCER SCREENING: ICD-10-CM

## 2023-09-15 DIAGNOSIS — N18.31 STAGE 3A CHRONIC KIDNEY DISEASE: ICD-10-CM

## 2023-09-15 DIAGNOSIS — N17.9 AKI (ACUTE KIDNEY INJURY): ICD-10-CM

## 2023-09-15 DIAGNOSIS — J31.0 CHRONIC RHINITIS: ICD-10-CM

## 2023-09-15 LAB
ANION GAP SERPL CALC-SCNC: 13 MMOL/L (ref 8–16)
BASOPHILS # BLD AUTO: 0.06 K/UL (ref 0–0.2)
BASOPHILS NFR BLD: 0.4 % (ref 0–1.9)
BUN SERPL-MCNC: 34 MG/DL (ref 8–23)
CALCIUM SERPL-MCNC: 9.4 MG/DL (ref 8.7–10.5)
CHLORIDE SERPL-SCNC: 106 MMOL/L (ref 95–110)
CO2 SERPL-SCNC: 18 MMOL/L (ref 23–29)
CREAT SERPL-MCNC: 1.4 MG/DL (ref 0.5–1.4)
DIFFERENTIAL METHOD: ABNORMAL
EOSINOPHIL # BLD AUTO: 1.3 K/UL (ref 0–0.5)
EOSINOPHIL NFR BLD: 8.7 % (ref 0–8)
ERYTHROCYTE [DISTWIDTH] IN BLOOD BY AUTOMATED COUNT: 16 % (ref 11.5–14.5)
EST. GFR  (NO RACE VARIABLE): 49.3 ML/MIN/1.73 M^2
GLUCOSE SERPL-MCNC: 76 MG/DL (ref 70–110)
HCT VFR BLD AUTO: 46.1 % (ref 40–54)
HGB BLD-MCNC: 15.2 G/DL (ref 14–18)
IMM GRANULOCYTES # BLD AUTO: 0.09 K/UL (ref 0–0.04)
IMM GRANULOCYTES NFR BLD AUTO: 0.6 % (ref 0–0.5)
LYMPHOCYTES # BLD AUTO: 1.4 K/UL (ref 1–4.8)
LYMPHOCYTES NFR BLD: 9 % (ref 18–48)
MCH RBC QN AUTO: 26.5 PG (ref 27–31)
MCHC RBC AUTO-ENTMCNC: 33 G/DL (ref 32–36)
MCV RBC AUTO: 80 FL (ref 82–98)
MONOCYTES # BLD AUTO: 0.7 K/UL (ref 0.3–1)
MONOCYTES NFR BLD: 4.5 % (ref 4–15)
NEUTROPHILS # BLD AUTO: 11.5 K/UL (ref 1.8–7.7)
NEUTROPHILS NFR BLD: 76.8 % (ref 38–73)
NRBC BLD-RTO: 0 /100 WBC
PLATELET # BLD AUTO: 638 K/UL (ref 150–450)
PMV BLD AUTO: 9.8 FL (ref 9.2–12.9)
POTASSIUM SERPL-SCNC: 4.8 MMOL/L (ref 3.5–5.1)
RBC # BLD AUTO: 5.74 M/UL (ref 4.6–6.2)
SODIUM SERPL-SCNC: 137 MMOL/L (ref 136–145)
WBC # BLD AUTO: 15.03 K/UL (ref 3.9–12.7)

## 2023-09-15 PROCEDURE — 85025 COMPLETE CBC W/AUTO DIFF WBC: CPT | Performed by: PEDIATRICS

## 2023-09-15 PROCEDURE — 99215 OFFICE O/P EST HI 40 MIN: CPT | Mod: PBBFAC | Performed by: PEDIATRICS

## 2023-09-15 PROCEDURE — 99999 PR PBB SHADOW E&M-EST. PATIENT-LVL V: CPT | Mod: PBBFAC,,, | Performed by: PEDIATRICS

## 2023-09-15 PROCEDURE — 99214 PR OFFICE/OUTPT VISIT, EST, LEVL IV, 30-39 MIN: ICD-10-PCS | Mod: S$PBB,,, | Performed by: PEDIATRICS

## 2023-09-15 PROCEDURE — 36415 COLL VENOUS BLD VENIPUNCTURE: CPT | Performed by: PEDIATRICS

## 2023-09-15 PROCEDURE — 80048 BASIC METABOLIC PNL TOTAL CA: CPT | Performed by: PEDIATRICS

## 2023-09-15 PROCEDURE — 99214 OFFICE O/P EST MOD 30 MIN: CPT | Mod: S$PBB,,, | Performed by: PEDIATRICS

## 2023-09-15 PROCEDURE — 99999 PR PBB SHADOW E&M-EST. PATIENT-LVL V: ICD-10-PCS | Mod: PBBFAC,,, | Performed by: PEDIATRICS

## 2023-09-15 NOTE — PROGRESS NOTES
Subjective     Patient ID: Bharathi Parsons is a 83 y.o. male.    Chief Complaint: Follow-up    Bharathi Parsons is a 83 y.o. male who presents to the clinic for a follow up.       1) GERD with esophagitis and esophageal stricture. Had EGD and dilatation by Dr. Darby. On PPI, has flares with missing doses. No swallowing difficulties.   2) Allergies: See Dr. Meneses, uses intranasal steroids and astelin successfully. Always has some degree of Sx  3) Hypercholesterol: Is taking Livalo (4 mg 1/2 a day) and zetia due to crestor and other statins causing muscle achiness.  4) HTN:Tolerating ACE. B/P good.   5) Senile purpura: no other bleeding gums, hematuria, rectal bleeding, Melena   6) CKD3: creatinine stable EGFR is low due to age  7)Colon cancer screening: has longevity on both sides of the family, he wishes to continue prostate and colon cancer screening. Last colonoscopy was 2013 at [facility] [place], their note had repeat due now. Pt's gastroenterologist retired, he wishes to have done here.          LABS REVIEWED AND DISCUSSED WITH PATIENT: CMP, lipid panel, PSA, and CBC            Review of Systems   Constitutional:  Negative for chills, diaphoresis, fatigue and fever.   HENT:  Positive for nasal congestion. Negative for sore throat and trouble swallowing.    Respiratory:  Negative for cough and shortness of breath.    Cardiovascular:  Negative for chest pain.   Gastrointestinal:  Positive for reflux. Negative for abdominal pain, anal bleeding, constipation, diarrhea, nausea and vomiting.   Endocrine: Negative for cold intolerance, heat intolerance, polydipsia, polyphagia and polyuria.   Allergic/Immunologic: Positive for environmental allergies.   All other systems reviewed and are negative.         Objective     Physical Exam  Constitutional:       General: He is not in acute distress.     Appearance: Normal appearance. He is not ill-appearing or toxic-appearing.   HENT:      Right Ear: Tympanic membrane normal.       Left Ear: Tympanic membrane normal.      Ears:      Comments: Uses hearing aids     Nose: Congestion present. No rhinorrhea.      Mouth/Throat:      Pharynx: No oropharyngeal exudate or posterior oropharyngeal erythema.   Eyes:      Extraocular Movements: Extraocular movements intact.      Conjunctiva/sclera: Conjunctivae normal.      Pupils: Pupils are equal, round, and reactive to light.   Cardiovascular:      Rate and Rhythm: Normal rate and regular rhythm.      Pulses: Normal pulses.      Heart sounds: Normal heart sounds. No murmur heard.     No friction rub. No gallop.   Pulmonary:      Effort: Pulmonary effort is normal.      Breath sounds: Normal breath sounds.   Abdominal:      General: There is no distension.      Palpations: There is no mass.      Tenderness: There is no abdominal tenderness. There is no guarding or rebound.      Hernia: No hernia is present.   Neurological:      Mental Status: He is alert and oriented to person, place, and time.   Psychiatric:         Mood and Affect: Mood normal.         Behavior: Behavior normal.         Thought Content: Thought content normal.         Judgment: Judgment normal.            Assessment and Plan     1. Essential hypertension    2. Gastroesophageal reflux disease with esophagitis, unspecified whether hemorrhage  Overview:  Hx of EGD, esophageal stricture      3. Hypercholesterolemia  -     Lipid Panel; Future; Expected date: 09/15/2023  -     Comprehensive Metabolic Panel; Future; Expected date: 09/15/2023    4. Other nonthrombocytopenic purpura    5. Stage 3a chronic kidney disease    6. Chronic rhinitis  Overview:  Dr Meneses      7. Colon cancer screening  -     Ambulatory referral/consult to Gastroenterology; Future; Expected date: 09/22/2023    8. Leukocytosis, unspecified type  -     CBC Auto Differential; Future; Expected date: 09/15/2023  -     CBC Auto Differential; Future; Expected date: 09/15/2023    9. PATIENCE (acute kidney injury)  -     Basic  Metabolic Panel; Future; Expected date: 09/15/2023        HMI discussed with pt. Repeat CBC (mild leukocytosis) and Chem 7 (mild increase of creatinine from baseline) today. I instructed the pt to increase hydration. Flu, covid, and RSV vaccines in fall discussed. Referral to GI for colonoscopy due to age, his father lived until late 80s and mother past 100. Keep subspecialty care. At goal for B/P, lipids, and BS. Maintain meds, self monitoring D&E, weight moderation. F/U 6 months with labs.           Follow up in about 6 months (around 3/15/2024).

## 2023-09-18 DIAGNOSIS — D72.829 LEUKOCYTOSIS, UNSPECIFIED TYPE: Primary | ICD-10-CM

## 2023-09-20 ENCOUNTER — CLINICAL SUPPORT (OUTPATIENT)
Dept: AUDIOLOGY | Facility: CLINIC | Age: 83
End: 2023-09-20
Payer: MEDICARE

## 2023-09-20 ENCOUNTER — PATIENT MESSAGE (OUTPATIENT)
Dept: INTERNAL MEDICINE | Facility: CLINIC | Age: 83
End: 2023-09-20
Payer: MEDICARE

## 2023-09-20 DIAGNOSIS — H90.3 HEARING LOSS, SENSORINEURAL, ASYMMETRICAL: Primary | ICD-10-CM

## 2023-09-20 NOTE — PROGRESS NOTES
Bharathi Parsons was seen 09/20/2023 for a hearing aid follow-up appointment.  Right aid dead. Aids cleaned and checked and are in good working order once filters, domes and retention wires were replaced.      Patient will call for any future hearing aid follow-up appointments as needed.

## 2023-09-28 ENCOUNTER — LAB VISIT (OUTPATIENT)
Dept: LAB | Facility: HOSPITAL | Age: 83
End: 2023-09-28
Attending: PEDIATRICS
Payer: MEDICARE

## 2023-09-28 DIAGNOSIS — D72.829 LEUKOCYTOSIS, UNSPECIFIED TYPE: ICD-10-CM

## 2023-09-28 PROCEDURE — 85025 COMPLETE CBC W/AUTO DIFF WBC: CPT | Performed by: PEDIATRICS

## 2023-09-28 PROCEDURE — 36415 COLL VENOUS BLD VENIPUNCTURE: CPT | Performed by: PEDIATRICS

## 2023-09-29 DIAGNOSIS — D72.829 LEUKOCYTOSIS, UNSPECIFIED TYPE: Primary | ICD-10-CM

## 2023-09-29 LAB
BASOPHILS # BLD AUTO: 0.06 K/UL (ref 0–0.2)
BASOPHILS NFR BLD: 0.4 % (ref 0–1.9)
DIFFERENTIAL METHOD: ABNORMAL
EOSINOPHIL # BLD AUTO: 1.1 K/UL (ref 0–0.5)
EOSINOPHIL NFR BLD: 6.8 % (ref 0–8)
ERYTHROCYTE [DISTWIDTH] IN BLOOD BY AUTOMATED COUNT: 16.6 % (ref 11.5–14.5)
HCT VFR BLD AUTO: 48.6 % (ref 40–54)
HGB BLD-MCNC: 14.9 G/DL (ref 14–18)
IMM GRANULOCYTES # BLD AUTO: 0.1 K/UL (ref 0–0.04)
IMM GRANULOCYTES NFR BLD AUTO: 0.6 % (ref 0–0.5)
LYMPHOCYTES # BLD AUTO: 1.4 K/UL (ref 1–4.8)
LYMPHOCYTES NFR BLD: 8.4 % (ref 18–48)
MCH RBC QN AUTO: 26.6 PG (ref 27–31)
MCHC RBC AUTO-ENTMCNC: 30.7 G/DL (ref 32–36)
MCV RBC AUTO: 87 FL (ref 82–98)
MONOCYTES # BLD AUTO: 0.7 K/UL (ref 0.3–1)
MONOCYTES NFR BLD: 4 % (ref 4–15)
NEUTROPHILS # BLD AUTO: 13.1 K/UL (ref 1.8–7.7)
NEUTROPHILS NFR BLD: 79.8 % (ref 38–73)
NRBC BLD-RTO: 0 /100 WBC
PLATELET # BLD AUTO: 680 K/UL (ref 150–450)
PMV BLD AUTO: 10.1 FL (ref 9.2–12.9)
RBC # BLD AUTO: 5.6 M/UL (ref 4.6–6.2)
WBC # BLD AUTO: 16.37 K/UL (ref 3.9–12.7)

## 2023-10-13 ENCOUNTER — CLINICAL SUPPORT (OUTPATIENT)
Dept: AUDIOLOGY | Facility: CLINIC | Age: 83
End: 2023-10-13
Payer: MEDICARE

## 2023-10-13 DIAGNOSIS — Z97.4 WEARS HEARING AID IN BOTH EARS: Primary | ICD-10-CM

## 2023-10-13 NOTE — PROGRESS NOTES
Bharathi Parsons was seen on 10/13/2023 for a hearing aid follow up appointment. Patient reported his right hearing aid is dead.    Listening check confirmed dead right hearing aid. Replaced . Second listening check revealed good sound quality.     Recommendations:  Return as needed.

## 2023-11-07 ENCOUNTER — OFFICE VISIT (OUTPATIENT)
Dept: SPORTS MEDICINE | Facility: CLINIC | Age: 83
End: 2023-11-07
Payer: MEDICARE

## 2023-11-07 DIAGNOSIS — M25.562 CHRONIC PAIN OF BOTH KNEES: ICD-10-CM

## 2023-11-07 DIAGNOSIS — M17.0 BILATERAL PRIMARY OSTEOARTHRITIS OF KNEE: Primary | ICD-10-CM

## 2023-11-07 DIAGNOSIS — M25.561 CHRONIC PAIN OF BOTH KNEES: ICD-10-CM

## 2023-11-07 DIAGNOSIS — G89.29 CHRONIC PAIN OF BOTH KNEES: ICD-10-CM

## 2023-11-07 PROCEDURE — 20610 LARGE JOINT ASPIRATION/INJECTION: BILATERAL KNEE: ICD-10-PCS | Mod: 50,S$PBB,, | Performed by: STUDENT IN AN ORGANIZED HEALTH CARE EDUCATION/TRAINING PROGRAM

## 2023-11-07 PROCEDURE — 99999 PR PBB SHADOW E&M-EST. PATIENT-LVL III: CPT | Mod: PBBFAC,,, | Performed by: STUDENT IN AN ORGANIZED HEALTH CARE EDUCATION/TRAINING PROGRAM

## 2023-11-07 PROCEDURE — 99999PBSHW PR PBB SHADOW TECHNICAL ONLY FILED TO HB: Mod: PBBFAC,,,

## 2023-11-07 PROCEDURE — 99999PBSHW PR PBB SHADOW TECHNICAL ONLY FILED TO HB: ICD-10-PCS | Mod: PBBFAC,,,

## 2023-11-07 PROCEDURE — 99999 PR PBB SHADOW E&M-EST. PATIENT-LVL III: ICD-10-PCS | Mod: PBBFAC,,, | Performed by: STUDENT IN AN ORGANIZED HEALTH CARE EDUCATION/TRAINING PROGRAM

## 2023-11-07 PROCEDURE — 99214 PR OFFICE/OUTPT VISIT, EST, LEVL IV, 30-39 MIN: ICD-10-PCS | Mod: 25,S$PBB,, | Performed by: STUDENT IN AN ORGANIZED HEALTH CARE EDUCATION/TRAINING PROGRAM

## 2023-11-07 PROCEDURE — 99213 OFFICE O/P EST LOW 20 MIN: CPT | Mod: PBBFAC,25 | Performed by: STUDENT IN AN ORGANIZED HEALTH CARE EDUCATION/TRAINING PROGRAM

## 2023-11-07 PROCEDURE — 99214 OFFICE O/P EST MOD 30 MIN: CPT | Mod: 25,S$PBB,, | Performed by: STUDENT IN AN ORGANIZED HEALTH CARE EDUCATION/TRAINING PROGRAM

## 2023-11-07 PROCEDURE — 20610 DRAIN/INJ JOINT/BURSA W/O US: CPT | Mod: 50,PBBFAC | Performed by: STUDENT IN AN ORGANIZED HEALTH CARE EDUCATION/TRAINING PROGRAM

## 2023-11-07 RX ORDER — TRIAMCINOLONE ACETONIDE 40 MG/ML
20 INJECTION, SUSPENSION INTRA-ARTICULAR; INTRAMUSCULAR
Status: DISCONTINUED | OUTPATIENT
Start: 2023-11-07 | End: 2023-11-07 | Stop reason: HOSPADM

## 2023-11-07 RX ADMIN — TRIAMCINOLONE ACETONIDE 20 MG: 200 INJECTION, SUSPENSION INTRA-ARTICULAR; INTRAMUSCULAR at 01:11

## 2023-11-07 NOTE — PROCEDURES
Large Joint Aspiration/Injection: bilateral knee    Date/Time: 11/7/2023 1:20 PM    Performed by: James Viera MD  Authorized by: James Viera MD    Consent Done?:  Yes (Verbal)  Indications:  Arthritis and pain  Site marked: the procedure site was marked    Timeout: prior to procedure the correct patient, procedure, and site was verified      Local anesthesia used?: Yes    Anesthesia:  Local infiltration  Local anesthetic:  Bupivacaine 0.5% without epinephrine, lidocaine 1% without epinephrine and topical anesthetic  Anesthetic total (ml):  4      Details:  Needle Size:  22 G  Ultrasonic Guidance for needle placement?: No    Approach:  Anterolateral  Location:  Knee  Laterality:  Bilateral  Site:  Bilateral knee  Medications (Right):  20 mg triamcinolone acetonide 40 mg/mL  Medications (Left):  20 mg triamcinolone acetonide 40 mg/mL  Patient tolerance:  Patient tolerated the procedure well with no immediate complications     We discussed the proper protocols after the injection such as no submerging pools, baths tubs, or hot tubs for 24 hr.  Showering is okay today.  We also discussed that blood sugars can be elevated after an injection and asked patient to properly checked her sugars over the next few days and contact their PCP if there are any concerns.  We discussed red flags such as fevers, chills, red, warm, tender joint at the area of injection to please seek medical care immediately.

## 2023-11-07 NOTE — PATIENT INSTRUCTIONS
Assessment:  Bharathi Parsons is a 83 y.o. male with a chief complaint of Pain of the Right Knee and Pain of the Left Knee    Encounter Diagnoses   Name Primary?    Bilateral primary osteoarthritis of knee Yes    Chronic pain of both knees       Plan:  XR reviewed - significant tricompartmental osteoarthritis of both knees, with severe medial compartment narrowing of the right knee, and severe lateral compartment narrowing of the left knee.    Labs reviewed - Cr 1.4, GFR 49.3, LFTs WNL  History and exam is consistent with chronic bilateral knee pain due to osteoarthritis.    Patient is not interested in surgical evaluation total knee replacement this time.    Relative contraindication of NSAIDs in the setting of CKD 3A and GERD.  Recommend to try over-the-counter Tylenol as first line as needed for pain and discomfort.  Bilateral knee corticosteroid injections today.    Proper protocols after the injection included: no submerging pools, baths tubs, or hot tubs for 24 hr.  Showering is okay today.  Side effects of the corticosteroid injection can include elevated blood glucose levels and blood pressures, so if you are taking medications for these, please monitor closely, and contact your PCP if any issues.  Red flag symptoms include fever, chills, nausea, vomiting, red, warm, tender joint at the area of injection.  If you are noticing these symptoms, they may be indicative of an infection, and please seek medical care immediately, either by calling our clinic or going to the emergency room.  We will monitor response following these corticosteroid injections, if pain recurs, we will order for repeat viscosupplementation/Euflexxa series.  May also be some element of patellar tendinitis, we will monitor response with injections, may consider physical therapy if not improving.    Follow-up:  1 month for bilateral knee Euflexxa injection series or sooner if there are any problems between now and then.    Thank you for  choosing [facility] and Dr. James Viera for your orthopedic & sports medicine care. It is our goal to provide you with exceptional care that will help keep you healthy, active, and get you back in the game.    Please do not hesitate to reach out to us via email, phone, or MyChart with any questions, concerns, or feedback.    If you are experiencing pain/discomfort ,or have questions after 5pm and would like to be connected to the [facility] on-call team, please call this number and specify which Sports Medicine provider is treating you: [phone]

## 2023-11-07 NOTE — PROGRESS NOTES
Patient ID: Bharathi Parsons  YOB: 1940  MRN: [id number]    Chief Complaint: Pain of the Right Knee and Pain of the Left Knee    Referred By: prior patient of Dr. Saad Price    History of Present Illness: Bharathi Parsons is a 83 y.o. male who presents today with Pain of the Right Knee and Pain of the Left Knee    He was initially evaluated in our office on 6/8/23.  He had previously been treated by Dr. Saad Price for bilateral knee osteoarthritis since early 2022.  He had completed Euflexxa series in January 2023.  At his last visit on 6/8/23 he was doing well with no issues.      Today, he reports that his injections lasted for a long time.  He recently had a flare up of anterior knee pain after having to climb a bunch of stairs.  It feels very similar to the first time he came in.  He is preparing for a vacation to [place] tomorrow and would like repeat injections today.      Past Medical History:   Past Medical History:   Diagnosis Date    DJD (degenerative joint disease)     Esophageal stricture     Hypertension      Past Surgical History:   Procedure Laterality Date    APPENDECTOMY      as child    CATARACT EXTRACTION W/  INTRAOCULAR LENS IMPLANT  OD before 2003    CATARACT EXTRACTION W/  INTRAOCULAR LENS IMPLANT  OS 12/19/07 with YAG    ESOPHAGEAL DILATION      x 2 in past last one done in  2022    FOOT ARTHRODESIS      medial column arthrodesis left foot  2011     Family History   Problem Relation Age of Onset    Glaucoma Maternal Uncle     Cataracts Mother     Heart disease Father      Social History     Socioeconomic History    Marital status:    Tobacco Use    Smoking status: Never     Passive exposure: Never    Smokeless tobacco: Never   Substance and Sexual Activity    Alcohol use: Yes     Comment: Ocasionally    Drug use: No    Sexual activity: Not Currently     Partners: Female   Social History Narrative    , No smokers in household, 1 Dog.     Social  Determinants of Health     Financial Resource Strain: Low Risk  (1/5/2023)    Overall Financial Resource Strain (CARDIA)     Difficulty of Paying Living Expenses: Not hard at all   Food Insecurity: No Food Insecurity (1/5/2023)    Hunger Vital Sign     Worried About Running Out of Food in the Last Year: Never true     Ran Out of Food in the Last Year: Never true   Transportation Needs: No Transportation Needs (1/5/2023)    PRAPARE - Transportation     Lack of Transportation (Medical): No     Lack of Transportation (Non-Medical): No   Physical Activity: Inactive (1/5/2023)    Exercise Vital Sign     Days of Exercise per Week: 0 days     Minutes of Exercise per Session: 0 min   Stress: No Stress Concern Present (1/5/2023)    [employer] - Occupational Stress Questionnaire     Feeling of Stress : Not at all   Social Connections: Socially Integrated (1/5/2023)    Social Connection and Isolation Panel [NHANES]     Frequency of Communication with Friends and Family: More than three times a week     Frequency of Social Gatherings with Friends and Family: Three times a week     Attends Pentecostalism Services: More than 4 times per year     Active Member of Clubs or Organizations: No     Attends Club or Organization Meetings: More than 4 times per year     Marital Status:    Housing Stability: Low Risk  (1/5/2023)    Housing Stability Vital Sign     Unable to Pay for Housing in the Last Year: No     Number of Places Lived in the Last Year: 1     Unstable Housing in the Last Year: No     Medication List with Changes/Refills   Current Medications    BEMPEDOIC ACID-EZETIMIBE (NEXLIZET) 180-10 MG TAB    Take 1 tablet by mouth Daily.    CETIRIZINE (ZYRTEC) 10 MG TABLET        COVID-19 VAC, JOSE,PFIZER,,PF, (PFIZER COVID-19 JOSE VACCN,PF,) 30 MCG/0.3 ML INJECTION    Inject into the muscle.    IBUPROFEN ORAL    Take by mouth as needed.     LISINOPRIL 10 MG TABLET    Take 1 tablet by mouth once daily     LIVALO 4 MG TAB    Take 1 tablet by mouth.    PANTOPRAZOLE (PROTONIX) 40 MG TABLET    TAKE 1 TABLET BY MOUTH ONCE DAILY BEFORE MEAL(S)    TRIAMCINOLONE ACETONIDE 0.1% (KENALOG) 0.1 % CREAM    Apply topically 2 (two) times daily.   Discontinued Medications    AZELASTINE (ASTELIN) 137 MCG (0.1 %) NASAL SPRAY    2 sprays by Nasal route as needed.     DICLOFENAC SODIUM (VOLTAREN) 1 % GEL    Apply 2 g topically 3 (three) times daily.    FLUTICASONE (FLONASE) 50 MCG/ACTUATION NASAL SPRAY         Review of patient's allergies indicates:  No Known Allergies    Physical Exam:   There is no height or weight on file to calculate BMI.    Physical Exam  Detailed MSK exam:     Right Knee:  Inspection: No effusion, erythema, or ecchymosis   Palpation tenderness: Nontender to palpation  Range of motion: 0-135 degrees.  Strength:  5/5 Extension    5/5 Flexion  Stability: stable ACL/Lachman      stable Posterior Drawer      stable MCL/Valgus Stress      stable LCL/Varus Stress  Meniscus Exam: Negative Elver's  Patella Exam: Negative J-sign   Negative Patellar apprehension   Negative Patellar grind  N/V Exam:  Tibial:    Normal sensory (plantar foot)  Normal motor (FHL)    Sup Peroneal:   Normal sensory (dorsal foot)  Normal motor (Peroneals)            Deep Peroneal:   Normal sensory (1st web space)  Normal motor (EHL)    Sural:   Normal sensory (lateral foot)   Saphenous:   Normal sensory (medial lower leg)   Normal pedal pulses, warm and well perfused with capillary refill < 2 sec     Left Knee:  Inspection: No effusion, erythema, or ecchymosis   Palpation tenderness: Nontender to palpation  Range of motion: 0-135 degrees.  Strength:  5/5 Extension    5/5 Flexion  Stability: stable ACL/Lachman      stable Posterior Drawer      stable MCL/Valgus Stress      stable LCL/Varus Stress  Meniscus Exam: Negative Elver's  Patella Exam: Negative J-sign   Negative Patellar apprehension   Negative Patellar grind  N/V Exam:  Tibial:     Normal sensory (plantar foot)  Normal motor (FHL)    Sup Peroneal:   Normal sensory (dorsal foot)  Normal motor (Peroneals)            Deep Peroneal:   Normal sensory (1st web space)  Normal motor (EHL)    Sural:   Normal sensory (lateral foot)   Saphenous:   Normal sensory (medial lower leg)   Normal pedal pulses, warm and well perfused with capillary refill < 2 sec     Imaging:   XR Results:  Results for orders placed during the hospital encounter of 06/08/23    X-ray Knee Ortho Bilateral with Flexion    Narrative  EXAM:  XR KNEE ORTHO BILAT WITH FLEXION    INDICATIONS:  Bilateral knee pain    TECHNIQUE:  5 views of the bilateral knees    COMPARISONS:  April and February 2022    FINDINGS:  No fractures nor dislocations.  Tricompartmental osteophytes are present bilaterally with total joint space loss of the left lateral tibial compartment and early joint space loss of both medial tibiofemoral compartments.  Small joint effusions bilaterally.  Arterial calcifications bilaterally.    A 1 cm calcification posteriorly in the right popliteal fossa could represent debris in a Baker's cyst but I cannot exclude loose bodies.    Impression  1.  Bilateral tricompartmental osteoarthritis with bilateral medial joint space narrowing and TOTAL lateral joint space narrowing on the left (both of these were described on previous study in April 2022)    2.  Popliteal fossa calcification on the right with possibilities discussed above (consider MRI for further evaluation if clinically indicated)    Finalized on: 6/8/2023 12:13 PM By:  Buddy Gamez MD  [facility]# [id number]      2023-06-08 12:15:30.769    [facility]      Relevant imaging results were reviewed and interpreted by me and per my read:  Significant tricompartmental osteoarthritis of both knees.  Severe medial compartment narrowing of the right knee.  Severe lateral compartment narrowing of the left knee.  Tricompartmental osteophytosis.    This was discussed with the patient and /  or family today.     Patient Instructions   Assessment:  Bharathi Parsons is a 83 y.o. male with a chief complaint of Pain of the Right Knee and Pain of the Left Knee    Encounter Diagnoses   Name Primary?    Bilateral primary osteoarthritis of knee Yes    Chronic pain of both knees       Plan:  XR reviewed - significant tricompartmental osteoarthritis of both knees, with severe medial compartment narrowing of the right knee, and severe lateral compartment narrowing of the left knee.    Labs reviewed - Cr 1.4, GFR 49.3, LFTs WNL  History and exam is consistent with chronic bilateral knee pain due to osteoarthritis.    Patient is not interested in surgical evaluation total knee replacement this time.    Relative contraindication of NSAIDs in the setting of CKD 3A and GERD.  Recommend to try over-the-counter Tylenol as first line as needed for pain and discomfort.  Bilateral knee corticosteroid injections today.    Proper protocols after the injection included: no submerging pools, baths tubs, or hot tubs for 24 hr.  Showering is okay today.  Side effects of the corticosteroid injection can include elevated blood glucose levels and blood pressures, so if you are taking medications for these, please monitor closely, and contact your PCP if any issues.  Red flag symptoms include fever, chills, nausea, vomiting, red, warm, tender joint at the area of injection.  If you are noticing these symptoms, they may be indicative of an infection, and please seek medical care immediately, either by calling our clinic or going to the emergency room.  We will monitor response following these corticosteroid injections, if pain recurs, we will order for repeat viscosupplementation/Euflexxa series.  May also be some element of patellar tendinitis, we will monitor response with injections, may consider physical therapy if not improving.    Follow-up:  1 month for bilateral knee Euflexxa injection series or sooner if there are any problems  between now and then.    Thank you for choosing [facility] and Dr. James Viera for your orthopedic & sports medicine care. It is our goal to provide you with exceptional care that will help keep you healthy, active, and get you back in the game.    Please do not hesitate to reach out to us via email, phone, or MyChart with any questions, concerns, or feedback.    If you are experiencing pain/discomfort ,or have questions after 5pm and would like to be connected to the [facility] on-call team, please call this number and specify which Sports Medicine provider is treating you: [phone]       A copy of today's visit note has been sent to the referring provider.           James Viera MD  Primary Care Sports Medicine    Disclaimer: This note was prepared using a voice recognition system and is likely to have sound alike errors within the text.

## 2023-11-30 DIAGNOSIS — D72.829 LEUKOCYTOSIS, UNSPECIFIED TYPE: ICD-10-CM

## 2023-11-30 DIAGNOSIS — D75.839 THROMBOCYTOSIS: Primary | ICD-10-CM

## 2023-12-01 ENCOUNTER — OFFICE VISIT (OUTPATIENT)
Dept: HEMATOLOGY/ONCOLOGY | Facility: CLINIC | Age: 83
End: 2023-12-01
Payer: MEDICARE

## 2023-12-01 ENCOUNTER — LAB VISIT (OUTPATIENT)
Dept: LAB | Facility: HOSPITAL | Age: 83
End: 2023-12-01
Attending: NURSE PRACTITIONER
Payer: MEDICARE

## 2023-12-01 VITALS
TEMPERATURE: 99 F | OXYGEN SATURATION: 96 % | DIASTOLIC BLOOD PRESSURE: 89 MMHG | HEIGHT: 72 IN | SYSTOLIC BLOOD PRESSURE: 153 MMHG | WEIGHT: 161.38 LBS | BODY MASS INDEX: 21.86 KG/M2 | HEART RATE: 85 BPM

## 2023-12-01 DIAGNOSIS — D72.829 LEUKOCYTOSIS, UNSPECIFIED TYPE: ICD-10-CM

## 2023-12-01 DIAGNOSIS — R09.82 POST-NASAL DRIP: ICD-10-CM

## 2023-12-01 DIAGNOSIS — D47.3 ESSENTIAL (HEMORRHAGIC) THROMBOCYTHEMIA: ICD-10-CM

## 2023-12-01 DIAGNOSIS — D75.839 THROMBOCYTOSIS: ICD-10-CM

## 2023-12-01 DIAGNOSIS — L03.818 CELLULITIS OF OTHER SPECIFIED SITE: ICD-10-CM

## 2023-12-01 DIAGNOSIS — D72.10 ALLERGIC EOSINOPHILIA: ICD-10-CM

## 2023-12-01 DIAGNOSIS — T78.40XA ALLERGY, INITIAL ENCOUNTER: ICD-10-CM

## 2023-12-01 DIAGNOSIS — T14.8XXA DEEP TISSUE INJURY: Primary | ICD-10-CM

## 2023-12-01 DIAGNOSIS — D72.828 GRANULOCYTOSIS: ICD-10-CM

## 2023-12-01 LAB
ALBUMIN SERPL BCP-MCNC: 3.5 G/DL (ref 3.5–5.2)
ALP SERPL-CCNC: 77 U/L (ref 55–135)
ALT SERPL W/O P-5'-P-CCNC: 15 U/L (ref 10–44)
ANION GAP SERPL CALC-SCNC: 9 MMOL/L (ref 8–16)
AST SERPL-CCNC: 18 U/L (ref 10–40)
BASOPHILS # BLD AUTO: 0.06 K/UL (ref 0–0.2)
BASOPHILS NFR BLD: 0.5 % (ref 0–1.9)
BILIRUB SERPL-MCNC: 0.3 MG/DL (ref 0.1–1)
BUN SERPL-MCNC: 27 MG/DL (ref 8–23)
CALCIUM SERPL-MCNC: 8.9 MG/DL (ref 8.7–10.5)
CHLORIDE SERPL-SCNC: 110 MMOL/L (ref 95–110)
CO2 SERPL-SCNC: 22 MMOL/L (ref 23–29)
CREAT SERPL-MCNC: 1.3 MG/DL (ref 0.5–1.4)
DIFFERENTIAL METHOD: ABNORMAL
EOSINOPHIL # BLD AUTO: 0.5 K/UL (ref 0–0.5)
EOSINOPHIL NFR BLD: 3.8 % (ref 0–8)
ERYTHROCYTE [DISTWIDTH] IN BLOOD BY AUTOMATED COUNT: 15.6 % (ref 11.5–14.5)
ERYTHROCYTE [SEDIMENTATION RATE] IN BLOOD BY PHOTOMETRIC METHOD: 4 MM/HR (ref 0–23)
EST. GFR  (NO RACE VARIABLE): 54 ML/MIN/1.73 M^2
FERRITIN SERPL-MCNC: 88 NG/ML (ref 20–300)
GLUCOSE SERPL-MCNC: 105 MG/DL (ref 70–110)
HCT VFR BLD AUTO: 45.8 % (ref 40–54)
HGB BLD-MCNC: 14.9 G/DL (ref 14–18)
IMM GRANULOCYTES # BLD AUTO: 0.1 K/UL (ref 0–0.04)
IMM GRANULOCYTES NFR BLD AUTO: 0.8 % (ref 0–0.5)
IRON SERPL-MCNC: 50 UG/DL (ref 45–160)
LYMPHOCYTES # BLD AUTO: 1.4 K/UL (ref 1–4.8)
LYMPHOCYTES NFR BLD: 10.8 % (ref 18–48)
MCH RBC QN AUTO: 26.9 PG (ref 27–31)
MCHC RBC AUTO-ENTMCNC: 32.5 G/DL (ref 32–36)
MCV RBC AUTO: 83 FL (ref 82–98)
MONOCYTES # BLD AUTO: 0.4 K/UL (ref 0.3–1)
MONOCYTES NFR BLD: 3.2 % (ref 4–15)
NEUTROPHILS # BLD AUTO: 10.1 K/UL (ref 1.8–7.7)
NEUTROPHILS NFR BLD: 80.9 % (ref 38–73)
NRBC BLD-RTO: 0 /100 WBC
PATH REV BLD -IMP: NORMAL
PATH REV BLD -IMP: NORMAL
PLATELET # BLD AUTO: 536 K/UL (ref 150–450)
PMV BLD AUTO: 9.6 FL (ref 9.2–12.9)
POTASSIUM SERPL-SCNC: 4.4 MMOL/L (ref 3.5–5.1)
PROT SERPL-MCNC: 6.4 G/DL (ref 6–8.4)
RBC # BLD AUTO: 5.53 M/UL (ref 4.6–6.2)
SATURATED IRON: 16 % (ref 20–50)
SODIUM SERPL-SCNC: 141 MMOL/L (ref 136–145)
TOTAL IRON BINDING CAPACITY: 311 UG/DL (ref 250–450)
TRANSFERRIN SERPL-MCNC: 210 MG/DL (ref 200–375)
WBC # BLD AUTO: 12.53 K/UL (ref 3.9–12.7)

## 2023-12-01 PROCEDURE — 99999 PR PBB SHADOW E&M-EST. PATIENT-LVL IV: CPT | Mod: PBBFAC,,, | Performed by: NURSE PRACTITIONER

## 2023-12-01 PROCEDURE — 36415 COLL VENOUS BLD VENIPUNCTURE: CPT | Performed by: NURSE PRACTITIONER

## 2023-12-01 PROCEDURE — 83540 ASSAY OF IRON: CPT | Performed by: NURSE PRACTITIONER

## 2023-12-01 PROCEDURE — 85060 PATHOLOGIST REVIEW: ICD-10-PCS | Mod: ,,, | Performed by: PATHOLOGY

## 2023-12-01 PROCEDURE — 99205 PR OFFICE/OUTPT VISIT, NEW, LEVL V, 60-74 MIN: ICD-10-PCS | Mod: S$PBB,,, | Performed by: NURSE PRACTITIONER

## 2023-12-01 PROCEDURE — 99999 PR PBB SHADOW E&M-EST. PATIENT-LVL IV: ICD-10-PCS | Mod: PBBFAC,,, | Performed by: NURSE PRACTITIONER

## 2023-12-01 PROCEDURE — 85652 RBC SED RATE AUTOMATED: CPT | Performed by: NURSE PRACTITIONER

## 2023-12-01 PROCEDURE — 85060 BLOOD SMEAR INTERPRETATION: CPT | Mod: ,,, | Performed by: PATHOLOGY

## 2023-12-01 PROCEDURE — 82728 ASSAY OF FERRITIN: CPT | Performed by: NURSE PRACTITIONER

## 2023-12-01 PROCEDURE — 99205 OFFICE O/P NEW HI 60 MIN: CPT | Mod: S$PBB,,, | Performed by: NURSE PRACTITIONER

## 2023-12-01 PROCEDURE — 85025 COMPLETE CBC W/AUTO DIFF WBC: CPT | Performed by: NURSE PRACTITIONER

## 2023-12-01 PROCEDURE — 84466 ASSAY OF TRANSFERRIN: CPT | Performed by: NURSE PRACTITIONER

## 2023-12-01 PROCEDURE — 80053 COMPREHEN METABOLIC PANEL: CPT | Performed by: NURSE PRACTITIONER

## 2023-12-01 PROCEDURE — 99214 OFFICE O/P EST MOD 30 MIN: CPT | Mod: PBBFAC | Performed by: NURSE PRACTITIONER

## 2023-12-01 RX ORDER — IPRATROPIUM BROMIDE 42 UG/1
2 SPRAY, METERED NASAL 3 TIMES DAILY
COMMUNITY
Start: 2023-11-04 | End: 2023-12-22

## 2023-12-01 RX ORDER — CIPROFLOXACIN 500 MG/1
500 TABLET ORAL 2 TIMES DAILY
Qty: 20 TABLET | Refills: 0 | Status: SHIPPED | OUTPATIENT
Start: 2023-12-01 | End: 2023-12-11

## 2023-12-01 NOTE — PROGRESS NOTES
Subjective:      Patient ID: Bharathi Parsons is a 83 y.o. male.    Chief Complaint: lab abnormalities    HPI:  83 year old male presents to the hematology department as a new patient for further evaluation and treatment of leukocytosis.   Tells me that he has chronic allergies.  Denies any s/s of infection.  States that he also had a cold last week.  Is not taking systemic steroids.  Denies fevers.    Denies f/c/ns or unintentional weight loss.  Denies any known abnormal lymphadenopathy.  States that he is bruising more easily.    Denies any abnormal bleeding.  With thrombocytosis present.  Sed rate and iron studies pending.      Family hx of cancer:  Maternal aunt: breast cancer  Maternal uncle: cancer?    Denies cigarette smoking.  Has an occasional beer.  Denies illicit drug use.    Worked as a .  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       Social History     Socioeconomic History    Marital status:    Tobacco Use    Smoking status: Never     Passive exposure: Never    Smokeless tobacco: Never   Substance and Sexual Activity    Alcohol use: Yes     Comment: Ocasionally    Drug use: No    Sexual activity: Not Currently     Partners: Female   Social History Narrative    , No smokers in household, 1 Dog.     Social Determinants of Health     Financial Resource Strain: Low Risk  (1/5/2023)    Overall Financial Resource Strain (CARDIA)     Difficulty of Paying Living Expenses: Not hard at all   Food Insecurity: No Food Insecurity (1/5/2023)    Hunger Vital Sign     Worried About Running Out of Food in the Last Year: Never true     Ran Out of Food in the Last Year: Never true   Transportation Needs: No Transportation Needs (1/5/2023)    PRAPARE - Transportation     Lack of Transportation (Medical): No     Lack of Transportation (Non-Medical): No   Physical Activity: Inactive (1/5/2023)    Exercise Vital Sign     Days of Exercise per Week: 0 days     Minutes of Exercise per Session:  0 min   Stress: No Stress Concern Present (1/5/2023)    [employer] - Occupational Stress Questionnaire     Feeling of Stress : Not at all   Social Connections: Socially Integrated (1/5/2023)    Social Connection and Isolation Panel [NHANES]     Frequency of Communication with Friends and Family: More than three times a week     Frequency of Social Gatherings with Friends and Family: Three times a week     Attends Voodoo Services: More than 4 times per year     Active Member of Clubs or Organizations: No     Attends Club or Organization Meetings: More than 4 times per year     Marital Status:    Housing Stability: Low Risk  (1/5/2023)    Housing Stability Vital Sign     Unable to Pay for Housing in the Last Year: No     Number of Places Lived in the Last Year: 1     Unstable Housing in the Last Year: No       Family History   Problem Relation Age of Onset    Glaucoma Maternal Uncle     Cataracts Mother     Heart disease Father        Past Surgical History:   Procedure Laterality Date    APPENDECTOMY      as child    CATARACT EXTRACTION W/  INTRAOCULAR LENS IMPLANT  OD before 2003    CATARACT EXTRACTION W/  INTRAOCULAR LENS IMPLANT  OS 12/19/07 with YAG    ESOPHAGEAL DILATION      x 2 in past last one done in  2022    FOOT ARTHRODESIS      medial column arthrodesis left foot  2011       Past Medical History:   Diagnosis Date    DJD (degenerative joint disease)     Esophageal stricture     Hypertension        Review of Systems   Constitutional: Negative.    HENT:  Positive for postnasal drip.    Eyes: Negative.    Respiratory: Negative.     Cardiovascular: Negative.    Gastrointestinal:  Negative for anal bleeding, blood in stool, constipation and diarrhea.   Endocrine: Negative.    Genitourinary: Negative.    Musculoskeletal:  Positive for arthralgias.   Skin:         Wound to right hand and right middle finger from carrying luggage    Allergic/Immunologic:  Negative.    Neurological: Negative.    Hematological:  Bruises/bleeds easily.   Psychiatric/Behavioral: Negative.            Medication List with Changes/Refills   New Medications    CIPROFLOXACIN HCL (CIPRO) 500 MG TABLET    Take 1 tablet (500 mg total) by mouth 2 (two) times daily. for 10 days   Current Medications    BEMPEDOIC ACID-EZETIMIBE (NEXLIZET) 180-10 MG TAB    Take 1 tablet by mouth Daily.    CETIRIZINE (ZYRTEC) 10 MG TABLET        COVID-19 VAC, JOSE,PFIZER,,PF, (PFIZER COVID-19 JOSE VACCN,PF,) 30 MCG/0.3 ML INJECTION    Inject into the muscle.    IBUPROFEN ORAL    Take by mouth as needed.     IPRATROPIUM (ATROVENT) 42 MCG (0.06 %) NASAL SPRAY    2 sprays by Each Nostril route 3 (three) times daily.    LISINOPRIL 10 MG TABLET    Take 1 tablet by mouth once daily    LIVALO 4 MG TAB    Take 1 tablet by mouth.    PANTOPRAZOLE (PROTONIX) 40 MG TABLET    TAKE 1 TABLET BY MOUTH ONCE DAILY BEFORE MEAL(S)    TRIAMCINOLONE ACETONIDE 0.1% (KENALOG) 0.1 % CREAM    Apply topically 2 (two) times daily.        Objective:     Vitals:    12/01/23 0942   BP: (!) 153/89   Pulse: 85   Temp: 98.5 °F (36.9 °C)       Physical Exam  Vitals reviewed.   Constitutional:       Appearance: Normal appearance.   HENT:      Head: Normocephalic and atraumatic.      Right Ear: External ear normal.      Left Ear: External ear normal.      Mouth/Throat:      Pharynx: Posterior oropharyngeal erythema present.   Cardiovascular:      Rate and Rhythm: Normal rate and regular rhythm.      Heart sounds: S1 normal and S2 normal. Murmur heard.   Pulmonary:      Effort: Pulmonary effort is normal.      Breath sounds: Normal breath sounds.   Abdominal:      General: There is no distension.   Musculoskeletal:         General: Normal range of motion.      Cervical back: Normal range of motion.   Lymphadenopathy:      Comments: Prominent tonsillar lymph nodes   Skin:     General: Skin is warm and dry.   Neurological:      General: No focal deficit  present.      Mental Status: He is alert and oriented to person, place, and time.   Psychiatric:         Attention and Perception: Attention and perception normal.         Mood and Affect: Mood and affect normal.         Speech: Speech normal.         Behavior: Behavior normal. Behavior is cooperative.         Thought Content: Thought content normal.         Cognition and Memory: Cognition and memory normal.         Judgment: Judgment normal.       Assessment:     Problem List Items Addressed This Visit    None  Visit Diagnoses       Deep tissue injury    -  Primary    Relevant Medications    ciprofloxacin HCl (CIPRO) 500 MG tablet    Leukocytosis, unspecified type        Cellulitis of other specified site        Relevant Medications    ciprofloxacin HCl (CIPRO) 500 MG tablet    Granulocytosis        Relevant Orders    BCR/ABL, p210, Quant, Monitor, blood    MPN (JAK2 V617F)WITH REFLEX TO CALR,MPL    Essential (hemorrhagic) thrombocythemia        Relevant Orders    MPN (JAK2 V617F)WITH REFLEX TO CALR,MPL    Thrombocytosis        Allergy, initial encounter        Relevant Orders    Ambulatory referral/consult to Allergy    Allergic eosinophilia        Relevant Orders    Ambulatory referral/consult to Allergy    Post-nasal drip                Lab Results   Component Value Date    WBC 12.53 12/01/2023    RBC 5.53 12/01/2023    HGB 14.9 12/01/2023    HCT 45.8 12/01/2023    MCV 83 12/01/2023    MCH 26.9 (L) 12/01/2023    MCHC 32.5 12/01/2023    RDW 15.6 (H) 12/01/2023     (H) 12/01/2023    MPV 9.6 12/01/2023    GRAN 10.1 (H) 12/01/2023    GRAN 80.9 (H) 12/01/2023    LYMPH 1.4 12/01/2023    LYMPH 10.8 (L) 12/01/2023    MONO 0.4 12/01/2023    MONO 3.2 (L) 12/01/2023    EOS 0.5 12/01/2023    BASO 0.06 12/01/2023    EOSINOPHIL 3.8 12/01/2023    BASOPHIL 0.5 12/01/2023      Lab Results   Component Value Date     12/01/2023    K 4.4 12/01/2023     12/01/2023    CO2 22 (L) 12/01/2023    BUN 27 (H) 12/01/2023     CREATININE 1.3 12/01/2023    CALCIUM 8.9 12/01/2023    ANIONGAP 9 12/01/2023    ESTGFRAFRICA >60.0 07/21/2022    EGFRNONAA 55.2 (A) 07/21/2022     Lab Results   Component Value Date    ALT 15 12/01/2023    AST 18 12/01/2023    ALKPHOS 77 12/01/2023    BILITOT 0.3 12/01/2023       Plan:   Deep tissue injury  -     ciprofloxacin HCl (CIPRO) 500 MG tablet; Take 1 tablet (500 mg total) by mouth 2 (two) times daily. for 10 days  Dispense: 20 tablet; Refill: 0    Leukocytosis, unspecified type  -     Ambulatory referral/consult to Hematology / Oncology    Cellulitis of other specified site  -     ciprofloxacin HCl (CIPRO) 500 MG tablet; Take 1 tablet (500 mg total) by mouth 2 (two) times daily. for 10 days  Dispense: 20 tablet; Refill: 0    Granulocytosis  -     BCR/ABL, p210, Quant, Monitor, blood; Future; Expected date: 12/01/2023  -     MPN (JAK2 V617F)WITH REFLEX TO CALR,MPL; Future; Expected date: 12/01/2023    Essential (hemorrhagic) thrombocythemia  -     MPN (JAK2 V617F)WITH REFLEX TO CALR,MPL; Future; Expected date: 12/01/2023    Thrombocytosis    Allergy, initial encounter  -     Ambulatory referral/consult to Allergy; Future; Expected date: 12/08/2023    Allergic eosinophilia  -     Ambulatory referral/consult to Allergy; Future; Expected date: 12/08/2023    Post-nasal drip      Med Onc Chart Routing      Follow up with physician    Follow up with PRACHI 4 weeks. in person at    Infusion scheduling note   n/a   Injection scheduling note n/a   Labs   Scheduling:  Preferred lab: [facility]  Lab interval:  labs today   Imaging   N/a   Pharmacy appointment No pharmacy appointment needed      Other referrals       Additional referrals needed  refer to allergist     Total time spent on encounter: 60 mintues     Collaborating Provider:  Dr. Jose Roberts    Thank You,  Cheyenne Herrera, FNP-C  Hematology Oncology

## 2023-12-11 ENCOUNTER — TELEPHONE (OUTPATIENT)
Dept: HEMATOLOGY/ONCOLOGY | Facility: CLINIC | Age: 83
End: 2023-12-11
Payer: MEDICARE

## 2023-12-11 NOTE — TELEPHONE ENCOUNTER
----- Message from Munira Herrera NP sent at 12/11/2023  7:25 AM CST -----  Please see if he is able to see us today with a VV to discuss results of testing.  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  He is not aware of this as of ye  t.    Thank you,   Cheyenne Herrera - FNP -C  Hematology/Oncology

## 2023-12-11 NOTE — TELEPHONE ENCOUNTER
----- Message from Frank Gar sent at 12/11/2023  9:09 AM CST -----  Contact: Bharathi Lawson is calling to speak with the nurse returning a call needing a sooner appt than what is available. Please call patient at [phone]

## 2023-12-11 NOTE — TELEPHONE ENCOUNTER
Nurse spoke with pt in regards to scheduling appt. Pt has been scheduled. Verbalized understanding.

## 2023-12-13 NOTE — PROGRESS NOTES
HEMATOLOGY / ONCOLOGY   CLINIC NOTE     ONCOLOGICAL HISTORY:     Diagnosis:  - thrombocytosis    Current Treatment:   - hydroxyurea and aspirin    Subjective:       Chief Complaint: Evaluation for elevated platelet count      HPI    Bharathi aPrsons  83 y.o.  male with past medical history significant for HTN, DJD, esophageal stricture, CKD III referred for evaluation of thrombocytosis and was noted to have JAK2 positive     Denies anemia, flushing, pruritus, unexplained fever, sweats, or weight loss. Denies thrombosis and splenectomy.    Interval History:       Oncology History    No history exists.       Past Medical History:   Diagnosis Date    DJD (degenerative joint disease)     Esophageal stricture     Hypertension       Past Surgical History:   Procedure Laterality Date    APPENDECTOMY      as child    CATARACT EXTRACTION W/  INTRAOCULAR LENS IMPLANT  OD before 2003    CATARACT EXTRACTION W/  INTRAOCULAR LENS IMPLANT  OS 12/19/07 with [facility]AG    ESOPHAGEAL DILATION      x 2 in past last one done in  2022    FOOT ARTHRODESIS      medial column arthrodesis left foot  2011     Social History     Socioeconomic History    Marital status:    Tobacco Use    Smoking status: Never     Passive exposure: Never    Smokeless tobacco: Never   Substance and Sexual Activity    Alcohol use: Yes     Comment: Ocasionally    Drug use: No    Sexual activity: Not Currently     Partners: Female   Social History Narrative    , No smokers in household, 1 Dog.     Social Determinants of Health     Financial Resource Strain: Low Risk  (1/5/2023)    Overall Financial Resource Strain (CARDIA)     Difficulty of Paying Living Expenses: Not hard at all   Food Insecurity: No Food Insecurity (12/11/2023)    Hunger Vital Sign     Worried About Running Out of Food in the Last Year: Never true     Ran Out of Food in the Last Year: Never true   Transportation Needs: No Transportation Needs (12/11/2023)    PRAPARE - Transportation      Lack of Transportation (Medical): No     Lack of Transportation (Non-Medical): No   Physical Activity: Sufficiently Active (12/11/2023)    Exercise Vital Sign     Days of Exercise per Week: 7 days     Minutes of Exercise per Session: 30 min   Stress: No Stress Concern Present (12/11/2023)    [employer] - Occupational Stress Questionnaire     Feeling of Stress : Only a little   Social Connections: Socially Integrated (12/11/2023)    Social Connection and Isolation Panel [NHANES]     Frequency of Communication with Friends and Family: Once a week     Frequency of Social Gatherings with Friends and Family: More than three times a week     Attends Religion Services: More than 4 times per year     Active Member of Clubs or Organizations: No     Attends Club or Organization Meetings: More than 4 times per year     Marital Status:    Housing Stability: Low Risk  (12/11/2023)    Housing Stability Vital Sign     Unable to Pay for Housing in the Last Year: No     Number of Places Lived in the Last Year: 1     Unstable Housing in the Last Year: No      Family History   Problem Relation Age of Onset    Glaucoma Maternal Uncle     Cataracts Mother     Heart disease Father           Review of patient's allergies indicates:  No Known Allergies   Review of Systems   Constitutional: Negative.  Negative for activity change, chills, fatigue and fever.   HENT: Negative.     Eyes: Negative.    Respiratory:  Negative for cough and shortness of breath.    Cardiovascular:  Negative for chest pain and leg swelling.   Gastrointestinal:  Negative for constipation, diarrhea, nausea and vomiting.   Endocrine: Negative.    Genitourinary: Negative.    Musculoskeletal:  Negative for arthralgias and myalgias.   Integumentary:  Negative.   Allergic/Immunologic: Negative.    Neurological:  Negative for light-headedness, numbness and headaches.   Hematological: Negative.    Psychiatric/Behavioral: Negative.            Objective:        Vitals:    12/14/23 0912   BP: 138/84   Pulse: 89   Resp: 20   Temp: 97.7 °F (36.5 °C)        Physical Exam  Constitutional:       General: He is not in acute distress.     Appearance: He is well-developed. He is not ill-appearing.   HENT:      Head: Normocephalic and atraumatic.      Mouth/Throat:      Mouth: Mucous membranes are moist.   Eyes:      Extraocular Movements: Extraocular movements intact.   Cardiovascular:      Rate and Rhythm: Normal rate and regular rhythm.   Pulmonary:      Effort: Pulmonary effort is normal. No respiratory distress.      Breath sounds: Normal breath sounds. No wheezing.   Abdominal:      General: There is no distension.      Palpations: Abdomen is soft.      Tenderness: There is no abdominal tenderness.   Musculoskeletal:         General: Normal range of motion.      Cervical back: Normal range of motion and neck supple.   Skin:     General: Skin is warm.   Neurological:      Mental Status: He is alert and oriented to person, place, and time.      Motor: No weakness.   Psychiatric:         Mood and Affect: Mood normal.         Behavior: Behavior normal.         Thought Content: Thought content normal.           LABS / IMAGING      - Reviewed       Assessment:     ECOG SCORE    1 - Restricted in strenuous activity-ambulatory and able to carry out work of a light nature       THROMBOCYTOSIS  - 12/01/2023 JAK2 V617F mutated DNA was detected and measured at 30% of total JAK2 DNA.   - denies splenectomy     Plan:       - noted thrombocytosis over the last few months and further workup was positive for JAK2 V617F mutation which could be most likely because of essential thrombocythemia.  Bone marrow biopsy requested to rule out for myelofibrosis or any other myeloproliferative neoplasm.  - based on elevated platelet counts and with age more than 60 and JAK2 positive, high-risk thus will start the patient on hydroxyurea and aspirin  - check for hepatitis profile /  HIV and also closely monitor the CBC   - MD / LABS VISIT - 2 WEEKS         Med Onc Chart Routing      Follow up with physician 2 weeks. Follow up after being started on hydroxyurea   Follow up with PRACHI    Infusion scheduling note    Injection scheduling note    Labs CBC, CMP and LDH   Scheduling:  Preferred lab: uric acid  Lab interval:  Labs 1 day before next visit   Imaging    Pharmacy appointment    Other referrals         Bone marrow biopsy               The patient was seen, interviewed and examined. Pertinent lab and radiology studies were reviewed. Pt instructed to call should develop concerning signs/symptoms or have further questions.       Portions of the record may have been created with voice recognition software. Occasional wrong-word or sound-a-like substitutions may have occurred due to the inherent limitations of voice recognition software. Read the chart carefully and recognize, using context, where substitutions have occurred.    Beryl Fernandez MD  Hematology / Oncology

## 2023-12-14 ENCOUNTER — OFFICE VISIT (OUTPATIENT)
Dept: HEMATOLOGY/ONCOLOGY | Facility: CLINIC | Age: 83
End: 2023-12-14
Payer: MEDICARE

## 2023-12-14 ENCOUNTER — LAB VISIT (OUTPATIENT)
Dept: LAB | Facility: HOSPITAL | Age: 83
End: 2023-12-14
Attending: INTERNAL MEDICINE
Payer: MEDICARE

## 2023-12-14 ENCOUNTER — TELEPHONE (OUTPATIENT)
Dept: ALLERGY | Facility: CLINIC | Age: 83
End: 2023-12-14
Payer: MEDICARE

## 2023-12-14 VITALS
HEART RATE: 89 BPM | RESPIRATION RATE: 20 BRPM | BODY MASS INDEX: 23.45 KG/M2 | DIASTOLIC BLOOD PRESSURE: 84 MMHG | OXYGEN SATURATION: 98 % | HEIGHT: 70 IN | WEIGHT: 163.81 LBS | TEMPERATURE: 98 F | SYSTOLIC BLOOD PRESSURE: 138 MMHG

## 2023-12-14 DIAGNOSIS — R53.83 OTHER FATIGUE: ICD-10-CM

## 2023-12-14 DIAGNOSIS — Z15.89 GENETIC SUSCEPTIBILITY TO OTHER DISEASE: ICD-10-CM

## 2023-12-14 DIAGNOSIS — D75.839 THROMBOCYTOSIS: ICD-10-CM

## 2023-12-14 DIAGNOSIS — D75.839 THROMBOCYTOSIS: Primary | ICD-10-CM

## 2023-12-14 LAB
ALBUMIN SERPL BCP-MCNC: 3.7 G/DL (ref 3.5–5.2)
ALP SERPL-CCNC: 75 U/L (ref 55–135)
ALT SERPL W/O P-5'-P-CCNC: 18 U/L (ref 10–44)
ANION GAP SERPL CALC-SCNC: 10 MMOL/L (ref 8–16)
AST SERPL-CCNC: 35 U/L (ref 10–40)
BASOPHILS # BLD AUTO: 0.08 K/UL (ref 0–0.2)
BASOPHILS NFR BLD: 0.5 % (ref 0–1.9)
BILIRUB SERPL-MCNC: 0.3 MG/DL (ref 0.1–1)
BUN SERPL-MCNC: 32 MG/DL (ref 8–23)
CALCIUM SERPL-MCNC: 9.4 MG/DL (ref 8.7–10.5)
CHLORIDE SERPL-SCNC: 108 MMOL/L (ref 95–110)
CO2 SERPL-SCNC: 20 MMOL/L (ref 23–29)
CREAT SERPL-MCNC: 1.4 MG/DL (ref 0.5–1.4)
DIFFERENTIAL METHOD: ABNORMAL
EOSINOPHIL # BLD AUTO: 0.7 K/UL (ref 0–0.5)
EOSINOPHIL NFR BLD: 4.2 % (ref 0–8)
ERYTHROCYTE [DISTWIDTH] IN BLOOD BY AUTOMATED COUNT: 15.7 % (ref 11.5–14.5)
EST. GFR  (NO RACE VARIABLE): 49 ML/MIN/1.73 M^2
GLUCOSE SERPL-MCNC: 89 MG/DL (ref 70–110)
HAV IGM SERPL QL IA: NORMAL
HBV CORE AB SERPL QL IA: NORMAL
HBV CORE IGM SERPL QL IA: NORMAL
HBV SURFACE AG SERPL QL IA: NORMAL
HCT VFR BLD AUTO: 45.2 % (ref 40–54)
HCV AB SERPL QL IA: NORMAL
HGB BLD-MCNC: 14.8 G/DL (ref 14–18)
HIV 1+2 AB+HIV1 P24 AG SERPL QL IA: NORMAL
IMM GRANULOCYTES # BLD AUTO: 0.14 K/UL (ref 0–0.04)
IMM GRANULOCYTES NFR BLD AUTO: 0.9 % (ref 0–0.5)
LDH SERPL L TO P-CCNC: 215 U/L (ref 110–260)
LYMPHOCYTES # BLD AUTO: 1.4 K/UL (ref 1–4.8)
LYMPHOCYTES NFR BLD: 8.3 % (ref 18–48)
MCH RBC QN AUTO: 27 PG (ref 27–31)
MCHC RBC AUTO-ENTMCNC: 32.7 G/DL (ref 32–36)
MCV RBC AUTO: 82 FL (ref 82–98)
MONOCYTES # BLD AUTO: 0.7 K/UL (ref 0.3–1)
MONOCYTES NFR BLD: 4.4 % (ref 4–15)
NEUTROPHILS # BLD AUTO: 13.4 K/UL (ref 1.8–7.7)
NEUTROPHILS NFR BLD: 81.7 % (ref 38–73)
NRBC BLD-RTO: 0 /100 WBC
PLATELET # BLD AUTO: 555 K/UL (ref 150–450)
PMV BLD AUTO: 9.5 FL (ref 9.2–12.9)
POTASSIUM SERPL-SCNC: 4.6 MMOL/L (ref 3.5–5.1)
PROT SERPL-MCNC: 6.9 G/DL (ref 6–8.4)
RBC # BLD AUTO: 5.49 M/UL (ref 4.6–6.2)
SODIUM SERPL-SCNC: 138 MMOL/L (ref 136–145)
WBC # BLD AUTO: 16.41 K/UL (ref 3.9–12.7)

## 2023-12-14 PROCEDURE — 99214 OFFICE O/P EST MOD 30 MIN: CPT | Mod: S$PBB,,, | Performed by: INTERNAL MEDICINE

## 2023-12-14 PROCEDURE — 99999 PR PBB SHADOW E&M-EST. PATIENT-LVL IV: ICD-10-PCS | Mod: PBBFAC,,, | Performed by: INTERNAL MEDICINE

## 2023-12-14 PROCEDURE — 36415 COLL VENOUS BLD VENIPUNCTURE: CPT | Performed by: INTERNAL MEDICINE

## 2023-12-14 PROCEDURE — 80053 COMPREHEN METABOLIC PANEL: CPT | Performed by: INTERNAL MEDICINE

## 2023-12-14 PROCEDURE — 80074 ACUTE HEPATITIS PANEL: CPT | Performed by: INTERNAL MEDICINE

## 2023-12-14 PROCEDURE — 99214 OFFICE O/P EST MOD 30 MIN: CPT | Mod: PBBFAC | Performed by: INTERNAL MEDICINE

## 2023-12-14 PROCEDURE — 86704 HEP B CORE ANTIBODY TOTAL: CPT | Performed by: INTERNAL MEDICINE

## 2023-12-14 PROCEDURE — 85025 COMPLETE CBC W/AUTO DIFF WBC: CPT | Performed by: INTERNAL MEDICINE

## 2023-12-14 PROCEDURE — 83615 LACTATE (LD) (LDH) ENZYME: CPT | Performed by: INTERNAL MEDICINE

## 2023-12-14 PROCEDURE — 87389 HIV-1 AG W/HIV-1&-2 AB AG IA: CPT | Performed by: INTERNAL MEDICINE

## 2023-12-14 PROCEDURE — 99999 PR PBB SHADOW E&M-EST. PATIENT-LVL IV: CPT | Mod: PBBFAC,,, | Performed by: INTERNAL MEDICINE

## 2023-12-14 PROCEDURE — 99214 PR OFFICE/OUTPT VISIT, EST, LEVL IV, 30-39 MIN: ICD-10-PCS | Mod: S$PBB,,, | Performed by: INTERNAL MEDICINE

## 2023-12-14 RX ORDER — HYDROXYUREA 500 MG/1
500 CAPSULE ORAL EVERY OTHER DAY
Qty: 30 CAPSULE | Refills: 1 | Status: SHIPPED | OUTPATIENT
Start: 2023-12-14 | End: 2023-12-28 | Stop reason: SDUPTHER

## 2023-12-14 RX ORDER — ASPIRIN 81 MG/1
81 TABLET ORAL DAILY
Qty: 100 TABLET | Refills: 1 | Status: SHIPPED | OUTPATIENT
Start: 2023-12-14 | End: 2024-12-13

## 2023-12-14 NOTE — TELEPHONE ENCOUNTER
----- Message from Sheba Corrales MA sent at 12/13/2023  4:11 PM CST -----  Contact: [phone]    ----- Message -----  From: Lydia James  Sent: 12/13/2023   4:04 PM CST  To: Sheba Corrales MA    Good Afternoon, patient was returning a call to Elizabeth Beck regarding scheduling an referral appt.   ----- Message -----  From: Sheba Corrales MA  Sent: 12/13/2023   3:51 PM CST  To: Lydia James    This is not our patient. I believe it was sent to wrong dept.  Thanks  ----- Message -----  From: Lydia James  Sent: 12/13/2023   2:16 PM CST  To: [facility] All[facility] Clinical Staff    Type:  Patient Returning Call    Who Called:Bharathi   Who Left Message for Patient:nurse   Does the patient know what this is regarding?:yes   Would the patient rather a call back or a response via [facility]? Call back   Best Call Back Number:[phone]   Additional Information: n/a      Thanks KB

## 2023-12-22 ENCOUNTER — LAB VISIT (OUTPATIENT)
Dept: LAB | Facility: HOSPITAL | Age: 83
End: 2023-12-22
Attending: PEDIATRICS
Payer: MEDICARE

## 2023-12-22 ENCOUNTER — OFFICE VISIT (OUTPATIENT)
Dept: ALLERGY | Facility: CLINIC | Age: 83
End: 2023-12-22
Payer: MEDICARE

## 2023-12-22 VITALS
TEMPERATURE: 98 F | DIASTOLIC BLOOD PRESSURE: 94 MMHG | BODY MASS INDEX: 23.45 KG/M2 | WEIGHT: 163.81 LBS | HEIGHT: 70 IN | HEART RATE: 89 BPM | SYSTOLIC BLOOD PRESSURE: 157 MMHG

## 2023-12-22 DIAGNOSIS — R09.82 POST-NASAL DRIP: Primary | ICD-10-CM

## 2023-12-22 DIAGNOSIS — K21.9 GASTROESOPHAGEAL REFLUX DISEASE, UNSPECIFIED WHETHER ESOPHAGITIS PRESENT: ICD-10-CM

## 2023-12-22 DIAGNOSIS — T78.40XA ALLERGY, INITIAL ENCOUNTER: ICD-10-CM

## 2023-12-22 DIAGNOSIS — D72.10 ALLERGIC EOSINOPHILIA: ICD-10-CM

## 2023-12-22 DIAGNOSIS — J31.0 GUSTATORY RHINITIS: ICD-10-CM

## 2023-12-22 LAB
BASOPHILS # BLD AUTO: 0.11 K/UL (ref 0–0.2)
BASOPHILS NFR BLD: 0.6 % (ref 0–1.9)
DIFFERENTIAL METHOD: ABNORMAL
DNA/RNA EXTRACT AND HOLD RESULT: NORMAL
DNA/RNA EXTRACTION: NORMAL
EOSINOPHIL # BLD AUTO: 1.2 K/UL (ref 0–0.5)
EOSINOPHIL NFR BLD: 6.6 % (ref 0–8)
ERYTHROCYTE [DISTWIDTH] IN BLOOD BY AUTOMATED COUNT: 15.7 % (ref 11.5–14.5)
EXHR SPECIMEN TYPE: NORMAL
HCT VFR BLD AUTO: 47.6 % (ref 40–54)
HGB BLD-MCNC: 15 G/DL (ref 14–18)
IGE SERPL-ACNC: 60 IU/ML (ref 0–100)
IMM GRANULOCYTES # BLD AUTO: 0.12 K/UL (ref 0–0.04)
IMM GRANULOCYTES NFR BLD AUTO: 0.6 % (ref 0–0.5)
LYMPHOCYTES # BLD AUTO: 1.5 K/UL (ref 1–4.8)
LYMPHOCYTES NFR BLD: 8.1 % (ref 18–48)
MCH RBC QN AUTO: 26.3 PG (ref 27–31)
MCHC RBC AUTO-ENTMCNC: 31.5 G/DL (ref 32–36)
MCV RBC AUTO: 83 FL (ref 82–98)
MONOCYTES # BLD AUTO: 0.6 K/UL (ref 0.3–1)
MONOCYTES NFR BLD: 3.3 % (ref 4–15)
NEUTROPHILS # BLD AUTO: 15.2 K/UL (ref 1.8–7.7)
NEUTROPHILS NFR BLD: 80.8 % (ref 38–73)
NRBC BLD-RTO: 0 /100 WBC
PLATELET # BLD AUTO: 817 K/UL (ref 150–450)
PMV BLD AUTO: 9.8 FL (ref 9.2–12.9)
RBC # BLD AUTO: 5.71 M/UL (ref 4.6–6.2)
WBC # BLD AUTO: 18.85 K/UL (ref 3.9–12.7)

## 2023-12-22 PROCEDURE — 86003 ALLG SPEC IGE CRUDE XTRC EA: CPT | Mod: 59 | Performed by: ALLERGY & IMMUNOLOGY

## 2023-12-22 PROCEDURE — 36415 COLL VENOUS BLD VENIPUNCTURE: CPT | Performed by: ALLERGY & IMMUNOLOGY

## 2023-12-22 PROCEDURE — 99999 PR PBB SHADOW E&M-EST. PATIENT-LVL IV: ICD-10-PCS | Mod: PBBFAC,,, | Performed by: ALLERGY & IMMUNOLOGY

## 2023-12-22 PROCEDURE — 99214 OFFICE O/P EST MOD 30 MIN: CPT | Mod: PBBFAC | Performed by: ALLERGY & IMMUNOLOGY

## 2023-12-22 PROCEDURE — 86003 ALLG SPEC IGE CRUDE XTRC EA: CPT | Performed by: ALLERGY & IMMUNOLOGY

## 2023-12-22 PROCEDURE — 99204 OFFICE O/P NEW MOD 45 MIN: CPT | Mod: S$PBB,,, | Performed by: ALLERGY & IMMUNOLOGY

## 2023-12-22 PROCEDURE — 82785 ASSAY OF IGE: CPT | Performed by: ALLERGY & IMMUNOLOGY

## 2023-12-22 PROCEDURE — 99999 PR PBB SHADOW E&M-EST. PATIENT-LVL IV: CPT | Mod: PBBFAC,,, | Performed by: ALLERGY & IMMUNOLOGY

## 2023-12-22 PROCEDURE — 99204 PR OFFICE/OUTPT VISIT, NEW, LEVL IV, 45-59 MIN: ICD-10-PCS | Mod: S$PBB,,, | Performed by: ALLERGY & IMMUNOLOGY

## 2023-12-22 PROCEDURE — 85025 COMPLETE CBC W/AUTO DIFF WBC: CPT | Performed by: ALLERGY & IMMUNOLOGY

## 2023-12-22 NOTE — PROGRESS NOTES
"Subjective:      Patient ID: Bharathi Parsons is a 83 y.o. male.    Chief Complaint:  Allergies      HPI:    12/22/2023: 83 year old complaining of a runny nose with eating. He reports when he goes to bed in the evening, he has a runny nose/post nasal drip. He used Sinex and it worked for a while until he had a "reaction". He has significant rhinitis. He now avoids it.  He is taking Atrovent nasal spray currently.  He takes Atrovent at night and it helps "a little bit".  Some times travel improves nasal symptoms and at times, it does not.    She denies itchy or watery eyes.    He denies food allergies.  He denies drug or insect sting allergy.  He denies asthma.    He reports intermittent GERD.    Zyrtec at night    Past Medical History:   Diagnosis Date    DJD (degenerative joint disease)     Esophageal stricture     Hypertension         Family History   Problem Relation Age of Onset    Glaucoma Maternal Uncle     Cataracts Mother     Heart disease Father         Current Outpatient Medications on File Prior to Visit   Medication Sig Dispense Refill    aspirin (ECOTRIN) 81 MG EC tablet Take 1 tablet (81 mg total) by mouth once daily. 100 tablet 1    bempedoic acid-ezetimibe (NEXLIZET) 180-10 mg Tab Take 1 tablet by mouth Daily. 30 tablet 12    cetirizine (ZYRTEC) 10 MG tablet       COVID-19 vac, jose,Pfizer,,PF, (PFIZER COVID-19 JOSE VACCN,PF,) 30 mcg/0.3 mL injection Inject into the muscle. 0.3 mL 0    hydroxyurea (HYDREA) 500 mg Cap Take 1 capsule (500 mg total) by mouth every other day. 30 capsule 1    IBUPROFEN ORAL Take by mouth as needed.       lisinopriL 10 MG tablet Take 1 tablet by mouth once daily 90 tablet 3    LIVALO 4 mg Tab Take 1 tablet by mouth.      pantoprazole (PROTONIX) 40 MG tablet TAKE 1 TABLET BY MOUTH ONCE DAILY BEFORE MEAL(S)      triamcinolone acetonide 0.1% (KENALOG) 0.1 % cream Apply topically 2 (two) times daily. 80 g 1    [DISCONTINUED] ipratropium (ATROVENT) 42 mcg (0.06 %) nasal spray 2 " sprays by Each Nostril route 3 (three) times daily.       No current facility-administered medications on file prior to visit.        Review of patient's allergies indicates:  No Known Allergies     Environmental History: Pets in the home: dogs (1).  Tobacco Smoke in Home: no  Review of Systems   Constitutional:  Negative for chills and fever.   HENT:  Positive for postnasal drip and rhinorrhea.    Eyes:  Negative for discharge and itching.   Allergic/Immunologic: Positive for environmental allergies. Negative for food allergies and immunocompromised state.   Neurological:  Negative for facial asymmetry and speech difficulty.   Psychiatric/Behavioral:  Negative for behavioral problems and suicidal ideas.        Objective:   Physical Exam  Constitutional:       General: He is not in acute distress.     Appearance: Normal appearance. He is not ill-appearing, toxic-appearing or diaphoretic.   HENT:      Head: Normocephalic and atraumatic.      Right Ear: Tympanic membrane, ear canal and external ear normal. There is no impacted cerumen.      Left Ear: Tympanic membrane, ear canal and external ear normal. There is no impacted cerumen.      Nose: Nose normal. No congestion or rhinorrhea.      Mouth/Throat:      Mouth: Mucous membranes are moist.      Pharynx: No oropharyngeal exudate or posterior oropharyngeal erythema.   Eyes:      General: No scleral icterus.        Right eye: No discharge.         Left eye: No discharge.   Neck:      Thyroid: No thyromegaly.   Cardiovascular:      Rate and Rhythm: Normal rate and regular rhythm.      Heart sounds: Normal heart sounds. No murmur heard.     No friction rub. No gallop.   Pulmonary:      Effort: Pulmonary effort is normal. No respiratory distress.      Breath sounds: Normal breath sounds. No stridor. No wheezing or rales.   Musculoskeletal:         General: Normal range of motion.      Cervical back: Normal range of motion and neck supple. No rigidity or tenderness.    Lymphadenopathy:      Cervical: No cervical adenopathy.   Skin:     General: Skin is warm.      Coloration: Skin is not jaundiced or pale.      Findings: No bruising, erythema or rash.   Neurological:      Mental Status: He is alert and oriented to person, place, and time.      Gait: Gait normal.   Psychiatric:         Mood and Affect: Mood normal.         Behavior: Behavior normal.         Thought Content: Thought content normal.         Judgment: Judgment normal.                               Component Ref Range & Units 8 d ago  (12/14/23) 3 wk ago  (12/1/23) 2 mo ago  (9/28/23) 3 mo ago  (9/15/23) 3 mo ago  (9/8/23) 5 yr ago  (5/17/18)   WBC 3.90 - 12.70 K/uL 16.41 High  12.53 16.37 High  15.03 High  14.59 High  7.02   RBC 4.60 - 6.20 M/uL 5.49 5.53 5.60 5.74 5.39 5.02   Hemoglobin 14.0 - 18.0 g/dL 14.8 14.9 14.9 15.2 14.7 14.4   Hematocrit 40.0 - 54.0 % 45.2 45.8 48.6 46.1 46.5 44.9   MCV 82 - 98 fL 82 83 87 80 Low  86 89   MCH 27.0 - 31.0 pg 27.0 26.9 Low  26.6 Low  26.5 Low  27.3 28.7   MCHC 32.0 - 36.0 g/dL 32.7 32.5 30.7 Low  33.0 31.6 Low  32.1   RDW 11.5 - 14.5 % 15.7 High  15.6 High  16.6 High  16.0 High  17.0 High  15.0 High    Platelets 150 - 450 K/uL 555 High  536 High  680 High  638 High  652 High  393 High  R   MPV 9.2 - 12.9 fL 9.5 9.6 10.1 9.8 10.1 9.7   Immature Granulocytes 0.0 - 0.5 % 0.9 High  0.8 High  0.6 High  0.6 High  0.8 High  0.3   Gran # (ANC) 1.8 - 7.7 K/uL 13.4 High  10.1 High  13.1 High  11.5 High  10.6 High  5.1   Immature Grans (Abs) 0.00 - 0.04 K/uL 0.14 High  0.10 High  CM 0.10 High  CM 0.09 High  CM 0.11 High  CM 0.02 CM   Comment: Mild elevation in immature granulocytes is non specific and  can be seen in a variety of conditions including stress response,  acute inflammation, trauma and pregnancy. Correlation with other  laboratory and clinical findings is essential.   Lymph # 1.0 - 4.8 K/uL 1.4 1.4 1.4 1.4 1.4 1.2   Mono # 0.3 - 1.0 K/uL 0.7 0.4 0.7 0.7 0.7 0.5   Eos # 0.0 -  0.5 K/uL 0.7 High  0.5 1.1 High  1.3 High  1.7 High  0.2   Baso # 0.00 - 0.20 K/uL 0.08 0.06 0.06 0.06 0.08 0.03   nRBC 0 /100 WBC 0 0 0 0 0 0   Gran % 38.0 - 73.0 % 81.7 High  80.9 High  79.8 High  76.8 High  72.4 72.1   Lymph % 18.0 - 48.0 % 8.3 Low  10.8 Low  8.4 Low  9.0 Low  9.8 Low  17.4 Low    Mono % 4.0 - 15.0 % 4.4 3.2 Low  4.0 4.5 4.6 6.7   Eosinophil % 0.0 - 8.0 % 4.2 3.8 6.8 8.7 High  11.9 High  3.1   Basophil % 0.0 - 1.9 % 0.5 0.5 0.4 0.4 0.5 0.4   Differential Method  Automated Automated Automated Automated Automated Automated   Resulting Agency  [facility]                     Unable to skin prick test, as she takes an antihistamine nightly    Assessment:      1. Post-nasal drip    2. Allergy, initial encounter    3. Allergic eosinophilia    4. Gastroesophageal reflux disease, unspecified whether esophagitis present    5. Gustatory rhinitis        Plan:     Post-nasal drip    Allergy, initial encounter  -     Ambulatory referral/consult to Allergy  -     Allergen, Pecan Tree IgE; Future; Expected date: 12/22/2023  -     [place], black IgE; Future; Expected date: 12/22/2023  -     [place], bald IgE; Future; Expected date: 12/22/2023  -     Oak, white IgE; Future; Expected date: 12/22/2023  -     Allergen, Cocklebur; Future; Expected date: 12/22/2023  -     Cat epithelium IgE; Future; Expected date: 12/22/2023  -     RAST Allergen for [place]; Future; Expected date: 12/22/2023  -     RAST Allergen Maple ([place]); Future; Expected date: 12/22/2023  -     Allergen, Meadow Grass ([place]); Future; Expected date: 12/22/2023  -     Allergen-Silver Birch; Future; Expected date: 12/22/2023  -     RAST Allergen [place]; Future; Expected date: 12/22/2023  -     RAST Allergen, Sheep [place]); Future; Expected date: 12/22/2023  -     Allergen, Hackberry Celtis; Future; Expected date: 12/22/2023  -     Allergen, Elm Cedar; Future; Expected date: 12/22/2023  -      Allergen-[place]; Future; Expected date: 12/22/2023  -     Allergen-Alternaria Alternata; Future; Expected date: 12/22/2023  -     Allergen-Maple [place]; Future; Expected date: 12/22/2023  -     Allergen, White [place]; Future; Expected date: 12/22/2023  -     IgE; Future; Expected date: 12/22/2023  -     [place] grass IgE; Future; Expected date: 12/22/2023  -     Aspergillus fumagatus IgE; Future; Expected date: 12/22/2023  -     Chaetomium globosum IgE; Future; Expected date: 12/22/2023  -     Cockroach, American IgE; Future; Expected date: 12/22/2023  -     Cladosporium IgE; Future; Expected date: 12/22/2023  -     Curvularia lunata IgE; Future; Expected date: 12/22/2023  -     D. farinae IgE; Future; Expected date: 12/22/2023  -     D. pteronyssinus IgE; Future; Expected date: 12/22/2023  -     Dog dander IgE; Future; Expected date: 12/22/2023  -     Plantain, English IgE; Future; Expected date: 12/22/2023  -     Eucalyptus IgE; Future; Expected date: 12/22/2023  -     [place] elder, rough IgE; Future; Expected date: 12/22/2023  -     Mugwort IgE; Future; Expected date: 12/22/2023  -     Nettle IgE; Future; Expected date: 12/22/2023  -     Orchard grass IgE; Future; Expected date: 12/22/2023  -     [place], western white IgE; Future; Expected date: 12/22/2023  -     Privet, common IgE; Future; Expected date: 12/22/2023  -     Ragweed, short, common IgE; Future; Expected date: 12/22/2023  -     Red top grass IgE; Future; Expected date: 12/22/2023  -     Rye grass, cultivated IgE; Future; Expected date: 12/22/2023  -     Thistle, Russian IgE; Future; Expected date: 12/22/2023  -     Stemphyllium IgE; Future; Expected date: 12/22/2023  -     Gary IgE; Future; Expected date: 12/22/2023  -     Kamari grass IgE; Future; Expected date: 12/22/2023  -     CBC Auto Differential; Future; Expected date: 12/22/2023    Allergic eosinophilia  -     Ambulatory referral/consult to Allergy  -     Allergen, Pecan Tree IgE;  Future; Expected date: 12/22/2023  -     [place], black IgE; Future; Expected date: 12/22/2023  -     [place], bald IgE; Future; Expected date: 12/22/2023  -     Oak, white IgE; Future; Expected date: 12/22/2023  -     Allergen, Cocklebur; Future; Expected date: 12/22/2023  -     Cat epithelium IgE; Future; Expected date: 12/22/2023  -     RAST Allergen for [place]; Future; Expected date: 12/22/2023  -     RAST Allergen Maple ([place]); Future; Expected date: 12/22/2023  -     Allergen, Meadow Grass ([place]); Future; Expected date: 12/22/2023  -     Allergen-Silver Birch; Future; Expected date: 12/22/2023  -     RAST Allergen [place]; Future; Expected date: 12/22/2023  -     RAST Allergen, Sheep [place]); Future; Expected date: 12/22/2023  -     Allergen, Hackberry Celtis; Future; Expected date: 12/22/2023  -     Allergen, Elm Cedar; Future; Expected date: 12/22/2023  -     Allergen-[place]; Future; Expected date: 12/22/2023  -     Allergen-Alternaria Alternata; Future; Expected date: 12/22/2023  -     Allergen-Maple [place]; Future; Expected date: 12/22/2023  -     Allergen, White [place]; Future; Expected date: 12/22/2023  -     IgE; Future; Expected date: 12/22/2023  -     Bahia grass IgE; Future; Expected date: 12/22/2023  -     Aspergillus fumagatus IgE; Future; Expected date: 12/22/2023  -     Chaetomium globosum IgE; Future; Expected date: 12/22/2023  -     Cockroach, American IgE; Future; Expected date: 12/22/2023  -     Cladosporium IgE; Future; Expected date: 12/22/2023  -     Curvularia lunata IgE; Future; Expected date: 12/22/2023  -     D. farinae IgE; Future; Expected date: 12/22/2023  -     D. pteronyssinus IgE; Future; Expected date: 12/22/2023  -     Dog dander IgE; Future; Expected date: 12/22/2023  -     Plantain, English IgE; Future; Expected date: 12/22/2023  -     Eucalyptus IgE; Future; Expected date: 12/22/2023  -     [place] elder, rough IgE; Future; Expected  date: 12/22/2023  -     Mugwort IgE; Future; Expected date: 12/22/2023  -     Nettle IgE; Future; Expected date: 12/22/2023  -     Orchard grass IgE; Future; Expected date: 12/22/2023  -     [place], western white IgE; Future; Expected date: 12/22/2023  -     Privet, common IgE; Future; Expected date: 12/22/2023  -     Ragweed, short, common IgE; Future; Expected date: 12/22/2023  -     Red top grass IgE; Future; Expected date: 12/22/2023  -     Rye grass, cultivated IgE; Future; Expected date: 12/22/2023  -     Thistle, Russian IgE; Future; Expected date: 12/22/2023  -     Stemphyllium IgE; Future; Expected date: 12/22/2023  -     Gary IgE; Future; Expected date: 12/22/2023  -     Kamari grass IgE; Future; Expected date: 12/22/2023    Gastroesophageal reflux disease, unspecified whether esophagitis present    Gustatory rhinitis     Recommend he take Pantoprazole on an empty stomach with no food for thirty minutes. Recommend he take Pantoprazole for a month.  Suspect post nasal drip is due to poorly controlled GERD.  Discussed diet and lifestyle changes to prevent GERD.  Information given on AVS      Atrovent nasal spray 15 minutes prior to eating.    CBC ordered. Seeing Heme/onc - plans for bone marrow biopsy soon.    RTC 2-4 weeks or sooner, if needed     MD,PREM                  Problems Address                                                 Amount and/or Complexity                                                                      Risk       3           [] 2 or more self-limited or minor problems                      [] Limited                                                                        [] Low                  [] 1 stable chronic illness                                                  Any combination of the two                                               OTC drugs                  []Acute, uncomplicated illness or injury                            Review of prior external notes from unique  source           Minor surgery with no risk factors                                                                                                               [] 1 []2  []3+                                                                                                              Review of results from each unique test                                                                                                               [] 1 []2  [] 3+                                                                                                              Order of each unique test                                                                                                               [] 1 []2  [] 3+                                                                                                              Or                                                                                                             [] Assessment requiring an independent historian      4            [] One or more chronic illness with exacerbation,              [] Moderate                                                                      [x] Moderate                 Progression, or side effects of treatment                            -test documents or independent historians                        Prescription drug management                [x]  2 or more stable chronic illnesses                                    [] Independent interpretation of tests                              Minor surgery with identifiable risk                [] 1 undiagnosed new problem with uncertain prognosis    [x] Discussion or management of test results                    elective major surgery                [] 1 acute illness with                systemic symptoms                                                                                                                                                              [] 1 acute complicated injury                                                                                                                                           Elective major surgery                                                                                                                                                                                                                                                                                                                                                                                                  5            [] 1 or more chronic illnesses with severe exacerbation,     [] Extensive(two from below)                                         [] High                                                                                                               [] Independent interpretation of results                         Drug therapy requiring intensive                                                                                                               []Discussion of management or test interpretation           monitoring                                                                                                                                                                                                       Decision to de-escalate care                 [] 1 acute or chronic illness or injury that poses a threat                                                                                               Decision regarding hospitalization

## 2023-12-22 NOTE — PATIENT INSTRUCTIONS
-The following causes or exacerbate GERD/ heartburn/acid reflux:    Caffeine, chocolate, peppermints, alcohol, spicy foods, greasy foods, large meals, acidic foods (like tomatoes, lemon), and being sedentary after eating.      -If you are not diabetic or have health issues that prohibit a long fast, I recommend no food three to four hours before you lay down at night.     Books- The Acid Reflux Cookbook  Dropping Acid: The Reflux Diet   Recommend he take Pantoprazole on an empty stomach with no food for thirty minutes. Recommend he take Pantoprazole for a month.

## 2023-12-27 LAB
A ALTERNATA IGE QN: <0.1 KU/L
A FUMIGATUS IGE QN: <0.1 KU/L
ALLERGEN BOXELDER MAPLE TREE IGE: 0.54 KU/L
ALLERGEN CHAETOMIUM GLOBOSUM IGE: <0.1 KU/L
ALLERGEN MAPLE (BOX ELDER) CLASS: ABNORMAL
ALLERGEN MULBERRY CLASS: ABNORMAL
ALLERGEN MULBERRY TREE IGE: 0.11 KU/L
ALLERGEN WHITE ASH TREE IGE: 0.89 KU/L
AMER SYCAMORE IGE QN: 0.61 KU/L
BAHIA GRASS IGE QN: 0.4 KU/L
BALD CYPRESS IGE QN: 0.45 KU/L
C HERBARUM IGE QN: <0.1 KU/L
C LUNATA IGE QN: <0.1 KU/L
CAT DANDER IGE QN: <0.1 KU/L
CHAETOMIUM GLOB. CLASS: NORMAL
COCKLEBUR IGE QN: 0.22 KU/L
COCKSFOOT IGE QN: 0.34 KU/L
COMMON RAGWEED IGE QN: 0.22 KU/L
COTTONWOOD IGE QN: 0.75 KU/L
D FARINAE IGE QN: <0.1 KU/L
D PTERONYSS IGE QN: <0.1 KU/L
DEPRECATED A ALTERNATA IGE RAST QL: NORMAL
DEPRECATED A FUMIGATUS IGE RAST QL: NORMAL
DEPRECATED BAHIA GRASS IGE RAST QL: ABNORMAL
DEPRECATED BALD CYPRESS IGE RAST QL: ABNORMAL
DEPRECATED C HERBARUM IGE RAST QL: NORMAL
DEPRECATED C LUNATA IGE RAST QL: NORMAL
DEPRECATED CAT DANDER IGE RAST QL: NORMAL
DEPRECATED COCKLEBUR IGE RAST QL: ABNORMAL
DEPRECATED COCKSFOOT IGE RAST QL: ABNORMAL
DEPRECATED COMMON RAGWEED IGE RAST QL: ABNORMAL
DEPRECATED COTTONWOOD IGE RAST QL: ABNORMAL
DEPRECATED D FARINAE IGE RAST QL: NORMAL
DEPRECATED D PTERONYSS IGE RAST QL: NORMAL
DEPRECATED DOG DANDER IGE RAST QL: NORMAL
DEPRECATED ELDER IGE RAST QL: ABNORMAL
DEPRECATED ENGL PLANTAIN IGE RAST QL: ABNORMAL
DEPRECATED GUM-TREE IGE RAST QL: NORMAL
DEPRECATED HACKBERRY TREE IGE RAST QL: ABNORMAL
DEPRECATED JOHNSON GRASS IGE RAST QL: ABNORMAL
DEPRECATED KENT BLUE GRASS IGE RAST QL: ABNORMAL
DEPRECATED LONDON PLANE IGE RAST QL: ABNORMAL
DEPRECATED MUGWORT IGE RAST QL: ABNORMAL
DEPRECATED NETTLE IGE RAST QL: ABNORMAL
DEPRECATED PECAN/HICK TREE IGE RAST QL: ABNORMAL
DEPRECATED PER RYE GRASS IGE RAST QL: ABNORMAL
DEPRECATED PRIVET IGE RAST QL: ABNORMAL
DEPRECATED RED TOP GRASS IGE RAST QL: ABNORMAL
DEPRECATED ROACH IGE RAST QL: NORMAL
DEPRECATED SALTWORT IGE RAST QL: ABNORMAL
DEPRECATED SHEEP SORREL IGE RAST QL: ABNORMAL
DEPRECATED SILVER BIRCH IGE RAST QL: ABNORMAL
DEPRECATED WHITE OAK IGE RAST QL: ABNORMAL
DEPRECATED WILLOW IGE RAST QL: ABNORMAL
DOG DANDER IGE QN: <0.1 KU/L
ELDER IGE QN: 0.21 KU/L
ELM CEDAR CLASS: ABNORMAL
ELM CEDAR, IGE: 0.18 KU/L
ENGL PLANTAIN IGE QN: 0.12 KU/L
GUM-TREE IGE QN: 0.1 KU/L
HACKBERRY TREE IGE QN: 0.48 KU/L
JOHNSON GRASS IGE QN: 0.23 KU/L
KENT BLUE GRASS IGE QN: 0.45 KU/L
LONDON PLANE IGE QN: 0.71 KU/L
MUGWORT IGE QN: 0.18 KU/L
NETTLE IGE QN: 0.5 KU/L
PECAN/HICK TREE IGE QN: 0.39 KU/L
PER RYE GRASS IGE QN: 0.37 KU/L
PRIVET IGE QN: 0.28 KU/L
RED TOP GRASS IGE QN: 0.41 KU/L
ROACH IGE QN: <0.1 KU/L
SALTWORT IGE QN: 0.29 KU/L
SHEEP SORREL IGE QN: 0.12 KU/L
SILVER BIRCH IGE QN: 0.35 KU/L
STEMPHYLIUM HERBARUM CLASS: NORMAL
STEMPHYLLIUM, IGE: <0.1 KU/L
WHITE ASH CLASS: ABNORMAL
WHITE OAK IGE QN: 0.48 KU/L
WILLOW IGE QN: 0.45 KU/L

## 2023-12-27 NOTE — PROGRESS NOTES
HEMATOLOGY / ONCOLOGY   CLINIC NOTE       Established Patient - Telehealth Visit     The patient location is: home  The chief complaint leading to consultation is: follow up   Visit type: Virtual visit with synchronous audio and video    Face to Face time with patient: 35 minutes of total time spent on the encounter, which includes face to face time and non-face to face time preparing to see the patient (eg, review of tests), Obtaining and/or reviewing separately obtained history, Documenting clinical information in the electronic or other health record, Independently interpreting results (not separately reported) and communicating results to the patient/family/caregiver, or Care coordination (not separately reported).        The reason for the audio only service rather than synchronous audio and video virtual visit was related to technical difficulties or patient preference/necessity.     Each patient to whom I provide medical services by telemedicine is:  (1) informed of the relationship between the physician and patient and the respective role of any other health care provider with respect to management of the patient; and (2) notified that they may decline to receive medical services by telemedicine and may withdraw from such care at any time. Patient verbally consented to receive this service via voice-only telephone call.             This service was not originating from a related E/M service provided within the previous 7 days nor will  to an E/M service or procedure within the next 24 hours or my soonest available appointment.  Prevailing standard of care was able to be met in this audio-only visit.    ONCOLOGICAL HISTORY:     Diagnosis:  - thrombocytosis    Current Treatment:   - hydroxyurea and aspirin    Subjective:       Chief Complaint: Follow-up      HPI    Bharathi Parsons  83 y.o.  male with past medical history significant for HTN, DJD, esophageal stricture, CKD III referred for  evaluation of thrombocytosis and was noted to have JAK2 positive       Interval History:     Denies anemia, flushing, pruritus, unexplained fever, sweats, or weight loss. Denies thrombosis and splenectomy.  He was started on hydroxyurea and has been tolerating it well.  Denies any issues      Oncology History    No history exists.       Past Medical History:   Diagnosis Date    DJD (degenerative joint disease)     Esophageal stricture     Hypertension       Past Surgical History:   Procedure Laterality Date    APPENDECTOMY      as child    CATARACT EXTRACTION W/  INTRAOCULAR LENS IMPLANT  OD before 2003    CATARACT EXTRACTION W/  INTRAOCULAR LENS IMPLANT  OS 12/19/07 with YAG    ESOPHAGEAL DILATION      x 2 in past last one done in  2022    FOOT ARTHRODESIS      medial column arthrodesis left foot  2011     Social History     Socioeconomic History    Marital status:    Tobacco Use    Smoking status: Never     Passive exposure: Never    Smokeless tobacco: Never   Substance and Sexual Activity    Alcohol use: Yes     Comment: Ocasionally    Drug use: No    Sexual activity: Not Currently     Partners: Female   Social History Narrative    , No smokers in household, 1 Dog.     Social Determinants of Health     Financial Resource Strain: Low Risk  (1/5/2023)    Overall Financial Resource Strain (CARDIA)     Difficulty of Paying Living Expenses: Not hard at all   Food Insecurity: No Food Insecurity (12/11/2023)    Hunger Vital Sign     Worried About Running Out of Food in the Last Year: Never true     Ran Out of Food in the Last Year: Never true   Transportation Needs: No Transportation Needs (12/11/2023)    PRAPARE - Transportation     Lack of Transportation (Medical): No     Lack of Transportation (Non-Medical): No   Physical Activity: Sufficiently Active (12/11/2023)    Exercise Vital Sign     Days of Exercise per Week: 7 days     Minutes of Exercise per Session: 30 min   Stress: No Stress Concern Present  (12/11/2023)    [place] [employer] [employer] - Occupational Stress Questionnaire     Feeling of Stress : Only a little   Social Connections: Socially Integrated (12/11/2023)    Social Connection and Isolation Panel [NHANES]     Frequency of Communication with Friends and Family: Once a week     Frequency of Social Gatherings with Friends and Family: More than three times a week     Attends [employer] Services: More than 4 times per year     Active Member of Clubs or Organizations: No     Attends Club or Organization Meetings: More than 4 times per year     Marital Status:    Housing Stability: Low Risk  (12/11/2023)    Housing Stability Vital Sign     Unable to Pay for Housing in the Last Year: No     Number of Places Lived in the Last Year: 1     Unstable Housing in the Last Year: No      Family History   Problem Relation Age of Onset    Glaucoma Maternal Uncle     Cataracts Mother     Heart disease Father           Review of patient's allergies indicates:  No Known Allergies   Review of Systems   Constitutional: Negative.  Negative for activity change, chills, fatigue and fever.   HENT: Negative.     Eyes: Negative.    Respiratory:  Negative for cough and shortness of breath.    Cardiovascular:  Negative for chest pain and leg swelling.   Gastrointestinal:  Negative for constipation, diarrhea, nausea and vomiting.   Endocrine: Negative.    Genitourinary: Negative.    Musculoskeletal:  Negative for arthralgias and myalgias.   Integumentary:  Negative.   Allergic/Immunologic: Negative.    Neurological:  Negative for light-headedness, numbness and headaches.   Hematological: Negative.    Psychiatric/Behavioral: Negative.           Objective:        There were no vitals filed for this visit.       Physical Exam  Constitutional:       General: He is not in acute distress.     Appearance: He is well-developed. He is not ill-appearing.   HENT:      Head: Normocephalic and atraumatic.      Mouth/Throat:       Mouth: Mucous membranes are moist.   Eyes:      Extraocular Movements: Extraocular movements intact.   Cardiovascular:      Rate and Rhythm: Normal rate and regular rhythm.   Pulmonary:      Effort: Pulmonary effort is normal. No respiratory distress.      Breath sounds: Normal breath sounds. No wheezing.   Abdominal:      General: There is no distension.      Palpations: Abdomen is soft.      Tenderness: There is no abdominal tenderness.   Musculoskeletal:         General: Normal range of motion.      Cervical back: Normal range of motion and neck supple.   Skin:     General: Skin is warm.   Neurological:      Mental Status: He is alert and oriented to person, place, and time.      Motor: No weakness.   Psychiatric:         Mood and Affect: Mood normal.         Behavior: Behavior normal.         Thought Content: Thought content normal.           LABS / IMAGING      - Reviewed       Assessment:         THROMBOCYTOSIS  - 12/01/2023 JAK2 V617F mutated DNA was detected and measured at 30% of total JAK2 DNA.   - denies splenectomy   - negative for BCR-ABL    Plan:       - noted thrombocytosis over the last few months and further workup was positive for JAK2 V617F mutation which could be most likely because of essential thrombocythemia.  Bone marrow biopsy requested to rule out for myelofibrosis or any other myeloproliferative neoplasm   - Started hydroxyurea and aspirin, now the dose of hydroxyurea has been increased to 500 mg daily   - given Kayexalate for hyperkalemia, recommended to follow up with PCP immediately   - MD / LABS VISIT - 2 WEEKS         Med Onc Chart Routing      Follow up with physician 2 weeks.   Follow up with PRACHI    Infusion scheduling note    Injection scheduling note    Labs CBC, CMP and LDH   Scheduling:  Preferred lab: [facility]  Lab interval:  Labs before next visit   Imaging    Pharmacy appointment    Other referrals         Bone marrow biopsy needs to be scheduled               The  patient was seen, interviewed and examined. Pertinent lab and radiology studies were reviewed. Pt instructed to call should develop concerning signs/symptoms or have further questions.       Portions of the record may have been created with voice recognition software. Occasional wrong-word or sound-a-like substitutions may have occurred due to the inherent limitations of voice recognition software. Read the chart carefully and recognize, using context, where substitutions have occurred.    Beryl Fernandez MD  Hematology / Oncology

## 2023-12-28 ENCOUNTER — OFFICE VISIT (OUTPATIENT)
Dept: HEMATOLOGY/ONCOLOGY | Facility: CLINIC | Age: 83
End: 2023-12-28
Payer: MEDICARE

## 2023-12-28 ENCOUNTER — LAB VISIT (OUTPATIENT)
Dept: LAB | Facility: HOSPITAL | Age: 83
End: 2023-12-28
Attending: INTERNAL MEDICINE
Payer: MEDICARE

## 2023-12-28 DIAGNOSIS — D75.839 THROMBOCYTOSIS: ICD-10-CM

## 2023-12-28 DIAGNOSIS — E87.5 HYPERKALEMIA: Primary | ICD-10-CM

## 2023-12-28 LAB
ALBUMIN SERPL BCP-MCNC: 3.8 G/DL (ref 3.5–5.2)
ALP SERPL-CCNC: 78 U/L (ref 55–135)
ALT SERPL W/O P-5'-P-CCNC: 18 U/L (ref 10–44)
ANION GAP SERPL CALC-SCNC: 11 MMOL/L (ref 8–16)
AST SERPL-CCNC: 20 U/L (ref 10–40)
BASOPHILS # BLD AUTO: 0.07 K/UL (ref 0–0.2)
BASOPHILS NFR BLD: 0.4 % (ref 0–1.9)
BILIRUB SERPL-MCNC: 0.3 MG/DL (ref 0.1–1)
BUN SERPL-MCNC: 30 MG/DL (ref 8–23)
CALCIUM SERPL-MCNC: 9.5 MG/DL (ref 8.7–10.5)
CHLORIDE SERPL-SCNC: 106 MMOL/L (ref 95–110)
CO2 SERPL-SCNC: 24 MMOL/L (ref 23–29)
CREAT SERPL-MCNC: 1.4 MG/DL (ref 0.5–1.4)
DIFFERENTIAL METHOD BLD: ABNORMAL
EOSINOPHIL # BLD AUTO: 1.4 K/UL (ref 0–0.5)
EOSINOPHIL NFR BLD: 8 % (ref 0–8)
ERYTHROCYTE [DISTWIDTH] IN BLOOD BY AUTOMATED COUNT: 15.9 % (ref 11.5–14.5)
EST. GFR  (NO RACE VARIABLE): 49 ML/MIN/1.73 M^2
GLUCOSE SERPL-MCNC: 92 MG/DL (ref 70–110)
HCT VFR BLD AUTO: 45.3 % (ref 40–54)
HGB BLD-MCNC: 15 G/DL (ref 14–18)
IMM GRANULOCYTES # BLD AUTO: 0.1 K/UL (ref 0–0.04)
IMM GRANULOCYTES NFR BLD AUTO: 0.6 % (ref 0–0.5)
LDH SERPL L TO P-CCNC: 199 U/L (ref 110–260)
LYMPHOCYTES # BLD AUTO: 1.3 K/UL (ref 1–4.8)
LYMPHOCYTES NFR BLD: 7.8 % (ref 18–48)
MCH RBC QN AUTO: 27.8 PG (ref 27–31)
MCHC RBC AUTO-ENTMCNC: 33.1 G/DL (ref 32–36)
MCV RBC AUTO: 84 FL (ref 82–98)
MONOCYTES # BLD AUTO: 0.7 K/UL (ref 0.3–1)
MONOCYTES NFR BLD: 4.3 % (ref 4–15)
NEUTROPHILS # BLD AUTO: 13.5 K/UL (ref 1.8–7.7)
NEUTROPHILS NFR BLD: 78.9 % (ref 38–73)
NRBC BLD-RTO: 0 /100 WBC
PLATELET # BLD AUTO: 733 K/UL (ref 150–450)
PMV BLD AUTO: 9.2 FL (ref 9.2–12.9)
POTASSIUM SERPL-SCNC: 5.3 MMOL/L (ref 3.5–5.1)
PROT SERPL-MCNC: 6.7 G/DL (ref 6–8.4)
RBC # BLD AUTO: 5.4 M/UL (ref 4.6–6.2)
SODIUM SERPL-SCNC: 141 MMOL/L (ref 136–145)
WBC # BLD AUTO: 17.1 K/UL (ref 3.9–12.7)

## 2023-12-28 PROCEDURE — 83615 LACTATE (LD) (LDH) ENZYME: CPT | Performed by: INTERNAL MEDICINE

## 2023-12-28 PROCEDURE — 85025 COMPLETE CBC W/AUTO DIFF WBC: CPT | Performed by: INTERNAL MEDICINE

## 2023-12-28 PROCEDURE — 80053 COMPREHEN METABOLIC PANEL: CPT | Performed by: INTERNAL MEDICINE

## 2023-12-28 PROCEDURE — 36415 COLL VENOUS BLD VENIPUNCTURE: CPT | Performed by: INTERNAL MEDICINE

## 2023-12-28 PROCEDURE — 99214 PR OFFICE/OUTPT VISIT, EST, LEVL IV, 30-39 MIN: ICD-10-PCS | Mod: 95,,, | Performed by: INTERNAL MEDICINE

## 2023-12-28 PROCEDURE — 99214 OFFICE O/P EST MOD 30 MIN: CPT | Mod: 95,,, | Performed by: INTERNAL MEDICINE

## 2023-12-28 RX ORDER — HYDROXYUREA 500 MG/1
500 CAPSULE ORAL DAILY
Qty: 30 CAPSULE | Refills: 1 | Status: SHIPPED | OUTPATIENT
Start: 2023-12-28 | End: 2024-02-02 | Stop reason: SDUPTHER

## 2024-01-03 ENCOUNTER — PATIENT MESSAGE (OUTPATIENT)
Dept: INTERNAL MEDICINE | Facility: CLINIC | Age: 84
End: 2024-01-03
Payer: MEDICARE

## 2024-01-03 DIAGNOSIS — E87.5 HYPERKALEMIA: Primary | ICD-10-CM

## 2024-01-03 NOTE — TELEPHONE ENCOUNTER
Spoke with patient and scheduled him for potassium lab as ordered by Dr. Rooney tomorrow. He verbalized understanding.

## 2024-01-04 ENCOUNTER — LAB VISIT (OUTPATIENT)
Dept: LAB | Facility: HOSPITAL | Age: 84
End: 2024-01-04
Attending: PEDIATRICS
Payer: MEDICARE

## 2024-01-04 DIAGNOSIS — E87.5 HYPERKALEMIA: ICD-10-CM

## 2024-01-04 LAB
ANION GAP SERPL CALC-SCNC: 8 MMOL/L (ref 8–16)
BUN SERPL-MCNC: 25 MG/DL (ref 8–23)
CALCIUM SERPL-MCNC: 9.5 MG/DL (ref 8.7–10.5)
CHLORIDE SERPL-SCNC: 108 MMOL/L (ref 95–110)
CO2 SERPL-SCNC: 25 MMOL/L (ref 23–29)
CREAT SERPL-MCNC: 1.2 MG/DL (ref 0.5–1.4)
EST. GFR  (NO RACE VARIABLE): 59 ML/MIN/1.73 M^2
GLUCOSE SERPL-MCNC: 88 MG/DL (ref 70–110)
POTASSIUM SERPL-SCNC: 4.8 MMOL/L (ref 3.5–5.1)
SODIUM SERPL-SCNC: 141 MMOL/L (ref 136–145)

## 2024-01-04 PROCEDURE — 36415 COLL VENOUS BLD VENIPUNCTURE: CPT | Performed by: PEDIATRICS

## 2024-01-04 PROCEDURE — 80048 BASIC METABOLIC PNL TOTAL CA: CPT | Performed by: PEDIATRICS

## 2024-01-09 ENCOUNTER — OFFICE VISIT (OUTPATIENT)
Dept: DERMATOLOGY | Facility: CLINIC | Age: 84
End: 2024-01-09
Payer: MEDICARE

## 2024-01-09 DIAGNOSIS — L81.4 SOLAR LENTIGO: ICD-10-CM

## 2024-01-09 DIAGNOSIS — L82.1 SEBORRHEIC KERATOSES: ICD-10-CM

## 2024-01-09 DIAGNOSIS — Z12.83 SKIN CANCER SCREENING: ICD-10-CM

## 2024-01-09 DIAGNOSIS — L57.0 ACTINIC KERATOSES: Primary | ICD-10-CM

## 2024-01-09 DIAGNOSIS — D18.00 HEMANGIOMA, UNSPECIFIED SITE: ICD-10-CM

## 2024-01-09 PROCEDURE — 99999 PR PBB SHADOW E&M-EST. PATIENT-LVL III: CPT | Mod: PBBFAC,,, | Performed by: STUDENT IN AN ORGANIZED HEALTH CARE EDUCATION/TRAINING PROGRAM

## 2024-01-09 PROCEDURE — 17004 DESTROY PREMAL LESIONS 15/>: CPT | Mod: S$PBB,,, | Performed by: STUDENT IN AN ORGANIZED HEALTH CARE EDUCATION/TRAINING PROGRAM

## 2024-01-09 PROCEDURE — 99213 OFFICE O/P EST LOW 20 MIN: CPT | Mod: 25,S$PBB,, | Performed by: STUDENT IN AN ORGANIZED HEALTH CARE EDUCATION/TRAINING PROGRAM

## 2024-01-09 PROCEDURE — 99213 OFFICE O/P EST LOW 20 MIN: CPT | Mod: PBBFAC | Performed by: STUDENT IN AN ORGANIZED HEALTH CARE EDUCATION/TRAINING PROGRAM

## 2024-01-09 PROCEDURE — 17004 DESTROY PREMAL LESIONS 15/>: CPT | Mod: PBBFAC | Performed by: STUDENT IN AN ORGANIZED HEALTH CARE EDUCATION/TRAINING PROGRAM

## 2024-01-09 NOTE — PATIENT INSTRUCTIONS

## 2024-01-09 NOTE — PROGRESS NOTES
Patient Information  Name: Bharathi Parsons  : 1940  MRN: [id number]     Referring Physician:  Dr. Zamora ref. provider found   Primary Care Physician:  Dr. Rooney, Festus Sinha MD   Date of Visit: 2024      Subjective:       Bharathi Parsons is a 83 y.o. male who presents for AK follow up  HPI  Patient with hx of AK, here for follow up.    Patient was last seen:2023     Prior notes by myself reviewed.   Clinical documentation obtained by nursing staff reviewed.    Review of Systems   Skin:  Negative for itching and rash.        Objective:    Physical Exam   Constitutional: He appears well-developed and well-nourished. No distress.   Neurological: He is alert and oriented to person, place, and time. He is not disoriented.   Psychiatric: He has a normal mood and affect.   Skin:   Areas Examined (abnormalities noted in diagram):   Scalp / Hair Palpated and Inspected  Head / Face Inspection Performed  Neck Inspection Performed  RUE Inspected  LUE Inspection Performed                   Diagram Legend     Erythematous scaling macule/papule c/w actinic keratosis       Vascular papule c/w angioma      Pigmented verrucoid papule/plaque c/w seborrheic keratosis      Yellow umbilicated papule c/w sebaceous hyperplasia      Irregularly shaped tan macule c/w lentigo     1-2 mm smooth white papules consistent with Milia      Movable subcutaneous cyst with punctum c/w epidermal inclusion cyst      Subcutaneous movable cyst c/w pilar cyst      Firm pink to brown papule c/w dermatofibroma      Pedunculated fleshy papule(s) c/w skin tag(s)      Evenly pigmented macule c/w junctional nevus     Mildly variegated pigmented, slightly irregular-bordered macule c/w mildly atypical nevus      Flesh colored to evenly pigmented papule c/w intradermal nevus       Pink pearly papule/plaque c/w basal cell carcinoma      Erythematous hyperkeratotic cursted plaque c/w SCC      Surgical scar with no sign of skin cancer recurrence       Open and closed comedones      Inflammatory papules and pustules      Verrucoid papule consistent consistent with wart     Erythematous eczematous patches and plaques     Dystrophic onycholytic nail with subungual debris c/w onychomycosis     Umbilicated papule    Erythematous-base heme-crusted tan verrucoid plaque consistent with inflamed seborrheic keratosis     Erythematous Silvery Scaling Plaque c/w Psoriasis     See annotation      No images are attached to the encounter or orders placed in the encounter.    [] Data reviewed  [] Independent review of test  [] Management discussed with another provider    Assessment / Plan:        Actinic keratoses  Cryosurgery Procedure Note    Verbal consent from the patient is obtained including, but not limited to, risk of hypopigmentation/hyperpigmentation, scar, recurrence of lesion. The patient is aware of the precancerous quality and need for treatment of these lesions. Liquid nitrogen cryosurgery is applied to the 24 actinic keratoses, as detailed in the physical exam, to produce a freeze injury. The patient is aware that blisters may form and is instructed on wound care with gentle cleansing and use of vaseline ointment to keep moist until healed. The patient is supplied a handout on cryosurgery and is instructed to call if lesions do not completely resolve.    Skin cancer screening  Upper body skin examination performed today including at least 6 points as noted in physical examination. No lesions suspicious for malignancy noted.    Recommend daily sun protection/avoidance and use of at least SPF 30, broad spectrum sunscreen (OTC drug).     Seborrheic keratoses  These are benign inherited growths without a malignant potential. Reassurance given to patient. No treatment is necessary.     Solar lentigo  This is a benign hyperpigmented sun induced lesion. Recommend daily sun protection/avoidance and use of at least SPF 30, broad spectrum sunscreen (OTC drug) will reduce  the number of new lesions. Treatment of these benign lesions are considered cosmetic.    Hemangioma, unspecified site  This is a benign vascular lesion. Reassurance given. No treatment required.                    LOS NUMBER AND COMPLEXITY OF PROBLEMS    COMPLEXITY OF DATA RISK TOTAL TIME (m)   65376  [id number] [] 1 self-limited or minor problem [x] Minimal to none [] No treatment recommended or patient to monitor 15-29  10-19   03706  18195 Low  [] 2 or > self limited or minor problems  [] 1 stable chronic illness  [] 1 acute, uncomplicated illness or injury Limited (2)  [] Prior external notes from each unique source  [] Review result of each unique test  [] Order each unique test [x]  Low  OTC medications, minor skin biopsy 30-44  20-29   68058  89230 Moderate  []  1 or > chronic illness with progression, exacerbation or SE of treatment  [x]  2 or more stable chronic illnesses  []  1 acute illness with systemic symptoms  []  1 acute complicated injury  []  1 undiagnosed new problem with uncertain prognosis Moderate (1/3 below)  []  3 or more data items        *Now includes assessment requiring independent historian  []  Independent interpretation of a test  []  Discuss management/test with another provider Moderate  []  Prescription drug mgmt  []  Minor surgery with risk discussed  []  Mgmt limited by social determinates 45-59  30-39   42471  27466 High  []  1 or more chronic illness with severe exacerbation, progression or SE of treatment  []  1 acute or chronic illness/injury that poses a threat to life or bodily function Extensive (2/3 below)  []  3 or more data items        *Now includes assessment requiring independent historian.  []  Independent interpretation of a test  []  Discuss management/test with another provider High  []  Major surgery with risk discussed  []  Drug therapy requiring intensive monitoring for toxicity  []  Hospitalization  []  Decision for DNR 60-74  40-54      No follow-ups on  file.    Ann Sanches MD, FAAD  [facility]

## 2024-01-10 LAB
ANNOTATION COMMENT IMP: NORMAL
DX: NORMAL
NGS CLINCIAL TRIALS: NORMAL
NGS INTERPRETATION: NORMAL
NGS ONCOHEME PANEL GENE LIST: NORMAL
NGS PATHOGENIC MUTATIONS DETECTED: NORMAL
NGS REVIEWED BY:: NORMAL
NGS VARIANTS OF UNKNOWN SIGNIFICANCE: NORMAL
NGSHM RESULT, BLOOD: NORMAL
REF LAB TEST METHOD: NORMAL
SPECIMEN SOURCE: NORMAL
TEST PERFORMANCE INFO SPEC: NORMAL

## 2024-01-11 DIAGNOSIS — Z00.00 ENCOUNTER FOR MEDICARE ANNUAL WELLNESS EXAM: ICD-10-CM

## 2024-01-16 ENCOUNTER — TELEPHONE (OUTPATIENT)
Dept: ALLERGY | Facility: CLINIC | Age: 84
End: 2024-01-16
Payer: MEDICARE

## 2024-01-16 NOTE — TELEPHONE ENCOUNTER
Left message for pt stating that, due to the weather, Dr Koch would not be in clinic today. She is happy to see pt virtually though. Pt instructed to change appt to virtual via [employer] or by calling us at [phone]. We can also reschedule if he chooses.

## 2024-01-17 ENCOUNTER — TELEPHONE (OUTPATIENT)
Dept: RADIOLOGY | Facility: HOSPITAL | Age: 84
End: 2024-01-17
Payer: MEDICARE

## 2024-01-17 ENCOUNTER — OFFICE VISIT (OUTPATIENT)
Dept: HEMATOLOGY/ONCOLOGY | Facility: CLINIC | Age: 84
End: 2024-01-17
Payer: MEDICARE

## 2024-01-17 VITALS
TEMPERATURE: 98 F | OXYGEN SATURATION: 97 % | HEIGHT: 69 IN | RESPIRATION RATE: 20 BRPM | DIASTOLIC BLOOD PRESSURE: 80 MMHG | WEIGHT: 165.13 LBS | HEART RATE: 110 BPM | BODY MASS INDEX: 24.46 KG/M2 | SYSTOLIC BLOOD PRESSURE: 131 MMHG

## 2024-01-17 DIAGNOSIS — Z79.899 IMMUNODEFICIENCY DUE TO CHEMOTHERAPY: ICD-10-CM

## 2024-01-17 DIAGNOSIS — N18.31 STAGE 3A CHRONIC KIDNEY DISEASE: ICD-10-CM

## 2024-01-17 DIAGNOSIS — D84.821 IMMUNODEFICIENCY DUE TO CHEMOTHERAPY: ICD-10-CM

## 2024-01-17 DIAGNOSIS — Z15.89 JAK2 GENE MUTATION: ICD-10-CM

## 2024-01-17 DIAGNOSIS — D47.3 ESSENTIAL (HEMORRHAGIC) THROMBOCYTHEMIA: Primary | ICD-10-CM

## 2024-01-17 DIAGNOSIS — T45.1X5A IMMUNODEFICIENCY DUE TO CHEMOTHERAPY: ICD-10-CM

## 2024-01-17 PROBLEM — Z79.69 IMMUNODEFICIENCY DUE TO CHEMOTHERAPY: Status: ACTIVE | Noted: 2024-01-17

## 2024-01-17 PROCEDURE — 99214 OFFICE O/P EST MOD 30 MIN: CPT | Mod: S$PBB,,, | Performed by: INTERNAL MEDICINE

## 2024-01-17 PROCEDURE — 99999 PR PBB SHADOW E&M-EST. PATIENT-LVL IV: CPT | Mod: PBBFAC,,, | Performed by: INTERNAL MEDICINE

## 2024-01-17 PROCEDURE — 99214 OFFICE O/P EST MOD 30 MIN: CPT | Mod: PBBFAC | Performed by: INTERNAL MEDICINE

## 2024-01-17 RX ORDER — SODIUM POLYSTYRENE SULFONATE 15 G/60ML
SUSPENSION ORAL; RECTAL
COMMUNITY
Start: 2023-12-28 | End: 2024-02-19

## 2024-01-17 RX ORDER — IPRATROPIUM BROMIDE 42 UG/1
2 SPRAY, METERED NASAL 3 TIMES DAILY
COMMUNITY
Start: 2023-12-26 | End: 2024-02-19

## 2024-01-17 NOTE — TELEPHONE ENCOUNTER
Pre-procedure phone call made at this time. Informed pt to be NPO after midnight, show up to [facility] on [address] at 0900, either have a ride with them or have a phone number for the person driving them home so that we can get in contact with them to keep them updated. Pt states last dose of ASA was 1/13 and denies use of other blood thinners, NSAIDS and fish oils. Answered all questions that the pt had and pt verbalized understanding of all discussed.

## 2024-01-17 NOTE — H&P (VIEW-ONLY)
Subjective:       Patient ID: Bharathi Parsons is a 83 y.o. male.    Chief Complaint: Results and Coagulation Disorder (JAK2 positive essential thrombocytosis)    HPI:  83-year-old male recently diagnosed with essential thrombocytosis; JAK2  mutation positive 30%.  Patient is scheduled for bone marrow tomorrow currently taking hydroxyurea 5 daily aspirin 81 mg    Past Medical History:   Diagnosis Date    DJD (degenerative joint disease)     Esophageal stricture     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 JAK2 V617F mutated DNA was detected and measured  at 30% of total JAK2 DNA.    [id number] mutation is found with high frequency in  [place] chromosome-negative myeloproliferative  neoplasms (>95% in polycythemia vera and approximately  50-60% in primary myelofibrosis and essential  thrombocy     Family History   Problem Relation Age of Onset    Glaucoma Maternal Uncle     Cataracts Mother     Heart disease Father      Social History     Socioeconomic History    Marital status:    Tobacco Use    Smoking status: Never     Passive exposure: Never    Smokeless tobacco: Never   Substance and Sexual Activity    Alcohol use: Yes     Comment: Ocasionally    Drug use: No    Sexual activity: Not Currently     Partners: Female   Social History Narrative    , No smokers in household, 1 Dog.     Social Determinants of Health     Financial Resource Strain: Low Risk  (1/5/2023)    Overall Financial Resource Strain (CARDIA)     Difficulty of Paying Living Expenses: Not hard at all   Food Insecurity: No Food Insecurity (12/11/2023)    Hunger Vital Sign     Worried About Running Out of Food in the Last Year: Never true     Ran Out of Food in the Last Year: Never true   Transportation Needs: No Transportation Needs (12/11/2023)    PRAPARE -  Transportation     Lack of Transportation (Medical): No     Lack of Transportation (Non-Medical): No   Physical Activity: Sufficiently Active (12/11/2023)    Exercise Vital Sign     Days of Exercise per Week: 7 days     Minutes of Exercise per Session: 30 min   Stress: No Stress Concern Present (12/11/2023)    [employer] [place] [employer] - Occupational Stress Questionnaire     Feeling of Stress : Only a little   Social Connections: Unknown (12/11/2023)    Social Connection and Isolation Panel [NHANES]     Frequency of Communication with Friends and Family: Once a week     Frequency of Social Gatherings with Friends and Family: More than three times a week     Attends Club or Organization Meetings: More than 4 times per year     Marital Status:    Housing Stability: Low Risk  (12/11/2023)    Housing Stability Vital Sign     Unable to Pay for Housing in the Last Year: No     Number of Places Lived in the Last Year: 1     Unstable Housing in the Last Year: No     Past Surgical History:   Procedure Laterality Date    APPENDECTOMY      as child    CATARACT EXTRACTION W/  INTRAOCULAR LENS IMPLANT  OD before 2003    CATARACT EXTRACTION W/  INTRAOCULAR LENS IMPLANT  OS 12/19/07 with [facility]    ESOPHAGEAL DILATION      x 2 in past last one done in  2022    FOOT ARTHRODESIS      medial column arthrodesis left foot  2011       Labs:  Lab Results   Component Value Date    WBC 17.10 (H) 12/28/2023    HGB 15.0 12/28/2023    HCT 45.3 12/28/2023    MCV 84 12/28/2023     (H) 12/28/2023     BMP  Lab Results   Component Value Date     01/04/2024    K 4.8 01/04/2024     01/04/2024    CO2 25 01/04/2024    BUN 25 (H) 01/04/2024    CREATININE 1.2 01/04/2024    CALCIUM 9.5 01/04/2024    ANIONGAP 8 01/04/2024    ESTGFRAFRICA >60.0 07/21/2022    EGFRNONAA 55.2 (A) 07/21/2022     Lab Results   Component Value Date    ALT 18 12/28/2023    AST 20 12/28/2023    ALKPHOS 78 12/28/2023    BILITOT 0.3 12/28/2023  "      Lab Results   Component Value Date    IRON 50 12/01/2023    TIBC 311 12/01/2023    FERRITIN 88 12/01/2023     No results found for: "FWZDSXPD37"  No results found for: "FOLATE"  Lab Results   Component Value Date    TSH 2.926 05/17/2018         Review of Systems   Constitutional:  Negative for activity change, appetite change, chills, diaphoresis, fatigue, fever and unexpected weight change.   HENT:  Negative for congestion, dental problem, drooling, ear discharge, ear pain, facial swelling, hearing loss, mouth sores, nosebleeds, postnasal drip, rhinorrhea, sinus pressure, sneezing, sore throat, tinnitus, trouble swallowing and voice change.    Eyes:  Negative for photophobia, pain, discharge, redness, itching and visual disturbance.   Respiratory:  Negative for apnea, cough, choking, chest tightness, shortness of breath, wheezing and stridor.    Cardiovascular:  Negative for chest pain, palpitations and leg swelling.   Gastrointestinal:  Negative for abdominal distention, abdominal pain, anal bleeding, blood in stool, constipation, diarrhea, nausea, rectal pain and vomiting.   Endocrine: Negative for cold intolerance, heat intolerance, polydipsia, polyphagia and polyuria.   Genitourinary:  Negative for decreased urine volume, difficulty urinating, dysuria, enuresis, flank pain, frequency, genital sores, hematuria, penile discharge, penile pain, penile swelling, scrotal swelling, testicular pain and urgency.   Musculoskeletal:  Negative for arthralgias, back pain, gait problem, joint swelling, myalgias, neck pain and neck stiffness.   Skin:  Negative for color change, pallor, rash and wound.   Allergic/Immunologic: Negative for environmental allergies, food allergies and immunocompromised state.   Neurological:  Negative for dizziness, tremors, seizures, syncope, facial asymmetry, speech difficulty, weakness, light-headedness, numbness and headaches.   Hematological:  Negative for adenopathy. Does not " bruise/bleed easily.   Psychiatric/Behavioral:  Negative for agitation, behavioral problems, confusion, decreased concentration, dysphoric mood, hallucinations, self-injury, sleep disturbance and suicidal ideas. The patient is not nervous/anxious and is not hyperactive.        Objective:      Physical Exam  Vitals reviewed.   Constitutional:       General: He is not in acute distress.     Appearance: He is well-developed. He is not diaphoretic.   HENT:      Head: Normocephalic.      Right Ear: External ear normal.      Left Ear: External ear normal.      Nose: Nose normal.      Right Sinus: No maxillary sinus tenderness or frontal sinus tenderness.      Left Sinus: No maxillary sinus tenderness or frontal sinus tenderness.      Mouth/Throat:      Pharynx: No oropharyngeal exudate.   Eyes:      General: Lids are normal. No scleral icterus.        Right eye: No discharge.         Left eye: No discharge.      Extraocular Movements:      Right eye: Normal extraocular motion.      Left eye: Normal extraocular motion.      Conjunctiva/sclera:      Right eye: Right conjunctiva is not injected. No hemorrhage.     Left eye: Left conjunctiva is not injected. No hemorrhage.     Pupils: Pupils are equal, round, and reactive to light.   Neck:      Thyroid: No thyromegaly.      Vascular: No JVD.      Trachea: No tracheal deviation.   Cardiovascular:      Rate and Rhythm: Normal rate.   Pulmonary:      Effort: Pulmonary effort is normal. No respiratory distress.      Breath sounds: No stridor.   Abdominal:      General: Bowel sounds are normal.      Palpations: Abdomen is soft. There is no hepatomegaly, splenomegaly or mass.      Tenderness: There is no abdominal tenderness.   Musculoskeletal:         General: No tenderness. Normal range of motion.      Cervical back: Normal range of motion and neck supple.   Lymphadenopathy:      Head:      Right side of head: No posterior auricular or occipital adenopathy.      Left side of  head: No posterior auricular or occipital adenopathy.      Cervical: No cervical adenopathy.      Right cervical: No superficial, deep or posterior cervical adenopathy.     Left cervical: No superficial, deep or posterior cervical adenopathy.      Upper Body:      Right upper body: No supraclavicular adenopathy.      Left upper body: No supraclavicular adenopathy.   Skin:     General: Skin is dry.      Findings: No erythema or rash.      Nails: There is no clubbing.   Neurological:      Mental Status: He is alert and oriented to person, place, and time.      Cranial Nerves: No cranial nerve deficit.      Coordination: Coordination normal.   Psychiatric:         Behavior: Behavior normal.         Thought Content: Thought content normal.         Judgment: Judgment normal.             Assessment:      1. Essential (hemorrhagic) thrombocythemia    2. Immunodeficiency due to chemotherapy    3. Stage 3a chronic kidney disease    4. JAK2 gene mutation           Med Onc Chart Routing      Follow up with physician . Return to clinic in approximately 2 weeks to see me with CBC CMP.   Follow up with PRACHI    Infusion scheduling note    Injection scheduling note    Labs    Imaging    Pharmacy appointment    Other referrals                   Plan:     Patient is scheduled for bone marrow tomorrow information from up-to-date followed to them on JAK2 mutations as well as essential thrombocytosis will proceed with follow-up in 2 weeks to review will discuss indications for increasing dose of hydroxyurea once CBC is done tomorrow.  Discussed implications and answered questions        Jose Roberts Jr, MD FACP

## 2024-01-17 NOTE — PROGRESS NOTES
Subjective:       Patient ID: Bharathi Parsons is a 83 y.o. male.    Chief Complaint: Results and Coagulation Disorder (JAK2 positive essential thrombocytosis)    HPI:  83-year-old male recently diagnosed with essential thrombocytosis; JAK2  mutation positive 30%.  Patient is scheduled for bone marrow tomorrow currently taking hydroxyurea 5 daily aspirin 81 mg    Past Medical History:   Diagnosis Date    DJD (degenerative joint disease)     Esophageal stricture     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 JAK2 V617F mutated DNA was detected and measured  at 30% of total JAK2 DNA.    [id number] mutation is found with high frequency in  [place] chromosome-negative myeloproliferative  neoplasms (>95% in polycythemia vera and approximately  50-60% in primary myelofibrosis and essential  thrombocy     Family History   Problem Relation Age of Onset    Glaucoma Maternal Uncle     Cataracts Mother     Heart disease Father      Social History     Socioeconomic History    Marital status:    Tobacco Use    Smoking status: Never     Passive exposure: Never    Smokeless tobacco: Never   Substance and Sexual Activity    Alcohol use: Yes     Comment: Ocasionally    Drug use: No    Sexual activity: Not Currently     Partners: Female   Social History Narrative    , No smokers in household, 1 Dog.     Social Determinants of Health     Financial Resource Strain: Low Risk  (1/5/2023)    Overall Financial Resource Strain (CARDIA)     Difficulty of Paying Living Expenses: Not hard at all   Food Insecurity: No Food Insecurity (12/11/2023)    Hunger Vital Sign     Worried About Running Out of Food in the Last Year: Never true     Ran Out of Food in the Last Year: Never true   Transportation Needs: No Transportation Needs (12/11/2023)    PRAPARE -  Transportation     Lack of Transportation (Medical): No     Lack of Transportation (Non-Medical): No   Physical Activity: Sufficiently Active (12/11/2023)    Exercise Vital Sign     Days of Exercise per Week: 7 days     Minutes of Exercise per Session: 30 min   Stress: No Stress Concern Present (12/11/2023)    [employer] [place] [employer] - Occupational Stress Questionnaire     Feeling of Stress : Only a little   Social Connections: Unknown (12/11/2023)    Social Connection and Isolation Panel [NHANES]     Frequency of Communication with Friends and Family: Once a week     Frequency of Social Gatherings with Friends and Family: More than three times a week     Attends Club or Organization Meetings: More than 4 times per year     Marital Status:    Housing Stability: Low Risk  (12/11/2023)    Housing Stability Vital Sign     Unable to Pay for Housing in the Last Year: No     Number of Places Lived in the Last Year: 1     Unstable Housing in the Last Year: No     Past Surgical History:   Procedure Laterality Date    APPENDECTOMY      as child    CATARACT EXTRACTION W/  INTRAOCULAR LENS IMPLANT  OD before 2003    CATARACT EXTRACTION W/  INTRAOCULAR LENS IMPLANT  OS 12/19/07 with [facility]    ESOPHAGEAL DILATION      x 2 in past last one done in  2022    FOOT ARTHRODESIS      medial column arthrodesis left foot  2011       Labs:  Lab Results   Component Value Date    WBC 17.10 (H) 12/28/2023    HGB 15.0 12/28/2023    HCT 45.3 12/28/2023    MCV 84 12/28/2023     (H) 12/28/2023     BMP  Lab Results   Component Value Date     01/04/2024    K 4.8 01/04/2024     01/04/2024    CO2 25 01/04/2024    BUN 25 (H) 01/04/2024    CREATININE 1.2 01/04/2024    CALCIUM 9.5 01/04/2024    ANIONGAP 8 01/04/2024    ESTGFRAFRICA >60.0 07/21/2022    EGFRNONAA 55.2 (A) 07/21/2022     Lab Results   Component Value Date    ALT 18 12/28/2023    AST 20 12/28/2023    ALKPHOS 78 12/28/2023    BILITOT 0.3 12/28/2023  "      Lab Results   Component Value Date    IRON 50 12/01/2023    TIBC 311 12/01/2023    FERRITIN 88 12/01/2023     No results found for: "IQYLASZJ10"  No results found for: "FOLATE"  Lab Results   Component Value Date    TSH 2.926 05/17/2018         Review of Systems   Constitutional:  Negative for activity change, appetite change, chills, diaphoresis, fatigue, fever and unexpected weight change.   HENT:  Negative for congestion, dental problem, drooling, ear discharge, ear pain, facial swelling, hearing loss, mouth sores, nosebleeds, postnasal drip, rhinorrhea, sinus pressure, sneezing, sore throat, tinnitus, trouble swallowing and voice change.    Eyes:  Negative for photophobia, pain, discharge, redness, itching and visual disturbance.   Respiratory:  Negative for apnea, cough, choking, chest tightness, shortness of breath, wheezing and stridor.    Cardiovascular:  Negative for chest pain, palpitations and leg swelling.   Gastrointestinal:  Negative for abdominal distention, abdominal pain, anal bleeding, blood in stool, constipation, diarrhea, nausea, rectal pain and vomiting.   Endocrine: Negative for cold intolerance, heat intolerance, polydipsia, polyphagia and polyuria.   Genitourinary:  Negative for decreased urine volume, difficulty urinating, dysuria, enuresis, flank pain, frequency, genital sores, hematuria, penile discharge, penile pain, penile swelling, scrotal swelling, testicular pain and urgency.   Musculoskeletal:  Negative for arthralgias, back pain, gait problem, joint swelling, myalgias, neck pain and neck stiffness.   Skin:  Negative for color change, pallor, rash and wound.   Allergic/Immunologic: Negative for environmental allergies, food allergies and immunocompromised state.   Neurological:  Negative for dizziness, tremors, seizures, syncope, facial asymmetry, speech difficulty, weakness, light-headedness, numbness and headaches.   Hematological:  Negative for adenopathy. Does not " bruise/bleed easily.   Psychiatric/Behavioral:  Negative for agitation, behavioral problems, confusion, decreased concentration, dysphoric mood, hallucinations, self-injury, sleep disturbance and suicidal ideas. The patient is not nervous/anxious and is not hyperactive.        Objective:      Physical Exam  Vitals reviewed.   Constitutional:       General: He is not in acute distress.     Appearance: He is well-developed. He is not diaphoretic.   HENT:      Head: Normocephalic.      Right Ear: External ear normal.      Left Ear: External ear normal.      Nose: Nose normal.      Right Sinus: No maxillary sinus tenderness or frontal sinus tenderness.      Left Sinus: No maxillary sinus tenderness or frontal sinus tenderness.      Mouth/Throat:      Pharynx: No oropharyngeal exudate.   Eyes:      General: Lids are normal. No scleral icterus.        Right eye: No discharge.         Left eye: No discharge.      Extraocular Movements:      Right eye: Normal extraocular motion.      Left eye: Normal extraocular motion.      Conjunctiva/sclera:      Right eye: Right conjunctiva is not injected. No hemorrhage.     Left eye: Left conjunctiva is not injected. No hemorrhage.     Pupils: Pupils are equal, round, and reactive to light.   Neck:      Thyroid: No thyromegaly.      Vascular: No JVD.      Trachea: No tracheal deviation.   Cardiovascular:      Rate and Rhythm: Normal rate.   Pulmonary:      Effort: Pulmonary effort is normal. No respiratory distress.      Breath sounds: No stridor.   Abdominal:      General: Bowel sounds are normal.      Palpations: Abdomen is soft. There is no hepatomegaly, splenomegaly or mass.      Tenderness: There is no abdominal tenderness.   Musculoskeletal:         General: No tenderness. Normal range of motion.      Cervical back: Normal range of motion and neck supple.   Lymphadenopathy:      Head:      Right side of head: No posterior auricular or occipital adenopathy.      Left side of  head: No posterior auricular or occipital adenopathy.      Cervical: No cervical adenopathy.      Right cervical: No superficial, deep or posterior cervical adenopathy.     Left cervical: No superficial, deep or posterior cervical adenopathy.      Upper Body:      Right upper body: No supraclavicular adenopathy.      Left upper body: No supraclavicular adenopathy.   Skin:     General: Skin is dry.      Findings: No erythema or rash.      Nails: There is no clubbing.   Neurological:      Mental Status: He is alert and oriented to person, place, and time.      Cranial Nerves: No cranial nerve deficit.      Coordination: Coordination normal.   Psychiatric:         Behavior: Behavior normal.         Thought Content: Thought content normal.         Judgment: Judgment normal.             Assessment:      1. Essential (hemorrhagic) thrombocythemia    2. Immunodeficiency due to chemotherapy    3. Stage 3a chronic kidney disease    4. JAK2 gene mutation           Med Onc Chart Routing      Follow up with physician . Return to clinic in approximately 2 weeks to see me with CBC CMP.   Follow up with PRACHI    Infusion scheduling note    Injection scheduling note    Labs    Imaging    Pharmacy appointment    Other referrals                   Plan:     Patient is scheduled for bone marrow tomorrow information from up-to-date followed to them on JAK2 mutations as well as essential thrombocytosis will proceed with follow-up in 2 weeks to review will discuss indications for increasing dose of hydroxyurea once CBC is done tomorrow.  Discussed implications and answered questions        Jose Roberts Jr, MD FACP

## 2024-01-18 ENCOUNTER — HOSPITAL ENCOUNTER (OUTPATIENT)
Dept: RADIOLOGY | Facility: HOSPITAL | Age: 84
Discharge: HOME | End: 2024-01-18
Attending: INTERNAL MEDICINE
Payer: MEDICARE

## 2024-01-18 VITALS
DIASTOLIC BLOOD PRESSURE: 77 MMHG | HEART RATE: 66 BPM | SYSTOLIC BLOOD PRESSURE: 136 MMHG | OXYGEN SATURATION: 96 % | WEIGHT: 165 LBS | HEIGHT: 69 IN | RESPIRATION RATE: 14 BRPM | BODY MASS INDEX: 24.44 KG/M2

## 2024-01-18 DIAGNOSIS — D75.839 THROMBOCYTOSIS: ICD-10-CM

## 2024-01-18 LAB
INR PPP: 1.1 (ref 0.8–1.2)
PROTHROMBIN TIME: 11.4 SEC (ref 9–12.5)

## 2024-01-18 PROCEDURE — 88264 CHROMOSOME ANALYSIS 20-25: CPT | Performed by: INTERNAL MEDICINE

## 2024-01-18 PROCEDURE — 38222 DX BONE MARROW BX & ASPIR: CPT | Mod: RT,,, | Performed by: RADIOLOGY

## 2024-01-18 PROCEDURE — 88342 IMHCHEM/IMCYTCHM 1ST ANTB: CPT | Mod: 26,59,, | Performed by: PATHOLOGY

## 2024-01-18 PROCEDURE — 77012 CT SCAN FOR NEEDLE BIOPSY: CPT | Mod: 26,RT,, | Performed by: RADIOLOGY

## 2024-01-18 PROCEDURE — C1830 POWER BONE MARROW BX NEEDLE: HCPCS

## 2024-01-18 PROCEDURE — 77012 CT SCAN FOR NEEDLE BIOPSY: CPT

## 2024-01-18 PROCEDURE — 85610 PROTHROMBIN TIME: CPT | Performed by: INTERNAL MEDICINE

## 2024-01-18 PROCEDURE — 88341 IMHCHEM/IMCYTCHM EA ADD ANTB: CPT | Performed by: PATHOLOGY

## 2024-01-18 PROCEDURE — 88305 TISSUE EXAM BY PATHOLOGIST: CPT | Mod: 59 | Performed by: PATHOLOGY

## 2024-01-18 PROCEDURE — 88305 TISSUE EXAM BY PATHOLOGIST: CPT | Mod: 26,,, | Performed by: PATHOLOGY

## 2024-01-18 PROCEDURE — 88311 DECALCIFY TISSUE: CPT | Mod: 26,,, | Performed by: PATHOLOGY

## 2024-01-18 PROCEDURE — 25000003 PHARM REV CODE 250: Performed by: PHYSICIAN ASSISTANT

## 2024-01-18 PROCEDURE — 38222 DX BONE MARROW BX & ASPIR: CPT | Mod: TC,LT

## 2024-01-18 PROCEDURE — 88185 FLOWCYTOMETRY/TC ADD-ON: CPT | Performed by: PATHOLOGY

## 2024-01-18 PROCEDURE — 88311 DECALCIFY TISSUE: CPT | Performed by: PATHOLOGY

## 2024-01-18 PROCEDURE — 88184 FLOWCYTOMETRY/ TC 1 MARKER: CPT | Performed by: PATHOLOGY

## 2024-01-18 PROCEDURE — 85097 BONE MARROW INTERPRETATION: CPT | Mod: ,,, | Performed by: PATHOLOGY

## 2024-01-18 PROCEDURE — 88313 SPECIAL STAINS GROUP 2: CPT | Mod: 26,,, | Performed by: PATHOLOGY

## 2024-01-18 PROCEDURE — 88341 IMHCHEM/IMCYTCHM EA ADD ANTB: CPT | Mod: 26,,, | Performed by: PATHOLOGY

## 2024-01-18 PROCEDURE — 88342 IMHCHEM/IMCYTCHM 1ST ANTB: CPT | Mod: 59 | Performed by: PATHOLOGY

## 2024-01-18 PROCEDURE — 88189 FLOWCYTOMETRY/READ 16 & >: CPT | Mod: ,,, | Performed by: PATHOLOGY

## 2024-01-18 PROCEDURE — 88237 TISSUE CULTURE BONE MARROW: CPT | Performed by: INTERNAL MEDICINE

## 2024-01-18 PROCEDURE — 81450 HL NEO GSAP 5-50DNA/DNA&RNA: CPT | Performed by: INTERNAL MEDICINE

## 2024-01-18 PROCEDURE — 88313 SPECIAL STAINS GROUP 2: CPT | Mod: 59 | Performed by: PATHOLOGY

## 2024-01-18 PROCEDURE — 63600175 PHARM REV CODE 636 W HCPCS: Performed by: PHYSICIAN ASSISTANT

## 2024-01-18 RX ORDER — LIDOCAINE HYDROCHLORIDE 10 MG/ML
INJECTION INFILTRATION; PERINEURAL CODE/TRAUMA/SEDATION MEDICATION
Status: COMPLETED | OUTPATIENT
Start: 2024-01-18 | End: 2024-01-18

## 2024-01-18 RX ORDER — FENTANYL CITRATE 50 UG/ML
INJECTION, SOLUTION INTRAMUSCULAR; INTRAVENOUS CODE/TRAUMA/SEDATION MEDICATION
Status: COMPLETED | OUTPATIENT
Start: 2024-01-18 | End: 2024-01-18

## 2024-01-18 RX ORDER — MIDAZOLAM HYDROCHLORIDE 1 MG/ML
INJECTION INTRAMUSCULAR; INTRAVENOUS CODE/TRAUMA/SEDATION MEDICATION
Status: COMPLETED | OUTPATIENT
Start: 2024-01-18 | End: 2024-01-18

## 2024-01-18 RX ADMIN — LIDOCAINE HYDROCHLORIDE 5 ML: 10 INJECTION, SOLUTION INFILTRATION; PERINEURAL at 10:01

## 2024-01-18 RX ADMIN — FENTANYL CITRATE 50 MCG: 50 INJECTION, SOLUTION INTRAMUSCULAR; INTRAVENOUS at 10:01

## 2024-01-18 RX ADMIN — MIDAZOLAM HYDROCHLORIDE 1 MG: 1 INJECTION, SOLUTION INTRAMUSCULAR; INTRAVENOUS at 10:01

## 2024-01-18 NOTE — SEDATION DOCUMENTATION
Procedure complete; needle out; manual pressure applied; bandaid dressing applied to rt lower back C/D/I

## 2024-01-18 NOTE — PLAN OF CARE
Band aid to lower back, C/D/I with no bleeding/redness/swelling noted. VSS, NADN, and pt meets criteria for discharge. Discharge instructions given to and reviewed with pt, and pt verbalized understanding of all. Pt discharged to home, taken out via wheelchair and driven home by brother-in-law.

## 2024-01-18 NOTE — DISCHARGE INSTRUCTIONS
Please return to ER if any of these symptoms occur:  Fever over 101 degrees,  Bleeding from the puncture site not controlled, (Hold pressure for 5 minutes, if bleeding does not stop then go to the ER.)  Pain not controlled with Aleve or Tylenol,    No driving for 24 hours after procedure due to sedation given during procedure.     Do not lift anything heavy, nothing over the size of a gallon of milk, for at least 2 days.    Do not submerge in standing water for 2 days after biopsy but you may shower.    Resume home medications and diet    Biopsy results will be with Dr. Fernandez in 5-7 days, please follow up with him for results and any other questions or concerns that you may have.

## 2024-01-18 NOTE — DISCHARGE SUMMARY
[facility])  Discharge Note  Short Stay    CT Dx Bone Marrow Biopsy(ies) w/ Aspiration: Left(XPD)      OUTCOME: Patient tolerated treatment/procedure well without complication and is now ready for discharge.    DISPOSITION: Home or Self Care    FINAL DIAGNOSIS:  <principal problem not specified>    FOLLOWUP: In clinic    DISCHARGE INSTRUCTIONS:  No discharge procedures on file.      Clinical Reference Documents Added to Patient Instructions         Document    BONE MARROW ASPIRATION OR BIOPSY (ENGLISH)    PROCEDURAL SEDATION, ADULT ED (ENGLISH)            TIME SPENT ON DISCHARGE: 15 minutes    Pre Op Diagnosis: essential thrombocytopenia     Post Op Diagnosis: same     Procedure:  Bone marrow biopsy     Procedure performed by: Kary DE LA GARZA, Medhat KERR     Written Informed Consent Obtained: Yes     Specimen Removed:  yes     Estimated Blood Loss:  minimal     Findings: Local anesthesia and moderate sedation were used.     The patient tolerated the procedure well and there were no complications.      Disposition:  F/U in clinic    Discharge instructions:  Light activity for 24 hours.  Remove band aid in 24 hours.  No baths (showers are appropriate).    F/U with ordering physician    Sterile technique was performed in the right iliac, lidocaine was used as a local anesthetic.  Multiple samples taken percutaneously from the right iliac bone.  Pt tolerated the procedure well without immediate complications.  Please see radiologist report for details. F/u with PCP and/or ordering physician.

## 2024-01-22 LAB
BODY SITE - BONE MARROW: NORMAL
CLINICAL DIAGNOSIS - BONE MARROW: NORMAL
FLOW CYTOMETRY ANTIBODIES ANALYZED - BONE MARROW: NORMAL
FLOW CYTOMETRY COMMENT - BONE MARROW: NORMAL
FLOW CYTOMETRY INTERPRETATION - BONE MARROW: NORMAL

## 2024-01-23 RX ORDER — PITAVASTATIN CALCIUM 4.18 MG/1
1 TABLET, FILM COATED ORAL
Qty: 90 TABLET | Refills: 3 | Status: SHIPPED | OUTPATIENT
Start: 2024-01-23

## 2024-01-23 NOTE — TELEPHONE ENCOUNTER
No care due was identified.  [facility] Embedded Care Due Messages. Reference number: [id number].   1/22/2024 10:03:57 AM CST  
Refill Routing Note   Medication(s) are not appropriate for processing by [facility] for the following reason(s):        No active prescription written by provider    [facility] action(s):  Defer               Appointments  past 12m or future 3m with PCP    Date Provider   Last Visit   9/15/2023 Festus Rooney MD   Next Visit   3/15/2024 Festus Rooney MD   ED visits in past 90 days: 0        Note composed:6:09 PM 01/22/2024          
minimum assist (75% patients effort)

## 2024-01-29 LAB
CHROM BANDING METHOD: NORMAL
CHROMOSOME ANALYSIS BM ADDITIONAL INFORMATION: NORMAL
CHROMOSOME ANALYSIS BM RELEASED BY: NORMAL
CHROMOSOME ANALYSIS BM RESULT SUMMARY: NORMAL
CLINICAL CYTOGENETICIST REVIEW: NORMAL
KARYOTYP MAR: NORMAL
REASON FOR REFERRAL (NARRATIVE): NORMAL
REF LAB TEST METHOD: NORMAL
SPECIMEN SOURCE: NORMAL
SPECIMEN: NORMAL

## 2024-01-30 ENCOUNTER — OFFICE VISIT (OUTPATIENT)
Dept: ALLERGY | Facility: CLINIC | Age: 84
End: 2024-01-30
Payer: MEDICARE

## 2024-01-30 VITALS
HEART RATE: 88 BPM | DIASTOLIC BLOOD PRESSURE: 82 MMHG | TEMPERATURE: 98 F | WEIGHT: 163.56 LBS | SYSTOLIC BLOOD PRESSURE: 149 MMHG | BODY MASS INDEX: 24.16 KG/M2

## 2024-01-30 DIAGNOSIS — J31.0 GUSTATORY RHINITIS: ICD-10-CM

## 2024-01-30 DIAGNOSIS — R09.82 POST-NASAL DRIP: ICD-10-CM

## 2024-01-30 DIAGNOSIS — J30.1 SEASONAL ALLERGIC RHINITIS DUE TO POLLEN: Primary | ICD-10-CM

## 2024-01-30 PROCEDURE — 99214 OFFICE O/P EST MOD 30 MIN: CPT | Mod: PBBFAC | Performed by: ALLERGY & IMMUNOLOGY

## 2024-01-30 PROCEDURE — 99999 PR PBB SHADOW E&M-EST. PATIENT-LVL IV: CPT | Mod: PBBFAC,,, | Performed by: ALLERGY & IMMUNOLOGY

## 2024-01-30 PROCEDURE — 99214 OFFICE O/P EST MOD 30 MIN: CPT | Mod: S$PBB,,, | Performed by: ALLERGY & IMMUNOLOGY

## 2024-01-30 RX ORDER — AZELASTINE 1 MG/ML
1 SPRAY, METERED NASAL 2 TIMES DAILY
Qty: 20 ML | Refills: 5 | Status: SHIPPED | OUTPATIENT
Start: 2024-01-30 | End: 2025-01-29

## 2024-01-30 NOTE — PROGRESS NOTES
"Subjective:      Patient ID: Bharathi Parsons is a 83 y.o. male.    Chief Complaint:  Follow-up (Doing well over all. Still complains of post nasal drip. Atrovent doesn't help much.)      HPI:      1/30/2024: 83 year old male here for follow up- allergic rhinitis- pollen  He reports that symptoms have persisted and he feels they contribute to hearing difficulty at times.  He also feels Flonase and Atrovent have not helped.  He admits to not consistently taking GERD medication as discussed at his previous visit.        12/22/2023: 83 year old complaining of a runny nose with eating. He reports when he goes to bed in the evening, he has a runny nose/post nasal drip. He used Sinex and it worked for a while until he had a "reaction". He has significant rhinitis. He now avoids it.  He is taking Atrovent nasal spray currently.  He takes Atrovent at night and it helps "a little bit".  Some times travel improves nasal symptoms and at times, it does not.    She denies itchy or watery eyes.    He denies food allergies.  He denies drug or insect sting allergy.  He denies asthma.    He reports intermittent GERD.    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 JAK2 V617F mutated DNA was detected and measured  at 30% of total JAK2 DNA.    [id number] mutation is found with high frequency in  [place] chromosome-negative myeloproliferative  neoplasms (>95% in polycythemia vera and approximately  50-60% in primary myelofibrosis and essential  thrombocy        Family History   Problem Relation Age of Onset    Glaucoma Maternal Uncle     Cataracts Mother     Heart disease Father         Current Outpatient Medications on File Prior to Visit   Medication " Sig Dispense Refill    aspirin (ECOTRIN) 81 MG EC tablet Take 1 tablet (81 mg total) by mouth once daily. 100 tablet 1    bempedoic acid-ezetimibe (NEXLIZET) 180-10 mg Tab Take 1 tablet by mouth Daily. 30 tablet 12    cetirizine (ZYRTEC) 10 MG tablet       hydroxyurea (HYDREA) 500 mg Cap Take 1 capsule (500 mg total) by mouth once daily. 30 capsule 1    IBUPROFEN ORAL Take by mouth as needed.       ipratropium (ATROVENT) 42 mcg (0.06 %) nasal spray 2 sprays by Each Nostril route 3 (three) times daily.      lisinopriL 10 MG tablet Take 1 tablet by mouth once daily 90 tablet 3    LIVALO 4 mg Tab Take 1 tablet by mouth once daily 90 tablet 3    pantoprazole (PROTONIX) 40 MG tablet TAKE 1 TABLET BY MOUTH ONCE DAILY BEFORE MEAL(S)      COVID-19 vac, jose,Pfizer,,PF, (PFIZER COVID-19 JOSE VACCN,PF,) 30 mcg/0.3 mL injection Inject into the muscle. (Patient not taking: Reported on 2024) 0.3 mL 0    sodium polystyrene sulfonate (KAYEXALATE) 15 gram/60 mL Susp 0.25 g/mL oral liquid Take 60 mLs (15 g total) by mouth once daily. (Patient not taking: Reported on 2024) 180 mL 0    SPS, WITH SORBITOL, 15-20 gram/60 mL Susp SMARTSI Milliliter(s) By Mouth Daily      triamcinolone acetonide 0.1% (KENALOG) 0.1 % cream Apply topically 2 (two) times daily. (Patient not taking: Reported on 2024) 80 g 1     No current facility-administered medications on file prior to visit.        Review of patient's allergies indicates:  No Known Allergies     Environmental History: Pets in the home: dogs (1).  Tobacco Smoke in Home: no  Review of Systems   Constitutional:  Negative for chills and fever.   HENT:  Positive for postnasal drip and rhinorrhea.    Eyes:  Negative for discharge and itching.   Allergic/Immunologic: Positive for environmental allergies. Negative for food allergies and immunocompromised state.   Neurological:  Negative for facial asymmetry and speech difficulty.   Psychiatric/Behavioral:  Negative for  behavioral problems and suicidal ideas.        Objective:   Physical Exam  Constitutional:       General: He is not in acute distress.     Appearance: Normal appearance. He is not ill-appearing, toxic-appearing or diaphoretic.   HENT:      Head: Normocephalic and atraumatic.      Right Ear: Tympanic membrane, ear canal and external ear normal. There is no impacted cerumen.      Left Ear: Tympanic membrane, ear canal and external ear normal. There is no impacted cerumen.      Nose: Nose normal. No congestion or rhinorrhea.      Mouth/Throat:      Mouth: Mucous membranes are moist.      Pharynx: No oropharyngeal exudate or posterior oropharyngeal erythema.   Eyes:      General: No scleral icterus.        Right eye: No discharge.         Left eye: No discharge.   Neck:      Thyroid: No thyromegaly.   Cardiovascular:      Rate and Rhythm: Normal rate and regular rhythm.      Heart sounds: Normal heart sounds. No murmur heard.     No friction rub. No gallop.   Pulmonary:      Effort: Pulmonary effort is normal. No respiratory distress.      Breath sounds: Normal breath sounds. No stridor. No wheezing or rales.   Musculoskeletal:         General: Normal range of motion.      Cervical back: Normal range of motion and neck supple. No rigidity or tenderness.   Lymphadenopathy:      Cervical: No cervical adenopathy.   Skin:     General: Skin is warm.      Coloration: Skin is not jaundiced or pale.      Findings: No bruising, erythema or rash.   Neurological:      Mental Status: He is alert and oriented to person, place, and time.      Gait: Gait normal.   Psychiatric:         Mood and Affect: Mood normal.         Behavior: Behavior normal.         Thought Content: Thought content normal.         Judgment: Judgment normal.                         Assessment:      1. Seasonal allergic rhinitis due to pollen    2. Post-nasal drip    3. Gustatory rhinitis          Plan:     Seasonal allergic rhinitis due to pollen  -      azelastine (ASTELIN) 137 mcg (0.1 %) nasal spray; 1 spray (137 mcg total) by Nasal route 2 (two) times daily.  Dispense: 20 mL; Refill: 5    Post-nasal drip    Gustatory rhinitis      Discussed lab results.  Astelin ordered. He did not wish to restart flonase.    Followed by heme/onc    RTC 4-6 months or sooner, if needed     PREM DE LA GARZA                  Problems Address                                                 Amount and/or Complexity                                                                      Risk       3           [] 2 or more self-limited or minor problems                      [] Limited                                                                        [] Low                  [] 1 stable chronic illness                                                  Any combination of the two                                               OTC drugs                  []Acute, uncomplicated illness or injury                            Review of prior external notes from unique source           Minor surgery with no risk factors                                                                                                               [] 1 []2  []3+                                                                                                              Review of results from each unique test                                                                                                               [] 1 []2  [] 3+                                                                                                              Order of each unique test                                                                                                               [] 1 []2  [] 3+                                                                                                              Or                                                                                                             [] Assessment  requiring an independent historian      4            [] One or more chronic illness with exacerbation,              [] Moderate                                                                      [x] Moderate                 Progression, or side effects of treatment                            -test documents or independent historians                        Prescription drug management                [x]  2 or more stable chronic illnesses                                    [] Independent interpretation of tests                              Minor surgery with identifiable risk                [] 1 undiagnosed new problem with uncertain prognosis    [x] Discussion or management of test results                    elective major surgery                [] 1 acute illness with                systemic symptoms                                                                                                                                                              [] 1 acute complicated injury                                                                                                                                          Elective major surgery                                                                                                                                                                                                                                                                                                                                                                                                  5            [] 1 or more chronic illnesses with severe exacerbation,     [] Extensive(two from below)                                         [] High                                                                                                               [] Independent interpretation of results                         Drug therapy requiring intensive                                                                                                                []Discussion of management or test interpretation           monitoring                                                                                                                                                                                                       Decision to de-escalate care                 [] 1 acute or chronic illness or injury that poses a threat                                                                                               Decision regarding hospitalization

## 2024-02-02 ENCOUNTER — LAB VISIT (OUTPATIENT)
Dept: LAB | Facility: HOSPITAL | Age: 84
End: 2024-02-02
Attending: INTERNAL MEDICINE
Payer: MEDICARE

## 2024-02-02 ENCOUNTER — OFFICE VISIT (OUTPATIENT)
Dept: HEMATOLOGY/ONCOLOGY | Facility: CLINIC | Age: 84
End: 2024-02-02
Payer: MEDICARE

## 2024-02-02 VITALS
SYSTOLIC BLOOD PRESSURE: 131 MMHG | RESPIRATION RATE: 18 BRPM | HEART RATE: 86 BPM | OXYGEN SATURATION: 98 % | DIASTOLIC BLOOD PRESSURE: 83 MMHG | HEIGHT: 69 IN | WEIGHT: 163.38 LBS | BODY MASS INDEX: 24.2 KG/M2 | TEMPERATURE: 98 F

## 2024-02-02 DIAGNOSIS — Z79.899 IMMUNODEFICIENCY DUE TO CHEMOTHERAPY: ICD-10-CM

## 2024-02-02 DIAGNOSIS — Z15.89 JAK2 GENE MUTATION: ICD-10-CM

## 2024-02-02 DIAGNOSIS — D47.3 ESSENTIAL (HEMORRHAGIC) THROMBOCYTHEMIA: ICD-10-CM

## 2024-02-02 DIAGNOSIS — T45.1X5A IMMUNODEFICIENCY DUE TO CHEMOTHERAPY: ICD-10-CM

## 2024-02-02 DIAGNOSIS — N18.31 STAGE 3A CHRONIC KIDNEY DISEASE: ICD-10-CM

## 2024-02-02 DIAGNOSIS — D84.821 IMMUNODEFICIENCY DUE TO CHEMOTHERAPY: ICD-10-CM

## 2024-02-02 DIAGNOSIS — D47.3 ESSENTIAL (HEMORRHAGIC) THROMBOCYTHEMIA: Primary | ICD-10-CM

## 2024-02-02 DIAGNOSIS — D75.839 THROMBOCYTOSIS: ICD-10-CM

## 2024-02-02 LAB
ALBUMIN SERPL BCP-MCNC: 3.7 G/DL (ref 3.5–5.2)
ALP SERPL-CCNC: 68 U/L (ref 55–135)
ALT SERPL W/O P-5'-P-CCNC: 14 U/L (ref 10–44)
ANION GAP SERPL CALC-SCNC: 6 MMOL/L (ref 8–16)
AST SERPL-CCNC: 18 U/L (ref 10–40)
BASOPHILS # BLD AUTO: 0.06 K/UL (ref 0–0.2)
BASOPHILS NFR BLD: 0.4 % (ref 0–1.9)
BILIRUB SERPL-MCNC: 0.3 MG/DL (ref 0.1–1)
BUN SERPL-MCNC: 32 MG/DL (ref 8–23)
CALCIUM SERPL-MCNC: 9 MG/DL (ref 8.7–10.5)
CHLORIDE SERPL-SCNC: 107 MMOL/L (ref 95–110)
CO2 SERPL-SCNC: 26 MMOL/L (ref 23–29)
CREAT SERPL-MCNC: 1.4 MG/DL (ref 0.5–1.4)
DIFFERENTIAL METHOD BLD: ABNORMAL
EOSINOPHIL # BLD AUTO: 0.4 K/UL (ref 0–0.5)
EOSINOPHIL NFR BLD: 3 % (ref 0–8)
ERYTHROCYTE [DISTWIDTH] IN BLOOD BY AUTOMATED COUNT: 19.3 % (ref 11.5–14.5)
EST. GFR  (NO RACE VARIABLE): 49 ML/MIN/1.73 M^2
GLUCOSE SERPL-MCNC: 88 MG/DL (ref 70–110)
HCT VFR BLD AUTO: 45.2 % (ref 40–54)
HGB BLD-MCNC: 14.7 G/DL (ref 14–18)
IMM GRANULOCYTES # BLD AUTO: 0.05 K/UL (ref 0–0.04)
IMM GRANULOCYTES NFR BLD AUTO: 0.4 % (ref 0–0.5)
LYMPHOCYTES # BLD AUTO: 1.1 K/UL (ref 1–4.8)
LYMPHOCYTES NFR BLD: 7.9 % (ref 18–48)
MCH RBC QN AUTO: 28.2 PG (ref 27–31)
MCHC RBC AUTO-ENTMCNC: 32.5 G/DL (ref 32–36)
MCV RBC AUTO: 87 FL (ref 82–98)
MONOCYTES # BLD AUTO: 0.7 K/UL (ref 0.3–1)
MONOCYTES NFR BLD: 4.7 % (ref 4–15)
NEUTROPHILS # BLD AUTO: 11.5 K/UL (ref 1.8–7.7)
NEUTROPHILS NFR BLD: 83.6 % (ref 38–73)
NRBC BLD-RTO: 0 /100 WBC
PLATELET # BLD AUTO: 554 K/UL (ref 150–450)
PMV BLD AUTO: 9.3 FL (ref 9.2–12.9)
POTASSIUM SERPL-SCNC: 5.1 MMOL/L (ref 3.5–5.1)
PROT SERPL-MCNC: 6.7 G/DL (ref 6–8.4)
RBC # BLD AUTO: 5.21 M/UL (ref 4.6–6.2)
SODIUM SERPL-SCNC: 139 MMOL/L (ref 136–145)
WBC # BLD AUTO: 13.77 K/UL (ref 3.9–12.7)

## 2024-02-02 PROCEDURE — 99999 PR PBB SHADOW E&M-EST. PATIENT-LVL IV: CPT | Mod: PBBFAC,,, | Performed by: INTERNAL MEDICINE

## 2024-02-02 PROCEDURE — 99214 OFFICE O/P EST MOD 30 MIN: CPT | Mod: PBBFAC | Performed by: INTERNAL MEDICINE

## 2024-02-02 PROCEDURE — 80053 COMPREHEN METABOLIC PANEL: CPT | Performed by: INTERNAL MEDICINE

## 2024-02-02 PROCEDURE — 85025 COMPLETE CBC W/AUTO DIFF WBC: CPT | Performed by: INTERNAL MEDICINE

## 2024-02-02 PROCEDURE — 36415 COLL VENOUS BLD VENIPUNCTURE: CPT | Performed by: INTERNAL MEDICINE

## 2024-02-02 PROCEDURE — 99214 OFFICE O/P EST MOD 30 MIN: CPT | Mod: S$PBB,,, | Performed by: INTERNAL MEDICINE

## 2024-02-02 RX ORDER — HYDROXYUREA 500 MG/1
500 CAPSULE ORAL DAILY
Qty: 30 CAPSULE | Refills: 11 | Status: SHIPPED | OUTPATIENT
Start: 2024-02-02 | End: 2025-02-01

## 2024-02-02 NOTE — PROGRESS NOTES
Subjective:       Patient ID: Bharathi Parsons is a 83 y.o. male.    Chief Complaint: Results    HPI:  83-year-old male history of JAK2 positive essential thrombocytosis.  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 JAK2 V617F mutated DNA was detected and measured  at 30% of total JAK2 DNA.    [id number] mutation is found with high frequency in  [place] chromosome-negative myeloproliferative  neoplasms (>95% in polycythemia vera and approximately  50-60% in primary myelofibrosis and essential  thrombocy     Family History   Problem Relation Age of Onset    Glaucoma Maternal Uncle     Cataracts Mother     Heart disease Father      Social History     Socioeconomic History    Marital status:    Tobacco Use    Smoking status: Never     Passive exposure: Never    Smokeless tobacco: Never   Substance and Sexual Activity    Alcohol use: Yes     Comment: Ocasionally    Drug use: No    Sexual activity: Not Currently     Partners: Female   Social History Narrative    , No smokers in household, 1 Dog.     Social Determinants of Health     Financial Resource Strain: Low Risk  (1/5/2023)    Overall Financial Resource Strain (CARDIA)     Difficulty of Paying Living Expenses: Not hard at all   Food Insecurity: No Food Insecurity (12/11/2023)    Hunger Vital Sign     Worried About Running Out of Food in the Last Year: Never true     Ran Out of Food in the Last Year: Never true   Transportation Needs: No Transportation Needs (12/11/2023)    PRAPARE - Transportation     Lack of Transportation (Medical):  No     Lack of Transportation (Non-Medical): No   Physical Activity: Sufficiently Active (12/11/2023)    Exercise Vital Sign     Days of Exercise per Week: 7 days     Minutes of Exercise per Session: 30 min   Stress: No Stress Concern Present (12/11/2023)    [employer] [place] [employer] - Occupational Stress Questionnaire     Feeling of Stress : Only a little   Social Connections: Unknown (12/11/2023)    Social Connection and Isolation Panel [NHANES]     Frequency of Communication with Friends and Family: Once a week     Frequency of Social Gatherings with Friends and Family: More than three times a week     Attends Club or Organization Meetings: More than 4 times per year     Marital Status:    Housing Stability: Low Risk  (12/11/2023)    Housing Stability Vital Sign     Unable to Pay for Housing in the Last Year: No     Number of Places Lived in the Last Year: 1     Unstable Housing in the Last Year: No     Past Surgical History:   Procedure Laterality Date    APPENDECTOMY      as child    CATARACT EXTRACTION W/  INTRAOCULAR LENS IMPLANT  OD before 2003    CATARACT EXTRACTION W/  INTRAOCULAR LENS IMPLANT  OS 12/19/07 with YAG    ESOPHAGEAL DILATION      x 2 in past last one done in  2022    FOOT ARTHRODESIS      medial column arthrodesis left foot  2011       Labs:  Lab Results   Component Value Date    WBC 13.77 (H) 02/02/2024    HGB 14.7 02/02/2024    HCT 45.2 02/02/2024    MCV 87 02/02/2024     (H) 02/02/2024     BMP  Lab Results   Component Value Date     01/04/2024    K 4.8 01/04/2024     01/04/2024    CO2 25 01/04/2024    BUN 25 (H) 01/04/2024    CREATININE 1.2 01/04/2024    CALCIUM 9.5 01/04/2024    ANIONGAP 8 01/04/2024    ESTGFRAFRICA >60.0 07/21/2022    EGFRNONAA 55.2 (A) 07/21/2022     Lab Results   Component Value Date    ALT 18 12/28/2023    AST 20 12/28/2023    ALKPHOS 78 12/28/2023    BILITOT 0.3 12/28/2023       Lab Results   Component Value Date    IRON 50  "12/01/2023    TIBC 311 12/01/2023    FERRITIN 88 12/01/2023     No results found for: "[id number]"  No results found for: "FOLATE"  Lab Results   Component Value Date    TSH 2.926 05/17/2018         Review of Systems   Constitutional:  Negative for activity change, appetite change, chills, diaphoresis, fatigue, fever and unexpected weight change.   HENT:  Negative for congestion, dental problem, drooling, ear discharge, ear pain, facial swelling, hearing loss, mouth sores, nosebleeds, postnasal drip, rhinorrhea, sinus pressure, sneezing, sore throat, tinnitus, trouble swallowing and voice change.    Eyes:  Negative for photophobia, pain, discharge, redness, itching and visual disturbance.   Respiratory:  Negative for apnea, cough, choking, chest tightness, shortness of breath, wheezing and stridor.    Cardiovascular:  Negative for chest pain, palpitations and leg swelling.   Gastrointestinal:  Negative for abdominal distention, abdominal pain, anal bleeding, blood in stool, constipation, diarrhea, nausea, rectal pain and vomiting.   Endocrine: Negative for cold intolerance, heat intolerance, polydipsia, polyphagia and polyuria.   Genitourinary:  Negative for decreased urine volume, difficulty urinating, dysuria, enuresis, flank pain, frequency, genital sores, hematuria, penile discharge, penile pain, penile swelling, scrotal swelling, testicular pain and urgency.   Musculoskeletal:  Negative for arthralgias, back pain, gait problem, joint swelling, myalgias, neck pain and neck stiffness.   Skin:  Negative for color change, pallor, rash and wound.   Allergic/Immunologic: Negative for environmental allergies, food allergies and immunocompromised state.   Neurological:  Negative for dizziness, tremors, seizures, syncope, facial asymmetry, speech difficulty, weakness, light-headedness, numbness and headaches.   Hematological:  Negative for adenopathy. Does not bruise/bleed easily.   Psychiatric/Behavioral:  Negative for " agitation, behavioral problems, confusion, decreased concentration, dysphoric mood, hallucinations, self-injury, sleep disturbance and suicidal ideas. The patient is not nervous/anxious and is not hyperactive.      Objective:      Physical Exam  Vitals reviewed.   Constitutional:       General: He is not in acute distress.     Appearance: Normal appearance. He is well-developed. He is not diaphoretic.   HENT:      Head: Normocephalic.      Right Ear: External ear normal.      Left Ear: External ear normal.      Nose: Nose normal.      Right Sinus: No maxillary sinus tenderness or frontal sinus tenderness.      Left Sinus: No maxillary sinus tenderness or frontal sinus tenderness.      Mouth/Throat:      Pharynx: No oropharyngeal exudate.   Eyes:      General: Lids are normal. No scleral icterus.        Right eye: No discharge.         Left eye: No discharge.      Extraocular Movements:      Right eye: Normal extraocular motion.      Left eye: Normal extraocular motion.      Conjunctiva/sclera:      Right eye: Right conjunctiva is not injected. No hemorrhage.     Left eye: Left conjunctiva is not injected. No hemorrhage.     Pupils: Pupils are equal, round, and reactive to light.   Neck:      Thyroid: No thyromegaly.      Vascular: No JVD.      Trachea: No tracheal deviation.   Cardiovascular:      Rate and Rhythm: Normal rate.   Pulmonary:      Effort: Pulmonary effort is normal. No respiratory distress.      Breath sounds: No stridor.   Abdominal:      General: Bowel sounds are normal.      Palpations: Abdomen is soft. There is no hepatomegaly, splenomegaly or mass.      Tenderness: There is no abdominal tenderness.   Musculoskeletal:         General: No tenderness. Normal range of motion.      Cervical back: Normal range of motion and neck supple.   Lymphadenopathy:      Head:      Right side of head: No posterior auricular or occipital adenopathy.      Left side of head: No posterior auricular or occipital  adenopathy.      Cervical: No cervical adenopathy.      Right cervical: No superficial, deep or posterior cervical adenopathy.     Left cervical: No superficial, deep or posterior cervical adenopathy.      Upper Body:      Right upper body: No supraclavicular adenopathy.      Left upper body: No supraclavicular adenopathy.   Skin:     General: Skin is dry.      Findings: No erythema or rash.      Nails: There is no clubbing.   Neurological:      Mental Status: He is alert and oriented to person, place, and time.      Cranial Nerves: No cranial nerve deficit.      Coordination: Coordination normal.   Psychiatric:         Behavior: Behavior normal.         Thought Content: Thought content normal.         Judgment: Judgment normal.           Assessment:      1. Essential (hemorrhagic) thrombocythemia    2. JAK2 gene mutation    3. Thrombocytosis    4. Immunodeficiency due to chemotherapy    5. Stage 3a chronic kidney disease           Med Onc Chart Routing      Follow up with physician    Follow up with PRACHI 3 months. Return in 3 months to see APAP with standing CBC   Infusion scheduling note    Injection scheduling note    Labs CBC   Scheduling:  Preferred lab:  Lab interval:  CBC monthly at [facility]   Imaging    Pharmacy appointment    Other referrals               Plan:     Please continue with CBC done on a monthly basis.  Can be seen by APAP in 3 months refill prescriptions communicate through portal patient is aware in tolerating therapy well with good control with low-dose hydroxyurea        Jose Roberts Jr, MD FACP

## 2024-02-19 ENCOUNTER — OFFICE VISIT (OUTPATIENT)
Dept: GASTROENTEROLOGY | Facility: CLINIC | Age: 84
End: 2024-02-19
Payer: MEDICARE

## 2024-02-19 VITALS
DIASTOLIC BLOOD PRESSURE: 84 MMHG | HEIGHT: 69 IN | OXYGEN SATURATION: 98 % | RESPIRATION RATE: 16 BRPM | HEART RATE: 105 BPM | BODY MASS INDEX: 24.23 KG/M2 | WEIGHT: 163.56 LBS | SYSTOLIC BLOOD PRESSURE: 124 MMHG

## 2024-02-19 DIAGNOSIS — R13.19 ESOPHAGEAL DYSPHAGIA: Primary | ICD-10-CM

## 2024-02-19 DIAGNOSIS — Z12.11 COLON CANCER SCREENING: ICD-10-CM

## 2024-02-19 PROCEDURE — 99215 OFFICE O/P EST HI 40 MIN: CPT | Mod: PBBFAC | Performed by: NURSE PRACTITIONER

## 2024-02-19 PROCEDURE — 99214 OFFICE O/P EST MOD 30 MIN: CPT | Mod: S$PBB,,, | Performed by: NURSE PRACTITIONER

## 2024-02-19 PROCEDURE — 99999 PR PBB SHADOW E&M-EST. PATIENT-LVL V: CPT | Mod: PBBFAC,,, | Performed by: NURSE PRACTITIONER

## 2024-02-19 NOTE — PROGRESS NOTES
Clinic Consult:  [facility] Consultation Note    Reason for Consult:  The primary encounter diagnosis was Esophageal dysphagia. A diagnosis of Colon cancer screening was also pertinent to this visit.    PCP: Festus Rooney   [address] [facility] / [place] [address]    HPI:  This is a 83 y.o. male here for evaluation of the above.    # colon cancer screening  - no family hx of colon cancer  - no hx of colon polyps  - last colonoscopy done in 2013    # dysphagia.   - chronic issue  - previous EGD a couple of years ago at outside GI with dilation  - helped swallowing but returned a couple of months ago.   - feels like meat (especially dry meat) get stuck over mid chest.   - this occurs more after drinking red wine for several days.     Review of Systems   Constitutional:  Negative for fever and weight loss.   HENT:  Negative for sore throat.    Respiratory:  Negative for cough, shortness of breath and wheezing.    Cardiovascular:  Negative for chest pain and palpitations.   Gastrointestinal:  Positive for heartburn. Negative for abdominal pain, blood in stool, constipation, diarrhea, melena, nausea and vomiting.        Dysphagia    Genitourinary:  Negative for dysuria and frequency.   Skin:  Negative for itching and rash.       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    Surgical History:  has a past surgical history that includes Foot arthrodesis; Cataract extraction w/  intraocular lens implant (OD before 2003); Cataract extraction w/  intraocular lens implant (OS 12/19/07 with YAG); Appendectomy; and Esophageal dilation.    Family History: family history includes Cataracts in his mother; Glaucoma in his maternal uncle; Heart disease in his father..     Social History:  reports that he has never smoked. He has never been exposed  "to tobacco smoke. He has never used smokeless tobacco. He reports current alcohol use. He reports that he does not use drugs.    Allergies: Reviewed    Home Medications:   Current Outpatient Medications on File Prior to Visit   Medication Sig Dispense Refill    aspirin (ECOTRIN) 81 MG EC tablet Take 1 tablet (81 mg total) by mouth once daily. 100 tablet 1    azelastine (ASTELIN) 137 mcg (0.1 %) nasal spray 1 spray (137 mcg total) by Nasal route 2 (two) times daily. 20 mL 5    bempedoic acid-ezetimibe (NEXLIZET) 180-10 mg Tab Take 1 tablet by mouth Daily. 30 tablet 12    cetirizine (ZYRTEC) 10 MG tablet       hydroxyurea (HYDREA) 500 mg Cap Take 1 capsule (500 mg total) by mouth once daily. 30 capsule 11    IBUPROFEN ORAL Take by mouth as needed.       lisinopriL 10 MG tablet Take 1 tablet by mouth once daily 90 tablet 3    LIVALO 4 mg Tab Take 1 tablet by mouth once daily 90 tablet 3    pantoprazole (PROTONIX) 40 MG tablet TAKE 1 TABLET BY MOUTH ONCE DAILY BEFORE MEAL(S)      triamcinolone acetonide 0.1% (KENALOG) 0.1 % cream Apply topically 2 (two) times daily. 80 g 1    COVID-19 vac, jose,Pfizer,,PF, (PFIZER COVID-19 JOSE VACCN,PF,) 30 mcg/0.3 mL injection Inject into the muscle. (Patient not taking: Reported on 2024) 0.3 mL 0    [DISCONTINUED] ipratropium (ATROVENT) 42 mcg (0.06 %) nasal spray 2 sprays by Each Nostril route 3 (three) times daily.      [DISCONTINUED] sodium polystyrene sulfonate (KAYEXALATE) 15 gram/60 mL Susp 0.25 g/mL oral liquid Take 60 mLs (15 g total) by mouth once daily. (Patient not taking: Reported on 2024) 180 mL 0    [DISCONTINUED] SPS, WITH SORBITOL, 15-20 gram/60 mL Susp SMARTSI Milliliter(s) By Mouth Daily       No current facility-administered medications on file prior to visit.       Physical Exam:  /84 (BP Location: Right arm, Patient Position: Sitting, BP Method: Medium (Automatic))   Pulse 105   Resp 16   Ht 5' 9" (1.753 m)   Wt 74.2 kg (163 lb 9.3 oz)   " SpO2 98%   BMI 24.16 kg/m²   Body mass index is 24.16 kg/m².  Physical Exam  Constitutional:       General: He is not in acute distress.  HENT:      Head: Normocephalic.   Neurological:      General: No focal deficit present.      Mental Status: He is alert.   Psychiatric:         Mood and Affect: Mood normal.         Judgment: Judgment normal.         Labs: Pertinent labs reviewed.  CRC Screening:     Assessment:  1. Esophageal dysphagia    2. Colon cancer screening        Recommendations:   - esophagram  - if abnormal, would recommend EGD  - colon cancer screening colonoscopy not indicated for average risk patients past the age of 75.     Esophageal dysphagia  -     FL Esophagram Complete; Future; Expected date: 02/19/2024    Colon cancer screening  -     Ambulatory referral/consult to Gastroenterology      Follow up if symptoms worsen or fail to improve.    Thank you so much for allowing me to participate in the care of JOSE MIGUEL Ridley

## 2024-02-21 LAB
ANNOTATION COMMENT IMP: NORMAL
COMMENT: NORMAL
FINAL PATHOLOGIC DIAGNOSIS: NORMAL
GROSS: NORMAL
Lab: NORMAL
MICROSCOPIC EXAM: NORMAL
NGS CLINCIAL TRIALS: NORMAL
NGS INDICATION OF TEST: NORMAL
NGS INTERPRETATION: NORMAL
NGS ONCOHEME PANEL GENE LIST: NORMAL
NGS PATHOGENIC MUTATIONS DETECTED: NORMAL
NGS REVIEWED BY:: NORMAL
NGS VARIANTS OF UNKNOWN SIGNIFICANCE: NORMAL
NGSHM RESULT, BONE MARROW: NORMAL
REF LAB TEST METHOD: NORMAL
SPECIMEN SOURCE: NORMAL
SUPPLEMENTAL DIAGNOSIS: NORMAL
TEST PERFORMANCE INFO SPEC: NORMAL

## 2024-02-23 ENCOUNTER — HOSPITAL ENCOUNTER (OUTPATIENT)
Dept: RADIOLOGY | Facility: HOSPITAL | Age: 84
Discharge: HOME | End: 2024-02-23
Attending: NURSE PRACTITIONER
Payer: MEDICARE

## 2024-02-23 DIAGNOSIS — R13.19 ESOPHAGEAL DYSPHAGIA: ICD-10-CM

## 2024-02-23 PROCEDURE — A9698 NON-RAD CONTRAST MATERIALNOC: HCPCS | Performed by: NURSE PRACTITIONER

## 2024-02-23 PROCEDURE — 74220 X-RAY XM ESOPHAGUS 1CNTRST: CPT | Mod: 26,,, | Performed by: RADIOLOGY

## 2024-02-23 PROCEDURE — 74220 X-RAY XM ESOPHAGUS 1CNTRST: CPT | Mod: TC

## 2024-02-23 PROCEDURE — 25500020 PHARM REV CODE 255: Performed by: NURSE PRACTITIONER

## 2024-02-23 RX ADMIN — BARIUM SULFATE 176 G: 960 POWDER, FOR SUSPENSION ORAL at 08:02

## 2024-02-23 RX ADMIN — BARIUM SULFATE 135 ML: 980 POWDER, FOR SUSPENSION ORAL at 08:02

## 2024-02-29 ENCOUNTER — LAB VISIT (OUTPATIENT)
Dept: LAB | Facility: HOSPITAL | Age: 84
End: 2024-02-29
Attending: INTERNAL MEDICINE
Payer: MEDICARE

## 2024-02-29 DIAGNOSIS — Z15.89 JAK2 GENE MUTATION: ICD-10-CM

## 2024-02-29 DIAGNOSIS — D47.3 ESSENTIAL (HEMORRHAGIC) THROMBOCYTHEMIA: ICD-10-CM

## 2024-02-29 LAB
BASOPHILS # BLD AUTO: 0.05 K/UL (ref 0–0.2)
BASOPHILS NFR BLD: 0.4 % (ref 0–1.9)
DIFFERENTIAL METHOD BLD: ABNORMAL
EOSINOPHIL # BLD AUTO: 0.4 K/UL (ref 0–0.5)
EOSINOPHIL NFR BLD: 3.5 % (ref 0–8)
ERYTHROCYTE [DISTWIDTH] IN BLOOD BY AUTOMATED COUNT: 20.6 % (ref 11.5–14.5)
HCT VFR BLD AUTO: 43.9 % (ref 40–54)
HGB BLD-MCNC: 14.5 G/DL (ref 14–18)
IMM GRANULOCYTES # BLD AUTO: 0.04 K/UL (ref 0–0.04)
IMM GRANULOCYTES NFR BLD AUTO: 0.3 % (ref 0–0.5)
LYMPHOCYTES # BLD AUTO: 1.1 K/UL (ref 1–4.8)
LYMPHOCYTES NFR BLD: 9 % (ref 18–48)
MCH RBC QN AUTO: 29.2 PG (ref 27–31)
MCHC RBC AUTO-ENTMCNC: 33 G/DL (ref 32–36)
MCV RBC AUTO: 89 FL (ref 82–98)
MONOCYTES # BLD AUTO: 0.7 K/UL (ref 0.3–1)
MONOCYTES NFR BLD: 5.4 % (ref 4–15)
NEUTROPHILS # BLD AUTO: 10.1 K/UL (ref 1.8–7.7)
NEUTROPHILS NFR BLD: 81.4 % (ref 38–73)
NRBC BLD-RTO: 0 /100 WBC
PLATELET # BLD AUTO: 449 K/UL (ref 150–450)
PMV BLD AUTO: 8.9 FL (ref 9.2–12.9)
RBC # BLD AUTO: 4.96 M/UL (ref 4.6–6.2)
WBC # BLD AUTO: 12.44 K/UL (ref 3.9–12.7)

## 2024-02-29 PROCEDURE — 36415 COLL VENOUS BLD VENIPUNCTURE: CPT | Performed by: INTERNAL MEDICINE

## 2024-02-29 PROCEDURE — 85025 COMPLETE CBC W/AUTO DIFF WBC: CPT | Performed by: INTERNAL MEDICINE

## 2024-03-05 ENCOUNTER — TELEPHONE (OUTPATIENT)
Dept: AUDIOLOGY | Facility: CLINIC | Age: 84
End: 2024-03-05
Payer: MEDICARE

## 2024-03-05 NOTE — TELEPHONE ENCOUNTER
Patient will drop off at [facility] 3 reception 1 hearing aid Wed 3/6 for factory check before his extended warranty expires on 4/15. When he picks that aid up, he will drop off the other aid for factory check. No performance problems with aids other than has difficulty understanding women's voices at times.  /rtoma/

## 2024-03-08 ENCOUNTER — LAB VISIT (OUTPATIENT)
Dept: LAB | Facility: HOSPITAL | Age: 84
End: 2024-03-08
Attending: PEDIATRICS
Payer: MEDICARE

## 2024-03-08 DIAGNOSIS — E78.00 HYPERCHOLESTEROLEMIA: ICD-10-CM

## 2024-03-08 DIAGNOSIS — D72.829 LEUKOCYTOSIS, UNSPECIFIED TYPE: ICD-10-CM

## 2024-03-08 LAB
ALBUMIN SERPL BCP-MCNC: 3.8 G/DL (ref 3.5–5.2)
ALP SERPL-CCNC: 61 U/L (ref 55–135)
ALT SERPL W/O P-5'-P-CCNC: 14 U/L (ref 10–44)
ANION GAP SERPL CALC-SCNC: 8 MMOL/L (ref 8–16)
AST SERPL-CCNC: 19 U/L (ref 10–40)
BASOPHILS # BLD AUTO: 0.04 K/UL (ref 0–0.2)
BASOPHILS NFR BLD: 0.4 % (ref 0–1.9)
BILIRUB SERPL-MCNC: 0.4 MG/DL (ref 0.1–1)
BUN SERPL-MCNC: 26 MG/DL (ref 8–23)
CALCIUM SERPL-MCNC: 9.7 MG/DL (ref 8.7–10.5)
CHLORIDE SERPL-SCNC: 107 MMOL/L (ref 95–110)
CHOLEST SERPL-MCNC: 143 MG/DL (ref 120–199)
CHOLEST/HDLC SERPL: 2.9 {RATIO} (ref 2–5)
CO2 SERPL-SCNC: 27 MMOL/L (ref 23–29)
CREAT SERPL-MCNC: 1.4 MG/DL (ref 0.5–1.4)
DIFFERENTIAL METHOD BLD: ABNORMAL
EOSINOPHIL # BLD AUTO: 0.5 K/UL (ref 0–0.5)
EOSINOPHIL NFR BLD: 5.1 % (ref 0–8)
ERYTHROCYTE [DISTWIDTH] IN BLOOD BY AUTOMATED COUNT: 21.2 % (ref 11.5–14.5)
EST. GFR  (NO RACE VARIABLE): 49.3 ML/MIN/1.73 M^2
GLUCOSE SERPL-MCNC: 85 MG/DL (ref 70–110)
HCT VFR BLD AUTO: 44.9 % (ref 40–54)
HDLC SERPL-MCNC: 50 MG/DL (ref 40–75)
HDLC SERPL: 35 % (ref 20–50)
HGB BLD-MCNC: 14.2 G/DL (ref 14–18)
IMM GRANULOCYTES # BLD AUTO: 0.03 K/UL (ref 0–0.04)
IMM GRANULOCYTES NFR BLD AUTO: 0.3 % (ref 0–0.5)
LDLC SERPL CALC-MCNC: 79 MG/DL (ref 63–159)
LYMPHOCYTES # BLD AUTO: 1.1 K/UL (ref 1–4.8)
LYMPHOCYTES NFR BLD: 11.3 % (ref 18–48)
MCH RBC QN AUTO: 29.5 PG (ref 27–31)
MCHC RBC AUTO-ENTMCNC: 31.6 G/DL (ref 32–36)
MCV RBC AUTO: 93 FL (ref 82–98)
MONOCYTES # BLD AUTO: 0.4 K/UL (ref 0.3–1)
MONOCYTES NFR BLD: 4.1 % (ref 4–15)
NEUTROPHILS # BLD AUTO: 7.8 K/UL (ref 1.8–7.7)
NEUTROPHILS NFR BLD: 78.8 % (ref 38–73)
NONHDLC SERPL-MCNC: 93 MG/DL
NRBC BLD-RTO: 0 /100 WBC
PLATELET # BLD AUTO: 441 K/UL (ref 150–450)
PMV BLD AUTO: 9.7 FL (ref 9.2–12.9)
POTASSIUM SERPL-SCNC: 5.5 MMOL/L (ref 3.5–5.1)
PROT SERPL-MCNC: 6.2 G/DL (ref 6–8.4)
RBC # BLD AUTO: 4.81 M/UL (ref 4.6–6.2)
SODIUM SERPL-SCNC: 142 MMOL/L (ref 136–145)
TRIGL SERPL-MCNC: 70 MG/DL (ref 30–150)
WBC # BLD AUTO: 9.88 K/UL (ref 3.9–12.7)

## 2024-03-08 PROCEDURE — 85025 COMPLETE CBC W/AUTO DIFF WBC: CPT | Performed by: PEDIATRICS

## 2024-03-08 PROCEDURE — 36415 COLL VENOUS BLD VENIPUNCTURE: CPT | Performed by: PEDIATRICS

## 2024-03-08 PROCEDURE — 80061 LIPID PANEL: CPT | Performed by: PEDIATRICS

## 2024-03-08 PROCEDURE — 80053 COMPREHEN METABOLIC PANEL: CPT | Performed by: PEDIATRICS

## 2024-03-15 ENCOUNTER — LAB VISIT (OUTPATIENT)
Dept: LAB | Facility: HOSPITAL | Age: 84
End: 2024-03-15
Attending: PEDIATRICS
Payer: MEDICARE

## 2024-03-15 ENCOUNTER — OFFICE VISIT (OUTPATIENT)
Dept: INTERNAL MEDICINE | Facility: CLINIC | Age: 84
End: 2024-03-15
Payer: MEDICARE

## 2024-03-15 VITALS
HEIGHT: 69 IN | OXYGEN SATURATION: 98 % | WEIGHT: 162.5 LBS | HEART RATE: 90 BPM | SYSTOLIC BLOOD PRESSURE: 126 MMHG | BODY MASS INDEX: 24.07 KG/M2 | TEMPERATURE: 96 F | RESPIRATION RATE: 16 BRPM | DIASTOLIC BLOOD PRESSURE: 80 MMHG

## 2024-03-15 DIAGNOSIS — Z79.899 IMMUNODEFICIENCY DUE TO CHEMOTHERAPY: ICD-10-CM

## 2024-03-15 DIAGNOSIS — E87.5 HYPERKALEMIA: ICD-10-CM

## 2024-03-15 DIAGNOSIS — N18.31 STAGE 3A CHRONIC KIDNEY DISEASE: ICD-10-CM

## 2024-03-15 DIAGNOSIS — K21.00 GASTROESOPHAGEAL REFLUX DISEASE WITH ESOPHAGITIS, UNSPECIFIED WHETHER HEMORRHAGE: ICD-10-CM

## 2024-03-15 DIAGNOSIS — D69.2 OTHER NONTHROMBOCYTOPENIC PURPURA: ICD-10-CM

## 2024-03-15 DIAGNOSIS — I10 ESSENTIAL HYPERTENSION: Primary | ICD-10-CM

## 2024-03-15 DIAGNOSIS — T45.1X5A IMMUNODEFICIENCY DUE TO CHEMOTHERAPY: ICD-10-CM

## 2024-03-15 DIAGNOSIS — E78.00 HYPERCHOLESTEROLEMIA: ICD-10-CM

## 2024-03-15 DIAGNOSIS — Z15.89 JAK2 GENE MUTATION: ICD-10-CM

## 2024-03-15 DIAGNOSIS — D47.3 ESSENTIAL (HEMORRHAGIC) THROMBOCYTHEMIA: ICD-10-CM

## 2024-03-15 DIAGNOSIS — D84.821 IMMUNODEFICIENCY DUE TO CHEMOTHERAPY: ICD-10-CM

## 2024-03-15 DIAGNOSIS — M17.0 BILATERAL PRIMARY OSTEOARTHRITIS OF KNEE: ICD-10-CM

## 2024-03-15 LAB
ANION GAP SERPL CALC-SCNC: 6 MMOL/L (ref 8–16)
BUN SERPL-MCNC: 34 MG/DL (ref 8–23)
CALCIUM SERPL-MCNC: 9.2 MG/DL (ref 8.7–10.5)
CHLORIDE SERPL-SCNC: 107 MMOL/L (ref 95–110)
CO2 SERPL-SCNC: 25 MMOL/L (ref 23–29)
CREAT SERPL-MCNC: 1.4 MG/DL (ref 0.5–1.4)
EST. GFR  (NO RACE VARIABLE): 49.3 ML/MIN/1.73 M^2
GLUCOSE SERPL-MCNC: 55 MG/DL (ref 70–110)
POTASSIUM SERPL-SCNC: 5.4 MMOL/L (ref 3.5–5.1)
SODIUM SERPL-SCNC: 138 MMOL/L (ref 136–145)

## 2024-03-15 PROCEDURE — 99999 PR PBB SHADOW E&M-EST. PATIENT-LVL III: CPT | Mod: PBBFAC,,, | Performed by: PEDIATRICS

## 2024-03-15 PROCEDURE — 80048 BASIC METABOLIC PNL TOTAL CA: CPT | Performed by: PEDIATRICS

## 2024-03-15 PROCEDURE — 99213 OFFICE O/P EST LOW 20 MIN: CPT | Mod: PBBFAC | Performed by: PEDIATRICS

## 2024-03-15 PROCEDURE — 99214 OFFICE O/P EST MOD 30 MIN: CPT | Mod: S$PBB,,, | Performed by: PEDIATRICS

## 2024-03-15 PROCEDURE — 36415 COLL VENOUS BLD VENIPUNCTURE: CPT | Performed by: PEDIATRICS

## 2024-03-15 RX ORDER — PANTOPRAZOLE SODIUM 40 MG/1
TABLET, DELAYED RELEASE ORAL
Qty: 90 TABLET | Refills: 3 | Status: SHIPPED | OUTPATIENT
Start: 2024-03-15

## 2024-03-15 NOTE — PROGRESS NOTES
Subjective     Patient ID: Bharathi Parsons is a 83 y.o. male.    Chief Complaint: Follow-up    Bharathi Parsons is a 83 y.o. male who presents for a follow up.    1) GERD with esophagitis and esophageal stricture. Had EGD and dilatation by Dr. Darby. On PPI, has flares with missing doses. Mild swallowing difficulties. Saw GI here with flourogram. Using swallowing precautions. Pantoperzol helps.  2) Allergies: See Dr. Meneses, uses intranasal steroids and astelin successfully. Always has some degree of Sx  3) Hypercholesterol: Is taking Livalo (4 mg 1/2 a day) and zetia due to crestor and other statins causing muscle achiness.  4) HTN:Tolerating ACE. B/P good.   5) Senile purpura: no other bleeding gums, hematuria, rectal bleeding, Melena   6) CKD3: creatinine stable EGFR is low due to age  7)Colon cancer screening: has longevity on both sides of the family, Last colonoscopy was 2013 at [facility]. He discussed with GI and is in the process of deciding on if he wishes to continue. Due now.   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    LABS REVIEWED AND DISCUSSED WITH PATIENT    PMHx, PSHx, SocHx, and FHx reviewed and discussed with patient.      Review of Systems   Constitutional:  Negative for chills and fever.   HENT:  Negative for nasal congestion and rhinorrhea.    Respiratory:  Negative for cough and shortness of breath.    Cardiovascular:  Negative for chest pain.   Gastrointestinal:  Positive for reflux. Negative for abdominal pain, blood in stool, change in bowel habit, constipation, diarrhea and nausea.   Endocrine: Negative for cold intolerance, heat intolerance, polydipsia, polyphagia and polyuria.   Genitourinary:  Negative for dysuria.   Musculoskeletal:  Positive for arthralgias.   Neurological:  Negative for weakness.          Objective     Physical Exam  Constitutional:       General: He is not in acute distress.     Appearance: Normal appearance. He  is normal weight. He is not ill-appearing, toxic-appearing or diaphoretic.   HENT:      Head: Atraumatic.   Cardiovascular:      Rate and Rhythm: Normal rate and regular rhythm.      Pulses: Normal pulses.      Heart sounds: Normal heart sounds. No murmur heard.  Pulmonary:      Effort: Pulmonary effort is normal. No respiratory distress.      Breath sounds: Normal breath sounds. No stridor. No wheezing, rhonchi or rales.   Chest:      Chest wall: No tenderness.   Abdominal:      General: Abdomen is flat. Bowel sounds are normal. There is no distension.      Palpations: Abdomen is soft. There is no mass.      Tenderness: There is no abdominal tenderness. There is no guarding.   Neurological:      General: No focal deficit present.      Mental Status: He is alert and oriented to person, place, and time. Mental status is at baseline.      Cranial Nerves: No cranial nerve deficit.      Sensory: No sensory deficit.      Motor: No weakness.      Gait: Gait normal.   Psychiatric:         Mood and Affect: Mood normal.         Behavior: Behavior normal.          Assessment and Plan     1. Essential hypertension    2. Hypercholesterolemia    3. Other nonthrombocytopenic purpura    4. Stage 3a chronic kidney disease    5. Bilateral primary osteoarthritis of knee    6. Essential (hemorrhagic) thrombocythemia    7. Immunodeficiency due to chemotherapy    Other orders  -     pantoprazole (PROTONIX) 40 MG tablet; TAKE 1 TABLET BY MOUTH ONCE DAILY BEFORE MEAL(S)  Dispense: 90 tablet; Refill: 3        At goal for B/P, lipids, and BS. Maintain meds, self monitoring D&E, weight moderation. Repeat chem 7 today due to elevated K.  He will let me know if he wishes to continue with colonoscopy due to his age. F/U 6 months with labs. Vaccines reviewed and discussed.            No follow-ups on file.

## 2024-03-18 DIAGNOSIS — E87.5 HYPERKALEMIA: Primary | ICD-10-CM

## 2024-03-18 DIAGNOSIS — I10 BENIGN ESSENTIAL HTN: ICD-10-CM

## 2024-03-18 RX ORDER — AMLODIPINE BESYLATE 5 MG/1
5 TABLET ORAL DAILY
Qty: 30 TABLET | Refills: 11 | Status: SHIPPED | OUTPATIENT
Start: 2024-03-18 | End: 2024-05-06

## 2024-03-19 ENCOUNTER — PATIENT MESSAGE (OUTPATIENT)
Dept: INTERNAL MEDICINE | Facility: CLINIC | Age: 84
End: 2024-03-19
Payer: MEDICARE

## 2024-03-19 ENCOUNTER — TELEPHONE (OUTPATIENT)
Dept: INTERNAL MEDICINE | Facility: CLINIC | Age: 84
End: 2024-03-19
Payer: MEDICARE

## 2024-03-19 DIAGNOSIS — H91.90 HEARING LOSS, UNSPECIFIED HEARING LOSS TYPE, UNSPECIFIED LATERALITY: Primary | ICD-10-CM

## 2024-03-20 ENCOUNTER — TELEPHONE (OUTPATIENT)
Dept: INTERNAL MEDICINE | Facility: CLINIC | Age: 84
End: 2024-03-20
Payer: MEDICARE

## 2024-03-20 NOTE — TELEPHONE ENCOUNTER
----- Message from Stuart Tello sent at 3/20/2024  2:38 PM CDT -----  Contact: Bharathi  Type:  Patient Returning Call    Who Called:Bharathi   Who Left Message for Patient: Janice  Does the patient know what this is regarding?: results  Would the patient rather a call back or a response via [email or web address]? Call back  Best Call Back Number:[phone]  Additional Information:     Thanks   Am

## 2024-03-20 NOTE — TELEPHONE ENCOUNTER
----- Message from Alba Holloway sent at 3/20/2024  3:15 PM CDT -----  Contact: Bharathi  The pt is needing his lab and nurse visit rescheduled. He already has an appt that day and wants his lab and nurse visit on the same day. Please give a call back at [phone]

## 2024-03-20 NOTE — TELEPHONE ENCOUNTER
Returned call to pt and advised him of lab results. He verbalized understanding. Lab and BP NV appt scheduled.

## 2024-03-21 ENCOUNTER — PATIENT MESSAGE (OUTPATIENT)
Dept: INTERNAL MEDICINE | Facility: CLINIC | Age: 84
End: 2024-03-21
Payer: MEDICARE

## 2024-03-21 ENCOUNTER — TELEPHONE (OUTPATIENT)
Dept: INTERNAL MEDICINE | Facility: CLINIC | Age: 84
End: 2024-03-21
Payer: MEDICARE

## 2024-03-21 NOTE — TELEPHONE ENCOUNTER
----- Message from Marley Castillo sent at 3/21/2024 11:27 AM CDT -----  Who Called: Pt    What is the request in detail: Requesting call back to speak with Morris. Please advise    Can the clinic reply by [facility]? No    Best Call Back Number: [phone]      Additional Information:

## 2024-03-28 ENCOUNTER — LAB VISIT (OUTPATIENT)
Dept: LAB | Facility: HOSPITAL | Age: 84
End: 2024-03-28
Attending: INTERNAL MEDICINE
Payer: MEDICARE

## 2024-03-28 DIAGNOSIS — D47.3 ESSENTIAL (HEMORRHAGIC) THROMBOCYTHEMIA: ICD-10-CM

## 2024-03-28 LAB
BASOPHILS # BLD AUTO: 0.03 K/UL (ref 0–0.2)
BASOPHILS NFR BLD: 0.3 % (ref 0–1.9)
DIFFERENTIAL METHOD BLD: ABNORMAL
EOSINOPHIL # BLD AUTO: 0.4 K/UL (ref 0–0.5)
EOSINOPHIL NFR BLD: 3.8 % (ref 0–8)
ERYTHROCYTE [DISTWIDTH] IN BLOOD BY AUTOMATED COUNT: 21.4 % (ref 11.5–14.5)
HCT VFR BLD AUTO: 44.3 % (ref 40–54)
HGB BLD-MCNC: 14.6 G/DL (ref 14–18)
IMM GRANULOCYTES # BLD AUTO: 0.03 K/UL (ref 0–0.04)
IMM GRANULOCYTES NFR BLD AUTO: 0.3 % (ref 0–0.5)
LYMPHOCYTES # BLD AUTO: 1.2 K/UL (ref 1–4.8)
LYMPHOCYTES NFR BLD: 10.4 % (ref 18–48)
MCH RBC QN AUTO: 30.2 PG (ref 27–31)
MCHC RBC AUTO-ENTMCNC: 33 G/DL (ref 32–36)
MCV RBC AUTO: 92 FL (ref 82–98)
MONOCYTES # BLD AUTO: 0.6 K/UL (ref 0.3–1)
MONOCYTES NFR BLD: 5.2 % (ref 4–15)
NEUTROPHILS # BLD AUTO: 8.9 K/UL (ref 1.8–7.7)
NEUTROPHILS NFR BLD: 80 % (ref 38–73)
NRBC BLD-RTO: 0 /100 WBC
PLATELET # BLD AUTO: 424 K/UL (ref 150–450)
PMV BLD AUTO: 9 FL (ref 9.2–12.9)
RBC # BLD AUTO: 4.83 M/UL (ref 4.6–6.2)
WBC # BLD AUTO: 11.14 K/UL (ref 3.9–12.7)

## 2024-03-28 PROCEDURE — 85025 COMPLETE CBC W/AUTO DIFF WBC: CPT | Performed by: INTERNAL MEDICINE

## 2024-03-28 PROCEDURE — 36415 COLL VENOUS BLD VENIPUNCTURE: CPT | Performed by: INTERNAL MEDICINE

## 2024-04-09 ENCOUNTER — OFFICE VISIT (OUTPATIENT)
Dept: DERMATOLOGY | Facility: CLINIC | Age: 84
End: 2024-04-09
Payer: MEDICARE

## 2024-04-09 DIAGNOSIS — L82.1 SEBORRHEIC KERATOSES: ICD-10-CM

## 2024-04-09 DIAGNOSIS — L81.4 SOLAR LENTIGO: ICD-10-CM

## 2024-04-09 DIAGNOSIS — Z12.83 SKIN CANCER SCREENING: ICD-10-CM

## 2024-04-09 DIAGNOSIS — L57.0 ACTINIC KERATOSES: Primary | ICD-10-CM

## 2024-04-09 DIAGNOSIS — D18.00 HEMANGIOMA, UNSPECIFIED SITE: ICD-10-CM

## 2024-04-09 PROCEDURE — 99213 OFFICE O/P EST LOW 20 MIN: CPT | Mod: 25,S$PBB,, | Performed by: STUDENT IN AN ORGANIZED HEALTH CARE EDUCATION/TRAINING PROGRAM

## 2024-04-09 PROCEDURE — 99213 OFFICE O/P EST LOW 20 MIN: CPT | Mod: PBBFAC,25 | Performed by: STUDENT IN AN ORGANIZED HEALTH CARE EDUCATION/TRAINING PROGRAM

## 2024-04-09 PROCEDURE — 17004 DESTROY PREMAL LESIONS 15/>: CPT | Mod: PBBFAC | Performed by: STUDENT IN AN ORGANIZED HEALTH CARE EDUCATION/TRAINING PROGRAM

## 2024-04-09 PROCEDURE — 17004 DESTROY PREMAL LESIONS 15/>: CPT | Mod: S$PBB,,, | Performed by: STUDENT IN AN ORGANIZED HEALTH CARE EDUCATION/TRAINING PROGRAM

## 2024-04-09 PROCEDURE — 99999 PR PBB SHADOW E&M-EST. PATIENT-LVL III: CPT | Mod: PBBFAC,,, | Performed by: STUDENT IN AN ORGANIZED HEALTH CARE EDUCATION/TRAINING PROGRAM

## 2024-04-09 NOTE — PATIENT INSTRUCTIONS

## 2024-04-09 NOTE — PROGRESS NOTES
Patient Information  Name: Bharathi Parsons  : 1940  MRN: [id number]     Referring Physician:  Dr. Zamora ref. provider found   Primary Care Physician:  Dr. Rooney, Festus Sinha MD   Date of Visit: 2024      Subjective:       Bharathi Parsons is a 84 y.o. male who presents for AK follow up  HPI  Patient with hx of AK, here for follow up.    Patient was last seen:2023     Prior notes by myself reviewed.   Clinical documentation obtained by nursing staff reviewed.    Review of Systems   Skin:  Negative for itching and rash.        Objective:    Physical Exam   Constitutional: He appears well-developed and well-nourished. No distress.   Neurological: He is alert and oriented to person, place, and time. He is not disoriented.   Psychiatric: He has a normal mood and affect.   Skin:   Areas Examined (abnormalities noted in diagram):   Scalp / Hair Palpated and Inspected  Head / Face Inspection Performed  Neck Inspection Performed  Chest / Axilla Inspection Performed  Abdomen Inspection Performed  Back Inspection Performed  RUE Inspected  LUE Inspection Performed                   Diagram Legend     Erythematous scaling macule/papule c/w actinic keratosis       Vascular papule c/w angioma      Pigmented verrucoid papule/plaque c/w seborrheic keratosis      Yellow umbilicated papule c/w sebaceous hyperplasia      Irregularly shaped tan macule c/w lentigo     1-2 mm smooth white papules consistent with Milia      Movable subcutaneous cyst with punctum c/w epidermal inclusion cyst      Subcutaneous movable cyst c/w pilar cyst      Firm pink to brown papule c/w dermatofibroma      Pedunculated fleshy papule(s) c/w skin tag(s)      Evenly pigmented macule c/w junctional nevus     Mildly variegated pigmented, slightly irregular-bordered macule c/w mildly atypical nevus      Flesh colored to evenly pigmented papule c/w intradermal nevus       Pink pearly papule/plaque c/w basal cell carcinoma      Erythematous  hyperkeratotic cursted plaque c/w SCC      Surgical scar with no sign of skin cancer recurrence      Open and closed comedones      Inflammatory papules and pustules      Verrucoid papule consistent consistent with wart     Erythematous eczematous patches and plaques     Dystrophic onycholytic nail with subungual debris c/w onychomycosis     Umbilicated papule    Erythematous-base heme-crusted tan verrucoid plaque consistent with inflamed seborrheic keratosis     Erythematous Silvery Scaling Plaque c/w Psoriasis     See annotation      No images are attached to the encounter or orders placed in the encounter.    [] Data reviewed  [] Independent review of test  [] Management discussed with another provider    Assessment / Plan:        Actinic keratoses  Cryosurgery Procedure Note    Verbal consent from the patient is obtained including, but not limited to, risk of hypopigmentation/hyperpigmentation, scar, recurrence of lesion. The patient is aware of the precancerous quality and need for treatment of these lesions. Liquid nitrogen cryosurgery is applied to the 39 actinic keratoses, as detailed in the physical exam, to produce a freeze injury. The patient is aware that blisters may form and is instructed on wound care with gentle cleansing and use of vaseline ointment to keep moist until healed. The patient is supplied a handout on cryosurgery and is instructed to call if lesions do not completely resolve.    Skin cancer screening  Upper body skin examination performed today including at least 6 points as noted in physical examination. No lesions suspicious for malignancy noted.    Recommend daily sun protection/avoidance and use of at least SPF 30, broad spectrum sunscreen (OTC drug).     Seborrheic keratoses  These are benign inherited growths without a malignant potential. Reassurance given to patient. No treatment is necessary.     Solar lentigo  This is a benign hyperpigmented sun induced lesion. Recommend daily  sun protection/avoidance and use of at least SPF 30, broad spectrum sunscreen (OTC drug) will reduce the number of new lesions. Treatment of these benign lesions are considered cosmetic.    Hemangioma, unspecified site  This is a benign vascular lesion. Reassurance given. No treatment required.                    LOS NUMBER AND COMPLEXITY OF PROBLEMS    COMPLEXITY OF DATA RISK TOTAL TIME (m)   80280  13983 [] 1 self-limited or minor problem [x] Minimal to none [] No treatment recommended or patient to monitor 15-29  10-19   21414  63301 Low  [] 2 or > self limited or minor problems  [] 1 stable chronic illness  [] 1 acute, uncomplicated illness or injury Limited (2)  [] Prior external notes from each unique source  [] Review result of each unique test  [] Order each unique test [x]  Low  OTC medications, minor skin biopsy 30-44  20-29   73826  28994 Moderate  []  1 or > chronic illness with progression, exacerbation or SE of treatment  [x]  2 or more stable chronic illnesses  []  1 acute illness with systemic symptoms  []  1 acute complicated injury  []  1 undiagnosed new problem with uncertain prognosis Moderate (1/3 below)  []  3 or more data items        *Now includes assessment requiring independent historian  []  Independent interpretation of a test  []  Discuss management/test with another provider Moderate  []  Prescription drug mgmt  []  Minor surgery with risk discussed  []  Mgmt limited by social determinates 45-59  30-39   28268  28576 High  []  1 or more chronic illness with severe exacerbation, progression or SE of treatment  []  1 acute or chronic illness/injury that poses a threat to life or bodily function Extensive (2/3 below)  []  3 or more data items        *Now includes assessment requiring independent historian.  []  Independent interpretation of a test  []  Discuss management/test with another provider High  []  Major surgery with risk discussed  []  Drug therapy requiring intensive monitoring  for toxicity  []  Hospitalization  []  Decision for DNR 60-74  40-54      No follow-ups on file.    Ann Sanches MD, FAAD  [facility]

## 2024-04-10 ENCOUNTER — PATIENT MESSAGE (OUTPATIENT)
Dept: INTERNAL MEDICINE | Facility: CLINIC | Age: 84
End: 2024-04-10
Payer: MEDICARE

## 2024-04-10 NOTE — PROGRESS NOTES
Pt scheduled Colonoscopy for 6/13/24 at [facility] with DR Glover. Prep sent to pharmacy. Instructions mailed to pts home address. Pt aware she needs a  for this procedure    Referring Provider:    [place]  [address] [place] [address]  Subjective:   Patient: Bharathi Parsons [id number], :1940   Visit date:1/3/2020 9:17 AM    Chief Complaint:  Hearing Loss (bilateral)    HPI:  Bharathi is a 79 y.o. male who I was asked to see in consultation for evaluation of the following issue(s):    Hearing Loss:    He describes a equally, bilateral sided hearing loss starting many years ago and has been gradually worsening.      The patient reports the following risk factors for hearing loss (Negative unless checked off):   [] Familial deafness   [] Ototoxic medication exposure  [] Acoustic trauma  [x]  Occupational exposure  [] Head injury or trauma  [] Otologic infection  [] History of meningitis  [] Ear surgery (other than pediatric tympanostomy tubes)  [] Metabolic disease      Severity: moderate  Quality: muffled  Modifying factors:  None  Associated symptoms include   [] Vertigo  [] Tinnitus  [] Otalgia  [] Drainage or pain   Previous treatments include none.    He also complains chronic rhinorrhea.  This is worse at night.  He also has nasal congestion and sneezing.  He uses fluticasone and Astelin in combination with Zyrtec.  His primary issue is severe rhinorrhea after eating at night.  He does have dogs and birds in his home.    Review of Systems:  Negative unless checked off.  Gen:  []fever   []fatigue  HENT:  []nosebleeds  []dental problem   Eyes:  []photophobia  []visual disturbance  Resp:  []chest tightness []wheezing  Card:  []chest pain  []leg swelling  GI:  []abdominal pain []blood in stool  :  []dysuria  []hematuria  Musc:  []joint swelling  []gait problem  Skin:  []color change  []pallor  Neuro:  []seizures  []numbness  Hem:  []bruise/bleed easily  Psych:  []hallucinations  []behavioral problems  Allergy/Imm: has No Known Allergies.    His meds, allergies, medical, surgical, social & family histories were reviewed & updated:  -     He has a  current medication list which includes the following prescription(s): azelastine, cetirizine, ezetimibe, fluticasone propionate, ibuprofen, lisinopril, livalo, omeprazole, and ipratropium.  -     He  has a past medical history of DJD (degenerative joint disease) and Hypertension.   -     He does not have any pertinent problems on file.   -     He  has a past surgical history that includes Foot arthrodesis; Cataract extraction w/  intraocular lens implant (OD before 2003); Cataract extraction w/  intraocular lens implant (OS 12/19/07 with YAG); and Appendectomy.  -     He  reports that he has never smoked. He has never used smokeless tobacco. He reports that he drinks alcohol. He reports that he does not use drugs.  -     His family history includes Cataracts in his mother; Glaucoma in his maternal uncle; Heart disease in his father.  -     He has No Known Allergies.    Objective:     Physical Exam:  Vitals:  BP (!) 145/90 (BP Location: Left arm, Patient Position: Sitting, BP Method: Large (Automatic))   Pulse 67   Temp 98.2 °F (36.8 °C) (Oral)   Ht 6' (1.829 m)   Wt 79.7 kg (175 lb 11.3 oz)   BMI 23.83 kg/m²   General appearance:  Well developed, well nourished    Eyes:  Extraocular motions intact, PERRL    Communication:  no hoarseness, no dysphonia    Ears:  Otoscopy of external auditory canals and tympanic membranes was normal, clinical speech reception thresholds grossly intact, no mass/lesion of auricle.  Nose:  No masses/lesions of external nose, nasal mucosa, septum, and turbinates were within normal limits.  Mouth:  No mass/lesion of lips, teeth, gums, hard/soft palate, tongue, tonsils, or oropharynx.    Cardiovascular:  No pedal edema; Radial Pulses +2     Neck & Lymphatics:  No cervical lymphadenopathy, no neck mass/crepitus/ asymmetry, trachea is midline, no thyroid enlargement/tenderness/mass.    Psych: Oriented x3,  Alert with normal mood and affect.     Respiration/Chest:  Symmetric expansion  during respiration, normal respiratory effort.    Skin:  Warm and intact. No ulcerations of face, scalp, neck.    Audiogram:    Symmetric SNHL    Assessment & Plan:   Bharathi was seen today for hearing loss.    Diagnoses and all orders for this visit:    Presbycusis of both ears    Other orders  -     ipratropium (ATROVENT) 42 mcg (0.06 %) nasal spray; 2 sprays by Nasal route 3 (three) times daily.          Jesus SNHL- hearing aids  Vasomotor rhinitis? Trial of atrovent.  If not improved, will have him see allergy       Lokesh Lino MD, FACS  [facility]   [facility]  [address]  [place] [address]  P: [phone]  F: [phone]

## 2024-04-11 ENCOUNTER — LAB VISIT (OUTPATIENT)
Dept: LAB | Facility: HOSPITAL | Age: 84
End: 2024-04-11
Attending: PEDIATRICS
Payer: MEDICARE

## 2024-04-11 ENCOUNTER — CLINICAL SUPPORT (OUTPATIENT)
Dept: INTERNAL MEDICINE | Facility: CLINIC | Age: 84
End: 2024-04-11
Payer: MEDICARE

## 2024-04-11 ENCOUNTER — TELEPHONE (OUTPATIENT)
Dept: INTERNAL MEDICINE | Facility: CLINIC | Age: 84
End: 2024-04-11

## 2024-04-11 VITALS — SYSTOLIC BLOOD PRESSURE: 148 MMHG | OXYGEN SATURATION: 95 % | HEART RATE: 77 BPM | DIASTOLIC BLOOD PRESSURE: 86 MMHG

## 2024-04-11 DIAGNOSIS — E87.5 HYPERKALEMIA: ICD-10-CM

## 2024-04-11 DIAGNOSIS — Z01.30 BP CHECK: Primary | ICD-10-CM

## 2024-04-11 LAB
ANION GAP SERPL CALC-SCNC: 7 MMOL/L (ref 8–16)
BUN SERPL-MCNC: 27 MG/DL (ref 8–23)
CALCIUM SERPL-MCNC: 9.4 MG/DL (ref 8.7–10.5)
CHLORIDE SERPL-SCNC: 105 MMOL/L (ref 95–110)
CO2 SERPL-SCNC: 26 MMOL/L (ref 23–29)
CREAT SERPL-MCNC: 1.4 MG/DL (ref 0.5–1.4)
EST. GFR  (NO RACE VARIABLE): 49 ML/MIN/1.73 M^2
GLUCOSE SERPL-MCNC: 83 MG/DL (ref 70–110)
POTASSIUM SERPL-SCNC: 4.6 MMOL/L (ref 3.5–5.1)
SODIUM SERPL-SCNC: 138 MMOL/L (ref 136–145)

## 2024-04-11 PROCEDURE — 99999 PR PBB SHADOW E&M-EST. PATIENT-LVL III: CPT | Mod: PBBFAC,,,

## 2024-04-11 PROCEDURE — 80048 BASIC METABOLIC PNL TOTAL CA: CPT | Performed by: PEDIATRICS

## 2024-04-11 PROCEDURE — 36415 COLL VENOUS BLD VENIPUNCTURE: CPT | Performed by: PEDIATRICS

## 2024-04-11 PROCEDURE — 99213 OFFICE O/P EST LOW 20 MIN: CPT | Mod: PBBFAC

## 2024-04-11 NOTE — PROGRESS NOTES
Pt came into clinic for BP check due to medication change. BP reading is 148/86, HR 77. Pt verbalized no pain but has been having a challenging day regarding dog. I advised pt that I will give reading to Dr. Rooney to see if he suggests any changes.

## 2024-04-11 NOTE — TELEPHONE ENCOUNTER
Pt came into clinic for BP check due to medication change. BP reading is 148/86, HR 77. Pt verbalized no pain but has been having a challenging day regarding dog. I advised pt that I will give reading to Dr. Rooney to see if he suggests any changes. He verbalized understanding.

## 2024-04-12 NOTE — TELEPHONE ENCOUNTER
Returned call to pt regarding Dr. Rooney's response regarding his BP check. No answer, left vm to return call to clinic.      Have him check B/P at home, and record dily and recheck as NV in 1-2 weeks, bring in his b/p machine to compare.

## 2024-04-16 DIAGNOSIS — D75.839 THROMBOCYTOSIS: ICD-10-CM

## 2024-04-16 RX ORDER — ASPIRIN 81 MG/1
81 TABLET ORAL
Qty: 100 TABLET | Refills: 0 | Status: SHIPPED | OUTPATIENT
Start: 2024-04-16

## 2024-04-26 ENCOUNTER — LAB VISIT (OUTPATIENT)
Dept: LAB | Facility: HOSPITAL | Age: 84
End: 2024-04-26
Attending: INTERNAL MEDICINE
Payer: MEDICARE

## 2024-04-26 DIAGNOSIS — Z15.89 JAK2 GENE MUTATION: ICD-10-CM

## 2024-04-26 DIAGNOSIS — D47.3 ESSENTIAL (HEMORRHAGIC) THROMBOCYTHEMIA: ICD-10-CM

## 2024-04-26 LAB
ALBUMIN SERPL BCP-MCNC: 3.7 G/DL (ref 3.5–5.2)
ALP SERPL-CCNC: 61 U/L (ref 55–135)
ALT SERPL W/O P-5'-P-CCNC: 21 U/L (ref 10–44)
ANION GAP SERPL CALC-SCNC: 7 MMOL/L (ref 8–16)
AST SERPL-CCNC: 18 U/L (ref 10–40)
BASOPHILS # BLD AUTO: 0.03 K/UL (ref 0–0.2)
BASOPHILS NFR BLD: 0.3 % (ref 0–1.9)
BILIRUB SERPL-MCNC: 0.3 MG/DL (ref 0.1–1)
BUN SERPL-MCNC: 26 MG/DL (ref 8–23)
CALCIUM SERPL-MCNC: 8.9 MG/DL (ref 8.7–10.5)
CHLORIDE SERPL-SCNC: 110 MMOL/L (ref 95–110)
CO2 SERPL-SCNC: 22 MMOL/L (ref 23–29)
CREAT SERPL-MCNC: 1.3 MG/DL (ref 0.5–1.4)
DIFFERENTIAL METHOD BLD: ABNORMAL
EOSINOPHIL # BLD AUTO: 0.3 K/UL (ref 0–0.5)
EOSINOPHIL NFR BLD: 3.3 % (ref 0–8)
ERYTHROCYTE [DISTWIDTH] IN BLOOD BY AUTOMATED COUNT: 18.9 % (ref 11.5–14.5)
EST. GFR  (NO RACE VARIABLE): 54 ML/MIN/1.73 M^2
GLUCOSE SERPL-MCNC: 92 MG/DL (ref 70–110)
HCT VFR BLD AUTO: 40.6 % (ref 40–54)
HGB BLD-MCNC: 13.5 G/DL (ref 14–18)
IMM GRANULOCYTES # BLD AUTO: 0.02 K/UL (ref 0–0.04)
IMM GRANULOCYTES NFR BLD AUTO: 0.2 % (ref 0–0.5)
LYMPHOCYTES # BLD AUTO: 1 K/UL (ref 1–4.8)
LYMPHOCYTES NFR BLD: 12 % (ref 18–48)
MCH RBC QN AUTO: 31.8 PG (ref 27–31)
MCHC RBC AUTO-ENTMCNC: 33.3 G/DL (ref 32–36)
MCV RBC AUTO: 96 FL (ref 82–98)
MONOCYTES # BLD AUTO: 0.4 K/UL (ref 0.3–1)
MONOCYTES NFR BLD: 4.6 % (ref 4–15)
NEUTROPHILS # BLD AUTO: 6.9 K/UL (ref 1.8–7.7)
NEUTROPHILS NFR BLD: 79.6 % (ref 38–73)
NRBC BLD-RTO: 0 /100 WBC
PLATELET # BLD AUTO: 422 K/UL (ref 150–450)
PMV BLD AUTO: 9 FL (ref 9.2–12.9)
POTASSIUM SERPL-SCNC: 4.2 MMOL/L (ref 3.5–5.1)
PROT SERPL-MCNC: 6.4 G/DL (ref 6–8.4)
RBC # BLD AUTO: 4.25 M/UL (ref 4.6–6.2)
SODIUM SERPL-SCNC: 139 MMOL/L (ref 136–145)
WBC # BLD AUTO: 8.7 K/UL (ref 3.9–12.7)

## 2024-04-26 PROCEDURE — 80053 COMPREHEN METABOLIC PANEL: CPT | Performed by: INTERNAL MEDICINE

## 2024-04-26 PROCEDURE — 36415 COLL VENOUS BLD VENIPUNCTURE: CPT | Performed by: INTERNAL MEDICINE

## 2024-04-26 PROCEDURE — 85025 COMPLETE CBC W/AUTO DIFF WBC: CPT | Performed by: INTERNAL MEDICINE

## 2024-05-01 ENCOUNTER — CLINICAL SUPPORT (OUTPATIENT)
Dept: AUDIOLOGY | Facility: CLINIC | Age: 84
End: 2024-05-01
Payer: MEDICARE

## 2024-05-01 DIAGNOSIS — H90.3 HEARING LOSS, SENSORINEURAL, ASYMMETRICAL: Primary | ICD-10-CM

## 2024-05-01 DIAGNOSIS — H90.3 SENSORINEURAL HEARING LOSS (SNHL) OF BOTH EARS: ICD-10-CM

## 2024-05-01 PROCEDURE — 99999PBSHW PR PBB SHADOW TECHNICAL ONLY FILED TO HB: Mod: PBBFAC,,,

## 2024-05-01 PROCEDURE — 92556 SPEECH AUDIOMETRY COMPLETE: CPT | Mod: PBBFAC | Performed by: AUDIOLOGIST

## 2024-05-01 PROCEDURE — 92567 TYMPANOMETRY: CPT | Mod: PBBFAC | Performed by: AUDIOLOGIST

## 2024-05-01 PROCEDURE — 99999 PR PBB SHADOW E&M-EST. PATIENT-LVL I: CPT | Mod: PBBFAC,,, | Performed by: AUDIOLOGIST

## 2024-05-01 PROCEDURE — 99211 OFF/OP EST MAY X REQ PHY/QHP: CPT | Mod: PBBFAC | Performed by: AUDIOLOGIST

## 2024-05-01 PROCEDURE — 92552 PURE TONE AUDIOMETRY AIR: CPT | Mod: PBBFAC | Performed by: AUDIOLOGIST

## 2024-05-01 NOTE — PROGRESS NOTES
Bharathi Parsons was seen 05/01/2024 for an audiological evaluation. Patient has history and complaint of bilateral gradual hearing loss and wears binaural hearing aids. His last audiogram was in 2022 and he suspects a decrease since then.    Results reveal a moderate-to-severe sensorineural hearing loss 250-8000 Hz for the right ear, and  moderate-to-severe sensorineural hearing loss 250-8000 Hz for the left ear.   Speech Reception Thresholds were  55 dBHL for the right ear and 55 dBHL for the left ear.   Word recognition scores were excellent for the right ear and excellent for the left ear.  Tympanograms were Type C, abnormal for the right ear and Type Ad, hypermobile for the left ear.    Patient was counseled on the above findings.    Recommendations:  Follow-up with ENT, as needed.   Repeat audiological evaluation in 1-2 years to monitor hearing, or sooner if needed.  Hearing aid follow-up today  Wear hearing protection devices when around loud noise.

## 2024-05-01 NOTE — PROGRESS NOTES
Bharathi Parsons was seen 05/01/2024 for a hearing aid follow-up appointment.  Aids cleaned and checked and are in good working order. Aids set to today's audiogram and settings re-prescribed. Slight decrease in left ear. He will remain in small power domes. He will try the TV program in his [employer] to help with female voices in the event today's adjustments are not enough.    We reviewed changing filters with chito and he understands. He does not produce much wax, so it is likely he won't need to change them often.     Patient will call for any future hearing aid follow-up appointments as needed.

## 2024-05-02 ENCOUNTER — OFFICE VISIT (OUTPATIENT)
Dept: HEMATOLOGY/ONCOLOGY | Facility: CLINIC | Age: 84
End: 2024-05-02
Payer: MEDICARE

## 2024-05-02 VITALS
TEMPERATURE: 98 F | HEART RATE: 79 BPM | SYSTOLIC BLOOD PRESSURE: 162 MMHG | HEIGHT: 72 IN | DIASTOLIC BLOOD PRESSURE: 98 MMHG | WEIGHT: 164.88 LBS | OXYGEN SATURATION: 96 % | BODY MASS INDEX: 22.33 KG/M2

## 2024-05-02 DIAGNOSIS — D69.2 OTHER NONTHROMBOCYTOPENIC PURPURA: ICD-10-CM

## 2024-05-02 DIAGNOSIS — Z15.89 JAK2 GENE MUTATION: ICD-10-CM

## 2024-05-02 DIAGNOSIS — N18.31 STAGE 3A CHRONIC KIDNEY DISEASE: Primary | ICD-10-CM

## 2024-05-02 DIAGNOSIS — T45.1X5A IMMUNODEFICIENCY DUE TO CHEMOTHERAPY: ICD-10-CM

## 2024-05-02 DIAGNOSIS — Z79.899 IMMUNODEFICIENCY DUE TO CHEMOTHERAPY: ICD-10-CM

## 2024-05-02 DIAGNOSIS — D84.821 IMMUNODEFICIENCY DUE TO CHEMOTHERAPY: ICD-10-CM

## 2024-05-02 DIAGNOSIS — D47.3 ESSENTIAL (HEMORRHAGIC) THROMBOCYTHEMIA: ICD-10-CM

## 2024-05-02 PROCEDURE — 99999 PR PBB SHADOW E&M-EST. PATIENT-LVL III: CPT | Mod: PBBFAC,,,

## 2024-05-02 PROCEDURE — 99213 OFFICE O/P EST LOW 20 MIN: CPT | Mod: PBBFAC

## 2024-05-02 PROCEDURE — 99214 OFFICE O/P EST MOD 30 MIN: CPT | Mod: S$PBB,,,

## 2024-05-02 NOTE — PROGRESS NOTES
Subjective:       Patient ID: Bharathi Parsons is a 84 y.o. male.    Chief Complaint: ET    HPI: Mr. Parsons is a pleasant 84 year old male who is following up for his JAK2 positive essential thrombocytosis. He continues with hydrea 500mg daily and baby asa daily.     Today: He states he has been well. He denies any fevers, swelling, illnesses, ha, sob, cp. He continues to take hydrea daily, no missed doses and a baby asa daily. He does have some environmental allergies. His BOP medication recently changed by PCP due to hyperkalemia, he states he hasn't been monitoring at home. It is high here in clinica and he states he will; start to monitor at home.     Social History     Socioeconomic History    Marital status:    Tobacco Use    Smoking status: Never     Passive exposure: Never    Smokeless tobacco: Never   Substance and Sexual Activity    Alcohol use: Yes     Comment: Ocasionally    Drug use: No    Sexual activity: Not Currently     Partners: Female   Social History Narrative    , No smokers in household, 1 Dog.     Social Determinants of Health     Financial Resource Strain: Low Risk  (1/5/2023)    Overall Financial Resource Strain (CARDIA)     Difficulty of Paying Living Expenses: Not hard at all   Food Insecurity: No Food Insecurity (12/11/2023)    Hunger Vital Sign     Worried About Running Out of Food in the Last Year: Never true     Ran Out of Food in the Last Year: Never true   Transportation Needs: No Transportation Needs (12/11/2023)    PRAPARE - Transportation     Lack of Transportation (Medical): No     Lack of Transportation (Non-Medical): No   Physical Activity: Sufficiently Active (12/11/2023)    Exercise Vital Sign     Days of Exercise per Week: 7 days     Minutes of Exercise per Session: 30 min   Stress: No Stress Concern Present (12/11/2023)    Citizen of [place] [employer] - Occupational Stress Questionnaire     Feeling of Stress : Only a little   Housing Stability: Low  Risk  (12/11/2023)    Housing Stability Vital Sign     Unable to Pay for Housing in the Last Year: No     Number of Places Lived in the Last Year: 1     Unstable Housing in the Last Year: No       Past Medical History:   Diagnosis Date    DJD (degenerative joint disease)     Esophageal stricture     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 JAK2 V617F mutated DNA was detected and measured  at 30% of total JAK2 DNA.    [id number] mutation is found with high frequency in  [place] chromosome-negative myeloproliferative  neoplasms (>95% in polycythemia vera and approximately  50-60% in primary myelofibrosis and essential  thrombocy       Family History   Problem Relation Name Age of Onset    Glaucoma Maternal Uncle      Cataracts Mother      Heart disease Father         Past Surgical History:   Procedure Laterality Date    APPENDECTOMY      as child    CATARACT EXTRACTION W/  INTRAOCULAR LENS IMPLANT  OD before 2003    CATARACT EXTRACTION W/  INTRAOCULAR LENS IMPLANT  OS 12/19/07 with YAG    ESOPHAGEAL DILATION      x 2 in past last one done in  2022    FOOT ARTHRODESIS      medial column arthrodesis left foot  2011       Review of Systems   Constitutional:  Negative for activity change, appetite change, chills, diaphoresis, fatigue, fever and unexpected weight change.   HENT:  Negative for nosebleeds.    Respiratory:  Negative for cough and shortness of breath.    Cardiovascular:  Negative for chest pain and leg swelling.   Gastrointestinal:  Negative for abdominal pain, anal bleeding, blood in stool, constipation, diarrhea, nausea and vomiting.   Allergic/Immunologic: Positive for environmental allergies.   Neurological:  Negative for headaches.         Medication List with Changes/Refills   Current Medications    AMLODIPINE (NORVASC) 5 MG TABLET     Take 1 tablet (5 mg total) by mouth once daily. Stop lisinopril    ASPIRIN (ECOTRIN) 81 MG EC TABLET    Take 1 tablet by mouth once daily    AZELASTINE (ASTELIN) 137 MCG (0.1 %) NASAL SPRAY    1 spray (137 mcg total) by Nasal route 2 (two) times daily.    BEMPEDOIC ACID-EZETIMIBE (NEXLIZET) 180-10 MG TAB    Take 1 tablet by mouth Daily.    CETIRIZINE (ZYRTEC) 10 MG TABLET        COVID-19 VAC, JOSE,PFIZER,,PF, (PFIZER COVID-19 JOSE VACCN,PF,) 30 MCG/0.3 ML INJECTION    Inject into the muscle.    HYDROXYUREA (HYDREA) 500 MG CAP    Take 1 capsule (500 mg total) by mouth once daily.    IBUPROFEN ORAL    Take by mouth as needed.     LISINOPRIL 10 MG TABLET    Take 1 tablet by mouth once daily    LIVALO 4 MG TAB    Take 1 tablet by mouth once daily    PANTOPRAZOLE (PROTONIX) 40 MG TABLET    TAKE 1 TABLET BY MOUTH ONCE DAILY BEFORE MEAL(S)    TRIAMCINOLONE ACETONIDE 0.1% (KENALOG) 0.1 % CREAM    Apply topically 2 (two) times daily.     Objective:     Vitals:    05/02/24 1058   BP: (!) 162/98   Pulse: 79   Temp: 97.6 °F (36.4 °C)     Physical Exam  Vitals reviewed.   Constitutional:       General: He is not in acute distress.     Appearance: He is not ill-appearing, toxic-appearing or diaphoretic.   HENT:      Head: Normocephalic and atraumatic.   Cardiovascular:      Rate and Rhythm: Normal rate.   Musculoskeletal:      Right lower leg: No edema.      Left lower leg: No edema.   Skin:     General: Skin is warm.      Coloration: Skin is not jaundiced or pale.      Findings: No bruising, erythema, lesion or rash.   Neurological:      Mental Status: He is alert.      Gait: Gait normal.   Psychiatric:         Mood and Affect: Mood normal.         Behavior: Behavior normal.         Thought Content: Thought content normal.          Labs/Results:    Lab Results   Component Value Date    WBC 8.70 04/26/2024    RBC 4.25 (L) 04/26/2024    HGB 13.5 (L) 04/26/2024    HCT 40.6 04/26/2024    MCV 96 04/26/2024    MCH 31.8 (H) 04/26/2024     MCHC 33.3 04/26/2024    RDW 18.9 (H) 04/26/2024     04/26/2024    MPV 9.0 (L) 04/26/2024    GRAN 6.9 04/26/2024    GRAN 79.6 (H) 04/26/2024    LYMPH 1.0 04/26/2024    LYMPH 12.0 (L) 04/26/2024    MONO 0.4 04/26/2024    MONO 4.6 04/26/2024    EOS 0.3 04/26/2024    BASO 0.03 04/26/2024    EOSINOPHIL 3.3 04/26/2024    BASOPHIL 0.3 04/26/2024       Assessment:     Problem List Items Addressed This Visit          Renal/    Stage 3a chronic kidney disease - Primary       Immunology/Multi System    Immunodeficiency due to chemotherapy       Hematology    Other nonthrombocytopenic purpura       Oncology    Essential (hemorrhagic) thrombocythemia       Genetic    JAK2 gene mutation     Plan:     Essential (hemorrhagic) thrombocythemia  --JAK2 gene mutation  --continue with baby asa  --continue with hydrea 500mg daily  --plts:422-stable    Follow-Up: cbc monthly. 4 months with cbc prior -standing orders in    Ann Mcnally PA-C  Hematology Oncology    Route Chart for Scheduling    Med Onc Chart Routing      Follow up with physician    Follow up with [facility]ACHI . 4 months with cbc prior   Infusion scheduling note    Injection scheduling note    Labs CBC   Scheduling:  Preferred lab:  Lab interval:  cbc monthly at [facility]. standing orders in   Imaging    Pharmacy appointment    Other referrals

## 2024-05-06 ENCOUNTER — OFFICE VISIT (OUTPATIENT)
Dept: OTOLARYNGOLOGY | Facility: CLINIC | Age: 84
End: 2024-05-06
Payer: MEDICARE

## 2024-05-06 ENCOUNTER — PATIENT MESSAGE (OUTPATIENT)
Dept: INTERNAL MEDICINE | Facility: CLINIC | Age: 84
End: 2024-05-06
Payer: MEDICARE

## 2024-05-06 VITALS — WEIGHT: 165.81 LBS | BODY MASS INDEX: 22.48 KG/M2

## 2024-05-06 DIAGNOSIS — H91.90 HEARING LOSS, UNSPECIFIED HEARING LOSS TYPE, UNSPECIFIED LATERALITY: ICD-10-CM

## 2024-05-06 DIAGNOSIS — J30.0 VASOMOTOR RHINITIS: Primary | ICD-10-CM

## 2024-05-06 DIAGNOSIS — I10 BENIGN ESSENTIAL HTN: ICD-10-CM

## 2024-05-06 PROCEDURE — 99204 OFFICE O/P NEW MOD 45 MIN: CPT | Mod: S$PBB,,, | Performed by: OTOLARYNGOLOGY

## 2024-05-06 PROCEDURE — 99213 OFFICE O/P EST LOW 20 MIN: CPT | Mod: PBBFAC | Performed by: OTOLARYNGOLOGY

## 2024-05-06 PROCEDURE — 99999 PR PBB SHADOW E&M-EST. PATIENT-LVL III: CPT | Mod: PBBFAC,,, | Performed by: OTOLARYNGOLOGY

## 2024-05-06 RX ORDER — AMLODIPINE BESYLATE 10 MG/1
10 TABLET ORAL DAILY
Qty: 90 TABLET | Refills: 3 | Status: SHIPPED | OUTPATIENT
Start: 2024-05-06 | End: 2025-05-06

## 2024-05-06 RX ORDER — IPRATROPIUM BROMIDE 21 UG/1
2 SPRAY, METERED NASAL 3 TIMES DAILY
Qty: 30 ML | Refills: 5 | Status: SHIPPED | OUTPATIENT
Start: 2024-05-06

## 2024-05-06 NOTE — PROGRESS NOTES
Referring Provider:    Festus Rooney Md  [address] [facility]  [place] [address]  Subjective:   Patient: Bharathi Parsons [id number], :1940   Visit date:2024 1:54 PM    Chief Complaint:  Sinus Problem (Pt is coming in today for sinus congestion )    HPI:    Prior notes reviewed by myself.  Clinical documentation obtained by nursing staff reviewed.     84-year-old gentleman presents for evaluation of sinonasal symptoms including rhinorrhea and congestion.  He has seen multiple other ENTs and tried different nasal sprays without adequate success.  He has tried as a last seen which he received from Dr. Koch in the allergy department.  His primary complaint is profuse watery rhinorrhea that happens when he eats.  He has not tried ipratropium nasal spray.  He denies any history of recurrent acute or chronic sinusitis.      Objective:     Physical Exam:  Vitals:  Wt 75.2 kg (165 lb 12.6 oz)   BMI 22.48 kg/m²   General appearance:  Well developed, well nourished    Ears:  Otoscopy of external auditory canals and tympanic membranes was normal, clinical speech reception thresholds grossly intact, no mass/lesion of auricle.    Nose:  No masses/lesions of external nose, nasal mucosa, septum, and turbinates were within normal limits.  Clear rhinorrhea    Mouth:  No mass/lesion of lips, teeth, gums, hard/soft palate, tongue, tonsils, or oropharynx.    Neck & Lymphatics:  No cervical lymphadenopathy, no neck mass/crepitus/ asymmetry, trachea is midline, no thyroid enlargement/tenderness/mass.        [x]  Data Reviewed:    Lab Results   Component Value Date    WBC 8.70 2024    HGB 13.5 (L) 2024    HCT 40.6 2024    MCV 96 2024    EOSINOPHIL 3.3 2024             Assessment & Plan:   Vasomotor rhinitis  -     ipratropium (ATROVENT) 21 mcg (0.03 %) nasal spray; 2 sprays by Each Nostril route 3 (three) times daily.  Dispense: 30 mL; Refill: 5    Hearing loss, unspecified hearing loss  type, unspecified laterality  -     Ambulatory referral/consult to ENT        His symptoms are most consistent with vasomotor rhinitis.  He has not tried ipratropium bromide nasal spray, so we recommended 6 weeks of this medication.  We discussed the possible option for procedural intervention like Clarifix or rhinaer.      Jerardo Hernandez M.D.  Department of Otolaryngology - Head & Neck Surgery  [address] [place] [address]  P: [phone]  F: [phone]        DISCLAIMER: This note was prepared with [employer] voice recognition transcription software. Garbled syntax, mangled pronouns, and other bizarre constructions may be attributed to that software system. While efforts were made to correct any mistakes made by this voice recognition program, some errors and/or omissions may remain in the note that were missed when the note was originally created.

## 2024-07-01 ENCOUNTER — OFFICE VISIT (OUTPATIENT)
Dept: OTOLARYNGOLOGY | Facility: CLINIC | Age: 84
End: 2024-07-01
Payer: MEDICARE

## 2024-07-01 DIAGNOSIS — J30.0 VASOMOTOR RHINITIS: Primary | ICD-10-CM

## 2024-07-01 PROCEDURE — 99999 PR PBB SHADOW E&M-EST. PATIENT-LVL III: CPT | Mod: PBBFAC,,, | Performed by: OTOLARYNGOLOGY

## 2024-07-01 PROCEDURE — 99212 OFFICE O/P EST SF 10 MIN: CPT | Mod: S$PBB,,, | Performed by: OTOLARYNGOLOGY

## 2024-07-01 PROCEDURE — 99213 OFFICE O/P EST LOW 20 MIN: CPT | Mod: PBBFAC | Performed by: OTOLARYNGOLOGY

## 2024-07-01 NOTE — PROGRESS NOTES
Referring Provider:    No referring provider defined for this encounter.  Subjective:   Patient: Bharathi Parsons [id number], :1940   Visit date:2024 1:54 PM    Chief Complaint:  Follow-up    HPI:    Prior notes reviewed by myself.  Clinical documentation obtained by nursing staff reviewed.     84-year-old gentleman presents for evaluation of sinonasal symptoms including rhinorrhea and congestion.  He has seen multiple other ENTs and tried different nasal sprays without adequate success.  He has tried as a last seen which he received from Dr. Koch in the allergy department.  His primary complaint is profuse watery rhinorrhea that happens when he eats.  He has not tried ipratropium nasal spray.  He denies any history of recurrent acute or chronic sinusitis.    24 update:  He feels that the atrovent nasal spray has controlled his symptoms.        Objective:     Physical Exam:  Vitals:  There were no vitals taken for this visit.  General appearance:  Well developed, well nourished    Ears:  Otoscopy of external auditory canals and tympanic membranes was normal, clinical speech reception thresholds grossly intact, no mass/lesion of auricle.    Nose:  No masses/lesions of external nose, nasal mucosa, septum, and turbinates were within normal limits.  Clear rhinorrhea    Mouth:  No mass/lesion of lips, teeth, gums, hard/soft palate, tongue, tonsils, or oropharynx.    Neck & Lymphatics:  No cervical lymphadenopathy, no neck mass/crepitus/ asymmetry, trachea is midline, no thyroid enlargement/tenderness/mass.        [x]  Data Reviewed:    Lab Results   Component Value Date    WBC 8.70 2024    HGB 13.5 (L) 2024    HCT 40.6 2024    MCV 96 2024    EOSINOPHIL 3.3 2024             Assessment & Plan:   Vasomotor rhinitis          His symptoms are most consistent with vasomotor rhinitis.  He has not tried ipratropium bromide nasal spray, so we recommended 6 weeks of this medication.  We  discussed the possible option for procedural intervention like Clarifix or rhinaer.     7/1/24 update:  His symptoms are controlled using the Atrovent nasal spray.  We discussed the procedural options as noted above and decided to continue with the current treatment regimen.  Follow-up in 1 year      Jerardo Hernandez M.D.  Department of Otolaryngology - Head & Neck Surgery  [address] [facility] [place] [address]  P: [phone]  F: [phone]        DISCLAIMER: This note was prepared with [employer] voice recognition transcription software. Garbled syntax, mangled pronouns, and other bizarre constructions may be attributed to that software system. While efforts were made to correct any mistakes made by this voice recognition program, some errors and/or omissions may remain in the note that were missed when the note was originally created.

## 2024-07-03 ENCOUNTER — TELEPHONE (OUTPATIENT)
Dept: DERMATOLOGY | Facility: CLINIC | Age: 84
End: 2024-07-03
Payer: MEDICARE

## 2024-07-09 ENCOUNTER — OFFICE VISIT (OUTPATIENT)
Dept: DERMATOLOGY | Facility: CLINIC | Age: 84
End: 2024-07-09
Payer: MEDICARE

## 2024-07-09 DIAGNOSIS — Z12.83 SKIN CANCER SCREENING: ICD-10-CM

## 2024-07-09 DIAGNOSIS — L82.1 SEBORRHEIC KERATOSIS: Primary | ICD-10-CM

## 2024-07-09 DIAGNOSIS — D18.00 HEMANGIOMA, UNSPECIFIED SITE: ICD-10-CM

## 2024-07-09 DIAGNOSIS — L57.0 ACTINIC KERATOSES: ICD-10-CM

## 2024-07-09 PROCEDURE — 99213 OFFICE O/P EST LOW 20 MIN: CPT | Mod: PBBFAC | Performed by: DERMATOLOGY

## 2024-07-09 PROCEDURE — 99213 OFFICE O/P EST LOW 20 MIN: CPT | Mod: 25,S$PBB,, | Performed by: DERMATOLOGY

## 2024-07-09 PROCEDURE — 99999 PR PBB SHADOW E&M-EST. PATIENT-LVL III: CPT | Mod: PBBFAC,,, | Performed by: DERMATOLOGY

## 2024-07-09 PROCEDURE — 17004 DESTROY PREMAL LESIONS 15/>: CPT | Mod: PBBFAC | Performed by: DERMATOLOGY

## 2024-07-09 PROCEDURE — 17004 DESTROY PREMAL LESIONS 15/>: CPT | Mod: S$PBB,,, | Performed by: DERMATOLOGY

## 2024-07-09 NOTE — PROGRESS NOTES
Subjective:      Patient ID:  Bharathi Parsons is a 84 y.o. male who presents for   Chief Complaint   Patient presents with    Skin Check     Lesions on face and head.      Hx of numerous AK's, last seen by Dr. Sanches on 3/9/24.  He c/o irritated area of the right cheek, + tenderness. No tx tried.         Review of Systems   Constitutional:  Negative for fever and chills.   Gastrointestinal:  Negative for nausea and vomiting.   Skin:  Positive for activity-related sunscreen use. Negative for daily sunscreen use and recent sunburn.   Hematologic/Lymphatic: Does not bruise/bleed easily.       Objective:   Physical Exam   Constitutional: He appears well-developed and well-nourished. No distress.   Neurological: He is alert and oriented to person, place, and time. He is not disoriented.   Psychiatric: He has a normal mood and affect.   Skin:   Areas Examined (abnormalities noted in diagram):   Scalp / Hair Palpated and Inspected  Head / Face Inspection Performed  Neck Inspection Performed  Chest / Axilla Inspection Performed  Abdomen Inspection Performed  Back Inspection Performed  RUE Inspected  LUE Inspection Performed  Nails and Digits Inspection Performed                 Diagram Legend     Erythematous scaling macule/papule c/w actinic keratosis       Vascular papule c/w angioma      Pigmented verrucoid papule/plaque c/w seborrheic keratosis      Yellow umbilicated papule c/w sebaceous hyperplasia      Irregularly shaped tan macule c/w lentigo     1-2 mm smooth white papules consistent with Milia      Movable subcutaneous cyst with punctum c/w epidermal inclusion cyst      Subcutaneous movable cyst c/w pilar cyst      Firm pink to brown papule c/w dermatofibroma      Pedunculated fleshy papule(s) c/w skin tag(s)      Evenly pigmented macule c/w junctional nevus     Mildly variegated pigmented, slightly irregular-bordered macule c/w mildly atypical nevus      Flesh colored to evenly pigmented papule c/w intradermal  nevus       Pink pearly papule/plaque c/w basal cell carcinoma      Erythematous hyperkeratotic cursted plaque c/w SCC      Surgical scar with no sign of skin cancer recurrence      Open and closed comedones      Inflammatory papules and pustules      Verrucoid papule consistent consistent with wart     Erythematous eczematous patches and plaques     Dystrophic onycholytic nail with subungual debris c/w onychomycosis     Umbilicated papule    Erythematous-base heme-crusted tan verrucoid plaque consistent with inflamed seborrheic keratosis     Erythematous Silvery Scaling Plaque c/w Psoriasis     See annotation      Assessment / Plan:        Seborrheic keratosis  Reassurance given. Discussed diagnosis and that lesions are benign.  AAD handout given.     Hemangioma, unspecified site  Reassurance given.  Lesions are benign.    Actinic keratoses  Skin cancer screening  Recommend re-eval of hypertrophic AK of the right cheek.  If fails to improve, pt may require biopsy. The patient acknowledged understanding. Recommend f/u with Dr. Sanches in 2 months.     Upper body skin examination performed today including at least 6 points as noted in physical examination. No lesions suspicious for malignancy noted.    Cryosurgery Procedure Note    The patient is informed of the precancerous quality and need for treatment of these lesions. After risks, benefits and alternatives explained, including blistering, pain, hyper- and hypopigmentation, patient verbally consents to cryotherapy to precancerous lesions. Liquid nitrogen cryosurgery is applied to the 29 actinic keratoses, as detailed in the physical exam, to produce a freeze injury. The patient is aware that blisters may form and is instructed on wound care with gentle cleansing and use of vaseline ointment to keep moist until healed. The patient is supplied a handout on cryosurgery and is instructed to call if lesions do not completely resolve.               Follow up in about 2  months (around 9/9/2024) for with Dr. Sanches.

## 2024-07-09 NOTE — PATIENT INSTRUCTIONS

## 2024-07-30 ENCOUNTER — OFFICE VISIT (OUTPATIENT)
Dept: ALLERGY | Facility: CLINIC | Age: 84
End: 2024-07-30
Payer: MEDICARE

## 2024-07-30 ENCOUNTER — PATIENT MESSAGE (OUTPATIENT)
Dept: HEMATOLOGY/ONCOLOGY | Facility: CLINIC | Age: 84
End: 2024-07-30
Payer: MEDICARE

## 2024-07-30 VITALS
WEIGHT: 165.56 LBS | BODY MASS INDEX: 22.45 KG/M2 | HEART RATE: 76 BPM | TEMPERATURE: 99 F | DIASTOLIC BLOOD PRESSURE: 88 MMHG | SYSTOLIC BLOOD PRESSURE: 155 MMHG

## 2024-07-30 DIAGNOSIS — J31.0 GUSTATORY RHINITIS: ICD-10-CM

## 2024-07-30 DIAGNOSIS — J30.1 SEASONAL ALLERGIC RHINITIS DUE TO POLLEN: Primary | ICD-10-CM

## 2024-07-30 PROCEDURE — 99214 OFFICE O/P EST MOD 30 MIN: CPT | Mod: PBBFAC | Performed by: ALLERGY & IMMUNOLOGY

## 2024-07-30 PROCEDURE — G2211 COMPLEX E/M VISIT ADD ON: HCPCS | Mod: S$PBB,,, | Performed by: ALLERGY & IMMUNOLOGY

## 2024-07-30 PROCEDURE — 99214 OFFICE O/P EST MOD 30 MIN: CPT | Mod: S$PBB,,, | Performed by: ALLERGY & IMMUNOLOGY

## 2024-07-30 PROCEDURE — 99999 PR PBB SHADOW E&M-EST. PATIENT-LVL IV: CPT | Mod: PBBFAC,,, | Performed by: ALLERGY & IMMUNOLOGY

## 2024-07-30 NOTE — PROGRESS NOTES
"Subjective:      Patient ID: Bharathi Parsons is a 84 y.o. male.    Chief Complaint:  Follow-up      HPI:        HPI 7/30/2024: 84 year old amel with allergic rhinitis- pollen  Started atrovent nasal spray by ENT. He feels it has helped and he admits to not taking three times a day.  Stopped Astelin nasal spray  He denies eye symptoms.  Rhinitis is worse with eating        1/30/2024: 83 year old male here for follow up- allergic rhinitis- pollen  He reports that symptoms have persisted and he feels they contribute to hearing difficulty at times.  He also feels Flonase and Atrovent have not helped.  He admits to not consistently taking GERD medication as discussed at his previous visit.        12/22/2023: 83 year old complaining of a runny nose with eating. He reports when he goes to bed in the evening, he has a runny nose/post nasal drip. He used Sinex and it worked for a while until he had a "reaction". He has significant rhinitis. He now avoids it.  He is taking Atrovent nasal spray currently.  He takes Atrovent at night and it helps "a little bit".  Some times travel improves nasal symptoms and at times, it does not.    She denies itchy or watery eyes.    He denies food allergies.  He denies drug or insect sting allergy.  He denies asthma.    He reports intermittent GERD.    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 JAK2 V617F mutated DNA was detected and measured  at 30% of total JAK2 DNA.    [id number] mutation is found with high frequency in  [place] chromosome-negative myeloproliferative  neoplasms (>95% in polycythemia vera and approximately  50-60% in primary myelofibrosis and essential  thrombocy        Family History "   Problem Relation Name Age of Onset    Glaucoma Maternal Uncle      Cataracts Mother      Heart disease Father          Current Outpatient Medications on File Prior to Visit   Medication Sig Dispense Refill    amLODIPine (NORVASC) 10 MG tablet Take 1 tablet (10 mg total) by mouth once daily. Stop lisinopril 90 tablet 3    aspirin (ECOTRIN) 81 MG EC tablet Take 1 tablet by mouth once daily 100 tablet 0    bempedoic acid-ezetimibe (NEXLIZET) 180-10 mg Tab Take 1 tablet by mouth Daily. 30 tablet 12    cetirizine (ZYRTEC) 10 MG tablet       COVID-19 vac, jose,Pfizer,,PF, (PFIZER COVID-19 JOSE VACCN,PF,) 30 mcg/0.3 mL injection Inject into the muscle. 0.3 mL 0    hydroxyurea (HYDREA) 500 mg Cap Take 1 capsule (500 mg total) by mouth once daily. 30 capsule 11    IBUPROFEN ORAL Take by mouth as needed.      ipratropium (ATROVENT) 21 mcg (0.03 %) nasal spray 2 sprays by Each Nostril route 3 (three) times daily. 30 mL 5    LIVALO 4 mg Tab Take 1 tablet by mouth once daily 90 tablet 3    pantoprazole (PROTONIX) 40 MG tablet TAKE 1 TABLET BY MOUTH ONCE DAILY BEFORE MEAL(S) 90 tablet 3    triamcinolone acetonide 0.1% (KENALOG) 0.1 % cream Apply topically 2 (two) times daily. 80 g 1    [DISCONTINUED] azelastine (ASTELIN) 137 mcg (0.1 %) nasal spray 1 spray (137 mcg total) by Nasal route 2 (two) times daily. 20 mL 5    [DISCONTINUED] lisinopriL 10 MG tablet Take 1 tablet by mouth once daily 90 tablet 3     No current facility-administered medications on file prior to visit.        Review of patient's allergies indicates:  No Known Allergies     Environmental History: Pets in the home: dogs (1).  Tobacco Smoke in Home: no  Review of Systems   Constitutional:  Negative for chills and fever.   HENT:  Positive for postnasal drip and rhinorrhea.    Eyes:  Negative for discharge and itching.   Allergic/Immunologic: Positive for environmental allergies. Negative for food allergies and immunocompromised state.   Neurological:  Negative  for facial asymmetry and speech difficulty.   Psychiatric/Behavioral:  Negative for behavioral problems and suicidal ideas.        Objective:   Physical Exam  Constitutional:       General: He is not in acute distress.     Appearance: Normal appearance. He is not ill-appearing, toxic-appearing or diaphoretic.   HENT:      Head: Normocephalic and atraumatic.      Right Ear: Tympanic membrane, ear canal and external ear normal. There is no impacted cerumen.      Left Ear: Tympanic membrane, ear canal and external ear normal. There is no impacted cerumen.      Nose: Nose normal. No congestion or rhinorrhea.      Mouth/Throat:      Mouth: Mucous membranes are moist.      Pharynx: No oropharyngeal exudate or posterior oropharyngeal erythema.   Eyes:      General: No scleral icterus.        Right eye: No discharge.         Left eye: No discharge.   Neck:      Thyroid: No thyromegaly.   Cardiovascular:      Rate and Rhythm: Normal rate and regular rhythm.      Heart sounds: Normal heart sounds. No murmur heard.     No friction rub. No gallop.   Pulmonary:      Effort: Pulmonary effort is normal. No respiratory distress.      Breath sounds: Normal breath sounds. No stridor. No wheezing or rales.   Musculoskeletal:         General: Normal range of motion.      Cervical back: Normal range of motion and neck supple. No rigidity or tenderness.   Lymphadenopathy:      Cervical: No cervical adenopathy.   Skin:     General: Skin is warm.      Coloration: Skin is not jaundiced or pale.      Findings: No bruising, erythema or rash.   Neurological:      Mental Status: He is alert and oriented to person, place, and time.      Gait: Gait normal.   Psychiatric:         Mood and Affect: Mood normal.         Behavior: Behavior normal.         Thought Content: Thought content normal.         Judgment: Judgment normal.                         Assessment:      1. Seasonal allergic rhinitis due to pollen    2. Gustatory rhinitis             Plan:     Seasonal allergic rhinitis due to pollen    Gustatory rhinitis        Discussed allergy immunotherapy. Information given on AVS. He would lie to think about it.    Agree with atrovent nasal spray    Visit today included increased complexity associated with the care of the episodic problem Allergic Rhinitis  addressed and managing the longitudinal care of the patient due to the serious and/or complex managed problem(s)  Allergic Rhinitis.       RTC 4-6 months or sooner, if needed     PREM DE LA GARZA                  Problems Address                                                 Amount and/or Complexity                                                                      Risk       3           [] 2 or more self-limited or minor problems                      [] Limited                                                                        [] Low                  [] 1 stable chronic illness                                                  Any combination of the two                                               OTC drugs                  []Acute, uncomplicated illness or injury                            Review of prior external notes from unique source           Minor surgery with no risk factors                                                                                                               [] 1 []2  []3+                                                                                                              Review of results from each unique test                                                                                                               [] 1 []2  [] 3+                                                                                                              Order of each unique test                                                                                                               [] 1 []2  [] 3+                                                                                                               Or                                                                                                             [] Assessment requiring an independent historian      4            [] One or more chronic illness with exacerbation,              [] Moderate                                                                      [x] Moderate                 Progression, or side effects of treatment                            -test documents or independent historians                        Prescription drug management                [x]  2 or more stable chronic illnesses                                    [] Independent interpretation of tests                              Minor surgery with identifiable risk                [] 1 undiagnosed new problem with uncertain prognosis    [] Discussion or management of test results                    elective major surgery                [] 1 acute illness with                systemic symptoms                                                                                                                                                              [] 1 acute complicated injury                                                                                                                                          Elective major surgery                                                                                                                                                                                                                                                                                                                                                                                                  5            [] 1 or more chronic illnesses with severe exacerbation,     [] Extensive(two from below)                                         [] High                                                                                                                [] Independent interpretation of results                         Drug therapy requiring intensive                                                                                                               []Discussion of management or test interpretation           monitoring                                                                                                                                                                                                       Decision to de-escalate care                 [] 1 acute or chronic illness or injury that poses a threat                                                                                               Decision regarding hospitalization

## 2024-08-08 ENCOUNTER — OFFICE VISIT (OUTPATIENT)
Dept: OPHTHALMOLOGY | Facility: CLINIC | Age: 84
End: 2024-08-08
Payer: MEDICARE

## 2024-08-08 DIAGNOSIS — H35.372 EPIRETINAL MEMBRANE (ERM) OF LEFT EYE: ICD-10-CM

## 2024-08-08 DIAGNOSIS — Z96.1 PSEUDOPHAKIA: ICD-10-CM

## 2024-08-08 DIAGNOSIS — H35.3131 EARLY DRY STAGE NONEXUDATIVE AGE-RELATED MACULAR DEGENERATION OF BOTH EYES: Primary | ICD-10-CM

## 2024-08-08 DIAGNOSIS — H52.7 REFRACTIVE ERROR: ICD-10-CM

## 2024-08-08 PROCEDURE — 99213 OFFICE O/P EST LOW 20 MIN: CPT | Mod: PBBFAC | Performed by: OPHTHALMOLOGY

## 2024-08-08 PROCEDURE — 92014 COMPRE OPH EXAM EST PT 1/>: CPT | Mod: S$PBB,,, | Performed by: OPHTHALMOLOGY

## 2024-08-08 PROCEDURE — 99999 PR PBB SHADOW E&M-EST. PATIENT-LVL III: CPT | Mod: PBBFAC,,, | Performed by: OPHTHALMOLOGY

## 2024-08-08 PROCEDURE — 92134 CPTRZ OPH DX IMG PST SGM RTA: CPT | Mod: PBBFAC | Performed by: OPHTHALMOLOGY

## 2024-08-08 PROCEDURE — 92015 DETERMINE REFRACTIVE STATE: CPT | Mod: ,,, | Performed by: OPHTHALMOLOGY

## 2024-08-30 DIAGNOSIS — D75.839 THROMBOCYTOSIS: ICD-10-CM

## 2024-08-30 RX ORDER — ASPIRIN 81 MG/1
81 TABLET ORAL
Qty: 100 TABLET | Refills: 0 | Status: SHIPPED | OUTPATIENT
Start: 2024-08-30

## 2024-09-05 ENCOUNTER — PATIENT MESSAGE (OUTPATIENT)
Dept: INTERNAL MEDICINE | Facility: CLINIC | Age: 84
End: 2024-09-05
Payer: MEDICARE

## 2024-09-09 ENCOUNTER — LAB VISIT (OUTPATIENT)
Dept: LAB | Facility: HOSPITAL | Age: 84
End: 2024-09-09
Attending: PEDIATRICS
Payer: MEDICARE

## 2024-09-09 DIAGNOSIS — D69.2 OTHER NONTHROMBOCYTOPENIC PURPURA: ICD-10-CM

## 2024-09-09 DIAGNOSIS — E78.00 HYPERCHOLESTEROLEMIA: ICD-10-CM

## 2024-09-09 DIAGNOSIS — D47.3 ESSENTIAL (HEMORRHAGIC) THROMBOCYTHEMIA: ICD-10-CM

## 2024-09-09 LAB
ALBUMIN SERPL BCP-MCNC: 3.6 G/DL (ref 3.5–5.2)
ALBUMIN SERPL BCP-MCNC: 3.6 G/DL (ref 3.5–5.2)
ALP SERPL-CCNC: 74 U/L (ref 55–135)
ALP SERPL-CCNC: 74 U/L (ref 55–135)
ALT SERPL W/O P-5'-P-CCNC: 19 U/L (ref 10–44)
ALT SERPL W/O P-5'-P-CCNC: 19 U/L (ref 10–44)
ANION GAP SERPL CALC-SCNC: 11 MMOL/L (ref 8–16)
ANION GAP SERPL CALC-SCNC: 11 MMOL/L (ref 8–16)
AST SERPL-CCNC: 19 U/L (ref 10–40)
AST SERPL-CCNC: 19 U/L (ref 10–40)
BASOPHILS # BLD AUTO: 0.03 K/UL (ref 0–0.2)
BASOPHILS # BLD AUTO: 0.03 K/UL (ref 0–0.2)
BASOPHILS NFR BLD: 0.4 % (ref 0–1.9)
BASOPHILS NFR BLD: 0.4 % (ref 0–1.9)
BILIRUB SERPL-MCNC: 0.4 MG/DL (ref 0.1–1)
BILIRUB SERPL-MCNC: 0.4 MG/DL (ref 0.1–1)
BUN SERPL-MCNC: 23 MG/DL (ref 8–23)
BUN SERPL-MCNC: 23 MG/DL (ref 8–23)
CALCIUM SERPL-MCNC: 8.9 MG/DL (ref 8.7–10.5)
CALCIUM SERPL-MCNC: 8.9 MG/DL (ref 8.7–10.5)
CHLORIDE SERPL-SCNC: 109 MMOL/L (ref 95–110)
CHLORIDE SERPL-SCNC: 109 MMOL/L (ref 95–110)
CHOLEST SERPL-MCNC: 147 MG/DL (ref 120–199)
CHOLEST/HDLC SERPL: 3.1 {RATIO} (ref 2–5)
CO2 SERPL-SCNC: 21 MMOL/L (ref 23–29)
CO2 SERPL-SCNC: 21 MMOL/L (ref 23–29)
CREAT SERPL-MCNC: 1.2 MG/DL (ref 0.5–1.4)
CREAT SERPL-MCNC: 1.2 MG/DL (ref 0.5–1.4)
DIFFERENTIAL METHOD BLD: ABNORMAL
DIFFERENTIAL METHOD BLD: ABNORMAL
EOSINOPHIL # BLD AUTO: 0.4 K/UL (ref 0–0.5)
EOSINOPHIL # BLD AUTO: 0.4 K/UL (ref 0–0.5)
EOSINOPHIL NFR BLD: 4.4 % (ref 0–8)
EOSINOPHIL NFR BLD: 4.4 % (ref 0–8)
ERYTHROCYTE [DISTWIDTH] IN BLOOD BY AUTOMATED COUNT: 13.3 % (ref 11.5–14.5)
ERYTHROCYTE [DISTWIDTH] IN BLOOD BY AUTOMATED COUNT: 13.3 % (ref 11.5–14.5)
EST. GFR  (NO RACE VARIABLE): 59 ML/MIN/1.73 M^2
EST. GFR  (NO RACE VARIABLE): 59 ML/MIN/1.73 M^2
GLUCOSE SERPL-MCNC: 96 MG/DL (ref 70–110)
GLUCOSE SERPL-MCNC: 96 MG/DL (ref 70–110)
HCT VFR BLD AUTO: 41.5 % (ref 40–54)
HCT VFR BLD AUTO: 41.5 % (ref 40–54)
HDLC SERPL-MCNC: 47 MG/DL (ref 40–75)
HDLC SERPL: 32 % (ref 20–50)
HGB BLD-MCNC: 13.7 G/DL (ref 14–18)
HGB BLD-MCNC: 13.7 G/DL (ref 14–18)
IMM GRANULOCYTES # BLD AUTO: 0.03 K/UL (ref 0–0.04)
IMM GRANULOCYTES # BLD AUTO: 0.03 K/UL (ref 0–0.04)
IMM GRANULOCYTES NFR BLD AUTO: 0.4 % (ref 0–0.5)
IMM GRANULOCYTES NFR BLD AUTO: 0.4 % (ref 0–0.5)
LDLC SERPL CALC-MCNC: 83.6 MG/DL (ref 63–159)
LYMPHOCYTES # BLD AUTO: 1.1 K/UL (ref 1–4.8)
LYMPHOCYTES # BLD AUTO: 1.1 K/UL (ref 1–4.8)
LYMPHOCYTES NFR BLD: 13 % (ref 18–48)
LYMPHOCYTES NFR BLD: 13 % (ref 18–48)
MCH RBC QN AUTO: 33.5 PG (ref 27–31)
MCH RBC QN AUTO: 33.5 PG (ref 27–31)
MCHC RBC AUTO-ENTMCNC: 33 G/DL (ref 32–36)
MCHC RBC AUTO-ENTMCNC: 33 G/DL (ref 32–36)
MCV RBC AUTO: 102 FL (ref 82–98)
MCV RBC AUTO: 102 FL (ref 82–98)
MONOCYTES # BLD AUTO: 0.4 K/UL (ref 0.3–1)
MONOCYTES # BLD AUTO: 0.4 K/UL (ref 0.3–1)
MONOCYTES NFR BLD: 5.4 % (ref 4–15)
MONOCYTES NFR BLD: 5.4 % (ref 4–15)
NEUTROPHILS # BLD AUTO: 6.3 K/UL (ref 1.8–7.7)
NEUTROPHILS # BLD AUTO: 6.3 K/UL (ref 1.8–7.7)
NEUTROPHILS NFR BLD: 76.4 % (ref 38–73)
NEUTROPHILS NFR BLD: 76.4 % (ref 38–73)
NONHDLC SERPL-MCNC: 100 MG/DL
NRBC BLD-RTO: 0 /100 WBC
NRBC BLD-RTO: 0 /100 WBC
PLATELET # BLD AUTO: 384 K/UL (ref 150–450)
PLATELET # BLD AUTO: 384 K/UL (ref 150–450)
PMV BLD AUTO: 9.1 FL (ref 9.2–12.9)
PMV BLD AUTO: 9.1 FL (ref 9.2–12.9)
POTASSIUM SERPL-SCNC: 4.2 MMOL/L (ref 3.5–5.1)
POTASSIUM SERPL-SCNC: 4.2 MMOL/L (ref 3.5–5.1)
PROT SERPL-MCNC: 6.5 G/DL (ref 6–8.4)
PROT SERPL-MCNC: 6.5 G/DL (ref 6–8.4)
RBC # BLD AUTO: 4.09 M/UL (ref 4.6–6.2)
RBC # BLD AUTO: 4.09 M/UL (ref 4.6–6.2)
SODIUM SERPL-SCNC: 141 MMOL/L (ref 136–145)
SODIUM SERPL-SCNC: 141 MMOL/L (ref 136–145)
TRIGL SERPL-MCNC: 82 MG/DL (ref 30–150)
WBC # BLD AUTO: 8.2 K/UL (ref 3.9–12.7)
WBC # BLD AUTO: 8.2 K/UL (ref 3.9–12.7)

## 2024-09-09 PROCEDURE — 80053 COMPREHEN METABOLIC PANEL: CPT | Performed by: INTERNAL MEDICINE

## 2024-09-09 PROCEDURE — 80061 LIPID PANEL: CPT | Performed by: PEDIATRICS

## 2024-09-09 PROCEDURE — 36415 COLL VENOUS BLD VENIPUNCTURE: CPT | Performed by: INTERNAL MEDICINE

## 2024-09-09 PROCEDURE — 85025 COMPLETE CBC W/AUTO DIFF WBC: CPT | Performed by: INTERNAL MEDICINE

## 2024-09-10 ENCOUNTER — OFFICE VISIT (OUTPATIENT)
Dept: ALLERGY | Facility: CLINIC | Age: 84
End: 2024-09-10
Payer: MEDICARE

## 2024-09-10 ENCOUNTER — OFFICE VISIT (OUTPATIENT)
Dept: HEMATOLOGY/ONCOLOGY | Facility: CLINIC | Age: 84
End: 2024-09-10
Payer: MEDICARE

## 2024-09-10 VITALS
HEIGHT: 69 IN | TEMPERATURE: 97 F | WEIGHT: 168 LBS | DIASTOLIC BLOOD PRESSURE: 77 MMHG | BODY MASS INDEX: 24.88 KG/M2 | HEART RATE: 100 BPM | SYSTOLIC BLOOD PRESSURE: 130 MMHG

## 2024-09-10 VITALS
HEIGHT: 69 IN | OXYGEN SATURATION: 98 % | WEIGHT: 166.44 LBS | RESPIRATION RATE: 18 BRPM | TEMPERATURE: 98 F | HEART RATE: 99 BPM | DIASTOLIC BLOOD PRESSURE: 78 MMHG | SYSTOLIC BLOOD PRESSURE: 124 MMHG | BODY MASS INDEX: 24.65 KG/M2

## 2024-09-10 DIAGNOSIS — Z15.89 JAK2 GENE MUTATION: ICD-10-CM

## 2024-09-10 DIAGNOSIS — D84.821 IMMUNODEFICIENCY DUE TO CHEMOTHERAPY: ICD-10-CM

## 2024-09-10 DIAGNOSIS — J31.0 GUSTATORY RHINITIS: ICD-10-CM

## 2024-09-10 DIAGNOSIS — T45.1X5A IMMUNODEFICIENCY DUE TO CHEMOTHERAPY: ICD-10-CM

## 2024-09-10 DIAGNOSIS — Z79.899 IMMUNODEFICIENCY DUE TO CHEMOTHERAPY: ICD-10-CM

## 2024-09-10 DIAGNOSIS — D75.839 THROMBOCYTOSIS: Primary | ICD-10-CM

## 2024-09-10 DIAGNOSIS — D47.3 ESSENTIAL (HEMORRHAGIC) THROMBOCYTHEMIA: ICD-10-CM

## 2024-09-10 DIAGNOSIS — N18.31 STAGE 3A CHRONIC KIDNEY DISEASE: ICD-10-CM

## 2024-09-10 DIAGNOSIS — J30.1 SEASONAL ALLERGIC RHINITIS DUE TO POLLEN: Primary | ICD-10-CM

## 2024-09-10 PROCEDURE — 99214 OFFICE O/P EST MOD 30 MIN: CPT | Mod: PBBFAC | Performed by: INTERNAL MEDICINE

## 2024-09-10 PROCEDURE — 99999 PR PBB SHADOW E&M-EST. PATIENT-LVL IV: CPT | Mod: PBBFAC,,, | Performed by: ALLERGY & IMMUNOLOGY

## 2024-09-10 PROCEDURE — 99214 OFFICE O/P EST MOD 30 MIN: CPT | Mod: S$PBB,,, | Performed by: ALLERGY & IMMUNOLOGY

## 2024-09-10 PROCEDURE — 99214 OFFICE O/P EST MOD 30 MIN: CPT | Mod: PBBFAC,27 | Performed by: ALLERGY & IMMUNOLOGY

## 2024-09-10 PROCEDURE — 99214 OFFICE O/P EST MOD 30 MIN: CPT | Mod: S$PBB,,, | Performed by: INTERNAL MEDICINE

## 2024-09-10 PROCEDURE — G2211 COMPLEX E/M VISIT ADD ON: HCPCS | Mod: S$PBB,,, | Performed by: ALLERGY & IMMUNOLOGY

## 2024-09-10 PROCEDURE — 99999 PR PBB SHADOW E&M-EST. PATIENT-LVL IV: CPT | Mod: PBBFAC,,, | Performed by: INTERNAL MEDICINE

## 2024-09-10 RX ORDER — HYDROXYUREA 500 MG/1
500 CAPSULE ORAL DAILY
Qty: 30 CAPSULE | Refills: 11 | Status: SHIPPED | OUTPATIENT
Start: 2024-09-10 | End: 2025-09-10

## 2024-09-10 NOTE — PROGRESS NOTES
"Subjective:      Patient ID: Bharathi Parsons is a 84 y.o. male.    Chief Complaint:  Follow-up      HPI:      HPI: 9/10/2024: 84 year old male here for follow up  History of esophageal dilation  Intermittent dysphagia  He denies a history of Eoe.    Atrovent nasal spray helps throughout the day  During \the night- nasal congestion        HPI 7/30/2024: 84 year old amel with allergic rhinitis- pollen  Started atrovent nasal spray by ENT. He feels it has helped and he admits to not taking three times a day.  Stopped Astelin nasal spray  He denies eye symptoms.  Rhinitis is worse with eating        1/30/2024: 83 year old male here for follow up- allergic rhinitis- pollen  He reports that symptoms have persisted and he feels they contribute to hearing difficulty at times.  He also feels Flonase and Atrovent have not helped.  He admits to not consistently taking GERD medication as discussed at his previous visit.        12/22/2023: 83 year old complaining of a runny nose with eating. He reports when he goes to bed in the evening, he has a runny nose/post nasal drip. He used Sinex and it worked for a while until he had a "reaction". He has significant rhinitis. He now avoids it.  He is taking Atrovent nasal spray currently.  He takes Atrovent at night and it helps "a little bit".  Some times travel improves nasal symptoms and at times, it does not.    She denies itchy or watery eyes.    He denies food allergies.  He denies drug or insect sting allergy.  He denies asthma.    He reports intermittent GERD.    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 JAK2 V617F mutated DNA was detected and measured  at 30% of total JAK2 DNA.    [id number] " mutation is found with high frequency in  [place] chromosome-negative myeloproliferative  neoplasms (>95% in polycythemia vera and approximately  50-60% in primary myelofibrosis and essential  thrombocy        Family History   Problem Relation Name Age of Onset    Glaucoma Maternal Uncle      Cataracts Mother      Heart disease Father          Current Outpatient Medications on File Prior to Visit   Medication Sig Dispense Refill    amLODIPine (NORVASC) 10 MG tablet Take 1 tablet (10 mg total) by mouth once daily. Stop lisinopril 90 tablet 3    aspirin (ECOTRIN) 81 MG EC tablet Take 1 tablet by mouth once daily 100 tablet 0    bempedoic acid-ezetimibe (NEXLIZET) 180-10 mg Tab Take 1 tablet by mouth Daily. 30 tablet 12    cetirizine (ZYRTEC) 10 MG tablet       COVID-19 vac, jose,Pfizer,,PF, (PFIZER COVID-19 JOSE VACCN,PF,) 30 mcg/0.3 mL injection Inject into the muscle. 0.3 mL 0    hydroxyurea (HYDREA) 500 mg Cap Take 1 capsule (500 mg total) by mouth once daily. 30 capsule 11    IBUPROFEN ORAL Take by mouth as needed.      ipratropium (ATROVENT) 21 mcg (0.03 %) nasal spray 2 sprays by Each Nostril route 3 (three) times daily. 30 mL 5    LIVALO 4 mg Tab Take 1 tablet by mouth once daily 90 tablet 3    pantoprazole (PROTONIX) 40 MG tablet TAKE 1 TABLET BY MOUTH ONCE DAILY BEFORE MEAL(S) 90 tablet 3    triamcinolone acetonide 0.1% (KENALOG) 0.1 % cream Apply topically 2 (two) times daily. 80 g 1    [DISCONTINUED] hydroxyurea (HYDREA) 500 mg Cap Take 1 capsule (500 mg total) by mouth once daily. 30 capsule 11     No current facility-administered medications on file prior to visit.        Review of patient's allergies indicates:  No Known Allergies     Environmental History: Pets in the home: dogs (1).  Tobacco Smoke in Home: no  Review of Systems   Constitutional:  Negative for chills and fever.   HENT:  Positive for postnasal drip and rhinorrhea.    Eyes:  Negative for discharge and itching.   Allergic/Immunologic:  Positive for environmental allergies. Negative for food allergies and immunocompromised state.   Neurological:  Negative for facial asymmetry and speech difficulty.   Psychiatric/Behavioral:  Negative for behavioral problems and suicidal ideas.        Objective:   Physical Exam  Constitutional:       General: He is not in acute distress.     Appearance: Normal appearance. He is not ill-appearing, toxic-appearing or diaphoretic.   HENT:      Head: Normocephalic and atraumatic.      Nose: Nose normal. No congestion or rhinorrhea.      Mouth/Throat:      Mouth: Mucous membranes are moist.      Pharynx: No oropharyngeal exudate or posterior oropharyngeal erythema.   Eyes:      General: No scleral icterus.        Right eye: No discharge.         Left eye: No discharge.   Neck:      Thyroid: No thyromegaly.   Cardiovascular:      Rate and Rhythm: Normal rate and regular rhythm.      Heart sounds: Normal heart sounds. No murmur heard.     No friction rub. No gallop.   Pulmonary:      Effort: Pulmonary effort is normal. No respiratory distress.      Breath sounds: Normal breath sounds. No stridor. No wheezing or rales.   Musculoskeletal:         General: Normal range of motion.      Cervical back: Normal range of motion and neck supple. No rigidity or tenderness.   Lymphadenopathy:      Cervical: No cervical adenopathy.   Skin:     General: Skin is warm.      Coloration: Skin is not jaundiced or pale.      Findings: No bruising, erythema or rash.   Neurological:      Mental Status: He is alert and oriented to person, place, and time.      Gait: Gait normal.   Psychiatric:         Mood and Affect: Mood normal.         Behavior: Behavior normal.         Thought Content: Thought content normal.         Judgment: Judgment normal.                         Assessment:      1. Seasonal allergic rhinitis due to pollen    2. Gustatory rhinitis              Plan:     Seasonal allergic rhinitis due to pollen    Gustatory  rhinitis          Discussed allergy immunotherapy. He would lie to think about it.    Agree with atrovent nasal spray  Advised that he can take Afrin once a day at night prior to bed.      Visit today included increased complexity associated with the care of the episodic problem Allergic Rhinitis  addressed and managing the longitudinal care of the patient due to the serious and/or complex managed problem(s)  Allergic Rhinitis.       RTC 4-6 months or sooner, if needed     PREM DE [facility]                  Problems Address                                                 Amount and/or Complexity                                                                      Risk       3           [] 2 or more self-limited or minor problems                      [] Limited                                                                        [] Low                  [] 1 stable chronic illness                                                  Any combination of the two                                               OTC drugs                  []Acute, uncomplicated illness or injury                            Review of prior external notes from unique source           Minor surgery with no risk factors                                                                                                               [] 1 []2  []3+                                                                                                              Review of results from each unique test                                                                                                               [] 1 []2  [] 3+                                                                                                              Order of each unique test                                                                                                               [] 1 []2  [] 3+                                                                                                               Or                                                                                                             [] Assessment requiring an independent historian      4            [] One or more chronic illness with exacerbation,              [] Moderate                                                                      [x] Moderate                 Progression, or side effects of treatment                            -test documents or independent historians                        Prescription drug management                [x]  2 or more stable chronic illnesses                                    [] Independent interpretation of tests                              Minor surgery with identifiable risk                [] 1 undiagnosed new problem with uncertain prognosis    [] Discussion or management of test results                    elective major surgery                [] 1 acute illness with                systemic symptoms                                                                                                                                                              [] 1 acute complicated injury                                                                                                                                          Elective major surgery                                                                                                                                                                                                                                                                                                                                                                                                  5            [] 1 or more chronic illnesses with severe exacerbation,     [] Extensive(two from below)                                         [] High                                                                                                                [] Independent interpretation of results                         Drug therapy requiring intensive                                                                                                               []Discussion of management or test interpretation           monitoring                                                                                                                                                                                                       Decision to de-escalate care                 [] 1 acute or chronic illness or injury that poses a threat                                                                                               Decision regarding hospitalization

## 2024-09-10 NOTE — PROGRESS NOTES
Subjective:       Patient ID: Bharathi Parsons is a 84 y.o. male.    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 JAK2 V617F mutated DNA was detected and measured  at 30% of total JAK2 DNA.    [id number] mutation is found with high frequency in  [place] chromosome-negative myeloproliferative  neoplasms (>95% in polycythemia vera and approximately  50-60% in primary myelofibrosis and essential  thrombocy     Family History   Problem Relation Name Age of Onset    Glaucoma Maternal Uncle      Cataracts Mother      Heart disease Father       Social History     Socioeconomic History    Marital status:    Tobacco Use    Smoking status: Never     Passive exposure: Never    Smokeless tobacco: Never   Substance and Sexual Activity    Alcohol use: Yes     Comment: Ocasionally    Drug use: No    Sexual activity: Not Currently     Partners: Female   Social History Narrative    , No smokers in household, 1 Dog.     Social Determinants of Health     Financial Resource Strain: Low Risk  (1/5/2023)    Overall Financial Resource Strain (CARDIA)     Difficulty of Paying Living Expenses: Not hard at all   Food Insecurity: No Food Insecurity (12/11/2023)    Hunger Vital Sign     Worried About Running Out of Food in the Last Year: Never true     Ran Out of Food in the Last Year: Never true   Transportation Needs: No Transportation Needs (12/11/2023)    PRAPARE - Transportation     Lack of Transportation (Medical): No     Lack of  Transportation (Non-Medical): No   Physical Activity: Sufficiently Active (12/11/2023)    Exercise Vital Sign     Days of Exercise per Week: 7 days     Minutes of Exercise per Session: 30 min   Stress: No Stress Concern Present (12/11/2023)    [employer] [place] [employer] - Occupational Stress Questionnaire     Feeling of Stress : Only a little   Housing Stability: Low Risk  (12/11/2023)    Housing Stability Vital Sign     Unable to Pay for Housing in the Last Year: No     Number of Places Lived in the Last Year: 1     Unstable Housing in the Last Year: No     Past Surgical History:   Procedure Laterality Date    APPENDECTOMY      as child    CATARACT EXTRACTION W/  INTRAOCULAR LENS IMPLANT  OD before 2003    CATARACT EXTRACTION W/  INTRAOCULAR LENS IMPLANT  OS 12/19/07 with YAG    ESOPHAGEAL DILATION      x 2 in past last one done in  2022    FOOT ARTHRODESIS      medial column arthrodesis left foot  2011       Labs:  Lab Results   Component Value Date    WBC 8.20 09/09/2024    WBC 8.20 09/09/2024    HGB 13.7 (L) 09/09/2024    HGB 13.7 (L) 09/09/2024    HCT 41.5 09/09/2024    HCT 41.5 09/09/2024     (H) 09/09/2024     (H) 09/09/2024     09/09/2024     09/09/2024     BMP  Lab Results   Component Value Date     09/09/2024     09/09/2024    K 4.2 09/09/2024    K 4.2 09/09/2024     09/09/2024     09/09/2024    CO2 21 (L) 09/09/2024    CO2 21 (L) 09/09/2024    BUN 23 09/09/2024    BUN 23 09/09/2024    CREATININE 1.2 09/09/2024    CREATININE 1.2 09/09/2024    CALCIUM 8.9 09/09/2024    CALCIUM 8.9 09/09/2024    ANIONGAP 11 09/09/2024    ANIONGAP 11 09/09/2024    ESTGFRAFRICA >60.0 07/21/2022    EGFRNONAA 55.2 (A) 07/21/2022     Lab Results   Component Value Date    ALT 19 09/09/2024    ALT 19 09/09/2024    AST 19 09/09/2024    AST 19 09/09/2024    ALKPHOS 74 09/09/2024    ALKPHOS 74 09/09/2024    BILITOT 0.4 09/09/2024    BILITOT 0.4 09/09/2024       Lab  "Results   Component Value Date    IRON 50 12/01/2023    TIBC 311 12/01/2023    FERRITIN 88 12/01/2023     No results found for: "ISMWGSCR12"  No results found for: "FOLATE"  Lab Results   Component Value Date    TSH 2.926 05/17/2018         Review of Systems   Constitutional:  Negative for activity change, appetite change, chills, diaphoresis, fatigue, fever and unexpected weight change.   HENT:  Negative for congestion, dental problem, drooling, ear discharge, ear pain, facial swelling, hearing loss, mouth sores, nosebleeds, postnasal drip, rhinorrhea, sinus pressure, sneezing, sore throat, tinnitus, trouble swallowing and voice change.    Eyes:  Negative for photophobia, pain, discharge, redness, itching and visual disturbance.   Respiratory:  Negative for apnea, cough, choking, chest tightness, shortness of breath, wheezing and stridor.    Cardiovascular:  Negative for chest pain, palpitations and leg swelling.   Gastrointestinal:  Negative for abdominal distention, abdominal pain, anal bleeding, blood in stool, constipation, diarrhea, nausea, rectal pain and vomiting.   Endocrine: Negative for cold intolerance, heat intolerance, polydipsia, polyphagia and polyuria.   Genitourinary:  Negative for decreased urine volume, difficulty urinating, dysuria, enuresis, flank pain, frequency, genital sores, hematuria, penile discharge, penile pain, penile swelling, scrotal swelling, testicular pain and urgency.   Musculoskeletal:  Negative for arthralgias, back pain, gait problem, joint swelling, myalgias, neck pain and neck stiffness.   Skin:  Negative for color change, pallor, rash and wound.   Allergic/Immunologic: Negative for environmental allergies, food allergies and immunocompromised state.   Neurological:  Negative for dizziness, tremors, seizures, syncope, facial asymmetry, speech difficulty, weakness, light-headedness, numbness and headaches.   Hematological:  Negative for adenopathy. Does not bruise/bleed " easily.   Psychiatric/Behavioral:  Negative for agitation, behavioral problems, confusion, decreased concentration, dysphoric mood, hallucinations, self-injury, sleep disturbance and suicidal ideas. The patient is not nervous/anxious and is not hyperactive.        Objective:      Physical Exam  Vitals reviewed.   Constitutional:       General: He is not in acute distress.     Appearance: He is well-developed. He is not diaphoretic.   HENT:      Head: Normocephalic.      Right Ear: External ear normal.      Left Ear: External ear normal.      Nose: Nose normal.      Right Sinus: No maxillary sinus tenderness or frontal sinus tenderness.      Left Sinus: No maxillary sinus tenderness or frontal sinus tenderness.      Mouth/Throat:      Pharynx: No oropharyngeal exudate.   Eyes:      General: Lids are normal. No scleral icterus.        Right eye: No discharge.         Left eye: No discharge.      Extraocular Movements:      Right eye: Normal extraocular motion.      Left eye: Normal extraocular motion.      Conjunctiva/sclera:      Right eye: Right conjunctiva is not injected. No hemorrhage.     Left eye: Left conjunctiva is not injected. No hemorrhage.     Pupils: Pupils are equal, round, and reactive to light.   Neck:      Thyroid: No thyromegaly.      Vascular: No JVD.      Trachea: No tracheal deviation.   Cardiovascular:      Rate and Rhythm: Normal rate.   Pulmonary:      Effort: Pulmonary effort is normal. No respiratory distress.      Breath sounds: No stridor.   Abdominal:      General: Bowel sounds are normal.      Palpations: Abdomen is soft. There is no hepatomegaly, splenomegaly or mass.      Tenderness: There is no abdominal tenderness.   Musculoskeletal:         General: No tenderness. Normal range of motion.      Cervical back: Normal range of motion and neck supple.   Lymphadenopathy:      Head:      Right side of head: No posterior auricular or occipital adenopathy.      Left side of head: No  posterior auricular or occipital adenopathy.      Cervical: No cervical adenopathy.      Right cervical: No superficial, deep or posterior cervical adenopathy.     Left cervical: No superficial, deep or posterior cervical adenopathy.      Upper Body:      Right upper body: No supraclavicular adenopathy.      Left upper body: No supraclavicular adenopathy.   Skin:     General: Skin is dry.      Findings: No erythema or rash.      Nails: There is no clubbing.   Neurological:      Mental Status: He is alert and oriented to person, place, and time.      Cranial Nerves: No cranial nerve deficit.      Coordination: Coordination normal.   Psychiatric:         Behavior: Behavior normal.         Thought Content: Thought content normal.         Judgment: Judgment normal.             Assessment:      1. Thrombocytosis    2. Essential (hemorrhagic) thrombocythemia    3. Immunodeficiency due to chemotherapy    4. Stage 3a chronic kidney disease    5. JAK2 gene mutation           Med Onc Chart Routing      Follow up with physician . Return to clinic can be seen by APAP are MD in 6 months CBC   Follow up with PRACHI    Infusion scheduling note    Injection scheduling note    Labs   Scheduling:  Preferred lab:  Lab interval:  CBC 3 months   Imaging    Pharmacy appointment    Other referrals                   Plan:     Reviewed information.  At this time recommend continue with hydroxyurea 500 mg daily aspirin 81 mg.  Discussed implications and answered questions.  At this time would recommend follow-up in 3 months with CBC in 6 months MO Roberts Jr, MD FACP

## 2024-09-16 ENCOUNTER — OFFICE VISIT (OUTPATIENT)
Dept: INTERNAL MEDICINE | Facility: CLINIC | Age: 84
End: 2024-09-16
Payer: MEDICARE

## 2024-09-16 VITALS
OXYGEN SATURATION: 99 % | BODY MASS INDEX: 24.72 KG/M2 | HEART RATE: 91 BPM | HEIGHT: 69 IN | RESPIRATION RATE: 16 BRPM | DIASTOLIC BLOOD PRESSURE: 80 MMHG | SYSTOLIC BLOOD PRESSURE: 128 MMHG | WEIGHT: 166.88 LBS

## 2024-09-16 DIAGNOSIS — N18.31 STAGE 3A CHRONIC KIDNEY DISEASE: ICD-10-CM

## 2024-09-16 DIAGNOSIS — I10 ESSENTIAL HYPERTENSION: ICD-10-CM

## 2024-09-16 DIAGNOSIS — Z00.00 WELL ADULT EXAM: Primary | ICD-10-CM

## 2024-09-16 DIAGNOSIS — D69.2 OTHER NONTHROMBOCYTOPENIC PURPURA: ICD-10-CM

## 2024-09-16 DIAGNOSIS — Z15.89 JAK2 GENE MUTATION: ICD-10-CM

## 2024-09-16 DIAGNOSIS — K21.00 GASTROESOPHAGEAL REFLUX DISEASE WITH ESOPHAGITIS, UNSPECIFIED WHETHER HEMORRHAGE: ICD-10-CM

## 2024-09-16 DIAGNOSIS — D47.3 ESSENTIAL (HEMORRHAGIC) THROMBOCYTHEMIA: ICD-10-CM

## 2024-09-16 DIAGNOSIS — J31.0 CHRONIC RHINITIS: ICD-10-CM

## 2024-09-16 DIAGNOSIS — E78.00 HYPERCHOLESTEROLEMIA: ICD-10-CM

## 2024-09-16 PROCEDURE — 99214 OFFICE O/P EST MOD 30 MIN: CPT | Mod: PBBFAC | Performed by: PEDIATRICS

## 2024-09-16 PROCEDURE — 99397 PER PM REEVAL EST PAT 65+ YR: CPT | Mod: S$PBB,GZ,, | Performed by: PEDIATRICS

## 2024-09-16 PROCEDURE — 99999 PR PBB SHADOW E&M-EST. PATIENT-LVL IV: CPT | Mod: PBBFAC,,, | Performed by: PEDIATRICS

## 2024-09-16 NOTE — PROGRESS NOTES
"Patient ID: Bharathi Parsons is a 84 y.o. male.    Chief Complaint: Follow-up    History of Present Illness    CHIEF COMPLAINT:  Patient presents today for yearly follow-up.    HEMATOLOGIC DISORDERS:  He has JAK2 mutation and essential thrombocytopenia. He takes hydroxyurea and recently had a follow-up with Dr. Roberts, who was satisfied with his progress. The current management plan includes lab draws every 3 months and doctor follow-ups every 6 months. He denies significant bruising or bleeding, including no bleeding from gums, no blood in stool, and no blood in urine. He notes bruising (purpura) occurs when he hits himself, particularly while working in the yard. His platelet count is currently okay, though it has been off in the past. He has minimal anemia.    CHRONIC KIDNEY DISEASE:  He has chronic kidney disease due to age. His creatinine levels have been consistently between 1.2 and 1.4, with the most recent reading at 1.2.    GASTROINTESTINAL:  He reports his acid reflux with esophagitis and esophageal strictures is doing "pretty good." He is currently taking pantoprazole for management. His last colonoscopy was 10 years ago, and he is currently undecided about undergoing another one given his age and previous results.    ALLERGIES:  He sees Dr. Koch for allergies. He considered allergy shots but decided against them due to potential side effects, including possible swelling of the esophagus.    CARDIOVASCULAR:  He takes Livalo and Zetia for cholesterol management, as he experiences statin myopathy with other statins. His cholesterol results are excellent, with HDL above 40 and LDL and triglycerides below target thresholds. He takes amlodipine 10 mg for blood pressure, which is well-controlled at 128/80.    OCULAR:  He presents with a right eye medial subconjunctival hemorrhage present for a few days. He believes he may have scratched his eye in the middle of the night. He denies any vision problems or pain " associated with the eye issue.    SLEEP AND RESPIRATORY:  He experiences nighttime nasal congestion, causing him to wake up in the middle of the night. He wakes approximately 2 times per night to urinate, which appears to be secondary to waking due to nasal congestion. He uses Atrovent nasal spray 1-3 times daily, noting better relief with 3 times daily use. He elevates the head of his bed to help with acid reflux and nasal congestion at night.    LABS:  Recent labs show creatinine at 1.2, good blood sugar, and excellent cholesterol results.    MEDICATIONS:  Current medications include hydroxyurea, pantoprazole, Livalo, Zetia, amlodipine 10 mg, and Atrovent nasal spray.    PMH, PSH, SH, FH reviewed with patient.      ROS:  General: -fever, -chills, -fatigue, -weight gain, -weight loss  Eyes: -vision changes, -redness, -discharge  ENT: -ear pain, +nasal congestion, -sore throat  Cardiovascular: -chest pain, -palpitations, -lower extremity edema  Respiratory: -cough, -shortness of breath  Gastrointestinal: -abdominal pain, -nausea, -vomiting, -diarrhea, -constipation, -blood in stool  Genitourinary: -dysuria, -hematuria, -frequency, +nocturia  Musculoskeletal: -joint pain, -muscle pain  Skin: -rash, -lesion  Neurological: -headache, -dizziness, -numbness, -tingling  Psychiatric: -anxiety, -depression, -sleep difficulty         Exam:  Physical Exam    Vitals: BP: 128/80.  General: No acute distress. Well-developed. Well-nourished.  Eyes: EOMI. Sclerae anicteric. Right eye medial subconjunctival hemorrhage.  HENT: Normocephalic. Atraumatic. Nares patent. Moist oral mucosa.  Cardiovascular: Regular rate. Regular rhythm. No murmurs. No rubs. No gallops. Normal S1, S2.  Respiratory: Normal respiratory effort. Clear to auscultation bilaterally. No rales. No rhonchi. No wheezing.  Abdomen: Soft. Non-tender. Non-distended. Normoactive bowel sounds.  Musculoskeletal: No  obvious deformity.  Extremities: No lower extremity  edema.  Neurological: Alert & oriented x3. No slurred speech. Normal gait.  Psychiatric: Normal mood. Normal affect. Good insight. Good judgment.  Skin: Warm. Dry. No rash.         Assessment/Plan:  Well adult exam    Chronic rhinitis    Essential hypertension    Gastroesophageal reflux disease with esophagitis, unspecified whether hemorrhage    Hypercholesterolemia  -     Lipid Panel; Future; Expected date: 09/16/2024  -     Comprehensive Metabolic Panel; Future; Expected date: 09/16/2024    JAK2 gene mutation    Essential (hemorrhagic) thrombocythemia    Stage 3a chronic kidney disease    Other nonthrombocytopenic purpura         Assessment & Plan    Assessed JAK2 mutation and essential thrombocytopenia; noted Dr. Roberts' recent evaluation with no changes to current management plan  Evaluated chronic kidney disease; creatinine at 1.2 considered favorable given patient's age and history  Reviewed acid reflux management; current treatment with pantoprazole appears effective  Considered risks and benefits of colonoscopy at patient's age; noted low procedural risks due to good overall health  Evaluated allergy management; determined allergy shots likely not beneficial due to decreased efficacy in older patients  Assessed cholesterol management; current regimen with Livalo and Zetia effective, avoiding other statins due to myopathy  Reviewed blood pressure control; current management with amlodipine 10mg adequate  Evaluated lab results; noted improvements in creatinine, cholesterol profile, and stable blood counts  Assessed subconjunctival hemorrhage in right eye; determined benign and self-limiting  Evaluated nighttime nasal congestion and nocturia; opted to address nasal issues before considering prostate medications due to potential side effects    NOCTURIA:  - Explained the relationship between nasal congestion and nocturia to the patient.  - Recommend avoiding fluid intake, especially caffeine, late at night to  reduce nocturia.  - Advised the patient to pay attention to urination patterns.  - Noted that the patient reports waking up to urinate 2 times a night.  - Assessed that starting medications like Doxazosin or Flomax might not be necessary if nocturia is not interfering with sleep quality.  - Recommend addressing nasal congestion to potentially improve nocturia.    ACID REFLUX:  - Continued pantoprazole for acid reflux management.  - Noted that the patient reports the condition is well-controlled with medication.  - Advised elevating the head of the bed to help with acid reflux.  - Patient to elevate head of bed to help with acid reflux and nasal congestion.    HYPERLIPIDEMIA:  - Continued Livalo and Zetia for cholesterol management.  - Reviewed cholesterol results, noting excellent levels with HDL above 40 and LDL below thresholds.    HYPERTENSION:  - Continued amlodipine 10 mg for blood pressure control.  - Noted blood pressure reading of 128/80.  - Assessed that no changes are needed in current blood pressure management.    NASAL CONGESTION:  - Recommend using Atrovent nasal spray at least daily before bed, aiming for 3 times daily if possible.  - Suggested restarting Flonase at night to help with nasal congestion.  - Advised using nasal strips for additional relief.  - Instructed the patient to contact the office if nasal congestion management is ineffective.  - Noted that referral to an ENT specialist may be considered if current treatment plan is unsuccessful.  - Patient to use nasal strips at night to keep nasal passages open.    JAK2 MUTATION / ESSENTIAL THROMBOCYTOPENIA:  - Continued hydroxyurea for JAK2 mutation management.  - Inquired about symptoms related to essential thrombocytopenia, including bruising and bleeding.  - Noted patient reports only bruising when hit, identified as senile purpura.  - Observed that platelet count is currently within normal range, though it has been elevated at times.  -  Acknowledged recent visit to Dr. Roberts, who was satisfied with the patient's condition.  - Scheduled follow-up plan with lab draws every 3 months and doctor visits every 6 months.    KIDNEY FUNCTION:  - Monitored creatinine levels, noting current level of 1.2.  - Evaluated kidney function based on creatinine and glomerular filtration rate.  - Assessed that kidney function is appropriate for the patient's age.    SUBCONJUNCTIVAL HEMORRHAGE:  - Educated the patient on subconjunctival hemorrhage, its common causes, and when to seek further evaluation.  - Observed a medial subconjunctival hemorrhage in the right eye.  - Assessed that no action is needed if there's no pain or visual problems.  - Instructed the patient to contact the office if experiencing pain or visual changes related to the subconjunctival hemorrhage.  - Advised consulting an ophthalmologist if pain or visual   disturbances occur.    SENILE PURPURA:  - Explained senile purpura as age-related easy bruising.    OTHER INSTRUCTIONS:  - Discussed risks and benefits of colonoscopy at advanced age.    FOLLOW UP:  - Follow up in 6 months.          Visit today included increased complexity associated with the care of the episodic problem  addressed and managing the longitudinal care of the patient due to the serious and/or complex managed problem(s) .      Follow up in about 6 months (around 3/16/2025).    This note was generated with the assistance of ambient listening technology. Verbal consent was obtained by the patient and accompanying visitor(s) for the recording of patient appointment to facilitate this note. I attest to having reviewed and edited the generated note for accuracy, though some syntax or spelling errors may persist. Please contact the author of this note for any clarification.

## 2024-09-18 ENCOUNTER — TELEPHONE (OUTPATIENT)
Dept: OPHTHALMOLOGY | Facility: CLINIC | Age: 84
End: 2024-09-18
Payer: MEDICARE

## 2024-09-18 NOTE — TELEPHONE ENCOUNTER
Pt reported that he has been having a red eye for about 5 days. He says he had no idea his eye was red until his wife told him. Pt denies any pain or discomfort. He went to his PCP and was told to be seen. Scheduled pt for urgent care appt with Dr. Olivas in [place] for tomorrow.     ----- Message from Diana Taveras sent at 9/18/2024  7:35 AM CDT -----  Pt sent message to portal to be advised or worked in pt c/o  right eye has been blood shot for approximately 5 days his  vision has not been affected only minor discomfort please call or reply to portal to advise  [phone]

## 2024-09-19 ENCOUNTER — OFFICE VISIT (OUTPATIENT)
Dept: OPHTHALMOLOGY | Facility: CLINIC | Age: 84
End: 2024-09-19
Payer: MEDICARE

## 2024-09-19 DIAGNOSIS — H11.31 SUBCONJUNCTIVAL HEMORRHAGE OF RIGHT EYE: Primary | ICD-10-CM

## 2024-09-19 PROCEDURE — 99213 OFFICE O/P EST LOW 20 MIN: CPT | Mod: PBBFAC,PO | Performed by: OPTOMETRIST

## 2024-09-19 PROCEDURE — 99203 OFFICE O/P NEW LOW 30 MIN: CPT | Mod: S$PBB,,, | Performed by: OPTOMETRIST

## 2024-09-19 PROCEDURE — 99999 PR PBB SHADOW E&M-EST. PATIENT-LVL III: CPT | Mod: PBBFAC,,, | Performed by: OPTOMETRIST

## 2024-09-19 NOTE — PROGRESS NOTES
HPI     Eye Problem            Comments: NP to [facility]  Patient here today for red OD          Comments    Pain Scale:  0  Onset:   4-5 days   OD, OS, OU:   OD  Discharge:   No  A.M. Matting:  No  Itch:   No  Redness:   Yes  Photophobia:   No  Foreign body sensation:   No  Deep pain:   No  Previous occurrence:   No  Drops:   No    1. Foveopathy OD / ERM OS   2. 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 Fhx COAG- uncle   4. Fhx PKP   5. Dry AMD (mild)  6. Ocular Migraines    AREDS 2  +HAG               Last edited by Eileen Perdomo, PCT on 9/19/2024  3:15 PM.            Assessment /Plan     For exam results, see Encounter Report.    1. Subconjunctival hemorrhage of right eye  Benign nature of subconjunctival hemorrhage was discussed with patient and/or patient's family. Reassurance provided. Symptoms should improve or resolve in the next 7-10 days. RTC PRN if symptoms worsen or persist x 1-2 weeks.  Discussed above and answered questions.       RTC as scheduled for annual eye exam, sooner if any changes to vision worsening symptoms.

## 2024-10-07 ENCOUNTER — PATIENT MESSAGE (OUTPATIENT)
Dept: RESEARCH | Facility: HOSPITAL | Age: 84
End: 2024-10-07
Payer: MEDICARE

## 2024-10-11 ENCOUNTER — OFFICE VISIT (OUTPATIENT)
Dept: DERMATOLOGY | Facility: CLINIC | Age: 84
End: 2024-10-11
Payer: MEDICARE

## 2024-10-11 DIAGNOSIS — L57.0 ACTINIC KERATOSES: ICD-10-CM

## 2024-10-11 DIAGNOSIS — Z12.83 SKIN CANCER SCREENING: Primary | ICD-10-CM

## 2024-10-11 DIAGNOSIS — L81.4 SOLAR LENTIGO: ICD-10-CM

## 2024-10-11 PROCEDURE — 99213 OFFICE O/P EST LOW 20 MIN: CPT | Mod: PBBFAC | Performed by: STUDENT IN AN ORGANIZED HEALTH CARE EDUCATION/TRAINING PROGRAM

## 2024-10-11 PROCEDURE — 99999 PR PBB SHADOW E&M-EST. PATIENT-LVL III: CPT | Mod: PBBFAC,,, | Performed by: STUDENT IN AN ORGANIZED HEALTH CARE EDUCATION/TRAINING PROGRAM

## 2024-10-11 NOTE — PROGRESS NOTES
Patient Information  Name: Bharathi Parsons  : 1940  MRN: [id number]     Referring Physician:  Dr. Navarro   Primary Care Physician:  Dr. Rooney, Festus Sinha MD   Date of Visit: 10/11/2024      Subjective:       Bharathi Parsons is a 84 y.o. male who presents for AK follow up  HPI  Patient with hx of AK, here for follow up.    Patient was last seen:2023     Prior notes by myself reviewed.   Clinical documentation obtained by nursing staff reviewed.    Review of Systems   Skin:  Negative for itching and rash.        Objective:    Physical Exam   Constitutional: He appears well-developed and well-nourished. No distress.   Neurological: He is alert and oriented to person, place, and time. He is not disoriented.   Psychiatric: He has a normal mood and affect.   Skin:   Areas Examined (abnormalities noted in diagram):   Scalp / Hair Palpated and Inspected  Head / Face Inspection Performed  Neck Inspection Performed  Chest / Axilla Inspection Performed  RUE Inspected  LUE Inspection Performed                   Diagram Legend     Erythematous scaling macule/papule c/w actinic keratosis       Vascular papule c/w angioma      Pigmented verrucoid papule/plaque c/w seborrheic keratosis      Yellow umbilicated papule c/w sebaceous hyperplasia      Irregularly shaped tan macule c/w lentigo     1-2 mm smooth white papules consistent with Milia      Movable subcutaneous cyst with punctum c/w epidermal inclusion cyst      Subcutaneous movable cyst c/w pilar cyst      Firm pink to brown papule c/w dermatofibroma      Pedunculated fleshy papule(s) c/w skin tag(s)      Evenly pigmented macule c/w junctional nevus     Mildly variegated pigmented, slightly irregular-bordered macule c/w mildly atypical nevus      Flesh colored to evenly pigmented papule c/w intradermal nevus       Pink pearly papule/plaque c/w basal cell carcinoma      Erythematous hyperkeratotic cursted plaque c/w SCC      Surgical scar with no sign of skin  cancer recurrence      Open and closed comedones      Inflammatory papules and pustules      Verrucoid papule consistent consistent with wart     Erythematous eczematous patches and plaques     Dystrophic onycholytic nail with subungual debris c/w onychomycosis     Umbilicated papule    Erythematous-base heme-crusted tan verrucoid plaque consistent with inflamed seborrheic keratosis     Erythematous Silvery Scaling Plaque c/w Psoriasis     See annotation      No images are attached to the encounter or orders placed in the encounter.    [] Data reviewed  [] Independent review of test  [] Management discussed with another provider    Assessment / Plan:        Actinic keratoses  Cryosurgery Procedure Note    Verbal consent from the patient is obtained including, but not limited to, risk of hypopigmentation/hyperpigmentation, scar, recurrence of lesion. The patient is aware of the precancerous quality and need for treatment of these lesions. Liquid nitrogen cryosurgery is applied to the 23 actinic keratoses, as detailed in the physical exam, to produce a freeze injury. The patient is aware that blisters may form and is instructed on wound care with gentle cleansing and use of vaseline ointment to keep moist until healed. The patient is supplied a handout on cryosurgery and is instructed to call if lesions do not completely resolve.    Skin cancer screening  Upper body skin examination performed today including at least 6 points as noted in physical examination. No lesions suspicious for malignancy noted.    Recommend daily sun protection/avoidance and use of at least SPF 30, broad spectrum sunscreen (OTC drug).     Solar lentigo  This is a benign hyperpigmented sun induced lesion. Recommend daily sun protection/avoidance and use of at least SPF 30, broad spectrum sunscreen (OTC drug) will reduce the number of new lesions. Treatment of these benign lesions are considered cosmetic.                 LOS NUMBER AND  COMPLEXITY OF PROBLEMS    COMPLEXITY OF DATA RISK TOTAL TIME (m)   87435  02348 [] 1 self-limited or minor problem [x] Minimal to none [] No treatment recommended or patient to monitor 15-29  10-19   77965  33634 Low  [] 2 or > self limited or minor problems  [] 1 stable chronic illness  [] 1 acute, uncomplicated illness or injury Limited (2)  [] Prior external notes from each unique source  [] Review result of each unique test  [] Order each unique test [x]  Low  OTC medications, minor skin biopsy 30-44  20-29   67052  60690 Moderate  []  1 or > chronic illness with progression, exacerbation or SE of treatment  [x]  2 or more stable chronic illnesses  []  1 acute illness with systemic symptoms  []  1 acute complicated injury  []  1 undiagnosed new problem with uncertain prognosis Moderate (1/3 below)  []  3 or more data items        *Now includes assessment requiring independent historian  []  Independent interpretation of a test  []  Discuss management/test with another provider Moderate  []  Prescription drug mgmt  []  Minor surgery with risk discussed  []  Mgmt limited by social determinates 45-59  30-39   29410  13512 High  []  1 or more chronic illness with severe exacerbation, progression or SE of treatment  []  1 acute or chronic illness/injury that poses a threat to life or bodily function Extensive (2/3 below)  []  3 or more data items        *Now includes assessment requiring independent historian.  []  Independent interpretation of a test  []  Discuss management/test with another provider High  []  Major surgery with risk discussed  []  Drug therapy requiring intensive monitoring for toxicity  []  Hospitalization  []  Decision for DNR 60-74  40-54      No follow-ups on file.    Ann Sanches MD, FAAD  [facility]

## 2024-12-20 ENCOUNTER — HOSPITAL ENCOUNTER (OUTPATIENT)
Dept: RADIOLOGY | Facility: HOSPITAL | Age: 84
Discharge: HOME | End: 2024-12-20
Payer: MEDICARE

## 2024-12-20 ENCOUNTER — OFFICE VISIT (OUTPATIENT)
Facility: CLINIC | Age: 84
End: 2024-12-20
Payer: MEDICARE

## 2024-12-20 VITALS — BODY MASS INDEX: 24.72 KG/M2 | WEIGHT: 166.88 LBS | RESPIRATION RATE: 17 BRPM | HEIGHT: 69 IN

## 2024-12-20 DIAGNOSIS — M25.552 PAIN OF LEFT HIP: ICD-10-CM

## 2024-12-20 DIAGNOSIS — M16.12 ARTHRITIS OF LEFT HIP: Primary | ICD-10-CM

## 2024-12-20 DIAGNOSIS — M25.552 PAIN OF LEFT HIP: Primary | ICD-10-CM

## 2024-12-20 PROCEDURE — 99213 OFFICE O/P EST LOW 20 MIN: CPT | Mod: PBBFAC,25,PN

## 2024-12-20 PROCEDURE — 73502 X-RAY EXAM HIP UNI 2-3 VIEWS: CPT | Mod: TC,PN,LT

## 2024-12-20 PROCEDURE — 72100 X-RAY EXAM L-S SPINE 2/3 VWS: CPT | Mod: 26,,, | Performed by: RADIOLOGY

## 2024-12-20 PROCEDURE — 72100 X-RAY EXAM L-S SPINE 2/3 VWS: CPT | Mod: TC,PN

## 2024-12-20 PROCEDURE — 99999 PR PBB SHADOW E&M-EST. PATIENT-LVL III: CPT | Mod: PBBFAC,,,

## 2024-12-20 PROCEDURE — 73502 X-RAY EXAM HIP UNI 2-3 VIEWS: CPT | Mod: 26,LT,, | Performed by: RADIOLOGY

## 2024-12-20 RX ORDER — METHYLPREDNISOLONE 4 MG/1
TABLET ORAL
Qty: 21 TABLET | Refills: 0 | Status: SHIPPED | OUTPATIENT
Start: 2024-12-20

## 2024-12-20 RX ORDER — METHYLPREDNISOLONE 4 MG/1
TABLET ORAL
Qty: 21 TABLET | Refills: 0 | Status: SHIPPED | OUTPATIENT
Start: 2024-12-20 | End: 2024-12-20

## 2024-12-20 RX ORDER — DICLOFENAC SODIUM 10 MG/G
2 GEL TOPICAL DAILY
Qty: 1 EACH | Refills: 1 | Status: SHIPPED | OUTPATIENT
Start: 2024-12-20 | End: 2025-01-19

## 2024-12-20 RX ORDER — DICLOFENAC SODIUM 10 MG/G
2 GEL TOPICAL DAILY
Qty: 1 EACH | Refills: 1 | Status: SHIPPED | OUTPATIENT
Start: 2024-12-20 | End: 2024-12-20

## 2024-12-20 NOTE — PROGRESS NOTES
History of Present Illness           84 yr old male with history of degenerative joint disease presents to the clinic with c/o of atraumatic left hip pain, onging for one month. Patient reports a history of similar pain, for which he has seen Dr. Viera in the past and was treated with steroid injections. Patient reports relief with injections. Patient reports pain is made worse by walking upstairs and is relieved with taking 400mg of Ibuprofen once per day. Patient denies that pain is associated with swelling, stiffness, clicking, locking, numbness, tingling, or weakness. Patient has appointment with Dr. Viera on 1/21/24.    Hip Musculoskeletal Exam  Patient appears comfortable. No distress noted.  No visible deformity, swelling, erythema, or atrophy noted in the left hip.  Gait is normal.  No tenderness to palpation over the greater trochanter  No palpable masses   Hip flexion normal without pain  Hip extension normal without pain  Internal rotation normal without pain  External rotation normal without pain  Adduction normal without pain  Abduction normal without pain    Assessment & Plan         Patient to begin physical therapy for left hip pain at [facility] in approximately one week  Patient to keep appointment with Dr. Viera in January  Patient prescribed Medrol dosepak and Voltaren gel. Take as directed.    EXAM:  XR LUMBAR SPINE 2 OR 3 VIEWS     CLINICAL HISTORY:  Left hip pain     COMPARISON:  None     TECHNIQUE:  3 views of the lumbosacral spine.     FINDINGS:  Alignment is normal.  Advanced chronic disc space narrowing is visible throughout the lower thoracic and lumbar spine from T9-10 through L5-S1.  Vertebral body heights are maintained. No fractures are identified. The sacroiliac joints appear normal and symmetrical.        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  No evidence of acute injury is appreciated.     Finalized on: 12/20/2024 2:40 PM By:  Matt  Dada  [facility]# [id number]      2024-12-20 14:[id number]    [facility]  Sports Medicine Nurse Practitioner      Disclaimer: This note was prepared using a voice recognition system and is likely to have sound alike errors within the text.        This note was generated with the assistance of ambient listening technology. Verbal consent was obtained by the patient and accompanying visitor(s) for the recording of patient appointment to facilitate this note. I attest to having reviewed and edited the generated note for accuracy, though some syntax or spelling errors may persist. Please contact the author of this note for any clarification.

## 2024-12-21 NOTE — ADDENDUM NOTE
Addended by: KIMBERLY HARRIS on: 12/20/2024 07:34 PM     Modules accepted: Level of Service     detailed exam

## 2024-12-23 ENCOUNTER — LAB VISIT (OUTPATIENT)
Dept: LAB | Facility: HOSPITAL | Age: 84
End: 2024-12-23
Attending: INTERNAL MEDICINE
Payer: MEDICARE

## 2024-12-23 DIAGNOSIS — D75.839 THROMBOCYTOSIS: ICD-10-CM

## 2024-12-23 DIAGNOSIS — D47.3 ESSENTIAL (HEMORRHAGIC) THROMBOCYTHEMIA: ICD-10-CM

## 2024-12-23 LAB
ALBUMIN SERPL BCP-MCNC: 3.7 G/DL (ref 3.5–5.2)
ALP SERPL-CCNC: 72 U/L (ref 40–150)
ALT SERPL W/O P-5'-P-CCNC: 18 U/L (ref 10–44)
ANION GAP SERPL CALC-SCNC: 12 MMOL/L (ref 8–16)
AST SERPL-CCNC: 17 U/L (ref 10–40)
BASOPHILS # BLD AUTO: 0.02 K/UL (ref 0–0.2)
BASOPHILS NFR BLD: 0.1 % (ref 0–1.9)
BILIRUB SERPL-MCNC: 0.3 MG/DL (ref 0.1–1)
BUN SERPL-MCNC: 27 MG/DL (ref 8–23)
CALCIUM SERPL-MCNC: 9 MG/DL (ref 8.7–10.5)
CHLORIDE SERPL-SCNC: 109 MMOL/L (ref 95–110)
CO2 SERPL-SCNC: 19 MMOL/L (ref 23–29)
CREAT SERPL-MCNC: 1.2 MG/DL (ref 0.5–1.4)
DIFFERENTIAL METHOD BLD: ABNORMAL
EOSINOPHIL # BLD AUTO: 0 K/UL (ref 0–0.5)
EOSINOPHIL NFR BLD: 0 % (ref 0–8)
ERYTHROCYTE [DISTWIDTH] IN BLOOD BY AUTOMATED COUNT: 13.5 % (ref 11.5–14.5)
EST. GFR  (NO RACE VARIABLE): 59 ML/MIN/1.73 M^2
GLUCOSE SERPL-MCNC: 164 MG/DL (ref 70–110)
HCT VFR BLD AUTO: 41.5 % (ref 40–54)
HGB BLD-MCNC: 13.5 G/DL (ref 14–18)
IMM GRANULOCYTES # BLD AUTO: 0.12 K/UL (ref 0–0.04)
IMM GRANULOCYTES NFR BLD AUTO: 0.7 % (ref 0–0.5)
LYMPHOCYTES # BLD AUTO: 0.5 K/UL (ref 1–4.8)
LYMPHOCYTES NFR BLD: 2.9 % (ref 18–48)
MCH RBC QN AUTO: 32.5 PG (ref 27–31)
MCHC RBC AUTO-ENTMCNC: 32.5 G/DL (ref 32–36)
MCV RBC AUTO: 100 FL (ref 82–98)
MONOCYTES # BLD AUTO: 0.5 K/UL (ref 0.3–1)
MONOCYTES NFR BLD: 2.9 % (ref 4–15)
NEUTROPHILS # BLD AUTO: 16.9 K/UL (ref 1.8–7.7)
NEUTROPHILS NFR BLD: 93.4 % (ref 38–73)
NRBC BLD-RTO: 0 /100 WBC
PLATELET # BLD AUTO: 458 K/UL (ref 150–450)
PMV BLD AUTO: 9.1 FL (ref 9.2–12.9)
POTASSIUM SERPL-SCNC: 4.4 MMOL/L (ref 3.5–5.1)
PROT SERPL-MCNC: 6.7 G/DL (ref 6–8.4)
RBC # BLD AUTO: 4.15 M/UL (ref 4.6–6.2)
SODIUM SERPL-SCNC: 140 MMOL/L (ref 136–145)
WBC # BLD AUTO: 18.11 K/UL (ref 3.9–12.7)

## 2024-12-23 PROCEDURE — 80053 COMPREHEN METABOLIC PANEL: CPT | Performed by: INTERNAL MEDICINE

## 2024-12-23 PROCEDURE — 36415 COLL VENOUS BLD VENIPUNCTURE: CPT | Performed by: INTERNAL MEDICINE

## 2024-12-23 PROCEDURE — 85025 COMPLETE CBC W/AUTO DIFF WBC: CPT | Performed by: INTERNAL MEDICINE

## 2025-01-07 ENCOUNTER — CLINICAL SUPPORT (OUTPATIENT)
Dept: REHABILITATION | Facility: HOSPITAL | Age: 85
End: 2025-01-07
Payer: MEDICARE

## 2025-01-07 DIAGNOSIS — D75.839 THROMBOCYTOSIS: ICD-10-CM

## 2025-01-07 DIAGNOSIS — R26.9 GAIT ABNORMALITY: ICD-10-CM

## 2025-01-07 DIAGNOSIS — M16.12 ARTHRITIS OF LEFT HIP: ICD-10-CM

## 2025-01-07 DIAGNOSIS — Z74.09 DECREASED FUNCTIONAL MOBILITY AND ENDURANCE: ICD-10-CM

## 2025-01-07 DIAGNOSIS — R52 PAIN: ICD-10-CM

## 2025-01-07 DIAGNOSIS — R26.89 BALANCE PROBLEM: ICD-10-CM

## 2025-01-07 DIAGNOSIS — M25.60 DECREASED RANGE OF MOTION WITH DECREASED STRENGTH: Primary | ICD-10-CM

## 2025-01-07 DIAGNOSIS — R53.1 DECREASED RANGE OF MOTION WITH DECREASED STRENGTH: Primary | ICD-10-CM

## 2025-01-07 PROCEDURE — 97110 THERAPEUTIC EXERCISES: CPT | Performed by: PHYSICAL THERAPIST

## 2025-01-07 PROCEDURE — 97162 PT EVAL MOD COMPLEX 30 MIN: CPT | Performed by: PHYSICAL THERAPIST

## 2025-01-07 RX ORDER — ASPIRIN 81 MG/1
81 TABLET ORAL
Qty: 100 TABLET | Refills: 0 | Status: SHIPPED | OUTPATIENT
Start: 2025-01-07

## 2025-01-07 NOTE — PLAN OF CARE
[facility]   Physical Therapy Initial Evaluation      Date: 1/7/2025   Name: Bharathi SULLIVAN [facility] Number: [id number]    Therapy Diagnosis:    Encounter Diagnoses   Name Primary?    Arthritis of left hip     Decreased range of motion with decreased strength Yes    Gait abnormality     Pain     Balance problem     Decreased functional mobility and endurance       Physician: Angi Cain FNP     Physician Orders: PT Eval and Treat  Medical Diagnosis from Referral: Arthritis of left hip  Evaluation Date: 1/7/2025  Authorization Period Expiration: 12/20/25  Plan of Care Expiration: 3/7/2025  Progress Note Due: 2/7/2025  Visit # / Visits authorized: 1/1  FOTO: 1/3 (last performed on 1/7/2025)    Precautions: Standard, Fall, and HTN    Time In: 2:00 pm  Time Out: 3:00 pm  Total Billable Time (timed & untimed codes): 55 minutes    SUBJECTIVE   Date of onset: 2-3 weeks ago    History of current condition - Bharathi reports He began to have pain in his left hip in November at his daughters house going up and down the stairs then about 2-3 weeks ago began to have increased pain. + x-rays for arthritis and bone spurs. Had steriods and an ointment.   About a week ago was walking after dog, and tripped and fell landing on left knee. Has not gotten checked out - was on the couch for a few days but is doing better.  Swelling is down in knee and not bothering him as much.  His hip is feeling better overall since he hurt his knee.     Current Activity Level: No exercise but stays active - walks dog, 2 x per day about 1200 feet each time.    Falls: [] No  [x] Yes: see above.    Imaging: [x] Xray [] MRI [] CT:   Hip 12/24:  FINDINGS: Mild spurring of the left acetabulum and femoral head.  Joint space well-maintained.  No acute fracture or dislocation.  SI joints unremarkable.  Pubic symphysis within normal limits.   LB 12/24:  FINDINGS: Alignment is normal. Advanced chronic disc space narrowing is visible  throughout the lower thoracic and lumbar spine from T9-10 through L5-S1. Vertebral body heights are maintained. No fractures are identified. The sacroiliac joints appear normal and symmetrical.     Prior Therapy:  [] No  [x] Yes: shoulder pain  Social History: Patient lives with their spouse  Stairs: [x] No  [] Yes: Did have difficulty with a flight of steps - unable to use left lower extremity very well. Incline on driveway and that seems to be OK.  Occupation: Patient is retired.  Prior Level of Function: ~1 year ago walking a few miles, standing ~ as long as wanted to , no problems with sitting   Current Level of Function:  walk ~ 15-20 min, standing ~ 20 min, sitting - as long as wants to     Pain:  Current 0/10, worst 3/10, best 0/10   Location: left knee and hip   Description:  hip - Sharp pain, knee - sharp pain, tight   Aggravating Factors: stairs, full weight on left lower extremity, twisting on knee, crossing ankle to knee increased knee pain.  Easing Factors:  knee ice helped, heating pad helped hip.    Patients goals: walk without pain, improve going up and down steps.    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 JAK2 V617F mutated DNA was detected and measured  at 30% of total JAK2 DNA.    [id number] mutation is found with high frequency in  Piatt chromosome-negative myeloproliferative  neoplasms (>95% in polycythemia vera and approximately  50-60% in primary myelofibrosis and essential  thrombocy       Surgical History:   Bharahti Parsons  has a past surgical history that includes Foot arthrodesis; Cataract extraction w/  intraocular lens implant (OD before 2003); Cataract extraction w/  intraocular lens implant  (OS 12/19/07 with YAG); Appendectomy; and Esophageal dilation.    Medications:   Bharathi has a current medication list which includes the following prescription(s): amlodipine, aspirin, nexlizet, cetirizine, pfizer covid-19 jerry vaccn(pf), diclofenac sodium, hydroxyurea, ibuprofen, ipratropium, livalo, methylprednisolone, pantoprazole, and triamcinolone acetonide 0.1%.    Allergies:   Review of patient's allergies indicates:  No Known Allergies     OBJECTIVE     Posture:  Patient presents with postural abnormalities which include:    [x] Forward Head   [] Increased Lumbar Lordosis   [x] Rounded Shoulder   [] Flat Back Posture   [] Increased Thoracic Kyphosis [x] Pes Planus left foot   [] Increased Trunk Sway  [] Valgus knee position   [] Increased Trunk Rotation  [] Varus knee position   [] Increased cervical lordosis [x] Other: hx fracture and surgery on broken left foot - left foot is in significant pes planus and forefoot is everted.    Sensation:    Sensation to light touch over UE's is  [] Intact [] Impaired [x] Defer  Sensation to light touch over LE's is  [x] Intact [] Impaired [] Defer    Reflexes:  Patellar    [x] Defer [] Normal [] Hyper []  Hypo   Achilles   [x] Defer [] Normal [] Hyper []  Hypo  Tricep   [x] Defer [] Normal [] Hyper []  Hypo  Brachioradialis [x] Defer [] Normal [] Hyper []  Hypo      Gait Analysis: The patient ambulated with the following assistive device: none; the patient presents with the following gait abnormalities: unsteady gait and trunk lean    Balance  Right   (seconds) Left  (seconds) Norms   Single Leg Stance 5 3 Less than 4.9 sec high risk  5-6.4 sec increased risk  6.5 or greater low risk       Functional Tests  Outcome Norms   Five Time Sit to Stand    Greater than 14 sec high risk  12.1-14 sec increased risk  12 sec or less low risk 20.60 20-29 yrs ? 6.0±1.4 sec.  30-39 yrs ? 6.1±1.4 sec.  40-49 yrs ? 7.6±1.8 sec.  50-59 yrs ? 7.7±2.6 sec.  60-69 yrs ? 8.4±0.0 sec (male),  12.7±1.8 sec (female)  70-79 yrs ? 11.6±3.4 sec (male), 13.0±4.8 sec (female)  80-89 yrs ? 16.7±4.5 sec (male), 17.2±5.5 sec (female)        Range of Motion:    Ankle/Foot AROM/PROM Right Left Pain/Dysfunction with Movement   Dorsiflexion (20º) WFL limited    Plantarflexion (50º) WFL limited      ,   Hip AROM/PROM Right Left Pain/Dysfunction with Movement   Hip Flexion (120º) limited limited    Hip Abduction (45º) limited limited    Hip IR (45º) limited limited    Hip ER (45º) limited limited    , and   Knee AROM/PROM Right Left Pain/Dysfunction with Movement   Knee Flexion (135º)     Knee Extension (0º) WFL 0        Strength:    L/E MMT Right  (spine) Left Pain/Dysfunction with Movement   Hip Flexion  4-/5 3+/5    Knee Extension 4/5 4/5    Knee Flexion 4/5 4/5    Hip IR 4-/5 3+/5    Hip ER 4-/5 3+/5    Ankle DF 4/5 4-/5    Ankle PF 4/5 4-/5    Ankle Inversion 4-/5 4/5    Ankle Eversion 4-/5 4/5        Muscle Length:       Muscle Tested  Right Left  Limitation   Hip Flexors [] Normal      [x] Limited [] Normal      [x] Limited    Hamstrings  [] Normal      [x] Limited [] Normal      [x] Limited    Piriformis  [] Normal      [x] Limited [] Normal      [x] Limited    Gastrocnemius  [x] Normal      [] Limited [] Normal      [x] Limited    Soleus  [x] Normal      [] Limited [] Normal      [x] Limited        Joint Mobility:   DEFER    Special Tests:  DEFER    Palpation:  DEFER      FOTO:    Intake Outcome Measure for FOTO Hip Survey    Therapist reviewed FOTO scores for Bharathi Parsons on 1/7/2025.   FOTO documents entered into [employer] - see Media section or FOTO account episode details..    Intake Score: see below           TREATMENT     Total Treatment time (time-based codes) separate from Evaluation: (10) minutes       CPT Intervention Performed  1/7/2025  Duration / Intensity     MT        TE  HEP x                       NMR                          TA                                                                        PLAN  Bike, ankle mobility on step (if tolerated) long arc quad, seated heel/toe raises, PPT, bridges, isometric Add, sidelying hip series, Prone hip extension, leg press, toe yoga, arch doming, MOBU sitting, ankle band. Matrix knee extension/flex               PATIENT EDUCATION AND HOME EXERCISES     Education provided: (during treatment session) minutes  Home exercise program, and effect of strength in hip and ankle affecting lower extremities gait/balance.    Written Home Exercises Provided: yes.  Exercises were reviewed and Bharathi was able to demonstrate them prior to the end of the session.  Bharathi demonstrated good  understanding of the education provided. See EMR under Patient Instructions for exercises provided during therapy sessions.    ASSESSMENT     Bharathi is a 84 y.o. male referred to outpatient Physical Therapy with a medical diagnosis of  Arthritis of left hip . The patient presents with signs and symptoms consistent with diagnosis along with hx of ankle surgery and patient reporting shifting of surgical repair and impairments which include weakness, impaired endurance, impaired functional mobility, gait instability, impaired balance, decreased lower extremity function, pain, decreased ROM, and impaired muscle length.      Patient prognosis is Good, if patient is consistent and compliant with Physical Therapy and home exercise program.  Patient will benefit from skilled outpatient Physical Therapy to address the deficits stated above and in the chart below, provide patient/family education, and to maximize patient's level of independence.     Plan of care discussed with patient: Yes  Patient's spiritual, cultural and educational needs considered and patient is agreeable to the plan of care and goals as stated below:     Anticipated Barriers for therapy: co-morbidities, chronicity of condition, and coping style      Medical Necessity is demonstrated by the following   History  Co-morbidities  and personal factors that may impact the plan of care [] LOW: no personal factors / co-morbidities  [x] MODERATE: 1-2 personal factors / co-morbidities  [] HIGH: 3+ personal factors / co-morbidities    Moderate / High Support Documentation:   Past Medical History:   Diagnosis Date    DJD (degenerative joint disease)     Esophageal stricture     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 JAK2 V617F mutated DNA was detected and measured  at 30% of total JAK2 DNA.    [id number] mutation is found with high frequency in  Bollinger chromosome-negative myeloproliferative  neoplasms (>95% in polycythemia vera and approximately  50-60% in primary myelofibrosis and essential  thrombocy      Hx of ankle/foot left surgery   Examination  Body Structures and Functions, activity limitations and participation restrictions that may impact the plan of care [] LOW: addressing 1-2 elements  [x] MODERATE: 3+ elements  [] HIGH: 4+ elements (please support below)    Moderate / High Support Documentation: See evaluation / objective measurements above and FOTO.     Clinical Presentation [] LOW: stable  [x] MODERATE: Evolving  [] HIGH: Unstable     Decision Making/ Complexity Score: moderate         Goals:  Reviewed: 1/7/2025      Short Term Goals: In 4 weeks  Progress Date   1.Patient to be educated on HEP. [x] Progressing  [] MET   [] Not MET   [] Not tested    2.Patient to increase hip range of motion, in order to improve available range of motion for ADL's.  [x] Progressing  [] MET   [] Not MET  [] Not tested    3.Patient to increase bilateral LE strength by 1/2 grade, in order to improve endurance and increase ability to perform all functional activities for increased time.  [x] Progressing  [] MET   [] Not MET  [] Not tested    4.Patient to have pain less than 0/10 at  worst, to improve QOL. [x] Progressing  [] MET   [] Not MET  [] Not tested    5.Patient to improve score on the FOTO, to improve QOL. [x] Progressing  [] MET   [] Not MET  [] Not tested    6. Patient to improve score on 5 times sit to  order to decrease fall risk. [x] Progressing  [] MET   [] Not MET  [] Not tested      Long Term Goals: In 8 weeks Progress Date   1.Patient to improve score on the FOTO predicted score or better, to improve QOL. [x] Progressing  [] MET   [] Not MET  [] Not tested    2. Patient to increase bilateral LE strength to 4+/5 or greater, in order to improve endurance and increase ability to perform all functional activities for increased time. [x] Progressing  [] MET   [] Not MET  [] Not tested    3. Patient to normalize score on 5 times sit to stand, in order to improve endurance and decrease fall risk. [x] Progressing  [] MET   [] Not MET  [] Not tested    4. Patient to perform daily activities including walking and going up and down steps with only mild increased symptoms. [x] Progressing  [] MET   [] Not MET  [] Not tested      PLAN     Plan of care Certification: 1/7/2025  to 3/7/25.    Outpatient Physical Therapy 2 times weekly for 8 weeks to include any combination of the following interventions: Aquatic Therapy, electrical stimulation (PRN), Gait Training, Manual Therapy, Neuromuscular Re-ed, Patient Education/Self Care, Therapeutic Activites, Therapeutic Exercise, Dry Needling, and Moist Hot Pack/Cold Pack    Estefania Varela, PT

## 2025-01-10 ENCOUNTER — PATIENT MESSAGE (OUTPATIENT)
Dept: HEMATOLOGY/ONCOLOGY | Facility: CLINIC | Age: 85
End: 2025-01-10
Payer: MEDICARE

## 2025-01-12 PROBLEM — R53.1 DECREASED RANGE OF MOTION WITH DECREASED STRENGTH: Status: ACTIVE | Noted: 2025-01-12

## 2025-01-12 PROBLEM — M25.60 DECREASED RANGE OF MOTION WITH DECREASED STRENGTH: Status: ACTIVE | Noted: 2025-01-12

## 2025-01-12 PROBLEM — R26.9 GAIT ABNORMALITY: Status: ACTIVE | Noted: 2025-01-12

## 2025-01-12 PROBLEM — Z74.09 DECREASED FUNCTIONAL MOBILITY AND ENDURANCE: Status: ACTIVE | Noted: 2025-01-12

## 2025-01-12 PROBLEM — R52 PAIN: Status: ACTIVE | Noted: 2025-01-12

## 2025-01-12 PROBLEM — R26.89 BALANCE PROBLEM: Status: ACTIVE | Noted: 2025-01-12

## 2025-01-13 ENCOUNTER — PATIENT MESSAGE (OUTPATIENT)
Dept: AUDIOLOGY | Facility: CLINIC | Age: 85
End: 2025-01-13
Payer: MEDICARE

## 2025-01-14 ENCOUNTER — CLINICAL SUPPORT (OUTPATIENT)
Dept: REHABILITATION | Facility: HOSPITAL | Age: 85
End: 2025-01-14
Payer: MEDICARE

## 2025-01-14 DIAGNOSIS — M25.60 DECREASED RANGE OF MOTION WITH DECREASED STRENGTH: Primary | ICD-10-CM

## 2025-01-14 DIAGNOSIS — Z74.09 DECREASED FUNCTIONAL MOBILITY AND ENDURANCE: ICD-10-CM

## 2025-01-14 DIAGNOSIS — R52 PAIN: ICD-10-CM

## 2025-01-14 DIAGNOSIS — R26.9 GAIT ABNORMALITY: ICD-10-CM

## 2025-01-14 DIAGNOSIS — R53.1 DECREASED RANGE OF MOTION WITH DECREASED STRENGTH: Primary | ICD-10-CM

## 2025-01-14 DIAGNOSIS — R26.89 BALANCE PROBLEM: ICD-10-CM

## 2025-01-14 PROCEDURE — 97110 THERAPEUTIC EXERCISES: CPT | Performed by: PHYSICAL THERAPIST

## 2025-01-14 PROCEDURE — 97112 NEUROMUSCULAR REEDUCATION: CPT | Performed by: PHYSICAL THERAPIST

## 2025-01-14 NOTE — PROGRESS NOTES
[facility]   Physical Therapy Treatment Note        Name: Bharathi SULLIVAN [facility] Number: [id number]    Therapy Diagnosis:   Encounter Diagnoses   Name Primary?    Decreased range of motion with decreased strength Yes    Gait abnormality     Pain     Balance problem     Decreased functional mobility and endurance      Physician: Angi Cain FNP    Visit Date: 1/14/2025    Physician Orders: PT Eval and Treat  Medical Diagnosis from Referral: Arthritis of left hip  Evaluation Date: 1/7/2025  Authorization Period Expiration: 12/20/25  Plan of Care Expiration: 3/7/2025  Progress Note Due: 2/7/2025  Visit # / Visits authorized: 1/20 (+1)  FOTO: 1/3 (last performed on 1/7/2025)         Precautions: Standard, Fall, and HTN    Time In: 2:00 pm  Time Out: 3:00 pm  Total Billable Time: 55 minutes      SUBJECTIVE     Pt reports: was sore after his evaluation. Was quite busy this past week and did not get a chance to do any of his exercises. Knee is bothering him more then hip now.    He was not compliant with home exercise program.  Response to previous treatment: soreness   Functional change: none reported    Pain: 0-1/10  Location: left knee and hip     OBJECTIVE     Objective Measures updated at progress report unless specified.       TREATMENT       CPT Intervention Performed  1/14/2025  Duration / Intensity     MT        TE  Bike  x 8 min      LTR x 3 min            NMR  PPT x 3 min, 5 sec      Bridge x 10x/ 3 sets      isometric ADD x  3 min, 5 sec hold     Supine clamshell - black x 3 min,  5 sec hold    Sidelying hip abduction  Hip circles x  x 10x 2 sets  5x CW/CCW    Prone hip extension  x 10x each    LAQ x 10x each side 5 sec hold                     TA  leg press                                                                      PLAN   Bike, ankle mobility on step (if tolerated) toe yoga, arch doming, MOBU sitting, ankle band. Matrix knee extension/flex             CPT Codes  available for Billing:   (00) minutes of Manual therapy (MT) to improve pain and ROM.  (11) minutes of Therapeutic Exercise (TE) to develop strength, endurance, range of motion, and flexibility.  (44) minutes of Neuromuscular Re-Education (NMR)  to improve: Balance, Coordination, Kinesthetic Sense, Proprioception, and Posture.  (00) minutes of Therapeutic Activities (TA) to improve functional performance.  Unattended Electrical Stimulation (ES) for muscle performance or pain modulation.  BFR: Blood flow restriction applied during exercise  NP or (-): Not Performed              PATIENT EDUCATION AND HOME EXERCISES     Home Exercises Provided and Patient Education Provided     Education provided:   - home exercise program     Written Home Exercises Provided: Patient instructed to cont prior HEP. Exercises were reviewed and Bharathi was able to demonstrate them prior to the end of the session.  Bharathi demonstrated good  understanding of the education provided. See EMR under Patient Instructions for exercises provided during therapy sessions      ASSESSMENT     Patient with increased time for all interventions with cues for all activities. Some discomfort in knee during treatment but no pain in hip reported. Did discuss weakness in hips and importance of strengthening. Encouraged home exercise program.    Bharathi Is progressing well towards his goals.   Pt prognosis is Good.     Pt will continue to benefit from skilled outpatient physical therapy to address the deficits listed in the problem list box on initial evaluation, provide pt/family education and to maximize pt's level of independence in the home and community environment.     Pt's spiritual, cultural and educational needs considered and pt agreeable to plan of care and goals.     Anticipated barriers to physical therapy: : co-morbidities, chronicity of condition, and coping style     Goals:   Reviewed: 1/14/2025      Short Term Goals: In 4 weeks  Progress Date    1.Patient to be educated on HEP. [x] Progressing  [] MET   [] Not MET   [] Not tested     2.Patient to increase hip range of motion, in order to improve available range of motion for ADL's.  [x] Progressing  [] MET   [] Not MET  [] Not tested     3.Patient to increase bilateral LE strength by 1/2 grade, in order to improve endurance and increase ability to perform all functional activities for increased time.  [x] Progressing  [] MET   [] Not MET  [] Not tested     4.Patient to have pain less than 0/10 at worst, to improve QOL. [x] Progressing  [] MET   [] Not MET  [] Not tested     5.Patient to improve score on the FOTO, to improve QOL. [x] Progressing  [] MET   [] Not MET  [] Not tested     6. Patient to improve score on 5 times sit to  order to decrease fall risk. [x] Progressing  [] MET   [] Not MET  [] Not tested        Long Term Goals: In 8 weeks Progress Date   1.Patient to improve score on the FOTO predicted score or better, to improve QOL. [x] Progressing  [] MET   [] Not MET  [] Not tested     2. Patient to increase bilateral LE strength to 4+/5 or greater, in order to improve endurance and increase ability to perform all functional activities for increased time. [x] Progressing  [] MET   [] Not MET  [] Not tested     3. Patient to normalize score on 5 times sit to stand, in order to improve endurance and decrease fall risk. [x] Progressing  [] MET   [] Not MET  [] Not tested     4. Patient to perform daily activities including walking and going up and down steps with only mild increased symptoms. [x] Progressing  [] MET   [] Not MET  [] Not tested          PLAN     Monitor response to today's treatment session and progress with Physical Therapy plan of care as indicated.    Plan of care Certification: 1/7/2025  to 3/7/25.     Outpatient Physical Therapy 2 times weekly for 8 weeks to include any combination of the following interventions: Aquatic Therapy, electrical stimulation (PRN), Gait  Training, Manual Therapy, Neuromuscular Re-ed, Patient Education/Self Care, Therapeutic Activites, Therapeutic Exercise, Dry Needling, and Moist Hot Pack/Cold Pack    Estefania Varela, PT

## 2025-01-15 ENCOUNTER — CLINICAL SUPPORT (OUTPATIENT)
Dept: AUDIOLOGY | Facility: CLINIC | Age: 85
End: 2025-01-15
Payer: MEDICARE

## 2025-01-15 DIAGNOSIS — H90.3 HEARING LOSS, SENSORINEURAL, ASYMMETRICAL: Primary | ICD-10-CM

## 2025-01-15 NOTE — PROGRESS NOTES
Bharathi Parsons was seen 01/15/2025 for a hearing aid follow-up appointment.  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 Aids cleaned and checked and are both in good working order once filters and domes replaced.     Patient will call for any future hearing aid follow-up appointments as needed.

## 2025-01-16 ENCOUNTER — CLINICAL SUPPORT (OUTPATIENT)
Dept: REHABILITATION | Facility: HOSPITAL | Age: 85
End: 2025-01-16
Payer: MEDICARE

## 2025-01-16 DIAGNOSIS — Z74.09 DECREASED FUNCTIONAL MOBILITY AND ENDURANCE: ICD-10-CM

## 2025-01-16 DIAGNOSIS — R26.89 BALANCE PROBLEM: ICD-10-CM

## 2025-01-16 DIAGNOSIS — R53.1 DECREASED RANGE OF MOTION WITH DECREASED STRENGTH: Primary | ICD-10-CM

## 2025-01-16 DIAGNOSIS — R26.9 GAIT ABNORMALITY: ICD-10-CM

## 2025-01-16 DIAGNOSIS — M25.60 DECREASED RANGE OF MOTION WITH DECREASED STRENGTH: Primary | ICD-10-CM

## 2025-01-16 DIAGNOSIS — R52 PAIN: ICD-10-CM

## 2025-01-16 PROCEDURE — 97110 THERAPEUTIC EXERCISES: CPT | Performed by: PHYSICAL THERAPIST

## 2025-01-16 PROCEDURE — 97530 THERAPEUTIC ACTIVITIES: CPT | Performed by: PHYSICAL THERAPIST

## 2025-01-16 PROCEDURE — 97112 NEUROMUSCULAR REEDUCATION: CPT | Performed by: PHYSICAL THERAPIST

## 2025-01-16 NOTE — PROGRESS NOTES
[facility]   Physical Therapy Treatment Note        Name: Bharathi SULLIVAN [facility] Number: [id number]    Therapy Diagnosis:   Encounter Diagnoses   Name Primary?    Decreased range of motion with decreased strength Yes    Gait abnormality     Pain     Balance problem     Decreased functional mobility and endurance      Physician: Angi Cain FNP    Visit Date: 1/16/2025    Physician Orders: PT Eval and Treat  Medical Diagnosis from Referral: Arthritis of left hip  Evaluation Date: 1/7/2025  Authorization Period Expiration: 12/20/25  Plan of Care Expiration: 3/7/2025  Progress Note Due: 2/7/2025  Visit # / Visits authorized: 2/20 (+1)  FOTO: 1/3 (last performed on 1/7/2025)         Precautions: Standard, Fall, and HTN    Time In: 1:30 pm  Time Out: 2:30 pm  Total Billable Time: 55 minutes  Billing reflects one on one time spent with patient.     SUBJECTIVE     Pt reports: did OK after last treatment was a little sore. He reports that it is difficult to lift his leg to the side.    He was not compliant with home exercise program.  Response to previous treatment: soreness   Functional change: none reported    Pain: 0-1/10  Location: left knee and hip     OBJECTIVE     Objective Measures updated at progress report unless specified.       TREATMENT       CPT Intervention Performed  1/16/2025  Duration / Intensity     MT        TE  Bike   8 min    Nustep x 9 min      LTR x 3 min            NMR  PPT x 3 min, 5 sec      Bridge x 10x/ 3 sets      isometric ADD x  3 min, 10 sec hold     Supine clamshell - black x 3 min,  10 sec hold    Sidelying hip abduction  Hip circles x  x 10x 3 sets  5x CW/CCW    Prone hip extension  x 10x each    LAQ x 10x each side 5 sec hold/ 2 sets                     TA  leg press  x  65# 10x/ 3 sets       standing hip hikes  x 10x each extensive cues      leg press        Matrix knee extension         Matrix knee flexion         Side stepping                         PLAN   Bike, ankle mobility on step (if tolerated) toe yoga, arch doming, MOBU sitting, ankle band. Matrix knee extension/flex             CPT Codes available for Billing:   (00) minutes of Manual therapy (MT) to improve pain and ROM.  (12) minutes of Therapeutic Exercise (TE) to develop strength, endurance, range of motion, and flexibility.  (35) minutes of Neuromuscular Re-Education (NMR)  to improve: Balance, Coordination, Kinesthetic Sense, Proprioception, and Posture.  (08) minutes of Therapeutic Activities (TA) to improve functional performance.  Unattended Electrical Stimulation (ES) for muscle performance or pain modulation.  BFR: Blood flow restriction applied during exercise  NP or (-): Not Performed              PATIENT EDUCATION AND HOME EXERCISES     Home Exercises Provided and Patient Education Provided     Education provided:   - home exercise program     Written Home Exercises Provided: Patient instructed to cont prior HEP. Exercises were reviewed and Bharathi was able to demonstrate them prior to the end of the session.  Bharathi demonstrated good  understanding of the education provided. See EMR under Patient Instructions for exercises provided during therapy sessions      ASSESSMENT     Patient with increased time for all interventions with increased cues - verbal and manual for all activities. Encouraged home exercise program.    Bharathi Is progressing well towards his goals.   Pt prognosis is Good.     Pt will continue to benefit from skilled outpatient physical therapy to address the deficits listed in the problem list box on initial evaluation, provide pt/family education and to maximize pt's level of independence in the home and community environment.     Pt's spiritual, cultural and educational needs considered and pt agreeable to plan of care and goals.     Anticipated barriers to physical therapy: : co-morbidities, chronicity of condition, and coping style     Goals:   Reviewed: 1/16/2025       Short Term Goals: In 4 weeks  Progress Date   1.Patient to be educated on HEP. [x] Progressing  [] MET   [] Not MET   [] Not tested     2.Patient to increase hip range of motion, in order to improve available range of motion for ADL's.  [x] Progressing  [] MET   [] Not MET  [] Not tested     3.Patient to increase bilateral LE strength by 1/2 grade, in order to improve endurance and increase ability to perform all functional activities for increased time.  [x] Progressing  [] MET   [] Not MET  [] Not tested     4.Patient to have pain less than 0/10 at worst, to improve QOL. [x] Progressing  [] MET   [] Not MET  [] Not tested     5.Patient to improve score on the FOTO, to improve QOL. [x] Progressing  [] MET   [] Not MET  [] Not tested     6. Patient to improve score on 5 times sit to  order to decrease fall risk. [x] Progressing  [] MET   [] Not MET  [] Not tested        Long Term Goals: In 8 weeks Progress Date   1.Patient to improve score on the FOTO predicted score or better, to improve QOL. [x] Progressing  [] MET   [] Not MET  [] Not tested     2. Patient to increase bilateral LE strength to 4+/5 or greater, in order to improve endurance and increase ability to perform all functional activities for increased time. [x] Progressing  [] MET   [] Not MET  [] Not tested     3. Patient to normalize score on 5 times sit to stand, in order to improve endurance and decrease fall risk. [x] Progressing  [] MET   [] Not MET  [] Not tested     4. Patient to perform daily activities including walking and going up and down steps with only mild increased symptoms. [x] Progressing  [] MET   [] Not MET  [] Not tested          PLAN     Monitor response to today's treatment session and progress with Physical Therapy plan of care as indicated.    Plan of care Certification: 1/7/2025  to 3/7/25.     Outpatient Physical Therapy 2 times weekly for 8 weeks to include any combination of the following interventions: Aquatic  Therapy, electrical stimulation (PRN), Gait Training, Manual Therapy, Neuromuscular Re-ed, Patient Education/Self Care, Therapeutic Activites, Therapeutic Exercise, Dry Needling, and Moist Hot Pack/Cold Pack    Estefania Varela PT

## 2025-01-24 ENCOUNTER — CLINICAL SUPPORT (OUTPATIENT)
Dept: REHABILITATION | Facility: HOSPITAL | Age: 85
End: 2025-01-24
Payer: MEDICARE

## 2025-01-24 DIAGNOSIS — Z74.09 DECREASED FUNCTIONAL MOBILITY AND ENDURANCE: ICD-10-CM

## 2025-01-24 DIAGNOSIS — R53.1 DECREASED RANGE OF MOTION WITH DECREASED STRENGTH: Primary | ICD-10-CM

## 2025-01-24 DIAGNOSIS — R26.89 BALANCE PROBLEM: ICD-10-CM

## 2025-01-24 DIAGNOSIS — M25.60 DECREASED RANGE OF MOTION WITH DECREASED STRENGTH: Primary | ICD-10-CM

## 2025-01-24 DIAGNOSIS — R52 PAIN: ICD-10-CM

## 2025-01-24 DIAGNOSIS — R26.9 GAIT ABNORMALITY: ICD-10-CM

## 2025-01-24 PROCEDURE — 97530 THERAPEUTIC ACTIVITIES: CPT

## 2025-01-24 PROCEDURE — 97112 NEUROMUSCULAR REEDUCATION: CPT

## 2025-01-24 PROCEDURE — 97110 THERAPEUTIC EXERCISES: CPT

## 2025-01-24 NOTE — PROGRESS NOTES
[facility]   Physical Therapy Treatment Note        Name: Bharathi SULLIVAN [facility] Number: [id number]    Therapy Diagnosis:   Encounter Diagnoses   Name Primary?    Decreased range of motion with decreased strength Yes    Gait abnormality     Pain     Balance problem     Decreased functional mobility and endurance        Physician: Angi Cain FNP    Visit Date: 1/24/2025    Physician Orders: PT Eval and Treat  Medical Diagnosis from Referral: Arthritis of left hip  Evaluation Date: 1/7/2025  Authorization Period Expiration: 12/20/25  Plan of Care Expiration: 3/7/2025  Progress Note Due: 2/7/2025  Visit # / Visits authorized: 3/20 (+1)  FOTO: 1/3 (last performed on 1/7/2025)         Precautions: Standard, Fall, and HTN    Time In: 1400   Time Out: 1455   Total Billable Time: 55 minutes  Billing reflects one on one time spent with patient.     SUBJECTIVE     Pt reports: he is still doing pretty well today and is ok with being progressed. Patient states he still has not gotten into a routine of performing his HOME EXERCISE PROGRAM.    He was not compliant with home exercise program.  Response to previous treatment: soreness   Functional change: none reported    Pain: 0-1/10  Location: left knee and hip     OBJECTIVE     Objective Measures updated at progress report unless specified.     TREATMENT       CPT Intervention Performed  1/24/2025  Duration / Intensity     MT        TE  Bike   8 min    Nustep x 8 min      LTR x 3 min            NMR  PPT x 3 min, 5 sec      Bridge x 10x/ 3 sets      isometric ADD x  3 min, 10 sec hold     Supine clamshell - black x 3 min,  10 sec hold    Straight Leg Raise x 2 x 10 bilateral    Sidelying hip abduction  Hip circles x   10x 3 sets  5x CW/CCW    Prone hip extension   10x each    LAQ  10x each side 5 sec hold/ 2 sets                     TA  leg press  -  65# 10x/ 3 sets       standing hip hikes  - 10x each extensive cues              Matrix knee  extension  x  2 x 10 15lbs     Matrix knee flexion  x  2 x 10 15lbs     Side stepping x  3 minutes with two hand support      Sit to Stands x  2 x 10 with arm support            PLAN   Bike, ankle mobility on step (if tolerated) toe yoga, arch doming, MOBU sitting, ankle band. Matrix knee extension/flex             CPT Codes available for Billing:   (00) minutes of Manual therapy (MT) to improve pain and ROM.  (11) minutes of Therapeutic Exercise (TE) to develop strength, endurance, range of motion, and flexibility.  (28) minutes of Neuromuscular Re-Education (NMR)  to improve: Balance, Coordination, Kinesthetic Sense, Proprioception, and Posture.  (16) minutes of Therapeutic Activities (TA) to improve functional performance.  Unattended Electrical Stimulation (ES) for muscle performance or pain modulation.  BFR: Blood flow restriction applied during exercise  NP or (-): Not Performed       PATIENT EDUCATION AND HOME EXERCISES     Home Exercises Provided and Patient Education Provided     Education provided:   - home exercise program     Written Home Exercises Provided: Patient instructed to cont prior HEP. Exercises were reviewed and Bharathi was able to demonstrate them prior to the end of the session.  Bharathi demonstrated good  understanding of the education provided. See EMR under Patient Instructions for exercises provided during therapy sessions      ASSESSMENT     Patient tolerated today's session well and was able to be progressed with further global LOWER EXTREMITY strengthening. Patient noted to have more fatigue in quadriceps and did struggle with eccentric portion of sit to stands, so patient instructed to use support for this. Patient encouraged to participate in HOME EXERCISE PROGRAM to continue strengthening; patient verbalized understanding.    Bharathi Is progressing well towards his goals.   Pt prognosis is Good.     Pt will continue to benefit from skilled outpatient physical therapy to address the  deficits listed in the problem list box on initial evaluation, provide pt/family education and to maximize pt's level of independence in the home and community environment.     Pt's spiritual, cultural and educational needs considered and pt agreeable to plan of care and goals.     Anticipated barriers to physical therapy: : co-morbidities, chronicity of condition, and coping style     Goals:   Reviewed: 1/24/2025      Short Term Goals: In 4 weeks  Progress Date   1.Patient to be educated on HEP. [x] Progressing  [] MET   [] Not MET   [] Not tested     2.Patient to increase hip range of motion, in order to improve available range of motion for ADL's.  [x] Progressing  [] MET   [] Not MET  [] Not tested     3.Patient to increase bilateral LE strength by 1/2 grade, in order to improve endurance and increase ability to perform all functional activities for increased time.  [x] Progressing  [] MET   [] Not MET  [] Not tested     4.Patient to have pain less than 0/10 at worst, to improve QOL. [x] Progressing  [] MET   [] Not MET  [] Not tested     5.Patient to improve score on the FOTO, to improve QOL. [x] Progressing  [] MET   [] Not MET  [] Not tested     6. Patient to improve score on 5 times sit to  order to decrease fall risk. [x] Progressing  [] MET   [] Not MET  [] Not tested        Long Term Goals: In 8 weeks Progress Date   1.Patient to improve score on the FOTO predicted score or better, to improve QOL. [x] Progressing  [] MET   [] Not MET  [] Not tested     2. Patient to increase bilateral LE strength to 4+/5 or greater, in order to improve endurance and increase ability to perform all functional activities for increased time. [x] Progressing  [] MET   [] Not MET  [] Not tested     3. Patient to normalize score on 5 times sit to stand, in order to improve endurance and decrease fall risk. [x] Progressing  [] MET   [] Not MET  [] Not tested     4. Patient to perform daily activities including walking  and going up and down steps with only mild increased symptoms. [x] Progressing  [] MET   [] Not MET  [] Not tested          PLAN     Monitor response to today's treatment session and progress with Physical Therapy plan of care as indicated.    Plan of care Certification: 1/7/2025  to 3/7/25.     Outpatient Physical Therapy 2 times weekly for 8 weeks to include any combination of the following interventions: Aquatic Therapy, electrical stimulation (PRN), Gait Training, Manual Therapy, Neuromuscular Re-ed, Patient Education/Self Care, Therapeutic Activites, Therapeutic Exercise, Dry Needling, and Moist Hot Pack/Cold Pack    Radha Weller, PT

## 2025-01-28 ENCOUNTER — CLINICAL SUPPORT (OUTPATIENT)
Dept: REHABILITATION | Facility: HOSPITAL | Age: 85
End: 2025-01-28
Payer: MEDICARE

## 2025-01-28 DIAGNOSIS — R53.1 DECREASED RANGE OF MOTION WITH DECREASED STRENGTH: Primary | ICD-10-CM

## 2025-01-28 DIAGNOSIS — R52 PAIN: ICD-10-CM

## 2025-01-28 DIAGNOSIS — M25.60 DECREASED RANGE OF MOTION WITH DECREASED STRENGTH: Primary | ICD-10-CM

## 2025-01-28 DIAGNOSIS — R26.89 BALANCE PROBLEM: ICD-10-CM

## 2025-01-28 DIAGNOSIS — Z74.09 DECREASED FUNCTIONAL MOBILITY AND ENDURANCE: ICD-10-CM

## 2025-01-28 DIAGNOSIS — R26.9 GAIT ABNORMALITY: ICD-10-CM

## 2025-01-28 PROCEDURE — 97110 THERAPEUTIC EXERCISES: CPT

## 2025-01-28 PROCEDURE — 97112 NEUROMUSCULAR REEDUCATION: CPT

## 2025-01-28 PROCEDURE — 97530 THERAPEUTIC ACTIVITIES: CPT

## 2025-01-28 NOTE — PROGRESS NOTES
[facility]   Physical Therapy Treatment Note      Name: Bharathi SULLIVAN [facility] Number: [id number]    Therapy Diagnosis:   Encounter Diagnoses   Name Primary?    Decreased range of motion with decreased strength Yes    Gait abnormality     Pain     Balance problem     Decreased functional mobility and endurance        Physician: Angi Cain FNP    Visit Date: 1/28/2025    Physician Orders: PT Eval and Treat  Medical Diagnosis from Referral: Arthritis of left hip  Evaluation Date: 1/7/2025  Authorization Period Expiration: 12/20/25  Plan of Care Expiration: 3/7/2025  Progress Note Due: 2/7/2025  Visit # / Visits authorized: 4/20 (+1)  FOTO: 1/3 (last performed on 1/7/2025)         Precautions: Standard, Fall, and HTN    Time In: 1400   Time Out: 1500   Total Billable Time: 60 minutes  Billing reflects one on one time spent with patient.     SUBJECTIVE     Pt reports: he was sore after the new exercises last visit, but overall states he is doing ok. Patient states his left knee is a little stiff, but stated by the end of today's session it felt better.    He was not compliant with home exercise program.  Response to previous treatment: soreness   Functional change: none reported    Pain: 0-1/10  Location: left knee and hip     OBJECTIVE     Objective Measures updated at progress report unless specified.     TREATMENT       CPT Intervention Performed  1/28/2025  Duration / Intensity     MT        TE  Bike   8 min    Nustep x 8 minutes Level 4      LTR x 3 min            NMR  PPT x 3 min, 5 sec      Bridge x 10x/ 3 sets      isometric ADD x 3 min, 10 sec hold     Supine clamshell - black x 3 min,  10 sec hold    Straight Leg Raise x 2 x 10 bilateral    Sidelying hip abduction  Hip circles x   10x 3 sets  5x CW/CCW    Prone hip extension   10x each    LAQ  10x each side 5 sec hold/ 2 sets                     TA  leg press  -  65# 10x/ 3 sets       standing hip hikes  - 10x each extensive  cues              Matrix knee extension  x  3 x 10 15lbs     Matrix knee flexion  x  3 x 10 15lbs     Side stepping x  3 minutes with two hand support     Sit to Stands x  3 x 10 with arm support from hi-lo mat            PLAN   Bike, ankle mobility on step (if tolerated) toe yoga, arch doming, MOBU sitting, ankle band. Matrix knee extension/flex             CPT Codes available for Billing:   (00) minutes of Manual therapy (MT) to improve pain and ROM.  (11) minutes of Therapeutic Exercise (TE) to develop strength, endurance, range of motion, and flexibility.  (28) minutes of Neuromuscular Re-Education (NMR)  to improve: Balance, Coordination, Kinesthetic Sense, Proprioception, and Posture.  (21) minutes of Therapeutic Activities (TA) to improve functional performance.  Unattended Electrical Stimulation (ES) for muscle performance or pain modulation.  BFR: Blood flow restriction applied during exercise  NP or (-): Not Performed       PATIENT EDUCATION AND HOME EXERCISES     Home Exercises Provided and Patient Education Provided     Education provided:   - home exercise program     Written Home Exercises Provided: Patient instructed to cont prior HEP. Exercises were reviewed and Bharathi was able to demonstrate them prior to the end of the session.  Bharathi demonstrated good  understanding of the education provided. See EMR under Patient Instructions for exercises provided during therapy sessions      ASSESSMENT     Patient tolerated today's session well. Increased resistance on sidestepping which patient demonstrated more difficulty with due to weakness in lateral glutes. Patient also struggles to decrease compensations with sidelying hip abduction and does require some minor verbal cueing. Patient educated to perform sit to stands at home to increase some compliance with HOME EXERCISE PROGRAM; patient verbalized understanding.     Bharathi Is progressing well towards his goals.   Pt prognosis is Good.     Pt will  continue to benefit from skilled outpatient physical therapy to address the deficits listed in the problem list box on initial evaluation, provide pt/family education and to maximize pt's level of independence in the home and community environment.     Pt's spiritual, cultural and educational needs considered and pt agreeable to plan of care and goals.     Anticipated barriers to physical therapy: : co-morbidities, chronicity of condition, and coping style     Goals:   Reviewed: 1/28/2025      Short Term Goals: In 4 weeks  Progress Date   1.Patient to be educated on HEP. [x] Progressing  [] MET   [] Not MET   [] Not tested     2.Patient to increase hip range of motion, in order to improve available range of motion for ADL's.  [x] Progressing  [] MET   [] Not MET  [] Not tested     3.Patient to increase bilateral LE strength by 1/2 grade, in order to improve endurance and increase ability to perform all functional activities for increased time.  [x] Progressing  [] MET   [] Not MET  [] Not tested     4.Patient to have pain less than 0/10 at worst, to improve QOL. [x] Progressing  [] MET   [] Not MET  [] Not tested     5.Patient to improve score on the FOTO, to improve QOL. [x] Progressing  [] MET   [] Not MET  [] Not tested     6. Patient to improve score on 5 times sit to  order to decrease fall risk. [x] Progressing  [] MET   [] Not MET  [] Not tested        Long Term Goals: In 8 weeks Progress Date   1.Patient to improve score on the FOTO predicted score or better, to improve QOL. [x] Progressing  [] MET   [] Not MET  [] Not tested     2. Patient to increase bilateral LE strength to 4+/5 or greater, in order to improve endurance and increase ability to perform all functional activities for increased time. [x] Progressing  [] MET   [] Not MET  [] Not tested     3. Patient to normalize score on 5 times sit to stand, in order to improve endurance and decrease fall risk. [x] Progressing  [] MET   [] Not  MET  [] Not tested     4. Patient to perform daily activities including walking and going up and down steps with only mild increased symptoms. [x] Progressing  [] MET   [] Not MET  [] Not tested          PLAN     Monitor response to today's treatment session and progress with Physical Therapy plan of care as indicated.    Plan of care Certification: 1/7/2025  to 3/7/25.     Outpatient Physical Therapy 2 times weekly for 8 weeks to include any combination of the following interventions: Aquatic Therapy, electrical stimulation (PRN), Gait Training, Manual Therapy, Neuromuscular Re-ed, Patient Education/Self Care, Therapeutic Activites, Therapeutic Exercise, Dry Needling, and Moist Hot Pack/Cold Pack    Radha Weller, PT

## 2025-01-30 ENCOUNTER — OFFICE VISIT (OUTPATIENT)
Dept: HEMATOLOGY/ONCOLOGY | Facility: CLINIC | Age: 85
End: 2025-01-30
Payer: MEDICARE

## 2025-01-30 ENCOUNTER — CLINICAL SUPPORT (OUTPATIENT)
Dept: REHABILITATION | Facility: HOSPITAL | Age: 85
End: 2025-01-30
Payer: MEDICARE

## 2025-01-30 VITALS
TEMPERATURE: 97 F | OXYGEN SATURATION: 98 % | RESPIRATION RATE: 20 BRPM | HEIGHT: 70 IN | DIASTOLIC BLOOD PRESSURE: 96 MMHG | WEIGHT: 171.31 LBS | HEART RATE: 91 BPM | SYSTOLIC BLOOD PRESSURE: 149 MMHG | BODY MASS INDEX: 24.52 KG/M2

## 2025-01-30 DIAGNOSIS — D84.821 IMMUNODEFICIENCY DUE TO CHEMOTHERAPY: ICD-10-CM

## 2025-01-30 DIAGNOSIS — I10 ESSENTIAL HYPERTENSION: ICD-10-CM

## 2025-01-30 DIAGNOSIS — R26.9 GAIT ABNORMALITY: ICD-10-CM

## 2025-01-30 DIAGNOSIS — Z15.89 JAK2 GENE MUTATION: ICD-10-CM

## 2025-01-30 DIAGNOSIS — R52 PAIN: ICD-10-CM

## 2025-01-30 DIAGNOSIS — D75.839 THROMBOCYTOSIS: ICD-10-CM

## 2025-01-30 DIAGNOSIS — Z74.09 DECREASED FUNCTIONAL MOBILITY AND ENDURANCE: ICD-10-CM

## 2025-01-30 DIAGNOSIS — Z79.899 IMMUNODEFICIENCY DUE TO CHEMOTHERAPY: ICD-10-CM

## 2025-01-30 DIAGNOSIS — R26.89 BALANCE PROBLEM: ICD-10-CM

## 2025-01-30 DIAGNOSIS — T45.1X5A IMMUNODEFICIENCY DUE TO CHEMOTHERAPY: ICD-10-CM

## 2025-01-30 DIAGNOSIS — R53.1 DECREASED RANGE OF MOTION WITH DECREASED STRENGTH: Primary | ICD-10-CM

## 2025-01-30 DIAGNOSIS — D47.3 ESSENTIAL (HEMORRHAGIC) THROMBOCYTHEMIA: Primary | ICD-10-CM

## 2025-01-30 DIAGNOSIS — N18.31 STAGE 3A CHRONIC KIDNEY DISEASE: ICD-10-CM

## 2025-01-30 DIAGNOSIS — M25.60 DECREASED RANGE OF MOTION WITH DECREASED STRENGTH: Primary | ICD-10-CM

## 2025-01-30 PROCEDURE — 97530 THERAPEUTIC ACTIVITIES: CPT | Performed by: PHYSICAL THERAPIST

## 2025-01-30 PROCEDURE — 99999 PR PBB SHADOW E&M-EST. PATIENT-LVL IV: CPT | Mod: PBBFAC,,, | Performed by: INTERNAL MEDICINE

## 2025-01-30 PROCEDURE — 97110 THERAPEUTIC EXERCISES: CPT | Performed by: PHYSICAL THERAPIST

## 2025-01-30 PROCEDURE — 97112 NEUROMUSCULAR REEDUCATION: CPT | Performed by: PHYSICAL THERAPIST

## 2025-01-30 PROCEDURE — 99214 OFFICE O/P EST MOD 30 MIN: CPT | Mod: PBBFAC | Performed by: INTERNAL MEDICINE

## 2025-01-30 PROCEDURE — 99214 OFFICE O/P EST MOD 30 MIN: CPT | Mod: S$PBB,,, | Performed by: INTERNAL MEDICINE

## 2025-01-30 RX ORDER — HYDROXYUREA 500 MG/1
500 CAPSULE ORAL DAILY
Qty: 30 CAPSULE | Refills: 11 | Status: SHIPPED | OUTPATIENT
Start: 2025-01-30 | End: 2026-01-30

## 2025-01-30 NOTE — PROGRESS NOTES
[facility]   Physical Therapy Treatment Note      Name: Bharathi SULLIVAN [facility] Number: [id number]    Therapy Diagnosis:   Encounter Diagnoses   Name Primary?    Decreased range of motion with decreased strength Yes    Gait abnormality     Pain     Balance problem     Decreased functional mobility and endurance          Physician: Angi Cain FNP    Visit Date: 1/30/2025    Physician Orders: PT Eval and Treat  Medical Diagnosis from Referral: Arthritis of left hip  Evaluation Date: 1/7/2025  Authorization Period Expiration: 12/20/25  Plan of Care Expiration: 3/7/2025  Progress Note Due: 2/7/2025  Visit # / Visits authorized: 5/20 (+1)  FOTO: 1/3 (last performed on 1/7/2025)         Precautions: Standard, Fall, and HTN    Time In: 1400   Time Out: 1500   Total Billable Time: 57 minutes  Billing reflects one on one time spent with patient.     SUBJECTIVE     Pt reports: he was sore after the new exercises last visit but no increased pain. He does feel steadier on his feet since starting therapy. Some discomfort in knee with sit to stands today but better once modified to stagger stance.    He was not compliant with home exercise program.  Response to previous treatment: soreness   Functional change: none reported    Pain: 0-1/10  Location: left knee and hip     OBJECTIVE     Objective Measures updated at progress report unless specified.     TREATMENT       CPT Intervention Performed  1/30/2025  Duration / Intensity     MT        TE  Bike   8 min    Nustep x 8 minutes Level 4      LTR x 3 min            NMR  PPT x 3 min, 5 sec      Bridge x 10x/ 3 sets      isometric ADD x 3 min, 10 sec hold     Supine clamshell - black x 3 min,  10 sec hold    Straight Leg Raise x 3 x 10 bilateral    Sidelying hip abduction  Hip circles x   10x 3 sets  5x CW/CCW    Prone hip extension   10x each    LAQ  10x each side 5 sec hold/ 2 sets                     TA  leg press  -  65# 10x/ 3 sets       standing  hip hikes  - 10x each extensive cues              Matrix knee extension  x  3 x 10 15lbs     Matrix knee flexion  x  3 x 10 19lbs     Side stepping - yellow band at ankles x  3 minutes with two hand support     Sit to Stands on airex pad (staggered left forward due to knee pain) x  3 x 10 with NO arm support from chair            PLAN   Bike, ankle mobility on step (if tolerated) toe yoga, arch doming, MOBU sitting, ankle band. Matrix knee extension/flex             CPT Codes available for Billing:   (00) minutes of Manual therapy (MT) to improve pain and ROM.  (11) minutes of Therapeutic Exercise (TE) to develop strength, endurance, range of motion, and flexibility.  (28) minutes of Neuromuscular Re-Education (NMR)  to improve: Balance, Coordination, Kinesthetic Sense, Proprioception, and Posture.  (18) minutes of Therapeutic Activities (TA) to improve functional performance.  Unattended Electrical Stimulation (ES) for muscle performance or pain modulation.  BFR: Blood flow restriction applied during exercise  NP or (-): Not Performed       PATIENT EDUCATION AND HOME EXERCISES     Home Exercises Provided and Patient Education Provided     Education provided:   - home exercise program     Written Home Exercises Provided: Patient instructed to cont prior HEP. Exercises were reviewed and Bharathi was able to demonstrate them prior to the end of the session.  Bharathi demonstrated good  understanding of the education provided. See EMR under Patient Instructions for exercises provided during therapy sessions      ASSESSMENT     Patient tolerated all treatment well and was able to progress as noted. Cues as needed for all interventions. Encouraged home exercise program and upright posture with side-stepping to activate hip muscle's.     Bharathi Is progressing well towards his goals.   Pt prognosis is Good.     Pt will continue to benefit from skilled outpatient physical therapy to address the deficits listed in the problem  list box on initial evaluation, provide pt/family education and to maximize pt's level of independence in the home and community environment.     Pt's spiritual, cultural and educational needs considered and pt agreeable to plan of care and goals.     Anticipated barriers to physical therapy: : co-morbidities, chronicity of condition, and coping style     Goals:   Reviewed: 1/30/2025      Short Term Goals: In 4 weeks  Progress Date   1.Patient to be educated on HEP. [x] Progressing  [] MET   [] Not MET   [] Not tested     2.Patient to increase hip range of motion, in order to improve available range of motion for ADL's.  [x] Progressing  [] MET   [] Not MET  [] Not tested     3.Patient to increase bilateral LE strength by 1/2 grade, in order to improve endurance and increase ability to perform all functional activities for increased time.  [x] Progressing  [] MET   [] Not MET  [] Not tested     4.Patient to have pain less than 0/10 at worst, to improve QOL. [x] Progressing  [] MET   [] Not MET  [] Not tested     5.Patient to improve score on the FOTO, to improve QOL. [x] Progressing  [] MET   [] Not MET  [] Not tested     6. Patient to improve score on 5 times sit to  order to decrease fall risk. [x] Progressing  [] MET   [] Not MET  [] Not tested        Long Term Goals: In 8 weeks Progress Date   1.Patient to improve score on the FOTO predicted score or better, to improve QOL. [x] Progressing  [] MET   [] Not MET  [] Not tested     2. Patient to increase bilateral LE strength to 4+/5 or greater, in order to improve endurance and increase ability to perform all functional activities for increased time. [x] Progressing  [] MET   [] Not MET  [] Not tested     3. Patient to normalize score on 5 times sit to stand, in order to improve endurance and decrease fall risk. [x] Progressing  [] MET   [] Not MET  [] Not tested     4. Patient to perform daily activities including walking and going up and down steps with  only mild increased symptoms. [x] Progressing  [] MET   [] Not MET  [] Not tested          PLAN     Monitor response to today's treatment session and progress with Physical Therapy plan of care as indicated.    Plan of care Certification: 1/7/2025  to 3/7/25.     Outpatient Physical Therapy 2 times weekly for 8 weeks to include any combination of the following interventions: Aquatic Therapy, electrical stimulation (PRN), Gait Training, Manual Therapy, Neuromuscular Re-ed, Patient Education/Self Care, Therapeutic Activites, Therapeutic Exercise, Dry Needling, and Moist Hot Pack/Cold Pack    Estefania Varela, PT

## 2025-01-30 NOTE — PROGRESS NOTES
Subjective:       Patient ID: Bharathi Parsons is a 84 y.o. male.    Chief Complaint: Results and Coagulation Disorder    HPI:  84-year-old male essential thrombocytosis.  Patient returns for follow-up continues on hydroxyurea 500 mg daily.  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 JAK2 V617F mutated DNA was detected and measured  at 30% of total JAK2 DNA.    [id number] mutation is found with high frequency in  [place] chromosome-negative myeloproliferative  neoplasms (>95% in polycythemia vera and approximately  50-60% in primary myelofibrosis and essential  thrombocy     Family History   Problem Relation Name Age of Onset    Glaucoma Maternal Uncle      Cataracts Mother      Heart disease Father       Social History     Socioeconomic History    Marital status:    Tobacco Use    Smoking status: Never     Passive exposure: Never    Smokeless tobacco: Never   Substance and Sexual Activity    Alcohol use: Yes     Comment: Ocasionally    Drug use: No    Sexual activity: Not Currently     Partners: Female   Social History Narrative    , No smokers in household, 1 Dog.     Social Drivers of Health     Financial Resource Strain: Low Risk  (1/24/2025)    Overall Financial Resource Strain (CARDIA)     Difficulty of Paying Living Expenses: Not hard at all   Food Insecurity: No Food Insecurity (1/24/2025)    Hunger Vital Sign     Worried About Running Out of Food in the Last Year: Never true     Ran Out of Food in the Last Year: Never true   Transportation Needs: No Transportation Needs (12/11/2023)    PRAPARE - Transportation     Lack of Transportation (Medical): No  "    Lack of Transportation (Non-Medical): No   Physical Activity: Insufficiently Active (1/24/2025)    Exercise Vital Sign     Days of Exercise per Week: 7 days     Minutes of Exercise per Session: 20 min   Stress: No Stress Concern Present (1/24/2025)    [facility] [place] [facility] - Occupational Stress Questionnaire     Feeling of Stress : Only a little   Housing Stability: Low Risk  (12/11/2023)    Housing Stability Vital Sign     Unable to Pay for Housing in the Last Year: No     Number of Places Lived in the Last Year: 1     Unstable Housing in the Last Year: No     Past Surgical History:   Procedure Laterality Date    APPENDECTOMY      as child    CATARACT EXTRACTION W/  INTRAOCULAR LENS IMPLANT  OD before 2003    CATARACT EXTRACTION W/  INTRAOCULAR LENS IMPLANT  OS 12/19/07 with YAG    ESOPHAGEAL DILATION      x 2 in past last one done in  2022    FOOT ARTHRODESIS      medial column arthrodesis left foot  2011       Labs:  Lab Results   Component Value Date    WBC 9.33 01/28/2025    HGB 13.3 (L) 01/28/2025    HCT 40.9 01/28/2025     (H) 01/28/2025     01/28/2025     BMP  Lab Results   Component Value Date     01/28/2025    K 4.4 01/28/2025     01/28/2025    CO2 20 (L) 01/28/2025    BUN 27 (H) 01/28/2025    CREATININE 1.4 01/28/2025    CALCIUM 8.8 01/28/2025    ANIONGAP 12 01/28/2025    ESTGFRAFRICA >60.0 07/21/2022    EGFRNONAA 55.2 (A) 07/21/2022     Lab Results   Component Value Date    ALT 18 01/28/2025    AST 19 01/28/2025    ALKPHOS 69 01/28/2025    BILITOT 0.2 01/28/2025       Lab Results   Component Value Date    IRON 50 12/01/2023    TIBC 311 12/01/2023    FERRITIN 88 12/01/2023     No results found for: "XAIRXWFI34"  No results found for: "FOLATE"  Lab Results   Component Value Date    TSH 2.926 05/17/2018         Review of Systems   Constitutional:  Negative for activity change, appetite change, chills, diaphoresis, fatigue, fever and unexpected weight change. "   HENT:  Negative for congestion, dental problem, drooling, ear discharge, ear pain, facial swelling, hearing loss, mouth sores, nosebleeds, postnasal drip, rhinorrhea, sinus pressure, sneezing, sore throat, tinnitus, trouble swallowing and voice change.    Eyes:  Negative for photophobia, pain, discharge, redness, itching and visual disturbance.   Respiratory:  Negative for apnea, cough, choking, chest tightness, shortness of breath, wheezing and stridor.    Cardiovascular:  Negative for chest pain, palpitations and leg swelling.   Gastrointestinal:  Negative for abdominal distention, abdominal pain, anal bleeding, blood in stool, constipation, diarrhea, nausea, rectal pain and vomiting.   Endocrine: Negative for cold intolerance, heat intolerance, polydipsia, polyphagia and polyuria.   Genitourinary:  Negative for decreased urine volume, difficulty urinating, dysuria, enuresis, flank pain, frequency, genital sores, hematuria, penile discharge, penile pain, penile swelling, scrotal swelling, testicular pain and urgency.   Musculoskeletal:  Negative for arthralgias, back pain, gait problem, joint swelling, myalgias, neck pain and neck stiffness.   Skin:  Negative for color change, pallor, rash and wound.   Allergic/Immunologic: Negative for environmental allergies, food allergies and immunocompromised state.   Neurological:  Negative for dizziness, tremors, seizures, syncope, facial asymmetry, speech difficulty, weakness, light-headedness, numbness and headaches.   Hematological:  Negative for adenopathy. Does not bruise/bleed easily.   Psychiatric/Behavioral:  Negative for agitation, behavioral problems, confusion, decreased concentration, dysphoric mood, hallucinations, self-injury, sleep disturbance and suicidal ideas. The patient is not nervous/anxious and is not hyperactive.        Objective:      Physical Exam  Vitals reviewed.   Constitutional:       General: He is not in acute distress.     Appearance: He is  well-developed. He is not diaphoretic.   HENT:      Head: Normocephalic.      Right Ear: External ear normal.      Left Ear: External ear normal.      Nose: Nose normal.      Right Sinus: No maxillary sinus tenderness or frontal sinus tenderness.      Left Sinus: No maxillary sinus tenderness or frontal sinus tenderness.      Mouth/Throat:      Pharynx: No oropharyngeal exudate.   Eyes:      General: Lids are normal. No scleral icterus.        Right eye: No discharge.         Left eye: No discharge.      Extraocular Movements:      Right eye: Normal extraocular motion.      Left eye: Normal extraocular motion.      Conjunctiva/sclera:      Right eye: Right conjunctiva is not injected. No hemorrhage.     Left eye: Left conjunctiva is not injected. No hemorrhage.     Pupils: Pupils are equal, round, and reactive to light.   Neck:      Thyroid: No thyromegaly.      Vascular: No JVD.      Trachea: No tracheal deviation.   Cardiovascular:      Rate and Rhythm: Normal rate.   Pulmonary:      Effort: Pulmonary effort is normal. No respiratory distress.      Breath sounds: No stridor.   Abdominal:      General: Bowel sounds are normal.      Palpations: Abdomen is soft. There is no hepatomegaly, splenomegaly or mass.      Tenderness: There is no abdominal tenderness.   Musculoskeletal:         General: No tenderness. Normal range of motion.      Cervical back: Normal range of motion and neck supple.   Lymphadenopathy:      Head:      Right side of head: No posterior auricular or occipital adenopathy.      Left side of head: No posterior auricular or occipital adenopathy.      Cervical: No cervical adenopathy.      Right cervical: No superficial, deep or posterior cervical adenopathy.     Left cervical: No superficial, deep or posterior cervical adenopathy.      Upper Body:      Right upper body: No supraclavicular adenopathy.      Left upper body: No supraclavicular adenopathy.   Skin:     General: Skin is dry.       Findings: No erythema or rash.      Nails: There is no clubbing.   Neurological:      Mental Status: He is alert and oriented to person, place, and time.      Cranial Nerves: No cranial nerve deficit.      Coordination: Coordination normal.   Psychiatric:         Behavior: Behavior normal.         Thought Content: Thought content normal.         Judgment: Judgment normal.             Assessment:      1. Essential (hemorrhagic) thrombocythemia    2. Thrombocytosis    3. Immunodeficiency due to chemotherapy    4. Stage 3a chronic kidney disease    5. Essential hypertension     JAK2 mutation      Med Onc Chart Routing      Follow up with physician . Return in 6 months brandy HASSAN prefers [facility]   Follow up with PRACHI    Infusion scheduling note    Injection scheduling note    Labs   Scheduling:  Preferred lab:  Lab interval:  CBC q.2 months [facility]   Imaging    Pharmacy appointment    Other referrals                   Plan:      continue with hydroxyurea 500 mg daily aspirin 81 mg recommend that CBC be done every 2-3 months.  Alternate MD APAP follow-up Q six-month interval.  Refill prescription hydroxyurea        Jose Roberts Jr, MD FACP

## 2025-01-31 ENCOUNTER — TELEPHONE (OUTPATIENT)
Dept: OPHTHALMOLOGY | Facility: CLINIC | Age: 85
End: 2025-01-31
Payer: MEDICARE

## 2025-01-31 NOTE — TELEPHONE ENCOUNTER
Returned call, FRANCK     ----- Message from Jessica Mcmanus sent at 1/31/2025  1:33 PM CST -----  Contact: jerardo    ----- Message -----  From: Kassidy Shen  Sent: 1/31/2025  12:36 PM CST  To: [facility] Staff    Jerardo is requesting a call back in regards to his upcoming appointment/ needs clarification  Please call him at [phone]

## 2025-02-04 ENCOUNTER — OFFICE VISIT (OUTPATIENT)
Dept: OPHTHALMOLOGY | Facility: CLINIC | Age: 85
End: 2025-02-04
Payer: MEDICARE

## 2025-02-04 ENCOUNTER — CLINICAL SUPPORT (OUTPATIENT)
Dept: REHABILITATION | Facility: HOSPITAL | Age: 85
End: 2025-02-04
Payer: MEDICARE

## 2025-02-04 DIAGNOSIS — Z74.09 DECREASED FUNCTIONAL MOBILITY AND ENDURANCE: ICD-10-CM

## 2025-02-04 DIAGNOSIS — R26.9 GAIT ABNORMALITY: ICD-10-CM

## 2025-02-04 DIAGNOSIS — R52 PAIN: ICD-10-CM

## 2025-02-04 DIAGNOSIS — Z96.1 PSEUDOPHAKIA: ICD-10-CM

## 2025-02-04 DIAGNOSIS — H35.3131 EARLY DRY STAGE NONEXUDATIVE AGE-RELATED MACULAR DEGENERATION OF BOTH EYES: Primary | ICD-10-CM

## 2025-02-04 DIAGNOSIS — R53.1 DECREASED RANGE OF MOTION WITH DECREASED STRENGTH: Primary | ICD-10-CM

## 2025-02-04 DIAGNOSIS — M25.60 DECREASED RANGE OF MOTION WITH DECREASED STRENGTH: Primary | ICD-10-CM

## 2025-02-04 DIAGNOSIS — H35.372 EPIRETINAL MEMBRANE (ERM) OF LEFT EYE: ICD-10-CM

## 2025-02-04 DIAGNOSIS — R26.89 BALANCE PROBLEM: ICD-10-CM

## 2025-02-04 PROCEDURE — 99214 OFFICE O/P EST MOD 30 MIN: CPT | Mod: S$PBB,,, | Performed by: OPHTHALMOLOGY

## 2025-02-04 PROCEDURE — 97112 NEUROMUSCULAR REEDUCATION: CPT

## 2025-02-04 PROCEDURE — 92134 CPTRZ OPH DX IMG PST SGM RTA: CPT | Mod: PBBFAC | Performed by: OPHTHALMOLOGY

## 2025-02-04 PROCEDURE — 99999 PR PBB SHADOW E&M-EST. PATIENT-LVL III: CPT | Mod: PBBFAC,,, | Performed by: OPHTHALMOLOGY

## 2025-02-04 PROCEDURE — 97110 THERAPEUTIC EXERCISES: CPT

## 2025-02-04 PROCEDURE — 97530 THERAPEUTIC ACTIVITIES: CPT

## 2025-02-04 PROCEDURE — 99213 OFFICE O/P EST LOW 20 MIN: CPT | Mod: PBBFAC,25 | Performed by: OPHTHALMOLOGY

## 2025-02-04 NOTE — PROGRESS NOTES
===============================  Date today is 2/4/2025  Bharathi Parsons is a 84 y.o. male  Last visit [facility]: :Visit date not found   Last visit eye dept. 8/8/2024    Corrected distance visual acuity was 20/25 in the right eye and 20/25- in the left eye.  Tonometry       Tonometry ([facility], 1:53 PM)         Right Left    Pressure 13 16                  Not recorded       Not recorded       Not recorded       Chief Complaint   Patient presents with    Follow-up     ERM AMD 6m per [facility]       HPI     Follow-up            Comments: ERM AMD 6m per [facility]            Comments    States that his vision is stable and denies any new ocular issues at this   time.    1. Foveopathy OD / ERM OS   2. 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 Fhx COAG- uncle   4. Fhx PKP   5. Dry AMD (mild)  6. Ocular Migraines    AREDS 2  +HAG             Last edited by Rita Ward on 2/4/2025  1:53 PM.      Problem List Items Addressed This Visit          Eye/Vision problems    Epiretinal membrane - Left Eye    Relevant Orders    Posterior Segment OCT Retina-Both eyes (Completed)    Pseudophakia     Other Visit Diagnoses       Early dry stage nonexudative age-related macular degeneration of both eyes    -  Primary    Relevant Orders    Posterior Segment OCT Retina-Both eyes (Completed)          Instructed to call 24/7 for any worsening of vision, visual distortion or pain.  Check OU independently daily.    Gave my office and personal cell phone number.  ________________  2/4/2025 today  Bharathi Parsons      OU Dry SMD  ERM OS  OCT stable OU    PCIOL OU  C/d 0.5 OU  RPE mottling OU   No sign of bleeding    RTC 1 year  Instructed to call 24/7 for any worsening of vision or symptoms. Check OU daily.   Gave my office and cell phone number.    =============================

## 2025-02-04 NOTE — PROGRESS NOTES
[facility]   Physical Therapy Treatment Note      Name: Bharathi SULLIVAN [facility] Number: [id number]    Therapy Diagnosis:   Encounter Diagnoses   Name Primary?    Decreased range of motion with decreased strength Yes    Gait abnormality     Pain     Balance problem     Decreased functional mobility and endurance        Physician: Angi Cain FNP    Visit Date: 2/4/2025    Physician Orders: PT Eval and Treat  Medical Diagnosis from Referral: Arthritis of left hip  Evaluation Date: 1/7/2025  Authorization Period Expiration: 12/20/25  Plan of Care Expiration: 3/7/2025  Progress Note Due: 2/7/2025  Visit # / Visits authorized: 6/20 (+1)  FOTO: 1/3 (last performed on 1/7/2025)         Precautions: Standard, Fall, and HTN    Time In: 1500   Time Out: 1555  Total Billable Time: 55 minutes  Billing reflects one on one time spent with patient.     SUBJECTIVE     Pt reports: he had continued muscle soreness after last visit and states he still does feel like he plops down with his sit to stands.    He was not compliant with home exercise program.  Response to previous treatment: soreness   Functional change: none reported    Pain: 0-1/10  Location: left knee and hip     OBJECTIVE     Objective Measures updated at progress report unless specified.     TREATMENT       CPT Intervention Performed  2/4/2025  Duration / Intensity     MT        TE  Bike   8 min    Nustep x 8 minutes Level 4      LTR x 3 min            NMR  PPT x 3 min, 5 sec      Bridge x 10x/ 3 sets      isometric ADD x 3 min, 10 sec hold     Supine clamshell - black x 3 min,  10 sec hold    Straight Leg Raise x 3 x 10 bilateral    Sidelying hip abduction  Hip circles x   10x 3 sets  5x CW/CCW    Prone hip extension   10x each    LAQ  10x each side 5 sec hold/ 2 sets                     TA  leg press  x  85# 10x/ 4 sets       standing hip hikes  - 10x each extensive cues              Matrix knee extension    3 x 10 15lbs     Matrix  knee flexion    3 x 10 19lbs     Side stepping - yellow band at ankles x  3 minutes with two hand support     Sit to Stands on airex pad (staggered left forward due to knee pain) x  3 x 10 with NO arm support from chair            PLAN   Bike, ankle mobility on step (if tolerated) toe yoga, arch doming, MOBU sitting, ankle band. Matrix knee extension/flex             CPT Codes available for Billing:   (00) minutes of Manual therapy (MT) to improve pain and ROM.  (11) minutes of Therapeutic Exercise (TE) to develop strength, endurance, range of motion, and flexibility.  (26) minutes of Neuromuscular Re-Education (NMR)  to improve: Balance, Coordination, Kinesthetic Sense, Proprioception, and Posture.  (18) minutes of Therapeutic Activities (TA) to improve functional performance.  Unattended Electrical Stimulation (ES) for muscle performance or pain modulation.  BFR: Blood flow restriction applied during exercise  NP or (-): Not Performed       PATIENT EDUCATION AND HOME EXERCISES     Home Exercises Provided and Patient Education Provided     Education provided:   - home exercise program     Written Home Exercises Provided: Patient instructed to cont prior HEP. Exercises were reviewed and Bharathi was able to demonstrate them prior to the end of the session.  Bharathi demonstrated good  understanding of the education provided. See EMR under Patient Instructions for exercises provided during therapy sessions      ASSESSMENT     Patient continues to struggle with eccentric portion of sit to stands and patient encouraged today to use upper extremity support to increase this control. Patient did struggle to control balance as well on first few reps. Otherwise, patient tolerated treatment session well without complaints of any increased pain.    Bharathi Is progressing well towards his goals.   Pt prognosis is Good.     Pt will continue to benefit from skilled outpatient physical therapy to address the deficits listed in the  problem list box on initial evaluation, provide pt/family education and to maximize pt's level of independence in the home and community environment.     Pt's spiritual, cultural and educational needs considered and pt agreeable to plan of care and goals.     Anticipated barriers to physical therapy: : co-morbidities, chronicity of condition, and coping style     Goals:   Reviewed: 2/4/2025      Short Term Goals: In 4 weeks  Progress Date   1.Patient to be educated on HEP. [x] Progressing  [] MET   [] Not MET   [] Not tested     2.Patient to increase hip range of motion, in order to improve available range of motion for ADL's.  [x] Progressing  [] MET   [] Not MET  [] Not tested     3.Patient to increase bilateral LE strength by 1/2 grade, in order to improve endurance and increase ability to perform all functional activities for increased time.  [x] Progressing  [] MET   [] Not MET  [] Not tested     4.Patient to have pain less than 0/10 at worst, to improve QOL. [x] Progressing  [] MET   [] Not MET  [] Not tested     5.Patient to improve score on the FOTO, to improve QOL. [x] Progressing  [] MET   [] Not MET  [] Not tested     6. Patient to improve score on 5 times sit to  order to decrease fall risk. [x] Progressing  [] MET   [] Not MET  [] Not tested        Long Term Goals: In 8 weeks Progress Date   1.Patient to improve score on the FOTO predicted score or better, to improve QOL. [x] Progressing  [] MET   [] Not MET  [] Not tested     2. Patient to increase bilateral LE strength to 4+/5 or greater, in order to improve endurance and increase ability to perform all functional activities for increased time. [x] Progressing  [] MET   [] Not MET  [] Not tested     3. Patient to normalize score on 5 times sit to stand, in order to improve endurance and decrease fall risk. [x] Progressing  [] MET   [] Not MET  [] Not tested     4. Patient to perform daily activities including walking and going up and down  steps with only mild increased symptoms. [x] Progressing  [] MET   [] Not MET  [] Not tested          PLAN     Monitor response to today's treatment session and progress with Physical Therapy plan of care as indicated.    Plan of care Certification: 1/7/2025  to 3/7/25.     Outpatient Physical Therapy 2 times weekly for 8 weeks to include any combination of the following interventions: Aquatic Therapy, electrical stimulation (PRN), Gait Training, Manual Therapy, Neuromuscular Re-ed, Patient Education/Self Care, Therapeutic Activites, Therapeutic Exercise, Dry Needling, and Moist Hot Pack/Cold Pack    Radha Weller, PT

## 2025-02-06 ENCOUNTER — CLINICAL SUPPORT (OUTPATIENT)
Dept: REHABILITATION | Facility: HOSPITAL | Age: 85
End: 2025-02-06
Payer: MEDICARE

## 2025-02-06 DIAGNOSIS — R26.89 BALANCE PROBLEM: ICD-10-CM

## 2025-02-06 DIAGNOSIS — R52 PAIN: ICD-10-CM

## 2025-02-06 DIAGNOSIS — R26.9 GAIT ABNORMALITY: ICD-10-CM

## 2025-02-06 DIAGNOSIS — M25.60 DECREASED RANGE OF MOTION WITH DECREASED STRENGTH: Primary | ICD-10-CM

## 2025-02-06 DIAGNOSIS — R53.1 DECREASED RANGE OF MOTION WITH DECREASED STRENGTH: Primary | ICD-10-CM

## 2025-02-06 DIAGNOSIS — Z74.09 DECREASED FUNCTIONAL MOBILITY AND ENDURANCE: ICD-10-CM

## 2025-02-06 PROCEDURE — 97112 NEUROMUSCULAR REEDUCATION: CPT | Mod: KX | Performed by: PHYSICAL THERAPIST

## 2025-02-06 PROCEDURE — 97530 THERAPEUTIC ACTIVITIES: CPT | Mod: KX | Performed by: PHYSICAL THERAPIST

## 2025-02-06 PROCEDURE — 97110 THERAPEUTIC EXERCISES: CPT | Mod: KX | Performed by: PHYSICAL THERAPIST

## 2025-02-06 NOTE — PROGRESS NOTES
[facility]   Physical Therapy Treatment Note & PROGRESS NOTE     Name: Bharathi Parsons  Clinic Number: [id number]    Therapy Diagnosis:   Encounter Diagnoses   Name Primary?    Decreased range of motion with decreased strength Yes    Gait abnormality     Pain     Balance problem     Decreased functional mobility and endurance          Physician: Angi Cain FNP    Visit Date: 2/6/2025    Physician Orders: PT Eval and Treat  Medical Diagnosis from Referral: Arthritis of left hip  Evaluation Date: 1/7/2025  Authorization Period Expiration: 12/20/25  Plan of Care Expiration: 3/7/2025  Progress Note Due: 3/7/2025  Visit # / Visits authorized: 7/20 (+1)  FOTO: 2/3 (last performed on 2/6/2025)         Precautions: Standard, Fall, and HTN    Time In: 13:30   Time Out: 14:30  Total Billable Time: 57 minutes  Billing reflects one on one time spent with patient.     SUBJECTIVE     Pt reports: he did fine after last treatment session. No pain in hip, only pain in knees - 2/10 currently, 4/10 at it's worst.    He was not compliant with home exercise program.  Response to previous treatment: soreness   Functional change: none reported    Pain: 0-2/10  Location: left knee and hip     OBJECTIVE     Objective Measures updated at progress report unless specified.     Gait Analysis: The patient ambulated with the following assistive device: none; the patient presents with the following gait abnormalities: unsteady gait and trunk lean     Balance  Right   (seconds) Left  (seconds) Norms 2/6/25   Single Leg Stance 5 3 Less than 4.9 sec high risk  5-6.4 sec increased risk  6.5 or greater low risk 8 right   4 left          Functional Tests  Outcome Norms 2/6/25   Five Time Sit to Stand     Greater than 14 sec high risk  12.1-14 sec increased risk  12 sec or less low risk 20.60 20-29 yrs ? 6.0±1.4 sec.  30-39 yrs ? 6.1±1.4 sec.  40-49 yrs ? 7.6±1.8 sec.  50-59 yrs ? 7.7±2.6 sec.  60-69 yrs ? 8.4±0.0 sec  (male), 12.7±1.8 sec (female)  70-79 yrs ? 11.6±3.4 sec (male), 13.0±4.8 sec (female)  80-89 yrs ? 16.7±4.5 sec (male), 17.2±5.5 sec (female) 38.81         Range of Motion:     Ankle/Foot AROM/PROM Right Left Pain/Dysfunction with Movement 2/6/25   Dorsiflexion (20º) WFL limited   NT   Plantarflexion (50º) WFL limited   NT      ,   Hip AROM/PROM Right Left Pain/Dysfunction with Movement 2/6/25   Hip Flexion (120º) limited limited   WNL   Hip Abduction (45º) limited limited   WFL   Hip IR (45º) limited limited   WFL   Hip ER (45º) limited limited   WFL   , and   Knee AROM/PROM Right Left Pain/Dysfunction with Movement 2/6/25   Knee Flexion (135º)    NT   Knee Extension (0º) WFL 0   NT         Strength:     L/E MMT Right  (spine) Left Pain/Dysfunction with Movement 2/6/25   Hip Flexion  4-/5 3+/5   4-   Knee Extension 4/5 4/5   4+   Knee Flexion 4/5 4/5   4+   Hip IR 4-/5 3+/5   4   Hip ER 4-/5 3+/5   4   Ankle DF 4/5 4-/5   4, 4-   Ankle PF 4/5 4-/5   4, 4-   Ankle Inversion 4-/5 4/5   4   Ankle Eversion 4-/5 4/5   4         Muscle Length:         Muscle Tested  Right Left  2/6/25   Hip Flexors [] Normal      [x] Limited [] Normal      [x] Limited [x] Normal      [] Limited   Hamstrings  [] Normal      [x] Limited [] Normal      [x] Limited [x] Normal      [] Limited    Piriformis  [] Normal      [x] Limited [] Normal      [x] Limited [x] Normal      [] Limited    Gastrocnemius  [x] Normal      [] Limited [] Normal      [x] Limited [] Normal      [x] Limited R   Soleus  [x] Normal      [] Limited [] Normal      [x] Limited  [] Normal      [x] Limited R        FOTO:      TREATMENT       CPT Intervention Performed  2/6/2025  Duration / Intensity     MT        TE  Bike   8 min    Nustep x 8 minutes Level 4      LTR x 3 min    FOTO/Re-assess x             NMR  PPT x 3 min, 5 sec      Bridge x 10x/ 3 sets      isometric ADD x 3 min, 10 sec hold     Supine clamshell - black x 3 min,  10 sec hold    Straight Leg  Raise x 3 x 10 bilateral    Sidelying hip abduction  Hip circles x   10x 3 sets  5x CW/CCW    Prone hip extension   10x each    LAQ  10x each side 5 sec hold/ 2 sets                     TA  leg press  x  85# 10x/ 4 sets       standing hip hikes  - 10x each extensive cues              Matrix knee extension  x  3 x 10 15lbs     Matrix knee flexion  x  3 x 10 25lbs     Side stepping - yellow band at ankles x  3 minutes with two hand support     Sit to Stands on airex pad (staggered left forward due to knee pain) -  3 x 10 with NO arm support from chair            PLAN   Bike, ankle mobility on step (if tolerated) toe yoga, arch doming, MOBU sitting, ankle band. Matrix knee extension/flex             CPT Codes available for Billing:   (00) minutes of Manual therapy (MT) to improve pain and ROM.  (21) minutes of Therapeutic Exercise (TE) to develop strength, endurance, range of motion, and flexibility.  (25) minutes of Neuromuscular Re-Education (NMR)  to improve: Balance, Coordination, Kinesthetic Sense, Proprioception, and Posture.  (11) minutes of Therapeutic Activities (TA) to improve functional performance.  Unattended Electrical Stimulation (ES) for muscle performance or pain modulation.  BFR: Blood flow restriction applied during exercise  NP or (-): Not Performed       PATIENT EDUCATION AND HOME EXERCISES     Home Exercises Provided and Patient Education Provided     Education provided:   - home exercise program     Written Home Exercises Provided: Patient instructed to cont prior HEP. Exercises were reviewed and Bharathi was able to demonstrate them prior to the end of the session.  Bharathi demonstrated good  understanding of the education provided. See EMR under Patient Instructions for exercises provided during therapy sessions      ASSESSMENT     Patient has made some progress with strength and SL balance. He remains limited on functional mobility, abnormal gait pattern and sit>stands. He reports follow up with  MD on 2/20 for knees. Patient should benefit from continued therapy to address remaining goals.    Bharathi Is progressing well towards his goals.   Pt prognosis is Good.     Pt will continue to benefit from skilled outpatient physical therapy to address the deficits listed in the problem list box on initial evaluation, provide pt/family education and to maximize pt's level of independence in the home and community environment.     Pt's spiritual, cultural and educational needs considered and pt agreeable to plan of care and goals.     Anticipated barriers to physical therapy: : co-morbidities, chronicity of condition, and coping style     Goals:   Reviewed: 2/6/2025      Short Term Goals: In 4 weeks  Progress Date   1.Patient to be educated on HEP. [x] Progressing  [] MET   [] Not MET   [] Not tested  2/6/2025   2.Patient to increase hip range of motion, in order to improve available range of motion for ADL's.  [] Progressing  [x] MET   [] Not MET  [] Not tested  2/6/2025   3.Patient to increase bilateral LE strength by 1/2 grade, in order to improve endurance and increase ability to perform all functional activities for increased time.  [x] Progressing  [] MET   [] Not MET  [] Not tested  2/6/2025   4.Patient to have pain less than 0/10 at worst, to improve QOL. [x] Progressing  [] MET   [] Not MET  [] Not tested  2/6/2025   5.Patient to improve score on the FOTO, to improve QOL. [x] Progressing  [] MET   [] Not MET  [] Not tested  2/6/2025   6. Patient to improve score on 5 times sit to  order to decrease fall risk. [x] Progressing  [] MET   [] Not MET  [] Not tested  2/6/2025      Long Term Goals: In 8 weeks Progress Date   1.Patient to improve score on the FOTO predicted score or better, to improve QOL. [x] Progressing  [] MET   [] Not MET  [] Not tested  2/6/2025   2. Patient to increase bilateral LE strength to 4+/5 or greater, in order to improve endurance and increase ability to perform all functional  activities for increased time. [x] Progressing  [] MET   [] Not MET  [] Not tested  2/6/2025   3. Patient to normalize score on 5 times sit to stand, in order to improve endurance and decrease fall risk. [x] Progressing  [] MET   [] Not MET  [] Not tested  2/6/2025   4. Patient to perform daily activities including walking and going up and down steps with only mild increased symptoms. [x] Progressing  [] MET   [] Not MET  [] Not tested  2/6/2025        PLAN     Monitor response to today's treatment session and progress with Physical Therapy plan of care as indicated.    Plan of care Certification: 1/7/2025  to 3/7/25.     Outpatient Physical Therapy 2 times weekly for 8 weeks to include any combination of the following interventions: Aquatic Therapy, electrical stimulation (PRN), Gait Training, Manual Therapy, Neuromuscular Re-ed, Patient Education/Self Care, Therapeutic Activites, Therapeutic Exercise, Dry Needling, and Moist Hot Pack/Cold Pack    Estefania Varela, PT

## 2025-02-10 ENCOUNTER — OFFICE VISIT (OUTPATIENT)
Dept: ALLERGY | Facility: CLINIC | Age: 85
End: 2025-02-10
Payer: MEDICARE

## 2025-02-10 VITALS
SYSTOLIC BLOOD PRESSURE: 130 MMHG | WEIGHT: 170.63 LBS | TEMPERATURE: 98 F | HEART RATE: 88 BPM | BODY MASS INDEX: 24.48 KG/M2 | DIASTOLIC BLOOD PRESSURE: 86 MMHG

## 2025-02-10 DIAGNOSIS — J31.0 GUSTATORY RHINITIS: ICD-10-CM

## 2025-02-10 DIAGNOSIS — J30.1 SEASONAL ALLERGIC RHINITIS DUE TO POLLEN: Primary | ICD-10-CM

## 2025-02-10 PROCEDURE — 99999 PR PBB SHADOW E&M-EST. PATIENT-LVL IV: CPT | Mod: PBBFAC,,, | Performed by: ALLERGY & IMMUNOLOGY

## 2025-02-10 PROCEDURE — 99214 OFFICE O/P EST MOD 30 MIN: CPT | Mod: S$PBB,,, | Performed by: ALLERGY & IMMUNOLOGY

## 2025-02-10 PROCEDURE — 99214 OFFICE O/P EST MOD 30 MIN: CPT | Mod: PBBFAC | Performed by: ALLERGY & IMMUNOLOGY

## 2025-02-10 PROCEDURE — G2211 COMPLEX E/M VISIT ADD ON: HCPCS | Mod: S$PBB,,, | Performed by: ALLERGY & IMMUNOLOGY

## 2025-02-10 NOTE — PROGRESS NOTES
"Subjective:      Patient ID: Bharathi Parsons is a 84 y.o. male.    Chief Complaint:  Follow-up      HPI:        2/10/2025:   Nasal congestion at night that keeps him up. He has not tried the night time dose of Afrin.      Atrovent prior to eating prevents gustatory rhinitis- which he has not consistently done.          HPI: 9/10/2024: 84 year old male here for follow up  History of esophageal dilation  Intermittent dysphagia  He denies a history of Eoe.    Atrovent nasal spray helps throughout the day  During \the night- nasal congestion        HPI 7/30/2024: 84 year old amel with allergic rhinitis- pollen  Started atrovent nasal spray by ENT. He feels it has helped and he admits to not taking three times a day.  Stopped Astelin nasal spray  He denies eye symptoms.  Rhinitis is worse with eating        1/30/2024: 83 year old male here for follow up- allergic rhinitis- pollen  He reports that symptoms have persisted and he feels they contribute to hearing difficulty at times.  He also feels Flonase and Atrovent have not helped.  He admits to not consistently taking GERD medication as discussed at his previous visit.        12/22/2023: 83 year old complaining of a runny nose with eating. He reports when he goes to bed in the evening, he has a runny nose/post nasal drip. He used Sinex and it worked for a while until he had a "reaction". He has significant rhinitis. He now avoids it.  He is taking Atrovent nasal spray currently.  He takes Atrovent at night and it helps "a little bit".  Some times travel improves nasal symptoms and at times, it does not.    She denies itchy or watery eyes.    He denies food allergies.  He denies drug or insect sting allergy.  He denies asthma.    He reports intermittent GERD.    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 JAK2 V617F mutated DNA was detected and measured  at 30% of total JAK2 DNA.    [id number] mutation is found with high frequency in  [place] chromosome-negative myeloproliferative  neoplasms (>95% in polycythemia vera and approximately  50-60% in primary myelofibrosis and essential  thrombocy        Family History   Problem Relation Name Age of Onset    Glaucoma Maternal Uncle      Cataracts Mother      Heart disease Father          Current Outpatient Medications on File Prior to Visit   Medication Sig Dispense Refill    amLODIPine (NORVASC) 10 MG tablet Take 1 tablet (10 mg total) by mouth once daily. Stop lisinopril 90 tablet 3    aspirin (ECOTRIN) 81 MG EC tablet Take 1 tablet by mouth once daily 100 tablet 0    bempedoic acid-ezetimibe (NEXLIZET) 180-10 mg Tab Take 1 tablet by mouth Daily. 30 tablet 12    cetirizine (ZYRTEC) 10 MG tablet       hydroxyurea (HYDREA) 500 mg Cap Take 1 capsule (500 mg total) by mouth once daily. 30 capsule 11    IBUPROFEN ORAL Take by mouth as needed.      ipratropium (ATROVENT) 21 mcg (0.03 %) nasal spray 2 sprays by Each Nostril route 3 (three) times daily. 30 mL 5    LIVALO 4 mg Tab Take 1 tablet by mouth once daily 90 tablet 3    pantoprazole (PROTONIX) 40 MG tablet TAKE 1 TABLET BY MOUTH ONCE DAILY BEFORE MEAL(S) 90 tablet 3    triamcinolone acetonide 0.1% (KENALOG) 0.1 % cream Apply topically 2 (two) times daily. 80 g 1    COVID-19 vac, jose,Pfizer,,PF, (PFIZER COVID-19 JOSE VACCN,PF,) 30 mcg/0.3 mL injection Inject into the muscle. (Patient not taking: Reported on 2/10/2025) 0.3 mL 0    diclofenac sodium (VOLTAREN ARTHRITIS PAIN) 1 % Gel Apply 2 g topically once daily. PRN pain 1 each 1    methylPREDNISolone (MEDROL DOSEPACK) 4 mg tablet use as directed (Patient not taking: Reported on 2/10/2025) 21 tablet 0     No current facility-administered medications  on file prior to visit.        Review of patient's allergies indicates:  No Known Allergies     Environmental History: Pets in the home: dogs (1).  Tobacco Smoke in Home: no  Review of Systems   Constitutional:  Negative for chills and fever.   HENT:  Positive for postnasal drip and rhinorrhea.    Eyes:  Negative for discharge and itching.   Allergic/Immunologic: Positive for environmental allergies. Negative for food allergies and immunocompromised state.   Neurological:  Negative for facial asymmetry and speech difficulty.   Psychiatric/Behavioral:  Negative for behavioral problems and suicidal ideas.        Objective:   Physical Exam  Constitutional:       General: He is not in acute distress.     Appearance: Normal appearance. He is not ill-appearing, toxic-appearing or diaphoretic.   HENT:      Head: Normocephalic and atraumatic.      Nose: Nose normal. No congestion or rhinorrhea.      Mouth/Throat:      Mouth: Mucous membranes are moist.      Pharynx: No oropharyngeal exudate or posterior oropharyngeal erythema.   Eyes:      General: No scleral icterus.        Right eye: No discharge.         Left eye: No discharge.   Neck:      Thyroid: No thyromegaly.   Cardiovascular:      Rate and Rhythm: Normal rate and regular rhythm.      Heart sounds: Normal heart sounds. No murmur heard.     No friction rub. No gallop.   Pulmonary:      Effort: Pulmonary effort is normal. No respiratory distress.      Breath sounds: Normal breath sounds. No stridor. No wheezing or rales.   Musculoskeletal:         General: Normal range of motion.      Cervical back: Normal range of motion and neck supple. No rigidity or tenderness.   Lymphadenopathy:      Cervical: No cervical adenopathy.   Skin:     General: Skin is warm.      Coloration: Skin is not jaundiced or pale.      Findings: No bruising, erythema or rash.   Neurological:      Mental Status: He is alert and oriented to person, place, and time.      Gait: Gait normal.    Psychiatric:         Mood and Affect: Mood normal.         Behavior: Behavior normal.         Thought Content: Thought content normal.         Judgment: Judgment normal.                         Assessment:      1. Seasonal allergic rhinitis due to pollen    2. Gustatory rhinitis                Plan:     Seasonal allergic rhinitis due to pollen    Gustatory rhinitis            Discussed allergy immunotherapy. He would lie to think about it.    Agree with atrovent nasal spray and encouraged him to spray 1-2 sprays in each nostril 15 minutes prior to eating.  Advised that he can take Afrin once a day at night prior to bed.      Visit today included increased complexity associated with the care of the episodic problem Allergic Rhinitis  addressed and managing the longitudinal care of the patient due to the serious and/or complex managed problem(s)  Allergic Rhinitis.       RTC 2-3 months or sooner, if needed     PREM DE LA GARZA spent a total of 31 minutes on the day of the visit.This includes face to face time and non-face to face time preparing to see the patient (eg, review of tests), obtaining and/or reviewing separately obtained history, documenting clinical information in the electronic or other health record, independently interpreting results and communicating results to the patient/family/caregiver, or care coordinator.

## 2025-02-10 NOTE — PATIENT INSTRUCTIONS
Over the counter Afrin- one spray in each nostril at night      Atrovent nasal spray 15 minutes prior to eating

## 2025-02-11 ENCOUNTER — CLINICAL SUPPORT (OUTPATIENT)
Dept: REHABILITATION | Facility: HOSPITAL | Age: 85
End: 2025-02-11
Payer: MEDICARE

## 2025-02-11 DIAGNOSIS — R52 PAIN: ICD-10-CM

## 2025-02-11 DIAGNOSIS — R53.1 DECREASED RANGE OF MOTION WITH DECREASED STRENGTH: Primary | ICD-10-CM

## 2025-02-11 DIAGNOSIS — Z74.09 DECREASED FUNCTIONAL MOBILITY AND ENDURANCE: ICD-10-CM

## 2025-02-11 DIAGNOSIS — R26.9 GAIT ABNORMALITY: ICD-10-CM

## 2025-02-11 DIAGNOSIS — R26.89 BALANCE PROBLEM: ICD-10-CM

## 2025-02-11 DIAGNOSIS — M25.60 DECREASED RANGE OF MOTION WITH DECREASED STRENGTH: Primary | ICD-10-CM

## 2025-02-11 PROCEDURE — 97110 THERAPEUTIC EXERCISES: CPT | Mod: KX

## 2025-02-11 PROCEDURE — 97112 NEUROMUSCULAR REEDUCATION: CPT | Mod: KX

## 2025-02-11 PROCEDURE — 97530 THERAPEUTIC ACTIVITIES: CPT | Mod: KX

## 2025-02-11 NOTE — PROGRESS NOTES
[facility]   Physical Therapy Treatment Note      Name: Bharathi SULLIVAN [facility] Number: [id number]    Therapy Diagnosis:   Encounter Diagnoses   Name Primary?    Decreased range of motion with decreased strength Yes    Gait abnormality     Pain     Balance problem     Decreased functional mobility and endurance        Physician: Angi Cain FNP    Visit Date: 2/11/2025    Physician Orders: PT Eval and Treat  Medical Diagnosis from Referral: Arthritis of left hip  Evaluation Date: 1/7/2025  Authorization Period Expiration: 12/20/25  Plan of Care Expiration: 3/7/2025  Progress Note Due: 3/7/2025  Visit # / Visits authorized: 8/20 (+1)  FOTO: 2/3 (last performed on 2/6/2025)         Precautions: Standard, Fall, and HTN    Time In: 14:00  Time Out: 15:00  Total Billable Time: 60 minutes  Billing reflects one on one time spent with patient.     SUBJECTIVE     Pt reports: his left hip continues to feel fine, however, patient states his left knee is still bothering him. Patient states he has an appointment to see his orthopedic doctor for his knee next week.    He was not compliant with home exercise program.  Response to previous treatment: soreness   Functional change: none reported    Pain: 0-2/10  Location: left knee and hip     OBJECTIVE     Objective Measures updated at progress report unless specified.     TREATMENT       CPT Intervention Performed  2/11/2025  Duration / Intensity     MT        TE  Bike   8 min    Nustep x 8 minutes level 4      LTR x 3 min    FOTO/Re-assess              NMR  PPT x 3 min, 5 sec      Bridge x 10x/ 3 sets      isometric ADD x 3 min, 10 sec hold     Supine clamshell - black x 3 min,  10 sec hold    Straight Leg Raise x 3 x 10 bilateral    Sidelying hip abduction  Hip circles x   10x 3 sets  5x CW/CCW    Prone hip extension   10x each    LAQ  10x each side 5 sec hold/ 2 sets                     TA  leg press    85# 10x/ 4 sets       standing hip hikes  -  10x each extensive cues              Matrix knee extension  x  3 x 10 17.5lbs     Matrix knee flexion  x  3 x 10 27.5lbs     Side stepping - red band at ankles x  3 minutes with two hand support     Sit to Stands on airex pad (staggered left forward due to knee pain) x  3 x 10 with NO arm support from chair            PLAN   Bike, ankle mobility on step (if tolerated) toe yoga, arch doming, MOBU sitting, ankle band. Matrix knee extension/flex             CPT Codes available for Billing:   (00) minutes of Manual therapy (MT) to improve pain and ROM.  (11) minutes of Therapeutic Exercise (TE) to develop strength, endurance, range of motion, and flexibility.  (31) minutes of Neuromuscular Re-Education (NMR)  to improve: Balance, Coordination, Kinesthetic Sense, Proprioception, and Posture.  (18) minutes of Therapeutic Activities (TA) to improve functional performance.  Unattended Electrical Stimulation (ES) for muscle performance or pain modulation.  BFR: Blood flow restriction applied during exercise  NP or (-): Not Performed       PATIENT EDUCATION AND HOME EXERCISES     Home Exercises Provided and Patient Education Provided     Education provided:   - home exercise program     Written Home Exercises Provided: Patient instructed to cont prior HEP. Exercises were reviewed and Bharathi was able to demonstrate them prior to the end of the session.  Bharathi demonstrated good  understanding of the education provided. See EMR under Patient Instructions for exercises provided during therapy sessions      ASSESSMENT     Patient tolerated today's treatment session well but is still limited with sit to stands, especially after patient has been sitting or lying down. After a few reps patient has notable improvement in form possibly due to decreased stiffness in knee. Patient was able to tolerate progressions with increased resistance on matrix machines as well as increased resistance with side stepping.    Bharathi Is progressing  well towards his goals.   Pt prognosis is Good.     Pt will continue to benefit from skilled outpatient physical therapy to address the deficits listed in the problem list box on initial evaluation, provide pt/family education and to maximize pt's level of independence in the home and community environment.     Pt's spiritual, cultural and educational needs considered and pt agreeable to plan of care and goals.     Anticipated barriers to physical therapy: : co-morbidities, chronicity of condition, and coping style     Goals:   Reviewed: 2/11/2025      Short Term Goals: In 4 weeks  Progress Date   1.Patient to be educated on HEP. [x] Progressing  [] MET   [] Not MET   [] Not tested  2/6/2025   2.Patient to increase hip range of motion, in order to improve available range of motion for ADL's.  [] Progressing  [x] MET   [] Not MET  [] Not tested  2/6/2025   3.Patient to increase bilateral LE strength by 1/2 grade, in order to improve endurance and increase ability to perform all functional activities for increased time.  [x] Progressing  [] MET   [] Not MET  [] Not tested  2/6/2025   4.Patient to have pain less than 0/10 at worst, to improve QOL. [x] Progressing  [] MET   [] Not MET  [] Not tested  2/6/2025   5.Patient to improve score on the FOTO, to improve QOL. [x] Progressing  [] MET   [] Not MET  [] Not tested  2/6/2025   6. Patient to improve score on 5 times sit to  order to decrease fall risk. [x] Progressing  [] MET   [] Not MET  [] Not tested  2/6/2025      Long Term Goals: In 8 weeks Progress Date   1.Patient to improve score on the FOTO predicted score or better, to improve QOL. [x] Progressing  [] MET   [] Not MET  [] Not tested  2/6/2025   2. Patient to increase bilateral LE strength to 4+/5 or greater, in order to improve endurance and increase ability to perform all functional activities for increased time. [x] Progressing  [] MET   [] Not MET  [] Not tested  2/6/2025   3. Patient to normalize  score on 5 times sit to stand, in order to improve endurance and decrease fall risk. [x] Progressing  [] MET   [] Not MET  [] Not tested  2/6/2025   4. Patient to perform daily activities including walking and going up and down steps with only mild increased symptoms. [x] Progressing  [] MET   [] Not MET  [] Not tested  2/6/2025        PLAN     Monitor response to today's treatment session and progress with Physical Therapy plan of care as indicated.    Plan of care Certification: 1/7/2025  to 3/7/25.     Outpatient Physical Therapy 2 times weekly for 8 weeks to include any combination of the following interventions: Aquatic Therapy, electrical stimulation (PRN), Gait Training, Manual Therapy, Neuromuscular Re-ed, Patient Education/Self Care, Therapeutic Activites, Therapeutic Exercise, Dry Needling, and Moist Hot Pack/Cold Pack    Radha Weller, PT

## 2025-02-18 ENCOUNTER — CLINICAL SUPPORT (OUTPATIENT)
Dept: REHABILITATION | Facility: HOSPITAL | Age: 85
End: 2025-02-18
Payer: MEDICARE

## 2025-02-18 DIAGNOSIS — M25.60 DECREASED RANGE OF MOTION WITH DECREASED STRENGTH: Primary | ICD-10-CM

## 2025-02-18 DIAGNOSIS — Z74.09 DECREASED FUNCTIONAL MOBILITY AND ENDURANCE: ICD-10-CM

## 2025-02-18 DIAGNOSIS — R26.89 BALANCE PROBLEM: ICD-10-CM

## 2025-02-18 DIAGNOSIS — R52 PAIN: ICD-10-CM

## 2025-02-18 DIAGNOSIS — R53.1 DECREASED RANGE OF MOTION WITH DECREASED STRENGTH: Primary | ICD-10-CM

## 2025-02-18 DIAGNOSIS — R26.9 GAIT ABNORMALITY: ICD-10-CM

## 2025-02-18 PROCEDURE — 97530 THERAPEUTIC ACTIVITIES: CPT | Mod: KX

## 2025-02-18 NOTE — PROGRESS NOTES
[facility]   Physical Therapy Treatment Note      Name: Bharathi SULLIVAN [facility] Number: [id number]    Therapy Diagnosis:   Encounter Diagnoses   Name Primary?    Decreased range of motion with decreased strength Yes    Gait abnormality     Pain     Balance problem     Decreased functional mobility and endurance          Physician: Angi Cain FNP    Visit Date: 2/18/2025    Physician Orders: PT Eval and Treat  Medical Diagnosis from Referral: Arthritis of left hip  Evaluation Date: 1/7/2025  Authorization Period Expiration: 12/20/25  Plan of Care Expiration: 3/7/2025  Progress Note Due: 3/7/2025  Visit # / Visits authorized: 9/20 (+1)  FOTO: 2/3 (last performed on 2/6/2025)         Precautions: Standard, Fall, and HTN    Time In: 14:00  Time Out: 15:00  Total Billable Time: 10 minutes  Billing reflects one on one time spent with patient.     SUBJECTIVE     Pt reports: he is doing about the same today and reports no new complaints.    He was not compliant with home exercise program.  Response to previous treatment: soreness   Functional change: none reported    Pain: 0-2/10  Location: left knee and hip     OBJECTIVE     Objective Measures updated at progress report unless specified.     TREATMENT       CPT Intervention Performed  2/18/2025  Duration / Intensity     MT        TE  Bike   8 min    Nustep x 8 minutes level 4      LTR x 3 min    FOTO/Re-assess              NMR  PPT x 3 min, 5 sec      Bridge x 10x/ 3 sets      isometric ADD x 3 min, 10 sec hold     Supine clamshell - black x 3 min,  10 sec hold    Straight Leg Raise x 3 x 10 bilateral    Sidelying hip abduction  Hip circles x   10x 3 sets  5x CW/CCW    Prone hip extension   10x each    LAQ  10x each side 5 sec hold/ 2 sets                     TA  leg press    85# 10x/ 4 sets       standing hip hikes  - 10x each extensive cues              Matrix knee extension  x  3 x 10 17.5lbs     Matrix knee flexion  x  3 x 10 27.5lbs      Side stepping - red band at ankles -  3 minutes with two hand support     Sit to Stands on airex pad (staggered left forward due to knee pain) -  3 x 10 with NO arm support from chair            PLAN   Bike, ankle mobility on step (if tolerated) toe yoga, arch doming, MOBU sitting, ankle band. Matrix knee extension/flex             CPT Codes available for Billing:   (00) minutes of Manual therapy (MT) to improve pain and ROM.  (11) minutes of Therapeutic Exercise (TE) to develop strength, endurance, range of motion, and flexibility.  (31) minutes of Neuromuscular Re-Education (NMR)  to improve: Balance, Coordination, Kinesthetic Sense, Proprioception, and Posture.  (8) minutes of Therapeutic Activities (TA) to improve functional performance.  Unattended Electrical Stimulation (ES) for muscle performance or pain modulation.  BFR: Blood flow restriction applied during exercise  NP or (-): Not Performed       PATIENT EDUCATION AND HOME EXERCISES     Home Exercises Provided and Patient Education Provided     Education provided:   - home exercise program     Written Home Exercises Provided: Patient instructed to cont prior HEP. Exercises were reviewed and Bharathi was able to demonstrate them prior to the end of the session.  Bharathi demonstrated good  understanding of the education provided. See EMR under Patient Instructions for exercises provided during therapy sessions      ASSESSMENT     Patient tolerated session well. Continued verbal and tactile cueing to decrease compensations for lateral glute exercises.    Bharathi Is progressing well towards his goals.   Pt prognosis is Good.     Pt will continue to benefit from skilled outpatient physical therapy to address the deficits listed in the problem list box on initial evaluation, provide pt/family education and to maximize pt's level of independence in the home and community environment.     Pt's spiritual, cultural and educational needs considered and pt agreeable to  plan of care and goals.     Anticipated barriers to physical therapy: : co-morbidities, chronicity of condition, and coping style     Goals:   Reviewed: 2/18/2025      Short Term Goals: In 4 weeks  Progress Date   1.Patient to be educated on HEP. [x] Progressing  [] MET   [] Not MET   [] Not tested  2/6/2025   2.Patient to increase hip range of motion, in order to improve available range of motion for ADL's.  [] Progressing  [x] MET   [] Not MET  [] Not tested  2/6/2025   3.Patient to increase bilateral LE strength by 1/2 grade, in order to improve endurance and increase ability to perform all functional activities for increased time.  [x] Progressing  [] MET   [] Not MET  [] Not tested  2/6/2025   4.Patient to have pain less than 0/10 at worst, to improve QOL. [x] Progressing  [] MET   [] Not MET  [] Not tested  2/6/2025   5.Patient to improve score on the FOTO, to improve QOL. [x] Progressing  [] MET   [] Not MET  [] Not tested  2/6/2025   6. Patient to improve score on 5 times sit to  order to decrease fall risk. [x] Progressing  [] MET   [] Not MET  [] Not tested  2/6/2025      Long Term Goals: In 8 weeks Progress Date   1.Patient to improve score on the FOTO predicted score or better, to improve QOL. [x] Progressing  [] MET   [] Not MET  [] Not tested  2/6/2025   2. Patient to increase bilateral LE strength to 4+/5 or greater, in order to improve endurance and increase ability to perform all functional activities for increased time. [x] Progressing  [] MET   [] Not MET  [] Not tested  2/6/2025   3. Patient to normalize score on 5 times sit to stand, in order to improve endurance and decrease fall risk. [x] Progressing  [] MET   [] Not MET  [] Not tested  2/6/2025   4. Patient to perform daily activities including walking and going up and down steps with only mild increased symptoms. [x] Progressing  [] MET   [] Not MET  [] Not tested  2/6/2025        PLAN     Monitor response to today's treatment  session and progress with Physical Therapy plan of care as indicated.    Plan of care Certification: 1/7/2025  to 3/7/25.     Outpatient Physical Therapy 2 times weekly for 8 weeks to include any combination of the following interventions: Aquatic Therapy, electrical stimulation (PRN), Gait Training, Manual Therapy, Neuromuscular Re-ed, Patient Education/Self Care, Therapeutic Activites, Therapeutic Exercise, Dry Needling, and Moist Hot Pack/Cold Pack    Radha Weller, PT

## 2025-02-20 ENCOUNTER — TELEPHONE (OUTPATIENT)
Dept: REHABILITATION | Facility: HOSPITAL | Age: 85
End: 2025-02-20
Payer: MEDICARE

## 2025-02-20 ENCOUNTER — CLINICAL SUPPORT (OUTPATIENT)
Dept: REHABILITATION | Facility: HOSPITAL | Age: 85
End: 2025-02-20
Payer: MEDICARE

## 2025-02-20 ENCOUNTER — OFFICE VISIT (OUTPATIENT)
Dept: SPORTS MEDICINE | Facility: CLINIC | Age: 85
End: 2025-02-20
Payer: MEDICARE

## 2025-02-20 DIAGNOSIS — R53.1 DECREASED RANGE OF MOTION WITH DECREASED STRENGTH: Primary | ICD-10-CM

## 2025-02-20 DIAGNOSIS — M25.562 LEFT KNEE PAIN, UNSPECIFIED CHRONICITY: Primary | ICD-10-CM

## 2025-02-20 DIAGNOSIS — R26.9 GAIT ABNORMALITY: ICD-10-CM

## 2025-02-20 DIAGNOSIS — M25.60 DECREASED RANGE OF MOTION WITH DECREASED STRENGTH: Primary | ICD-10-CM

## 2025-02-20 DIAGNOSIS — R26.89 BALANCE PROBLEM: ICD-10-CM

## 2025-02-20 DIAGNOSIS — M17.12 PRIMARY OSTEOARTHRITIS OF LEFT KNEE: ICD-10-CM

## 2025-02-20 DIAGNOSIS — R52 PAIN: ICD-10-CM

## 2025-02-20 DIAGNOSIS — S80.02XA CONTUSION OF LEFT KNEE, INITIAL ENCOUNTER: Primary | ICD-10-CM

## 2025-02-20 DIAGNOSIS — Z74.09 DECREASED FUNCTIONAL MOBILITY AND ENDURANCE: ICD-10-CM

## 2025-02-20 PROCEDURE — 97530 THERAPEUTIC ACTIVITIES: CPT | Mod: KX | Performed by: PHYSICAL THERAPIST

## 2025-02-20 PROCEDURE — 99213 OFFICE O/P EST LOW 20 MIN: CPT | Mod: PBBFAC | Performed by: STUDENT IN AN ORGANIZED HEALTH CARE EDUCATION/TRAINING PROGRAM

## 2025-02-20 PROCEDURE — 97110 THERAPEUTIC EXERCISES: CPT | Mod: KX | Performed by: PHYSICAL THERAPIST

## 2025-02-20 PROCEDURE — 97112 NEUROMUSCULAR REEDUCATION: CPT | Mod: KX | Performed by: PHYSICAL THERAPIST

## 2025-02-20 NOTE — PATIENT INSTRUCTIONS
Assessment:  Bharathi Parsons is a 84 y.o. male with a chief complaint of Pain of the Left Knee and Pain of the Right Knee    Encounter Diagnoses   Name Primary?    Contusion of left knee, initial encounter Yes    Primary osteoarthritis of left knee       Plan:  No indication for repeat x-rays at this time.  History and clinical exam is consistent with a contusion of the left knee, likely exacerbating underlying osteoarthritis.  However, pain seems to be improving at this time.  Continue physical therapy at [facility].  No indication for repeat injections time however persists after, we will months, your trip, we can consider for repeat steroid or viscosupplementation injections, if needed.  Continue use Tylenol as needed for pain and discomfort.  You can also use topical diclofenac, lidocaine patches, etc..  Recommend to limit any NSAID use, such as ibuprofen, Aleve, given underlying renal dysfunction    Follow-up:  As needed or sooner if there are any problems between now and then.    Thank you for choosing [facility] and Dr. James Viera for your orthopedic & sports medicine care. It is our goal to provide you with exceptional care that will help keep you healthy, active, and get you back in the game.    Please do not hesitate to reach out to us via email, phone, or MyChart with any questions, concerns, or feedback.    If you are experiencing pain/discomfort ,or have questions after 5pm and would like to be connected to the [facility] on-call team, please call this number and specify which Sports Medicine provider is treating you: [phone]

## 2025-02-20 NOTE — TELEPHONE ENCOUNTER
Name: Bharathi Parsons  Clinic Number: [id number]      Call Date: 2/20/2025    Reason for call: Offered earlier appointment     Message Left:  Bharathi Parsons was called and a message was left informing patient of open slots available prior to his appointment time. Call back number provided at [phone], with recommendations to call as soon as possible.     Plan: follow up with pt

## 2025-02-20 NOTE — PROGRESS NOTES
"       Patient ID: Bharathi Parsons  YOB: 1940  MRN: [id number]    Chief Complaint: Pain of the Left Knee and Pain of the Right Knee    Referred By: prior patient of Dr. Saad Price    History of Present Illness: Bharathi Parsons is a 84 y.o. male who presents today with Pain of the Left Knee and Pain of the Right Knee    History of Present Illness    HPI:  Bharathi presents for follow-up, having last seen the doctor in November 2023. He reports a fall 3-4 weeks ago causing hip swelling. He did not seek medical attention, but used ice and elevation, with swelling resolving after three days. Hip pain has since resolved.    He visited a walk-in clinic in December 2024 for hip issues, where X-rays were taken. He has been undergoing physical therapy and using diclofenac ointment on his hip. Pain now only occurs occasionally at night. He has difficulty with stairs.    His knee "cracks" and "pops" with leg movement, described as "crackling behind the kneecap." He notes minor pain that becomes noticeable with movement. His last gel injection for the knee was in January 2023, and his knee was doing well throughout 2024.    He reports occasional left ankle instability, stating, "I experience this infrequently and am unsure of the specific movement that triggers it." He has noticeable edema in his left ankle, present even in the morning.    He has several upcoming trips planned, including visits to [place], [facility], and [facility].    Bharathi denies any formal medical diagnoses. Bharathi has been applying diclofenac ointment to the hip.  Bharathi is currently undergoing physical therapy for hip issues.  Bharathi received steroid and gel injections for knee issues, with the last ones administered in January 2023.    MEDICATIONS:  - Diclofenac ointment: applied topically to the hip area    HISTORY:  - Marital Status:         He was initially evaluated in our office on 6/8/23.  He had " previously been treated by Dr. Saad Price for bilateral knee osteoarthritis since early 2022.  He had completed Euflexxa series in January 2023.  He received bilateral knee CSI's on 11/7/23.      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 JAK2 V617F mutated DNA was detected and measured  at 30% of total JAK2 DNA.    [id number] mutation is found with high frequency in  Roberts chromosome-negative myeloproliferative  neoplasms (>95% in polycythemia vera and approximately  50-60% in primary myelofibrosis and essential  thrombocy     Past Surgical History:   Procedure Laterality Date    APPENDECTOMY      as child    CATARACT EXTRACTION W/  INTRAOCULAR LENS IMPLANT  OD before 2003    CATARACT EXTRACTION W/  INTRAOCULAR LENS IMPLANT  OS 12/19/07 with YAG    ESOPHAGEAL DILATION      x 2 in past last one done in  2022    FOOT ARTHRODESIS      medial column arthrodesis left foot  2011     Family History   Problem Relation Name Age of Onset    Glaucoma Maternal Uncle      Cataracts Mother      Heart disease Father       Social History     Socioeconomic History    Marital status:    Tobacco Use    Smoking status: Never     Passive exposure: Never    Smokeless tobacco: Never   Substance and Sexual Activity    Alcohol use: Yes     Comment: Ocasionally    Drug use: No    Sexual activity: Not Currently     Partners: Female   Social History Narrative    , No smokers in household, 1 Dog.     Social Drivers of Health     Financial Resource Strain: Low Risk  (1/24/2025)    Overall Financial Resource Strain (CARDIA)     Difficulty of Paying Living Expenses: Not hard at all   Food Insecurity: No Food Insecurity (1/24/2025)     Hunger Vital Sign     Worried About Running Out of Food in the Last Year: Never true     Ran Out of Food in the Last Year: Never true   Transportation Needs: No Transportation Needs (12/11/2023)    PRAPARE - Transportation     Lack of Transportation (Medical): No     Lack of Transportation (Non-Medical): No   Physical Activity: Insufficiently Active (1/24/2025)    Exercise Vital Sign     Days of Exercise per Week: 7 days     Minutes of Exercise per Session: 20 min   Stress: No Stress Concern Present (1/24/2025)    [employer] [place] [employer] - Occupational Stress Questionnaire     Feeling of Stress : Only a little   Housing Stability: Low Risk  (12/11/2023)    Housing Stability Vital Sign     Unable to Pay for Housing in the Last Year: No     Number of Places Lived in the Last Year: 1     Unstable Housing in the Last Year: No     Medication List with Changes/Refills   Current Medications    AMLODIPINE (NORVASC) 10 MG TABLET    Take 1 tablet (10 mg total) by mouth once daily. Stop lisinopril    ASPIRIN (ECOTRIN) 81 MG EC TABLET    Take 1 tablet by mouth once daily    BEMPEDOIC ACID-EZETIMIBE (NEXLIZET) 180-10 MG TAB    Take 1 tablet by mouth Daily.    CETIRIZINE (ZYRTEC) 10 MG TABLET        COVID-19 VAC, JOSE,PFIZER,,PF, (PFIZER COVID-19 JOSE VACCN,PF,) 30 MCG/0.3 ML INJECTION    Inject into the muscle.    DICLOFENAC SODIUM (VOLTAREN ARTHRITIS PAIN) 1 % GEL    Apply 2 g topically once daily. PRN pain    HYDROXYUREA (HYDREA) 500 MG CAP    Take 1 capsule (500 mg total) by mouth once daily.    IBUPROFEN ORAL    Take by mouth as needed.    IPRATROPIUM (ATROVENT) 21 MCG (0.03 %) NASAL SPRAY    2 sprays by Each Nostril route 3 (three) times daily.    LIVALO 4 MG TAB    Take 1 tablet by mouth once daily    METHYLPREDNISOLONE (MEDROL DOSEPACK) 4 MG TABLET    use as directed    PANTOPRAZOLE (PROTONIX) 40 MG TABLET    TAKE 1 TABLET BY MOUTH ONCE DAILY BEFORE MEAL(S)    TRIAMCINOLONE ACETONIDE 0.1% (KENALOG) 0.1 %  CREAM    Apply topically 2 (two) times daily.     Review of patient's allergies indicates:  No Known Allergies    Physical Exam:   There is no height or weight on file to calculate BMI.    Detailed MSK exam:     Left Knee:  Inspection:  Trace effusion, no erythema or ecchymosis   Palpation tenderness: Mild anterior knee tenderness  Range of motion:  0-135 degrees.  Strength:  5/5 Extension    5/5 Flexion  N/V Exam:  Tibial:    Normal sensory (plantar foot)  Normal motor (FHL)    Sup Peroneal:  Normal sensory (dorsal foot)  Normal motor (Peroneals)            Deep Peroneal:  Normal sensory (1st web space) Normal motor (EHL)    Sural:   Normal sensory (lateral foot)   Saphenous:   Normal sensory (medial lower leg)   Normal pedal pulses, warm and well perfused with capillary refill < 2 sec     Imaging:    No new imaging today.     Patient Instructions   Assessment:  Bharathi Parsons is a 84 y.o. male with a chief complaint of Pain of the Left Knee and Pain of the Right Knee    Encounter Diagnoses   Name Primary?    Contusion of left knee, initial encounter Yes    Primary osteoarthritis of left knee       Plan:  No indication for repeat x-rays at this time.  History and clinical exam is consistent with a contusion of the left knee, likely exacerbating underlying osteoarthritis.  However, pain seems to be improving at this time.  Continue physical therapy at [facility].  No indication for repeat injections time however persists after, we will months, your trip, we can consider for repeat steroid or viscosupplementation injections, if needed.  Continue use Tylenol as needed for pain and discomfort.  You can also use topical diclofenac, lidocaine patches, etc..  Recommend to limit any NSAID use, such as ibuprofen, Aleve, given underlying renal dysfunction    Follow-up:  As needed or sooner if there are any problems between now and then.    Thank you for choosing [facility] and Dr. James Viera  for your orthopedic & sports medicine care. It is our goal to provide you with exceptional care that will help keep you healthy, active, and get you back in the game.    Please do not hesitate to reach out to us via email, phone, or MyChart with any questions, concerns, or feedback.    If you are experiencing pain/discomfort ,or have questions after 5pm and would like to be connected to the [facility] on-call team, please call this number and specify which Sports Medicine provider is treating you: [phone]       A copy of today's visit note has been sent to the referring provider.           James Viera MD  Primary Care Sports Medicine    Disclaimer: This note was prepared using a voice recognition system and is likely to have sound alike errors within the text.     This note was generated with the assistance of ambient listening technology. Verbal consent was obtained by the patient and accompanying visitor(s) for the recording of patient appointment to facilitate this note. I attest to having reviewed and edited the generated note for accuracy, though some syntax or spelling errors may persist. Please contact the author of this note for any clarification.

## 2025-02-20 NOTE — PROGRESS NOTES
[facility]   Physical Therapy Treatment Note      Name: Bharathi SULLIVAN [facility] Number: [id number]    Therapy Diagnosis:   Encounter Diagnoses   Name Primary?    Decreased range of motion with decreased strength Yes    Gait abnormality     Pain     Balance problem     Decreased functional mobility and endurance            Physician: Angi Cain FNP    Visit Date: 2/20/2025    Physician Orders: PT Eval and Treat  Medical Diagnosis from Referral: Arthritis of left hip  Evaluation Date: 1/7/2025  Authorization Period Expiration: 12/31/25  Plan of Care Expiration: 3/7/2025  Progress Note Due: 3/7/2025  Visit # / Visits authorized: 10/20 (+1)  FOTO: 2/3 (last performed on 2/6/2025)         Precautions: Standard, Fall, and HTN    Time In: 13:15  Time Out: 14:15  Total Billable Time: 53 minutes  Billing reflects one on one time spent with patient.     SUBJECTIVE     Pt reports: he is having no pain currently hip has been feeling good, but reports knees are still bothersome -rates pain at 3/10 at worst in knees.    He was not compliant with home exercise program.  Response to previous treatment: soreness   Functional change: none reported    Pain: 0-3/10 - more in knee then hip  Location: left knee and hip     OBJECTIVE     Objective Measures updated at progress report unless specified.     TREATMENT       CPT Intervention Performed  2/20/2025  Duration / Intensity     MT        TE  Bike   8 min    Nustep x 8 minutes level 4      LTR x 3 min    FOTO/Re-assess              NMR  PPT x 3 min, 5 sec      Bridge x 10x/ 3 sets      isometric ADD x 3 min, 10 sec hold     Supine clamshell - black x 3 min,  10 sec hold    Straight Leg Raise x 3 x 10 bilateral    Sidelying hip abduction  Hip circles x   10x 3 sets  5x CW/CCW    Prone hip extension   10x each    LAQ  10x each side 5 sec hold/ 2 sets                     TA  leg press  x  85# 10x/ 4 sets       standing hip extension   10x each extensive  cues              Matrix knee extension  x  3 x 10 17.5lbs     Matrix knee flexion  x  3 x 10 27.5lbs     Side stepping - red band at ankles -  3 minutes with two hand support     Sit to Stands on airex pad (staggered left forward due to knee pain) -  3 x 10 with NO arm support from chair    STEP UPS              PLAN   Bike, ankle mobility on step (if tolerated) toe yoga, arch doming, MOBU sitting, ankle band. Matrix knee extension/flex             CPT Codes available for Billing:   (00) minutes of Manual therapy (MT) to improve pain and ROM.  (11) minutes of Therapeutic Exercise (TE) to develop strength, endurance, range of motion, and flexibility.  (31) minutes of Neuromuscular Re-Education (NMR)  to improve: Balance, Coordination, Kinesthetic Sense, Proprioception, and Posture.  (11) minutes of Therapeutic Activities (TA) to improve functional performance.  Unattended Electrical Stimulation (ES) for muscle performance or pain modulation.  BFR: Blood flow restriction applied during exercise  NP or (-): Not Performed       PATIENT EDUCATION AND HOME EXERCISES     Home Exercises Provided and Patient Education Provided     Education provided:   - home exercise program     Written Home Exercises Provided: Patient instructed to cont prior HEP. Exercises were reviewed and Bharathi was able to demonstrate them prior to the end of the session.  Bharathi demonstrated good  understanding of the education provided. See EMR under Patient Instructions for exercises provided during therapy sessions      ASSESSMENT     Patient tolerated session and all progressions well, increased cues for interventions for correct technique. Encouraged home exercise program.    Bhartahi Is progressing well towards his goals.   Pt prognosis is Good.     Pt will continue to benefit from skilled outpatient physical therapy to address the deficits listed in the problem list box on initial evaluation, provide pt/family education and to maximize pt's  level of independence in the home and community environment.     Pt's spiritual, cultural and educational needs considered and pt agreeable to plan of care and goals.     Anticipated barriers to physical therapy: : co-morbidities, chronicity of condition, and coping style     Goals:   Reviewed: 2/20/2025      Short Term Goals: In 4 weeks  Progress Date   1.Patient to be educated on HEP. [x] Progressing  [] MET   [] Not MET   [] Not tested  2/6/2025   2.Patient to increase hip range of motion, in order to improve available range of motion for ADL's.  [] Progressing  [x] MET   [] Not MET  [] Not tested  2/6/2025   3.Patient to increase bilateral LE strength by 1/2 grade, in order to improve endurance and increase ability to perform all functional activities for increased time.  [x] Progressing  [] MET   [] Not MET  [] Not tested  2/6/2025   4.Patient to have pain less than 0/10 at worst, to improve QOL. [x] Progressing  [] MET   [] Not MET  [] Not tested  2/6/2025   5.Patient to improve score on the FOTO, to improve QOL. [x] Progressing  [] MET   [] Not MET  [] Not tested  2/6/2025   6. Patient to improve score on 5 times sit to  order to decrease fall risk. [x] Progressing  [] MET   [] Not MET  [] Not tested  2/6/2025      Long Term Goals: In 8 weeks Progress Date   1.Patient to improve score on the FOTO predicted score or better, to improve QOL. [x] Progressing  [] MET   [] Not MET  [] Not tested  2/6/2025   2. Patient to increase bilateral LE strength to 4+/5 or greater, in order to improve endurance and increase ability to perform all functional activities for increased time. [x] Progressing  [] MET   [] Not MET  [] Not tested  2/6/2025   3. Patient to normalize score on 5 times sit to stand, in order to improve endurance and decrease fall risk. [x] Progressing  [] MET   [] Not MET  [] Not tested  2/6/2025   4. Patient to perform daily activities including walking and going up and down steps with only mild  increased symptoms. [x] Progressing  [] MET   [] Not MET  [] Not tested  2/6/2025        PLAN     Monitor response to today's treatment session and progress with Physical Therapy plan of care as indicated.    Plan of care Certification: 1/7/2025  to 3/7/25.     Outpatient Physical Therapy 2 times weekly for 8 weeks to include any combination of the following interventions: Aquatic Therapy, electrical stimulation (PRN), Gait Training, Manual Therapy, Neuromuscular Re-ed, Patient Education/Self Care, Therapeutic Activites, Therapeutic Exercise, Dry Needling, and Moist Hot Pack/Cold Pack    Estefania Varela, PT

## 2025-02-22 DIAGNOSIS — Z00.00 ENCOUNTER FOR MEDICARE ANNUAL WELLNESS EXAM: ICD-10-CM

## 2025-02-25 ENCOUNTER — CLINICAL SUPPORT (OUTPATIENT)
Dept: REHABILITATION | Facility: HOSPITAL | Age: 85
End: 2025-02-25
Payer: MEDICARE

## 2025-02-25 DIAGNOSIS — R53.1 DECREASED RANGE OF MOTION WITH DECREASED STRENGTH: Primary | ICD-10-CM

## 2025-02-25 DIAGNOSIS — M25.60 DECREASED RANGE OF MOTION WITH DECREASED STRENGTH: Primary | ICD-10-CM

## 2025-02-25 DIAGNOSIS — Z74.09 DECREASED FUNCTIONAL MOBILITY AND ENDURANCE: ICD-10-CM

## 2025-02-25 DIAGNOSIS — R26.9 GAIT ABNORMALITY: ICD-10-CM

## 2025-02-25 DIAGNOSIS — R26.89 BALANCE PROBLEM: ICD-10-CM

## 2025-02-25 DIAGNOSIS — R52 PAIN: ICD-10-CM

## 2025-02-25 PROCEDURE — 97112 NEUROMUSCULAR REEDUCATION: CPT | Mod: KX

## 2025-02-25 PROCEDURE — 97530 THERAPEUTIC ACTIVITIES: CPT | Mod: KX

## 2025-02-25 NOTE — PROGRESS NOTES
[facility]   Physical Therapy Treatment Note      Name: Bharathi SULLIVAN [facility] Number: [id number]    Therapy Diagnosis:   Encounter Diagnoses   Name Primary?    Decreased range of motion with decreased strength Yes    Gait abnormality     Pain     Balance problem     Decreased functional mobility and endurance        Physician: Angi Cain FNP    Visit Date: 2/25/2025    Physician Orders: PT Eval and Treat  Medical Diagnosis from Referral: Arthritis of left hip  Evaluation Date: 1/7/2025  Authorization Period Expiration: 12/31/25  Plan of Care Expiration: 3/7/2025  Progress Note Due: 3/7/2025  Visit # / Visits authorized: 11/20 (+1)  FOTO: 2/3 (last performed on 2/6/2025)         Precautions: Standard, Fall, and HTN    Time In: 13:05  Time Out: 13:55  Total Billable Time: 23 minutes  Billing reflects one on one time spent with patient.     SUBJECTIVE     Pt reports: he is unable to be scheduled for injections for his knee pain due to insurance stipulations. Patient still reports left  knee being more painful than hip.    He was not compliant with home exercise program.  Response to previous treatment: soreness   Functional change: none reported    Pain: 0-3/10 - more in knee then hip  Location: left knee and hip     OBJECTIVE     Objective Measures updated at progress report unless specified.     TREATMENT       CPT Intervention Performed  2/25/2025  Duration / Intensity     MT        TE  Bike   8 min    Nustep x 8 minutes level 4      LTR x 3 min    FOTO/Re-assess              NMR  PPT x 3 min, 5 sec      Bridge x 10x/ 3 sets      isometric ADD x 3 min, 10 sec hold     Supine clamshell - black x 3 min,  10 sec hold    Straight Leg Raise x 3 x 10 bilateral    Sidelying hip abduction  Hip circles x   10x 3 sets  5x CW/CCW    Prone hip extension   10x each    LAQ  10x each side 5 sec hold/ 2 sets                     TA  leg press  x  85# 10x/ 4 sets       standing hip extension   10x  each extensive cues              Matrix knee extension  x  3 x 10 17.5lbs     Matrix knee flexion  x  3 x 10 27.5lbs     Side stepping - red band at ankles -  3 minutes with two hand support     Sit to Stands on airex pad (staggered left forward due to knee pain) -  3 x 10 with NO arm support from chair    STEP UPS              PLAN   Bike, ankle mobility on step (if tolerated) toe yoga, arch doming, MOBU sitting, ankle band. Matrix knee extension/flex             CPT Codes available for Billing:   (00) minutes of Manual therapy (MT) to improve pain and ROM.  (11) minutes of Therapeutic Exercise (TE) to develop strength, endurance, range of motion, and flexibility.  (28) minutes of Neuromuscular Re-Education (NMR)  to improve: Balance, Coordination, Kinesthetic Sense, Proprioception, and Posture.  (11) minutes of Therapeutic Activities (TA) to improve functional performance.  Unattended Electrical Stimulation (ES) for muscle performance or pain modulation.  BFR: Blood flow restriction applied during exercise  NP or (-): Not Performed       PATIENT EDUCATION AND HOME EXERCISES     Home Exercises Provided and Patient Education Provided     Education provided:   - home exercise program     Written Home Exercises Provided: Patient instructed to cont prior HEP. Exercises were reviewed and Bharathi was able to demonstrate them prior to the end of the session.  Bharathi demonstrated good  understanding of the education provided. See EMR under Patient Instructions for exercises provided during therapy sessions      ASSESSMENT     Patient tolerated session and all progressions well, increased cues for interventions for correct technique. Encouraged home exercise program.    Bharathi Is progressing well towards his goals.   Pt prognosis is Good.     Pt will continue to benefit from skilled outpatient physical therapy to address the deficits listed in the problem list box on initial evaluation, provide pt/family education and to  maximize pt's level of independence in the home and community environment.     Pt's spiritual, cultural and educational needs considered and pt agreeable to plan of care and goals.     Anticipated barriers to physical therapy: : co-morbidities, chronicity of condition, and coping style     Goals:   Reviewed: 2/25/2025      Short Term Goals: In 4 weeks  Progress Date   1.Patient to be educated on HEP. [x] Progressing  [] MET   [] Not MET   [] Not tested  2/6/2025   2.Patient to increase hip range of motion, in order to improve available range of motion for ADL's.  [] Progressing  [x] MET   [] Not MET  [] Not tested  2/6/2025   3.Patient to increase bilateral LE strength by 1/2 grade, in order to improve endurance and increase ability to perform all functional activities for increased time.  [x] Progressing  [] MET   [] Not MET  [] Not tested  2/6/2025   4.Patient to have pain less than 0/10 at worst, to improve QOL. [x] Progressing  [] MET   [] Not MET  [] Not tested  2/6/2025   5.Patient to improve score on the FOTO, to improve QOL. [x] Progressing  [] MET   [] Not MET  [] Not tested  2/6/2025   6. Patient to improve score on 5 times sit to  order to decrease fall risk. [x] Progressing  [] MET   [] Not MET  [] Not tested  2/6/2025      Long Term Goals: In 8 weeks Progress Date   1.Patient to improve score on the FOTO predicted score or better, to improve QOL. [x] Progressing  [] MET   [] Not MET  [] Not tested  2/6/2025   2. Patient to increase bilateral LE strength to 4+/5 or greater, in order to improve endurance and increase ability to perform all functional activities for increased time. [x] Progressing  [] MET   [] Not MET  [] Not tested  2/6/2025   3. Patient to normalize score on 5 times sit to stand, in order to improve endurance and decrease fall risk. [x] Progressing  [] MET   [] Not MET  [] Not tested  2/6/2025   4. Patient to perform daily activities including walking and going up and down steps  with only mild increased symptoms. [x] Progressing  [] MET   [] Not MET  [] Not tested  2/6/2025        PLAN     Monitor response to today's treatment session and progress with Physical Therapy plan of care as indicated.    Plan of care Certification: 1/7/2025  to 3/7/25.     Outpatient Physical Therapy 2 times weekly for 8 weeks to include any combination of the following interventions: Aquatic Therapy, electrical stimulation (PRN), Gait Training, Manual Therapy, Neuromuscular Re-ed, Patient Education/Self Care, Therapeutic Activites, Therapeutic Exercise, Dry Needling, and Moist Hot Pack/Cold Pack    Radha Weller, PT

## 2025-02-27 ENCOUNTER — CLINICAL SUPPORT (OUTPATIENT)
Dept: REHABILITATION | Facility: HOSPITAL | Age: 85
End: 2025-02-27
Payer: MEDICARE

## 2025-02-27 DIAGNOSIS — R52 PAIN: ICD-10-CM

## 2025-02-27 DIAGNOSIS — Z74.09 DECREASED FUNCTIONAL MOBILITY AND ENDURANCE: ICD-10-CM

## 2025-02-27 DIAGNOSIS — R26.9 GAIT ABNORMALITY: ICD-10-CM

## 2025-02-27 DIAGNOSIS — R26.89 BALANCE PROBLEM: ICD-10-CM

## 2025-02-27 DIAGNOSIS — M25.60 DECREASED RANGE OF MOTION WITH DECREASED STRENGTH: Primary | ICD-10-CM

## 2025-02-27 DIAGNOSIS — R53.1 DECREASED RANGE OF MOTION WITH DECREASED STRENGTH: Primary | ICD-10-CM

## 2025-02-27 PROCEDURE — 97112 NEUROMUSCULAR REEDUCATION: CPT | Mod: KX

## 2025-02-27 PROCEDURE — 97530 THERAPEUTIC ACTIVITIES: CPT | Mod: KX

## 2025-02-27 NOTE — PROGRESS NOTES
[facility]   Physical Therapy Treatment Note      Name: Bharathi SULLIVAN [facility] Number: [id number]    Therapy Diagnosis:   Encounter Diagnoses   Name Primary?    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     SUBJECTIVE     Pt reports: that he is not having any pain right now.     He was not compliant with home exercise program.  Response to previous treatment: soreness   Functional change: none reported    Pain: 0/10 - more in knee then hip  Location: left knee and hip     OBJECTIVE     Objective Measures updated at progress report unless specified.     TREATMENT       CPT Intervention Performed  2/27/2025  Duration / Intensity     MT        TE  Bike   8 min    Nustep x 8 minutes level 4      LTR x 3 min    FOTO/Re-assess              NMR  PPT x 3 min, 5 sec      Bridge x 10x/ 3 sets      isometric ADD x 3 min, 10 sec hold     Supine clamshell - black x 3 min,  10 sec hold    Straight Leg Raise x 3 x 10 bilateral    Sidelying hip abduction  Hip circles x   10x 3 sets  5x CW/CCW    Prone hip extension   10x each    LAQ  10x each side 5 sec hold/ 2 sets                     TA  leg press  x  85# 10x/ 4 sets       standing hip extension   10x each extensive cues              Matrix knee extension  x  3 x 10 17.5lbs     Matrix knee flexion  x  3 x 10 27.5lbs      Side stepping - red band at ankles -  3 minutes with two hand support     Sit to Stands on airex pad (staggered left forward due to knee pain) -  3 x 10 with NO arm support from chair    STEP UPS              PLAN   Bike, ankle mobility on step (if tolerated) toe yoga, arch doming, MOBU sitting, ankle band.             CPT Codes available for Billing:   (00) minutes of Manual therapy (MT) to improve pain and ROM.  (10) minutes of Therapeutic Exercise (TE) to develop strength, endurance, range of motion, and flexibility.  (10) minutes of Neuromuscular Re-Education (NMR)  to improve: Balance, Coordination, Kinesthetic Sense, Proprioception, and Posture.  (10) minutes of Therapeutic Activities (TA) to improve functional performance.  Unattended Electrical Stimulation (ES) for muscle performance or pain modulation.  BFR: Blood flow restriction applied during exercise  NP or (-): Not Performed       PATIENT EDUCATION AND HOME EXERCISES     Home Exercises Provided and Patient Education Provided     Education provided:   - home exercise program     Written Home Exercises Provided: Patient instructed to cont prior HEP. Exercises were reviewed and Bharathi was able to demonstrate them prior to the end of the session.  Bharathi demonstrated good  understanding of the education provided. See EMR under Patient Instructions for exercises provided during therapy sessions      ASSESSMENT     Patient tolerated treatment well today. Cueing provided throughout session to improve form and decrease compensations present. No pain reported throughout the session from the patient.     Bharathi Is progressing well towards his goals.   Pt prognosis is Good.     Pt will continue to benefit from skilled outpatient physical therapy to address the deficits listed in the problem list box on initial evaluation, provide pt/family education and to maximize pt's level of independence in the home and community environment.     Pt's spiritual, cultural  and educational needs considered and pt agreeable to plan of care and goals.     Anticipated barriers to physical therapy: : co-morbidities, chronicity of condition, and coping style     Goals:   Reviewed: 2/27/2025      Short Term Goals: In 4 weeks  Progress Date   1.Patient to be educated on HEP. [x] Progressing  [] MET   [] Not MET   [] Not tested  2/6/2025   2.Patient to increase hip range of motion, in order to improve available range of motion for ADL's.  [] Progressing  [x] MET   [] Not MET  [] Not tested  2/6/2025   3.Patient to increase bilateral LE strength by 1/2 grade, in order to improve endurance and increase ability to perform all functional activities for increased time.  [x] Progressing  [] MET   [] Not MET  [] Not tested  2/6/2025   4.Patient to have pain less than 0/10 at worst, to improve QOL. [x] Progressing  [] MET   [] Not MET  [] Not tested  2/6/2025   5.Patient to improve score on the FOTO, to improve QOL. [x] Progressing  [] MET   [] Not MET  [] Not tested  2/6/2025   6. Patient to improve score on 5 times sit to  order to decrease fall risk. [x] Progressing  [] MET   [] Not MET  [] Not tested  2/6/2025      Long Term Goals: In 8 weeks Progress Date   1.Patient to improve score on the FOTO predicted score or better, to improve QOL. [x] Progressing  [] MET   [] Not MET  [] Not tested  2/6/2025   2. Patient to increase bilateral LE strength to 4+/5 or greater, in order to improve endurance and increase ability to perform all functional activities for increased time. [x] Progressing  [] MET   [] Not MET  [] Not tested  2/6/2025   3. Patient to normalize score on 5 times sit to stand, in order to improve endurance and decrease fall risk. [x] Progressing  [] MET   [] Not MET  [] Not tested  2/6/2025   4. Patient to perform daily activities including walking and going up and down steps with only mild increased symptoms. [x] Progressing  [] MET   [] Not MET  [] Not tested  2/6/2025         PLAN     Monitor response to today's treatment session and progress with Physical Therapy plan of care as indicated.    Plan of care Certification: 1/7/2025  to 3/7/25.     Outpatient Physical Therapy 2 times weekly for 8 weeks to include any combination of the following interventions: Aquatic Therapy, electrical stimulation (PRN), Gait Training, Manual Therapy, Neuromuscular Re-ed, Patient Education/Self Care, Therapeutic Activites, Therapeutic Exercise, Dry Needling, and Moist Hot Pack/Cold Pack    Roxane Mccann, PT

## 2025-03-06 ENCOUNTER — CLINICAL SUPPORT (OUTPATIENT)
Dept: REHABILITATION | Facility: HOSPITAL | Age: 85
End: 2025-03-06
Payer: MEDICARE

## 2025-03-06 DIAGNOSIS — R52 PAIN: ICD-10-CM

## 2025-03-06 DIAGNOSIS — M25.60 DECREASED RANGE OF MOTION WITH DECREASED STRENGTH: Primary | ICD-10-CM

## 2025-03-06 DIAGNOSIS — Z74.09 DECREASED FUNCTIONAL MOBILITY AND ENDURANCE: ICD-10-CM

## 2025-03-06 DIAGNOSIS — R53.1 DECREASED RANGE OF MOTION WITH DECREASED STRENGTH: Primary | ICD-10-CM

## 2025-03-06 DIAGNOSIS — R26.89 BALANCE PROBLEM: ICD-10-CM

## 2025-03-06 DIAGNOSIS — R26.9 GAIT ABNORMALITY: ICD-10-CM

## 2025-03-06 PROCEDURE — 97112 NEUROMUSCULAR REEDUCATION: CPT | Mod: KX | Performed by: PHYSICAL THERAPIST

## 2025-03-06 PROCEDURE — 97530 THERAPEUTIC ACTIVITIES: CPT | Mod: KX | Performed by: PHYSICAL THERAPIST

## 2025-03-06 PROCEDURE — 97110 THERAPEUTIC EXERCISES: CPT | Mod: KX | Performed by: PHYSICAL THERAPIST

## 2025-03-06 NOTE — PROGRESS NOTES
Outpatient Rehab    Physical Therapy Progress Note : Updated Plan of Care    Patient Name: Bharathi Parsons  MRN: [id number]  YOB: 1940  Encounter Date: 3/6/2025    Therapy Diagnosis:   Encounter Diagnoses   Name Primary?    Decreased range of motion with decreased strength Yes    Gait abnormality     Pain     Balance problem     Decreased functional mobility and endurance      Physician: Angi Cain FNP    Physician Orders: Eval and Treat  Medical Diagnosis: Arthritis of left hip     Visit # / Visits Authorized:  13 / 20 (+1)   Date of Evaluation: 1/7/2025   Insurance Authorization Period: 1/1/25 to 12/31/25  Plan of Care Certification:  3/6/25 to 4/3/25      Time In: 1400   Time Out: 1500  Total Time: 60   Total Billable Time: 58    FOTO:  Intake Score: 65%  Survey Score 1: 59%  Survey Score 2: 64%                Subjective   that he is not having any hip pain right now. Knee pain is more the problem and he has some pain with twisting. He will scheduling to try and have some injections in his knees. He reports that he did have MD look at his ankle but he didn't say much..  Pain reported as 0/10.      Objective    Gait Analysis: The patient ambulated with the following assistive device: none; the patient presents with the following gait abnormalities: unsteady gait and trunk lean     Balance  Right   (seconds) Left  (seconds) Norms 2/6/25 3/6/25   Single Leg Stance 5 3 Less than 4.9 sec high risk  5-6.4 sec increased risk  6.5 or greater low risk 8 right   4 left  5 sec on right   3 sec on left          Functional Tests  Outcome Norms 2/6/25 3/6/25   Five Time Sit to Stand     Greater than 14 sec high risk  12.1-14 sec increased risk  12 sec or less low risk 20.60 20-29 yrs ? 6.0±1.4 sec.  30-39 yrs ? 6.1±1.4 sec.  40-49 yrs ? 7.6±1.8 sec.  50-59 yrs ? 7.7±2.6 sec.  60-69 yrs ? 8.4±0.0 sec (male), 12.7±1.8 sec (female)  70-79 yrs ? 11.6±3.4 sec (male), 13.0±4.8 sec (female)  80-89 yrs ? 16.7±4.5 sec  (male), 17.2±5.5 sec (female) 38.81 s 34.20 s         Range of Motion:     Ankle/Foot AROM/PROM Right Left Pain/Dysfunction with Movement 2/6/25 3/6/25   Dorsiflexion (20º) WFL limited   NT 20   Plantarflexion (50º) WFL limited   NT 35      ,   Hip AROM/PROM Right Left Pain/Dysfunction with Movement 2/6/25 3/6/25   Hip Flexion (120º) limited limited      Hip Abduction (45º) limited limited   WFL 30   Hip IR (45º) limited limited   WFL 40   Hip ER (45º) limited limited   WFL 35   , and   Knee AROM/PROM Right Left Pain/Dysfunction with Movement 2/6/25 3/6/25  Left    Knee Flexion (135º)       Knee Extension (0º) WFL 0   NT -12 / -2 sitting   -2 supine         Strength:     L/E MMT Right  (spine) Left Pain/Dysfunction with Movement 2/6/25 3/6/25   Hip Flexion  4-/5 3+/5   4- 4-   Knee Extension 4/5 4/5   4+ 4+-5   Knee Flexion 4/5 4/5   4+ 4+-5   Hip IR 4-/5 3+/5   4 4, 3+ (knee pain/gives away)   Hip ER 4-/5 3+/5   4 4, 4   Ankle DF 4/5 4-/5   4, 4- 4+   Ankle PF 4/5 4-/5   4, 4- 4+   Ankle Inversion 4-/5 4/5   4 4   Ankle Eversion 4-/5 4/5   4 4         Muscle Length:         Muscle Tested  Right Left  2/6/25 3/6/25   Hip Flexors [] Normal      [x] Limited [] Normal      [x] Limited [x] Normal      [] Limited Limited right    Hamstrings  [] Normal      [x] Limited [] Normal      [x] Limited [x] Normal      [] Limited  Slight limited   Piriformis  [] Normal      [x] Limited [] Normal      [x] Limited [x] Normal      [] Limited  Slight limited   Gastrocnemius  [x] Normal      [] Limited [] Normal      [x] Limited [] Normal      [x] Limited R Normal    Soleus  [x] Normal      [] Limited [] Normal      [x] Limited  [] Normal      [x] Limited R Normal               Treatment:    CPT Intervention Performed  3/6/2025  Duration / Intensity     MT        TE  Bike   8 min    Nustep x 8 minutes level 4      LTR - 3 min    FOTO/Re-assess x             NMR  PPT - 3 min, 5 sec      Bridge x 10x/ 3 sets       isometric ADD x 3 min, 10 sec hold     Supine clamshell - black - 3 min,  10 sec hold    Straight Leg Raise - 3 x 10 bilateral    Sidelying hip abduction  Hip circles x  x 10x 2 sets  5x CW/CCW 2 sets     Prone hip extension   10x each    LAQ  10x each side 5 sec hold/ 2 sets                     TA  leg press  x  85# 10x/ 3 sets       standing hip extension   10x each extensive cues              Matrix knee extension  x  3 x 10 17.5lbs     Matrix knee flexion  x  3 x 10 27.5lbs     Side stepping - red band at ankles -  3 minutes with two hand support     Sit to Stands on airex pad x  x5/ 2 sets  with NO arm support from chair    STEP UPS              PLAN   Bike, ankle mobility on step (if tolerated) toe yoga, arch doming, MOBU sitting, ankle band.             CPT Codes available for Billing:   (00) minutes of Manual therapy (MT) to improve pain and ROM.  (29) minutes of Therapeutic Exercise (TE) to develop strength, endurance, range of motion, and flexibility.  (14) minutes of Neuromuscular Re-Education (NMR)  to improve: Balance, Coordination, Kinesthetic Sense, Proprioception, and Posture.  (12) minutes of Therapeutic Activities (TA) to improve functional performance.  Unattended Electrical Stimulation (ES) for muscle performance or pain modulation.  BFR: Blood flow restriction applied during exercise  NP or (-): Not Performed       Assessment & Plan    Pt has made gains in some objective l measurements and is reporting functional improvement verbally but functional measurements and FOTO score decreased. Pt should benefit from con't PT treatment to address all goals not yet met and progress with strengthening, endurance and overal improved function. Pt was encouraged to participate with HEP daily to increase strength and endurance.     Patient will continue to benefit from skilled outpatient physical therapy to address the deficits listed in the problem list box on initial evaluation, provide pt/family education  and to maximize pt's level of independence in the home and community environment.     Patient's spiritual, cultural, and educational needs considered and patient agreeable to plan of care and goals.       Monitor response to today's treatment session and progress with Physical Therapy plan of care as indicated. Will decrease frequency to 1x per week in order to begin to move towards self care with home exercise program.    Goals:   Active       long term goals       Patient to improve score on the FOTO predicted score or better, to improve QOL. (Progressing)       Start:  01/07/25    Expected End:  04/06/25            Patient to increase bilateral LE strength to 4+/5 or greater, in order to improve endurance and increase ability to perform all functional activities for increased time. (Progressing)       Start:  01/07/25    Expected End:  04/06/25            Patient to normalize score on 5 times sit to stand, in order to improve endurance and decrease fall risk. (Progressing)       Start:  01/07/25    Expected End:  04/06/25            Patient to perform daily activities including walking and going up and down steps with only mild increased symptoms. (Progressing)       Start:  01/07/25    Expected End:  04/06/25               short term goals       Patient to be educated on HEP. (Progressing)       Start:  01/07/25    Expected End:  03/06/25            Patient to increase hip range of motion, in order to improve available range of motion for ADL's.  (Met)       Start:  01/07/25    Expected End:  03/06/25    Resolved:  03/06/25         Patient to increase bilateral LE strength by 1/2 grade, in order to improve endurance and increase ability to perform all functional activities for increased time.  (Progressing)       Start:  01/07/25    Expected End:  03/06/25            Patient to have pain less than 0/10 at worst, to improve QOL. (Met)       Start:  01/07/25    Expected End:  03/06/25    Resolved:  03/06/25          Patient to improve score on the FOTO, to improve QOL. (Progressing)       Start:  01/07/25    Expected End:  03/06/25                Estefania Varela PT

## 2025-03-08 ENCOUNTER — HOSPITAL ENCOUNTER (OUTPATIENT)
Dept: RADIOLOGY | Facility: HOSPITAL | Age: 85
Discharge: HOME | End: 2025-03-08
Attending: STUDENT IN AN ORGANIZED HEALTH CARE EDUCATION/TRAINING PROGRAM
Payer: MEDICARE

## 2025-03-08 DIAGNOSIS — M25.562 LEFT KNEE PAIN, UNSPECIFIED CHRONICITY: ICD-10-CM

## 2025-03-08 PROCEDURE — 73562 X-RAY EXAM OF KNEE 3: CPT | Mod: TC,LT

## 2025-03-08 PROCEDURE — 73562 X-RAY EXAM OF KNEE 3: CPT | Mod: 26,LT,, | Performed by: RADIOLOGY

## 2025-03-10 ENCOUNTER — CLINICAL SUPPORT (OUTPATIENT)
Dept: REHABILITATION | Facility: HOSPITAL | Age: 85
End: 2025-03-10
Payer: MEDICARE

## 2025-03-10 ENCOUNTER — LAB VISIT (OUTPATIENT)
Dept: LAB | Facility: HOSPITAL | Age: 85
End: 2025-03-10
Attending: PEDIATRICS
Payer: MEDICARE

## 2025-03-10 DIAGNOSIS — R53.1 DECREASED RANGE OF MOTION WITH DECREASED STRENGTH: Primary | ICD-10-CM

## 2025-03-10 DIAGNOSIS — Z74.09 DECREASED FUNCTIONAL MOBILITY AND ENDURANCE: ICD-10-CM

## 2025-03-10 DIAGNOSIS — D47.3 ESSENTIAL (HEMORRHAGIC) THROMBOCYTHEMIA: ICD-10-CM

## 2025-03-10 DIAGNOSIS — E78.00 HYPERCHOLESTEROLEMIA: ICD-10-CM

## 2025-03-10 DIAGNOSIS — M25.60 DECREASED RANGE OF MOTION WITH DECREASED STRENGTH: Primary | ICD-10-CM

## 2025-03-10 DIAGNOSIS — R52 PAIN: ICD-10-CM

## 2025-03-10 DIAGNOSIS — R26.9 GAIT ABNORMALITY: ICD-10-CM

## 2025-03-10 DIAGNOSIS — R26.89 BALANCE PROBLEM: ICD-10-CM

## 2025-03-10 LAB
ALBUMIN SERPL BCP-MCNC: 3.6 G/DL (ref 3.5–5.2)
ALP SERPL-CCNC: 76 U/L (ref 40–150)
ALT SERPL W/O P-5'-P-CCNC: 21 U/L (ref 10–44)
ANION GAP SERPL CALC-SCNC: 9 MMOL/L (ref 8–16)
AST SERPL-CCNC: 21 U/L (ref 10–40)
BASOPHILS # BLD AUTO: 0.03 K/UL (ref 0–0.2)
BASOPHILS NFR BLD: 0.4 % (ref 0–1.9)
BILIRUB SERPL-MCNC: 0.3 MG/DL (ref 0.1–1)
BUN SERPL-MCNC: 23 MG/DL (ref 8–23)
CALCIUM SERPL-MCNC: 9 MG/DL (ref 8.7–10.5)
CHLORIDE SERPL-SCNC: 108 MMOL/L (ref 95–110)
CHOLEST SERPL-MCNC: 146 MG/DL (ref 120–199)
CHOLEST/HDLC SERPL: 2.9 {RATIO} (ref 2–5)
CO2 SERPL-SCNC: 23 MMOL/L (ref 23–29)
CREAT SERPL-MCNC: 1.2 MG/DL (ref 0.5–1.4)
DIFFERENTIAL METHOD BLD: ABNORMAL
EOSINOPHIL # BLD AUTO: 0.2 K/UL (ref 0–0.5)
EOSINOPHIL NFR BLD: 3.1 % (ref 0–8)
ERYTHROCYTE [DISTWIDTH] IN BLOOD BY AUTOMATED COUNT: 14.5 % (ref 11.5–14.5)
EST. GFR  (NO RACE VARIABLE): 59 ML/MIN/1.73 M^2
GLUCOSE SERPL-MCNC: 102 MG/DL (ref 70–110)
HCT VFR BLD AUTO: 40.2 % (ref 40–54)
HDLC SERPL-MCNC: 51 MG/DL (ref 40–75)
HDLC SERPL: 34.9 % (ref 20–50)
HGB BLD-MCNC: 13.3 G/DL (ref 14–18)
IMM GRANULOCYTES # BLD AUTO: 0.02 K/UL (ref 0–0.04)
IMM GRANULOCYTES NFR BLD AUTO: 0.3 % (ref 0–0.5)
LDLC SERPL CALC-MCNC: 79.2 MG/DL (ref 63–159)
LYMPHOCYTES # BLD AUTO: 1 K/UL (ref 1–4.8)
LYMPHOCYTES NFR BLD: 14 % (ref 18–48)
MCH RBC QN AUTO: 33 PG (ref 27–31)
MCHC RBC AUTO-ENTMCNC: 33.1 G/DL (ref 32–36)
MCV RBC AUTO: 100 FL (ref 82–98)
MONOCYTES # BLD AUTO: 0.5 K/UL (ref 0.3–1)
MONOCYTES NFR BLD: 6.4 % (ref 4–15)
NEUTROPHILS # BLD AUTO: 5.6 K/UL (ref 1.8–7.7)
NEUTROPHILS NFR BLD: 75.8 % (ref 38–73)
NONHDLC SERPL-MCNC: 95 MG/DL
NRBC BLD-RTO: 0 /100 WBC
PLATELET # BLD AUTO: 388 K/UL (ref 150–450)
PMV BLD AUTO: 9.1 FL (ref 9.2–12.9)
POTASSIUM SERPL-SCNC: 4.4 MMOL/L (ref 3.5–5.1)
PROT SERPL-MCNC: 6.5 G/DL (ref 6–8.4)
RBC # BLD AUTO: 4.03 M/UL (ref 4.6–6.2)
SODIUM SERPL-SCNC: 140 MMOL/L (ref 136–145)
TRIGL SERPL-MCNC: 79 MG/DL (ref 30–150)
WBC # BLD AUTO: 7.34 K/UL (ref 3.9–12.7)

## 2025-03-10 PROCEDURE — 80053 COMPREHEN METABOLIC PANEL: CPT | Performed by: PEDIATRICS

## 2025-03-10 PROCEDURE — 36415 COLL VENOUS BLD VENIPUNCTURE: CPT | Performed by: PEDIATRICS

## 2025-03-10 PROCEDURE — 85025 COMPLETE CBC W/AUTO DIFF WBC: CPT | Performed by: INTERNAL MEDICINE

## 2025-03-10 PROCEDURE — 80061 LIPID PANEL: CPT | Performed by: PEDIATRICS

## 2025-03-10 PROCEDURE — 97530 THERAPEUTIC ACTIVITIES: CPT | Mod: KX

## 2025-03-10 PROCEDURE — 97112 NEUROMUSCULAR REEDUCATION: CPT | Mod: KX

## 2025-03-10 NOTE — PROGRESS NOTES
[facility]   Physical Therapy Treatment Note      Name: Bharatih SULLIVAN [facility] Number: [id number]    Therapy Diagnosis:   Encounter Diagnoses   Name Primary?    Decreased range of motion with decreased strength Yes    Gait abnormality     Pain     Balance problem     Decreased functional mobility and endurance        Physician: Angi Cain FNP    Visit Date: 3/10/2025    Physician Orders: PT Eval and Treat  Medical Diagnosis from Referral: Arthritis of left hip  Evaluation Date: 1/7/2025  Authorization Period Expiration: 12/31/25  Plan of Care Expiration: 4/3/2025  Progress Note Due: 3/7/2025  Visit # / Visits authorized: 14/20 (+1)  FOTO: 3/3 (last performed on 3/6/2025)         Precautions: Standard, Fall, and HTN    Time In: 910  Time Out: 1000  Total Billable Time: 50 minutes  Billing reflects one on one time spent with patient.     SUBJECTIVE     Pt reports: he was running a little behind today and had to do blood work before this appointment. Patient states otherwise no new changes with pain today.    He was not compliant with home exercise program.  Response to previous treatment: soreness   Functional change: none reported    Pain: 0/10 - more in knee then hip  Location: left knee and hip     OBJECTIVE     Objective Measures updated at progress report unless specified.     TREATMENT       CPT Intervention Performed  3/10/2025  Duration / Intensity     MT        TE  Bike   8 min    Nustep x 6 minutes level 4      LTR x 3 min    FOTO/Re-assess              NMR  PPT x 3 min, 5 sec      Bridge x 10x/ 3 sets      isometric ADD x 3 min, 10 sec hold     Supine clamshell - black x 3 min,  10 sec hold    Straight Leg Raise x 3 x 10 bilateral    Sidelying hip abduction  Hip circles x   10x 3 sets  5x CW/CCW    Prone hip extension   10x each    LAQ  10x each side 5 sec hold/ 2 sets                     TA  leg press  x  95# 10x/ 4 sets       standing hip extension   10x each extensive  cues              Matrix knee extension  x  3 x 10 17.5lbs     Matrix knee flexion  x  3 x 10 27.5lbs     Side stepping - red band at ankles -  3 minutes with two hand support     Sit to Stands on airex pad (staggered left forward due to knee pain) -  3 x 10 with NO arm support from chair    STEP UPS              PLAN   Bike, ankle mobility on step (if tolerated) toe yoga, arch doming, MOBU sitting, ankle band.             CPT Codes available for Billing:   (00) minutes of Manual therapy (MT) to improve pain and ROM.  (09) minutes of Therapeutic Exercise (TE) to develop strength, endurance, range of motion, and flexibility.  (31) minutes of Neuromuscular Re-Education (NMR)  to improve: Balance, Coordination, Kinesthetic Sense, Proprioception, and Posture.  (10) minutes of Therapeutic Activities (TA) to improve functional performance.  Unattended Electrical Stimulation (ES) for muscle performance or pain modulation.  BFR: Blood flow restriction applied during exercise  NP or (-): Not Performed       PATIENT EDUCATION AND HOME EXERCISES     Home Exercises Provided and Patient Education Provided     Education provided:   - home exercise program     Written Home Exercises Provided: Patient instructed to cont prior HEP. Exercises were reviewed and Bharathi was able to demonstrate them prior to the end of the session.  Bharathi demonstrated good  understanding of the education provided. See EMR under Patient Instructions for exercises provided during therapy sessions      ASSESSMENT     Patient tolerated today's session well without any complaints of adverse effects having done blood work before session. No exercises progressed due to this. Plan to progress next visit as patient can tolerate.    Bharathi Is progressing well towards his goals.   Pt prognosis is Good.     Pt will continue to benefit from skilled outpatient physical therapy to address the deficits listed in the problem list box on initial evaluation, provide  pt/family education and to maximize pt's level of independence in the home and community environment.     Pt's spiritual, cultural and educational needs considered and pt agreeable to plan of care and goals.     Anticipated barriers to physical therapy: : co-morbidities, chronicity of condition, and coping style     Goals:   Reviewed: 3/10/2025      Active         long term goals         Patient to improve score on the FOTO predicted score or better, to improve QOL. (Progressing)         Start:  01/07/25    Expected End:  04/06/25              Patient to increase bilateral LE strength to 4+/5 or greater, in order to improve endurance and increase ability to perform all functional activities for increased time. (Progressing)         Start:  01/07/25    Expected End:  04/06/25              Patient to normalize score on 5 times sit to stand, in order to improve endurance and decrease fall risk. (Progressing)         Start:  01/07/25    Expected End:  04/06/25              Patient to perform daily activities including walking and going up and down steps with only mild increased symptoms. (Progressing)         Start:  01/07/25    Expected End:  04/06/25                 short term goals         Patient to be educated on HEP. (Progressing)         Start:  01/07/25    Expected End:  03/06/25              Patient to increase hip range of motion, in order to improve available range of motion for ADL's.  (Met)         Start:  01/07/25    Expected End:  03/06/25    Resolved:  03/06/25           Patient to increase bilateral LE strength by 1/2 grade, in order to improve endurance and increase ability to perform all functional activities for increased time.  (Progressing)         Start:  01/07/25    Expected End:  03/06/25              Patient to have pain less than 0/10 at worst, to improve QOL. (Met)         Start:  01/07/25    Expected End:  03/06/25    Resolved:  03/06/25           Patient to improve score on the FOTO, to  improve QOL. (Progressing)         Start:  01/07/25    Expected End:  03/06/25                PLAN     Monitor response to today's treatment session and progress with Physical Therapy plan of care as indicated.    Updated Plan of care Certification: 3/7/2025  to 4/3/25.     Outpatient Physical Therapy 2 times weekly for 4 weeks to include any combination of the following interventions: Aquatic Therapy, electrical stimulation (PRN), Gait Training, Manual Therapy, Neuromuscular Re-ed, Patient Education/Self Care, Therapeutic Activites, Therapeutic Exercise, Dry Needling, and Moist Hot Pack/Cold Pack    Radha Weller, PT

## 2025-03-17 ENCOUNTER — OFFICE VISIT (OUTPATIENT)
Dept: ALLERGY | Facility: CLINIC | Age: 85
End: 2025-03-17
Payer: MEDICARE

## 2025-03-17 ENCOUNTER — CLINICAL SUPPORT (OUTPATIENT)
Dept: REHABILITATION | Facility: HOSPITAL | Age: 85
End: 2025-03-17
Payer: MEDICARE

## 2025-03-17 ENCOUNTER — OFFICE VISIT (OUTPATIENT)
Dept: INTERNAL MEDICINE | Facility: CLINIC | Age: 85
End: 2025-03-17
Payer: MEDICARE

## 2025-03-17 VITALS
HEIGHT: 72 IN | OXYGEN SATURATION: 98 % | TEMPERATURE: 98 F | DIASTOLIC BLOOD PRESSURE: 79 MMHG | WEIGHT: 170.44 LBS | HEART RATE: 102 BPM | SYSTOLIC BLOOD PRESSURE: 136 MMHG | BODY MASS INDEX: 23.09 KG/M2

## 2025-03-17 VITALS
BODY MASS INDEX: 23.03 KG/M2 | OXYGEN SATURATION: 99 % | SYSTOLIC BLOOD PRESSURE: 124 MMHG | DIASTOLIC BLOOD PRESSURE: 72 MMHG | TEMPERATURE: 96 F | RESPIRATION RATE: 17 BRPM | WEIGHT: 170 LBS | HEART RATE: 86 BPM | HEIGHT: 72 IN

## 2025-03-17 DIAGNOSIS — I10 ESSENTIAL HYPERTENSION: ICD-10-CM

## 2025-03-17 DIAGNOSIS — J30.1 SEASONAL ALLERGIC RHINITIS DUE TO POLLEN: Primary | ICD-10-CM

## 2025-03-17 DIAGNOSIS — D69.2 OTHER NONTHROMBOCYTOPENIC PURPURA: ICD-10-CM

## 2025-03-17 DIAGNOSIS — J31.0 CHRONIC RHINITIS: ICD-10-CM

## 2025-03-17 DIAGNOSIS — J31.0 GUSTATORY RHINITIS: ICD-10-CM

## 2025-03-17 DIAGNOSIS — E78.00 HYPERCHOLESTEROLEMIA: ICD-10-CM

## 2025-03-17 DIAGNOSIS — Z00.00 WELL ADULT EXAM: Primary | ICD-10-CM

## 2025-03-17 DIAGNOSIS — R53.1 DECREASED RANGE OF MOTION WITH DECREASED STRENGTH: Primary | ICD-10-CM

## 2025-03-17 DIAGNOSIS — K21.00 GASTROESOPHAGEAL REFLUX DISEASE WITH ESOPHAGITIS, UNSPECIFIED WHETHER HEMORRHAGE: ICD-10-CM

## 2025-03-17 DIAGNOSIS — N18.31 STAGE 3A CHRONIC KIDNEY DISEASE: ICD-10-CM

## 2025-03-17 DIAGNOSIS — Z15.89 JAK2 GENE MUTATION: ICD-10-CM

## 2025-03-17 DIAGNOSIS — M25.60 DECREASED RANGE OF MOTION WITH DECREASED STRENGTH: Primary | ICD-10-CM

## 2025-03-17 DIAGNOSIS — D47.3 ESSENTIAL (HEMORRHAGIC) THROMBOCYTHEMIA: ICD-10-CM

## 2025-03-17 DIAGNOSIS — R52 PAIN: ICD-10-CM

## 2025-03-17 DIAGNOSIS — R26.9 GAIT ABNORMALITY: ICD-10-CM

## 2025-03-17 DIAGNOSIS — R26.89 BALANCE PROBLEM: ICD-10-CM

## 2025-03-17 DIAGNOSIS — Z74.09 DECREASED FUNCTIONAL MOBILITY AND ENDURANCE: ICD-10-CM

## 2025-03-17 PROCEDURE — 99397 PER PM REEVAL EST PAT 65+ YR: CPT | Mod: S$PBB,GZ,, | Performed by: PEDIATRICS

## 2025-03-17 PROCEDURE — 99215 OFFICE O/P EST HI 40 MIN: CPT | Mod: PBBFAC,27 | Performed by: PEDIATRICS

## 2025-03-17 PROCEDURE — 97530 THERAPEUTIC ACTIVITIES: CPT | Performed by: PHYSICAL THERAPIST

## 2025-03-17 PROCEDURE — 99999 PR PBB SHADOW E&M-EST. PATIENT-LVL V: CPT | Mod: PBBFAC,,, | Performed by: PEDIATRICS

## 2025-03-17 PROCEDURE — G2211 COMPLEX E/M VISIT ADD ON: HCPCS | Mod: S$PBB,,, | Performed by: ALLERGY & IMMUNOLOGY

## 2025-03-17 PROCEDURE — 99214 OFFICE O/P EST MOD 30 MIN: CPT | Mod: PBBFAC | Performed by: ALLERGY & IMMUNOLOGY

## 2025-03-17 PROCEDURE — 99999 PR PBB SHADOW E&M-EST. PATIENT-LVL IV: CPT | Mod: PBBFAC,,, | Performed by: ALLERGY & IMMUNOLOGY

## 2025-03-17 PROCEDURE — 97112 NEUROMUSCULAR REEDUCATION: CPT | Performed by: PHYSICAL THERAPIST

## 2025-03-17 PROCEDURE — 99215 OFFICE O/P EST HI 40 MIN: CPT | Mod: S$PBB,,, | Performed by: ALLERGY & IMMUNOLOGY

## 2025-03-17 RX ORDER — AZELASTINE 1 MG/ML
1 SPRAY, METERED NASAL 2 TIMES DAILY
Qty: 20 ML | Refills: 5 | Status: SHIPPED | OUTPATIENT
Start: 2025-03-17 | End: 2026-03-17

## 2025-03-17 RX ORDER — FLUTICASONE PROPIONATE 50 MCG
1 SPRAY, SUSPENSION (ML) NASAL DAILY
Qty: 20 ML | Refills: 5 | Status: SHIPPED | OUTPATIENT
Start: 2025-03-17

## 2025-03-17 NOTE — PROGRESS NOTES
Patient ID: Bharathi Parsons is a 84 y.o. male.    Chief Complaint: Follow-up    History of Present Illness    CHIEF COMPLAINT:  Patient presents today for annual follow-up .    HEMATOLOGIC DISORDERS:  He recently saw Dr. Roberts who is satisfied with his current management. He continues hydroxyurea for JAK2 mutation and essential thrombocytopenia. He reports increased susceptibility to bruising but denies pathological bruising, uncontrolled bleeding, or bleeding gums. He has had mild anemia for the past 10 months with minimal progression.    GENITOURINARY:  He reports nocturia 2-4 times per night without daytime urinary symptoms. The nocturia appears to be associated with nasal congestion that awakens him.    ENT:  He recently switched nasal sprays to Asteline and Flonase for management of nasal congestion.    GASTROINTESTINAL:  He experiences occasional acid reflux and esophageal stricture, managed with Pantoprazole.    MEDICAL HISTORY:  He has chronic kidney disease, which is primarily age-related.    CURRENT MEDICATIONS:  He takes hydroxyurea, Pantoprazole, asteline, Flonase, Livalo for cholesterol management, and amlodipine for blood pressure control(B/P normal, no HTNive symptoms).    PMH, PSH, SH, FH reviewed with patient.      ROS:  General: -fever, -chills, -fatigue, -weight gain, -weight loss  Eyes: -vision changes, -redness, -discharge  ENT: -ear pain, +nasal congestion, -sore throat  Cardiovascular: -chest pain, -palpitations, -lower extremity edema  Respiratory: -cough, -shortness of breath  Gastrointestinal: -abdominal pain, -nausea, -vomiting, -diarrhea, -constipation, -blood in stool, +indigestion  Genitourinary: -dysuria, -hematuria, -frequency  Musculoskeletal: -joint pain, -muscle pain  Skin: -rash, -lesion, +easy bruising  Neurological: -headache, -dizziness, -numbness, -tingling  Psychiatric: -anxiety, -depression, -sleep difficulty  Male Genitourinary: +nocturia         Exam:  Physical Exam     General: No acute distress. Well-developed. Well-nourished.  Eyes: EOMI. Sclerae anicteric.  HENT: Normocephalic. Atraumatic. Nares patent. Moist oral mucosa.  Cardiovascular: Regular rate. Regular rhythm. No murmurs. No rubs. No gallops. Normal S1, S2.  Respiratory: Normal respiratory effort. Clear to auscultation bilaterally. No rales. No rhonchi. No wheezing.  Abdomen: Soft. Non-tender. Non-distended. Normoactive bowel sounds.  Musculoskeletal: No  obvious deformity.  Extremities: No lower extremity edema.  Neurological: Alert & oriented x3. No slurred speech. Normal gait.  Psychiatric: Normal mood. Normal affect. Good insight. Good judgment.  Skin: Warm. Dry. No rash.         Assessment/Plan:  Well adult exam    Essential hypertension    Hypercholesterolemia  -     Lipid Panel; Future; Expected date: 03/17/2025  -     Comprehensive Metabolic Panel; Future; Expected date: 03/17/2025    Stage 3a chronic kidney disease  -     Comprehensive Metabolic Panel; Future; Expected date: 03/17/2025    Other nonthrombocytopenic purpura    JAK2 gene mutation    Gastroesophageal reflux disease with esophagitis, unspecified whether hemorrhage    Essential (hemorrhagic) thrombocythemia  -     CBC Auto Differential; Future; Expected date: 03/17/2025    Chronic rhinitis         Assessment & Plan    IMPRESSION:  - Assessed JAK2 mutation and essential thrombocytopenia; patient stable on hydroxyurea without significant bleeding concerns.  - Evaluated chronic CKD; creatinine at 1.2, consistent with baseline and indicative of mild renal insufficiency due to age.  - Nighttime urination potentially related to nasal congestion rather than prostate issues.  - Slight anemia, likely related to JAK2 mutation; trend stable over past 10 months.  - Cholesterol management effective with current Livalo regimen.    NASAL CONGESTION:  - Explained relationship between lying down at night and increased venous congestion, leading to nasal congestion  and subsequent urination.  - Discussed potential rebound congestion and physiological addiction risks associated with long-term Afrin use.  - Patient to maintain elevated bed position to help with nighttime congestion.  - May start Afrin nasal spray, 1 spray in each nostril at night only, if nighttime congestion persists after trying new nasal spray regimen prescribed by Dr. Koch.  - Discussed strategies to address nighttime nasal congestion, including changing blood pressure medication from CCB to ARB or cautious use of Afrin.  - Contact the office if nighttime congestion persists after trying new nasal spray regimen to discuss potential medication changes or Afrin trial.    ESSENTIAL THROMBOCYTHEMIA:  - Continued hydroxyurea for JAK2 mutation and essential thrombocytopenia.    GASTROESOPHAGEAL REFLUX DISEASE (GERD):  - Continued Pantoprazole for acid reflux and esophageal stricture.    HYPERTENSION:  - Continued amlodipine for blood pressure management.    HYPERLIPIDEMIA:  - Continued Livalo for cholesterol management.  - Contact the office by September regarding decision to continue Livalo or switch to alternative statin medication.    GENERAL HEALTH MAINTENANCE:  - Patient to continue to keep hydrated.  - Patient to avoid regular use of ibuprofen or Motrin.    FOLLOW-UP:  - Follow up in 6 months.          Visit today included increased complexity associated with the care of the episodic problem  addressed and managing the longitudinal care of the patient due to the serious and/or complex managed problem(s) .      Follow up in about 6 months (around 9/17/2025).    This note was generated with the assistance of ambient listening technology. Verbal consent was obtained by the patient and accompanying visitor(s) for the recording of patient appointment to facilitate this note. I attest to having reviewed and edited the generated note for accuracy, though some syntax or spelling errors may persist. Please contact the  author of this note for any clarification.

## 2025-03-17 NOTE — PROGRESS NOTES
Outpatient Rehab    Physical Therapy Visit    Patient Name: Bharathi Parsons  MRN: [id number]  YOB: 1940  Encounter Date: 3/17/2025    Therapy Diagnosis:   Encounter Diagnoses   Name Primary?    Decreased range of motion with decreased strength Yes    Gait abnormality     Pain     Balance problem     Decreased functional mobility and endurance      Physician: Angi Cain FNP    Physician Orders: Eval and Treat  Medical Diagnosis: Arthritis of left hip    Visit # / Visits Authorized:  15 / 20  Date of Evaluation: 1/7/2025   Insurance Authorization Period: 1/1/2025 to 12/31/2025  Plan of Care Certification:  3/6/25 to 4/3/25      PT/PTA:     Number of PTA visits since last PT visit:   Time In: 1325   Time Out: 1427  Total Time: 62   Total Billable Time: 30    FOTO:  Intake Score:  %  Survey Score 1:  %  Survey Score 2:  %         Subjective   No pain in hip - only pain when twsist on knee. He did do some ex's.at home..         Objective            Treatment:         CPT Intervention Performed  3/10/2025  Duration / Intensity      MT         TE  Bike  DC  8 min     Nustep x 8 minutes level 4      LTR x 3 min     FOTO/Re-assess                 NMR  PPT x 3 min, 5 sec      Bridge x 10x/ 3 sets      isometric ADD x 3 min, 10 sec hold      Supine clamshell - black x 3 min,  10 sec hold     Straight Leg Raise x 3 x 10 bilateral     Sidelying hip abduction  Hip circles x   x 10x 3 sets  5x CW/CCW     Prone hip extension    10x each     LAQ   10x each side 5 sec hold/ 2 sets                                 TA  leg press  x  95# 10x/ 3 sets       standing hip extension    10x each extensive cues               Matrix knee extension  x  3 x 10 17.5lbs     Matrix knee flexion  x  3 x 10 27.5lbs     Side stepping - red band at ankles -  3 minutes with two hand support     Sit to Stands on airex pad (staggered left forward due to knee pain) -  3 x 10 with NO arm support from chair     STEP UPS                 PLAN    Bike, ankle mobility on step (if tolerated) toe yoga, arch doming, MOBU sitting, ankle band.              CPT Codes available for Billing:   (00) minutes of Manual therapy (MT) to improve pain and ROM.  (11) minutes of Therapeutic Exercise (TE) to develop strength, endurance, range of motion, and flexibility.  (31) minutes of Neuromuscular Re-Education (NMR)  to improve: Balance, Coordination, Kinesthetic Sense, Proprioception, and Posture.  (09) minutes of Therapeutic Activities (TA) to improve functional performance.  Unattended Electrical Stimulation (ES) for muscle performance or pain modulation.  BFR: Blood flow restriction applied during exercise  NP or (-): Not Performed    Time Entry(in minutes):       Assessment & Plan   Assessment: Patient tolerated all treatment well and was able to complete program as noted this treatment session. Cues for all interventions for correct technique and to avoid substitution patterns. Encouraged HEP       Patient will continue to benefit from skilled outpatient physical therapy to address the deficits listed in the problem list box on initial evaluation, provide pt/family education and to maximize pt's level of independence in the home and community environment.     Patient's spiritual, cultural, and educational needs considered and patient agreeable to plan of care and goals.           Plan: Monitor response to today's treatment session and progress with Physical Therapy plan of care as indicated.    Goals:   Active       long term goals       Patient to improve score on the FOTO predicted score or better, to improve QOL. (Progressing)       Start:  01/07/25    Expected End:  04/06/25            Patient to increase bilateral LE strength to 4+/5 or greater, in order to improve endurance and increase ability to perform all functional activities for increased time. (Progressing)       Start:  01/07/25    Expected End:  04/06/25            Patient to normalize score on 5 times  sit to stand, in order to improve endurance and decrease fall risk. (Progressing)       Start:  01/07/25    Expected End:  04/06/25            Patient to perform daily activities including walking and going up and down steps with only mild increased symptoms. (Progressing)       Start:  01/07/25    Expected End:  04/06/25               short term goals       Patient to be educated on HEP. (Progressing)       Start:  01/07/25    Expected End:  03/06/25            Patient to increase hip range of motion, in order to improve available range of motion for ADL's.  (Met)       Start:  01/07/25    Expected End:  03/06/25    Resolved:  03/06/25         Patient to increase bilateral LE strength by 1/2 grade, in order to improve endurance and increase ability to perform all functional activities for increased time.  (Progressing)       Start:  01/07/25    Expected End:  03/06/25            Patient to have pain less than 0/10 at worst, to improve QOL. (Met)       Start:  01/07/25    Expected End:  03/06/25    Resolved:  03/06/25         Patient to improve score on the FOTO, to improve QOL. (Progressing)       Start:  01/07/25    Expected End:  03/06/25                Estefania Varela PT

## 2025-03-17 NOTE — PROGRESS NOTES
"Subjective:      Patient ID: Bharathi Parsons is a 84 y.o. male.    Chief Complaint:  Follow-up      HPI:      3/17/2025:   Afrin nasal spray at night helps " a great deal"    Nasal congestion during the day  Atrovent three times a day.    Atrovent prior to eating helps          2/10/2025:   Nasal congestion at night that keeps him up. He has not tried the night time dose of Afrin.      Atrovent prior to eating prevents gustatory rhinitis- which he has not consistently done.          HPI: 9/10/2024: 84 year old male here for follow up  History of esophageal dilation  Intermittent dysphagia  He denies a history of Eoe.    Atrovent nasal spray helps throughout the day  During \the night- nasal congestion        HPI 7/30/2024: 84 year old amel with allergic rhinitis- pollen  Started atrovent nasal spray by ENT. He feels it has helped and he admits to not taking three times a day.  Stopped Astelin nasal spray  He denies eye symptoms.  Rhinitis is worse with eating        1/30/2024: 83 year old male here for follow up- allergic rhinitis- pollen  He reports that symptoms have persisted and he feels they contribute to hearing difficulty at times.  He also feels Flonase and Atrovent have not helped.  He admits to not consistently taking GERD medication as discussed at his previous visit.        12/22/2023: 83 year old complaining of a runny nose with eating. He reports when he goes to bed in the evening, he has a runny nose/post nasal drip. He used Sinex and it worked for a while until he had a "reaction". He has significant rhinitis. He now avoids it.  He is taking Atrovent nasal spray currently.  He takes Atrovent at night and it helps "a little bit".  Some times travel improves nasal symptoms and at times, it does not.    She denies itchy or watery eyes.    He denies food allergies.  He denies drug or insect sting allergy.  He denies asthma.    He reports intermittent GERD.    Zyrtec at night    Past Medical History: "   Diagnosis Date    DJD (degenerative joint disease)     Esophageal stricture     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 JAK2 V617F mutated DNA was detected and measured  at 30% of total JAK2 DNA.    [id number] mutation is found with high frequency in  Hamilton chromosome-negative myeloproliferative  neoplasms (>95% in polycythemia vera and approximately  50-60% in primary myelofibrosis and essential  thrombocy        Family History   Problem Relation Name Age of Onset    Glaucoma Maternal Uncle      Cataracts Mother      Heart disease Father          Current Outpatient Medications on File Prior to Visit   Medication Sig Dispense Refill    amLODIPine (NORVASC) 10 MG tablet Take 1 tablet (10 mg total) by mouth once daily. Stop lisinopril 90 tablet 3    aspirin (ECOTRIN) 81 MG EC tablet Take 1 tablet by mouth once daily 100 tablet 0    bempedoic acid-ezetimibe (NEXLIZET) 180-10 mg Tab Take 1 tablet by mouth Daily. 30 tablet 12    cetirizine (ZYRTEC) 10 MG tablet       COVID-19 vac, jose,Pfizer,,PF, (PFIZER COVID-19 JOSE VACCN,PF,) 30 mcg/0.3 mL injection Inject into the muscle. 0.3 mL 0    hydroxyurea (HYDREA) 500 mg Cap Take 1 capsule (500 mg total) by mouth once daily. 30 capsule 11    IBUPROFEN ORAL Take by mouth as needed.      ipratropium (ATROVENT) 21 mcg (0.03 %) nasal spray 2 sprays by Each Nostril route 3 (three) times daily. 30 mL 5    LIVALO 4 mg Tab Take 1 tablet by mouth once daily 90 tablet 3    methylPREDNISolone (MEDROL DOSEPACK) 4 mg tablet use as directed 21 tablet 0    pantoprazole (PROTONIX) 40 MG tablet TAKE 1 TABLET BY MOUTH ONCE DAILY BEFORE MEAL(S) 90 tablet 3    triamcinolone acetonide 0.1% (KENALOG) 0.1 % cream Apply topically 2 (two) times daily. 80 g 1    diclofenac sodium (VOLTAREN ARTHRITIS PAIN) 1 % Gel Apply 2 g  topically once daily. PRN pain 1 each 1     No current facility-administered medications on file prior to visit.        Review of patient's allergies indicates:  No Known Allergies     Environmental History: Pets in the home: dogs (1).  Tobacco Smoke in Home: no  Review of Systems   Constitutional:  Negative for chills and fever.   HENT:  Positive for postnasal drip and rhinorrhea.    Eyes:  Negative for discharge and itching.   Allergic/Immunologic: Positive for environmental allergies. Negative for food allergies and immunocompromised state.   Neurological:  Negative for facial asymmetry and speech difficulty.   Psychiatric/Behavioral:  Negative for behavioral problems and suicidal ideas.        Objective:   Physical Exam  Constitutional:       General: He is not in acute distress.     Appearance: Normal appearance. He is not ill-appearing, toxic-appearing or diaphoretic.   HENT:      Head: Normocephalic and atraumatic.      Nose: Nose normal. No congestion or rhinorrhea.      Mouth/Throat:      Mouth: Mucous membranes are moist.      Pharynx: No oropharyngeal exudate or posterior oropharyngeal erythema.   Eyes:      General: No scleral icterus.        Right eye: No discharge.         Left eye: No discharge.   Neck:      Thyroid: No thyromegaly.   Cardiovascular:      Rate and Rhythm: Normal rate and regular rhythm.      Heart sounds: Normal heart sounds. No murmur heard.     No friction rub. No gallop.   Pulmonary:      Effort: Pulmonary effort is normal. No respiratory distress.      Breath sounds: Normal breath sounds. No stridor. No wheezing or rales.   Musculoskeletal:         General: Normal range of motion.      Cervical back: Normal range of motion and neck supple. No rigidity or tenderness.   Lymphadenopathy:      Cervical: No cervical adenopathy.   Skin:     General: Skin is warm.      Coloration: Skin is not jaundiced or pale.      Findings: No bruising, erythema or rash.   Neurological:      Mental  Status: He is alert and oriented to person, place, and time.      Gait: Gait normal.   Psychiatric:         Mood and Affect: Mood normal.         Behavior: Behavior normal.         Thought Content: Thought content normal.         Judgment: Judgment normal.                   Assessment:      1. Seasonal allergic rhinitis due to pollen    2. Gustatory rhinitis                  Plan:     Seasonal allergic rhinitis due to pollen  -     fluticasone propionate (FLONASE) 50 mcg/actuation nasal spray; 1 spray (50 mcg total) by Each Nostril route once daily.  Dispense: 20 mL; Refill: 5  -     azelastine (ASTELIN) 137 mcg (0.1 %) nasal spray; 1 spray (137 mcg total) by Nasal route 2 (two) times daily.  Dispense: 20 mL; Refill: 5    Gustatory rhinitis      Continue afrin at night  Continue Atrovent nasal spray 15 minutes prior to eating.  Flonase and Astelin nasal spray BID          Discussed allergy immunotherapy. He would lie to think about it.    Agree with atrovent nasal spray and encouraged him to spray 1-2 sprays in each nostril 15 minutes prior to eating.  Advised that he can take Afrin once a day at night prior to bed.      Visit today included increased complexity associated with the care of the episodic problem Allergic Rhinitis  addressed and managing the longitudinal care of the patient due to the serious and/or complex managed problem(s)  Allergic Rhinitis.       RTC 6  months or sooner, if needed     PREM DE LA GARZA spent a total of 31 minutes on the day of the visit.This includes face to face time and non-face to face time preparing to see the patient (eg, review of tests), obtaining and/or reviewing separately obtained history, documenting clinical information in the electronic or other health record, independently interpreting results and communicating results to the patient/family/caregiver, or care coordinator.

## 2025-03-28 ENCOUNTER — LAB VISIT (OUTPATIENT)
Dept: LAB | Facility: HOSPITAL | Age: 85
End: 2025-03-28
Attending: INTERNAL MEDICINE
Payer: MEDICARE

## 2025-03-28 ENCOUNTER — CLINICAL SUPPORT (OUTPATIENT)
Dept: REHABILITATION | Facility: HOSPITAL | Age: 85
End: 2025-03-28
Payer: MEDICARE

## 2025-03-28 DIAGNOSIS — R52 PAIN: ICD-10-CM

## 2025-03-28 DIAGNOSIS — R26.9 GAIT ABNORMALITY: ICD-10-CM

## 2025-03-28 DIAGNOSIS — Z74.09 DECREASED FUNCTIONAL MOBILITY AND ENDURANCE: ICD-10-CM

## 2025-03-28 DIAGNOSIS — D47.3 ESSENTIAL (HEMORRHAGIC) THROMBOCYTHEMIA: ICD-10-CM

## 2025-03-28 DIAGNOSIS — R53.1 DECREASED RANGE OF MOTION WITH DECREASED STRENGTH: Primary | ICD-10-CM

## 2025-03-28 DIAGNOSIS — R26.89 BALANCE PROBLEM: ICD-10-CM

## 2025-03-28 DIAGNOSIS — M25.60 DECREASED RANGE OF MOTION WITH DECREASED STRENGTH: Primary | ICD-10-CM

## 2025-03-28 LAB
ABSOLUTE EOSINOPHIL (OHS): 0.14 K/UL
ABSOLUTE MONOCYTE (OHS): 0.59 K/UL (ref 0.3–1)
ABSOLUTE NEUTROPHIL COUNT (OHS): 7.36 K/UL (ref 1.8–7.7)
BASOPHILS # BLD AUTO: 0.04 K/UL
BASOPHILS NFR BLD AUTO: 0.4 %
ERYTHROCYTE [DISTWIDTH] IN BLOOD BY AUTOMATED COUNT: 14.2 % (ref 11.5–14.5)
HCT VFR BLD AUTO: 40.3 % (ref 40–54)
HGB BLD-MCNC: 13.2 GM/DL (ref 14–18)
IMM GRANULOCYTES # BLD AUTO: 0.03 K/UL (ref 0–0.04)
IMM GRANULOCYTES NFR BLD AUTO: 0.3 % (ref 0–0.5)
LYMPHOCYTES # BLD AUTO: 1.09 K/UL (ref 1–4.8)
MCH RBC QN AUTO: 32.6 PG (ref 27–50)
MCHC RBC AUTO-ENTMCNC: 32.8 G/DL (ref 32–36)
MCV RBC AUTO: 100 FL (ref 82–98)
NUCLEATED RBC (/100WBC) (OHS): 0 /100 WBC
PLATELET # BLD AUTO: 408 K/UL (ref 150–450)
PMV BLD AUTO: 9.2 FL (ref 9.2–12.9)
RBC # BLD AUTO: 4.05 M/UL (ref 4.6–6.2)
RELATIVE EOSINOPHIL (OHS): 1.5 %
RELATIVE LYMPHOCYTE (OHS): 11.8 % (ref 18–48)
RELATIVE MONOCYTE (OHS): 6.4 % (ref 4–15)
RELATIVE NEUTROPHIL (OHS): 79.6 % (ref 38–73)
WBC # BLD AUTO: 9.25 K/UL (ref 3.9–12.7)

## 2025-03-28 PROCEDURE — 97530 THERAPEUTIC ACTIVITIES: CPT

## 2025-03-28 PROCEDURE — 97110 THERAPEUTIC EXERCISES: CPT

## 2025-03-28 PROCEDURE — 85025 COMPLETE CBC W/AUTO DIFF WBC: CPT

## 2025-03-28 PROCEDURE — 97112 NEUROMUSCULAR REEDUCATION: CPT

## 2025-03-28 PROCEDURE — 36415 COLL VENOUS BLD VENIPUNCTURE: CPT

## 2025-03-28 NOTE — PROGRESS NOTES
Outpatient Rehab    Physical Therapy Discharge      Patient Name: Bharathi Parsons  MRN: [id number]  YOB: 1940  Encounter Date: 3/28/2025    Therapy Diagnosis:   Encounter Diagnoses   Name Primary?    Decreased range of motion with decreased strength Yes    Gait abnormality     Pain     Balance problem     Decreased functional mobility and endurance        Physician: Angi Cain FNP    Physician Orders: Eval and Treat  Medical Diagnosis: Arthritis of left hip    Visit # / Visits Authorized:  16 / 20  Date of Evaluation: 1/7/2025   Insurance Authorization Period: 1/1/2025 to 12/31/2025  Plan of Care Certification:  3/6/25 to 4/3/25      PT/PTA:     Number of PTA visits since last PT visit:   Time In: 1300   Time Out: 1354  Total Time: 54   Total Billable Time: 40    FOTO:  Intake Score: 65%  Survey Score 1: 59%  Survey Score 2: 64%         Subjective   Patient states he is doing well today and is agreeable to being discharged..  Pain reported as 0/10.      Objective          Gait Analysis: The patient ambulated with the following assistive device: none; the patient presents with the following gait abnormalities: unsteady gait and trunk lean     Balance  Right   (seconds) Left  (seconds) Norms 2/6/25 3/28/25   Single Leg Stance 5 3 Less than 4.9 sec high risk  5-6.4 sec increased risk  6.5 or greater low risk 8 right   4 left  5 sec on right   3 sec on left          Functional Tests  Outcome Norms 2/6/25 3/28/25   Five Time Sit to Stand     Greater than 14 sec high risk  12.1-14 sec increased risk  12 sec or less low risk 20.60 20-29 yrs ? 6.0±1.4 sec.  30-39 yrs ? 6.1±1.4 sec.  40-49 yrs ? 7.6±1.8 sec.  50-59 yrs ? 7.7±2.6 sec.  60-69 yrs ? 8.4±0.0 sec (male), 12.7±1.8 sec (female)  70-79 yrs ? 11.6±3.4 sec (male), 13.0±4.8 sec (female)  80-89 yrs ? 16.7±4.5 sec (male), 17.2±5.5 sec (female) 38.81 s 34.20 s         Range of Motion:     Ankle/Foot AROM/PROM Right Left Pain/Dysfunction with Movement  2/6/25 3/28/25   Dorsiflexion (20º) WFL limited   NT 20   Plantarflexion (50º) WFL limited   NT 35      ,   Hip AROM/PROM Right Left Pain/Dysfunction with Movement 2/6/25 3/28/25   Hip Flexion (120º) limited limited      Hip Abduction (45º) limited limited   WFL 30   Hip IR (45º) limited limited   WFL 40   Hip ER (45º) limited limited   WFL 35   , and   Knee AROM/PROM Right Left Pain/Dysfunction with Movement 2/6/25 3/28/25  Left    Knee Flexion (135º)       Knee Extension (0º) WFL 0   NT -12 / -2 sitting   -2 supine         Strength:     L/E MMT Right  (spine) Left Pain/Dysfunction with Movement 2/6/25 3/28/25   Hip Flexion  4-/5 3+/5   4- 4-   Knee Extension 4/5 4/5   4+ 4+-5   Knee Flexion 4/5 4/5   4+ 4+-5   Hip IR 4-/5 3+/5   4 4, 3+ (knee pain/gives away)   Hip ER 4-/5 3+/5   4 4, 4   Ankle DF 4/5 4-/5   4, 4- 4+   Ankle PF 4/5 4-/5   4, 4- 4+   Ankle Inversion 4-/5 4/5   4 4   Ankle Eversion 4-/5 4/5   4 4         Muscle Length:         Muscle Tested  Right Left  2/6/25 3/28/25   Hip Flexors [] Normal      [x] Limited [] Normal      [x] Limited [x] Normal      [] Limited Limited right    Hamstrings  [] Normal      [x] Limited [] Normal      [x] Limited [x] Normal      [] Limited  Slight limited   Piriformis  [] Normal      [x] Limited [] Normal      [x] Limited [x] Normal      [] Limited  Slight limited   Gastrocnemius  [x] Normal      [] Limited [] Normal      [x] Limited [] Normal      [x] Limited R Normal    Soleus  [x] Normal      [] Limited [] Normal      [x] Limited  [] Normal      [x] Limited R Normal            Treatment:         CPT Intervention Performed  03/28/2025 Duration / Intensity      MT         TE  Bike  DC  8 min     Nustep x 6 minutes level 4      LTR x 3 min     FOTO/Re-assess  x  5 min             NMR  PPT x 3 min, 5 sec      Bridge x 10x/ 3 sets      isometric ADD x 3 min, 10 sec hold      Supine clamshell - black x 3 min,  10 sec hold     Straight Leg Raise x 3 x 10  bilateral     Sidelying hip abduction  Hip circles x   x 10x 3 sets  5x CW/CCW     Prone hip extension    10x each     LAQ   10x each side 5 sec hold/ 2 sets                                 TA  leg press  x  95# 10x/ 3 sets       standing hip extension    10x each extensive cues               Matrix knee extension  x  3 x 10 17.5lbs     Matrix knee flexion  x  3 x 10 27.5lbs     Side stepping - red band at ankles -  3 minutes with two hand support     Sit to Stands on airex pad (staggered left forward due to knee pain) -  3 x 10 with NO arm support from chair     STEP UPS                 PLAN   Bike, ankle mobility on step (if tolerated) toe yoga, arch doming, MOBU sitting, ankle band.              CPT Codes available for Billing:   (00) minutes of Manual therapy (MT) to improve pain and ROM.  (14) minutes of Therapeutic Exercise (TE) to develop strength, endurance, range of motion, and flexibility.  (31) minutes of Neuromuscular Re-Education (NMR)  to improve: Balance, Coordination, Kinesthetic Sense, Proprioception, and Posture.  (09) minutes of Therapeutic Activities (TA) to improve functional performance.  Unattended Electrical Stimulation (ES) for muscle performance or pain modulation.  BFR: Blood flow restriction applied during exercise  NP or (-): Not Performed        Assessment & Plan   Assessment: Since last progress report patient does not demonstrate any significant improvements in gait, balance, functional tests, nor muscle strength of bilateral lower extremity and global lower extremity range of motion. Because of this plateau in progress, patient is now to transition to independent phase of rehabilitation.         Patient's spiritual, cultural, and educational needs considered and patient agreeable to plan of care and goals.           Plan: Patient is discharged from physical therapy.    Goals:   Active       long term goals       Patient to improve score on the FOTO predicted score or better, to  improve QOL. (Met)       Start:  01/07/25    Expected End:  04/06/25    Resolved:  03/29/25         Patient to increase bilateral LE strength to 4+/5 or greater, in order to improve endurance and increase ability to perform all functional activities for increased time. (Unable to Meet)       Start:  01/07/25    Expected End:  04/06/25            Patient to normalize score on 5 times sit to stand, in order to improve endurance and decrease fall risk. (Unable to Meet)       Start:  01/07/25    Expected End:  04/06/25            Patient to perform daily activities including walking and going up and down steps with only mild increased symptoms. (Met)       Start:  01/07/25    Expected End:  04/06/25    Resolved:  03/29/25            short term goals       Patient to be educated on HEP. (Met)       Start:  01/07/25    Expected End:  03/06/25    Resolved:  03/29/25         Patient to increase hip range of motion, in order to improve available range of motion for ADL's.  (Met)       Start:  01/07/25    Expected End:  03/06/25    Resolved:  03/06/25         Patient to increase bilateral LE strength by 1/2 grade, in order to improve endurance and increase ability to perform all functional activities for increased time.  (Unable to Meet)       Start:  01/07/25    Expected End:  03/06/25            Patient to have pain less than 0/10 at worst, to improve QOL. (Met)       Start:  01/07/25    Expected End:  03/06/25    Resolved:  03/06/25         Patient to improve score on the FOTO, to improve QOL. (Unable to Meet)       Start:  01/07/25    Expected End:  03/06/25                Radha Weller, PT

## 2025-04-02 RX ORDER — PITAVASTATIN CALCIUM 4.18 MG/1
1 TABLET, FILM COATED ORAL
Qty: 90 TABLET | Refills: 3 | Status: SHIPPED | OUTPATIENT
Start: 2025-04-02

## 2025-04-02 NOTE — TELEPHONE ENCOUNTER
Refill Decision Note   Bharathi Jaqueline  is requesting a refill authorization.  Brief Assessment and Rationale for Refill:  Approve     Medication Therapy Plan:        Comments:     Note composed:5:17 PM 04/02/2025

## 2025-04-04 ENCOUNTER — TELEPHONE (OUTPATIENT)
Dept: INTERNAL MEDICINE | Facility: CLINIC | Age: 85
End: 2025-04-04
Payer: MEDICARE

## 2025-04-11 ENCOUNTER — OFFICE VISIT (OUTPATIENT)
Dept: DERMATOLOGY | Facility: CLINIC | Age: 85
End: 2025-04-11
Payer: MEDICARE

## 2025-04-11 DIAGNOSIS — D18.00 HEMANGIOMA, UNSPECIFIED SITE: ICD-10-CM

## 2025-04-11 DIAGNOSIS — L57.0 ACTINIC KERATOSES: ICD-10-CM

## 2025-04-11 DIAGNOSIS — L82.1 SEBORRHEIC KERATOSIS: ICD-10-CM

## 2025-04-11 DIAGNOSIS — Z12.83 SKIN CANCER SCREENING: Primary | ICD-10-CM

## 2025-04-11 DIAGNOSIS — L81.4 SOLAR LENTIGO: ICD-10-CM

## 2025-04-11 DIAGNOSIS — D48.5 NEOPLASM OF UNCERTAIN BEHAVIOR OF SKIN: ICD-10-CM

## 2025-04-11 PROCEDURE — 99213 OFFICE O/P EST LOW 20 MIN: CPT | Mod: PBBFAC | Performed by: STUDENT IN AN ORGANIZED HEALTH CARE EDUCATION/TRAINING PROGRAM

## 2025-04-11 PROCEDURE — 99999 PR PBB SHADOW E&M-EST. PATIENT-LVL III: CPT | Mod: PBBFAC,,, | Performed by: STUDENT IN AN ORGANIZED HEALTH CARE EDUCATION/TRAINING PROGRAM

## 2025-04-11 NOTE — PROGRESS NOTES
Patient Information  Name: Bharathi Parsons  : 1940  MRN: [id number]     Referring Physician:  Dr. Zamora ref. provider found   Primary Care Physician:  Dr. Rooney, Festus Sinha MD   Date of Visit: 2025      Subjective:       Bharathi Parsons is a 85 y.o. male who presents for AK follow up  HPI  Patient with hx of AK, here for follow up.    Patient was last seen:2023     Prior notes by myself reviewed.   Clinical documentation obtained by nursing staff reviewed.    Review of Systems   Skin:  Negative for itching and rash.        Objective:    Physical Exam   Constitutional: He appears well-developed and well-nourished. No distress.   Neurological: He is alert and oriented to person, place, and time. He is not disoriented.   Psychiatric: He has a normal mood and affect.   Skin:   Areas Examined (abnormalities noted in diagram):   Scalp / Hair Palpated and Inspected  Head / Face Inspection Performed  Neck Inspection Performed  Chest / Axilla Inspection Performed  Abdomen Inspection Performed  Back Inspection Performed  RUE Inspected  LUE Inspection Performed                   Diagram Legend     Erythematous scaling macule/papule c/w actinic keratosis       Vascular papule c/w angioma      Pigmented verrucoid papule/plaque c/w seborrheic keratosis      Yellow umbilicated papule c/w sebaceous hyperplasia      Irregularly shaped tan macule c/w lentigo     1-2 mm smooth white papules consistent with Milia      Movable subcutaneous cyst with punctum c/w epidermal inclusion cyst      Subcutaneous movable cyst c/w pilar cyst      Firm pink to brown papule c/w dermatofibroma      Pedunculated fleshy papule(s) c/w skin tag(s)      Evenly pigmented macule c/w junctional nevus     Mildly variegated pigmented, slightly irregular-bordered macule c/w mildly atypical nevus      Flesh colored to evenly pigmented papule c/w intradermal nevus       Pink pearly papule/plaque c/w basal cell carcinoma      Erythematous  hyperkeratotic cursted plaque c/w SCC      Surgical scar with no sign of skin cancer recurrence      Open and closed comedones      Inflammatory papules and pustules      Verrucoid papule consistent consistent with wart     Erythematous eczematous patches and plaques     Dystrophic onycholytic nail with subungual debris c/w onychomycosis     Umbilicated papule    Erythematous-base heme-crusted tan verrucoid plaque consistent with inflamed seborrheic keratosis     Erythematous Silvery Scaling Plaque c/w Psoriasis     See annotation            [] Data reviewed  [] Independent review of test  [] Management discussed with another provider    Assessment / Plan:      Pathology Orders:       Normal Orders This Visit    Specimen to Pathology, Dermatology     Questions:    Procedure Type: Dermatology and skin neoplasms    Number of Specimens: 1    ------------------------: -------------------------    Spec 1 Procedure: Shave Biopsy    Spec 1 Clinical Impression: r/o melanoma    Spec 1 Source: right chest    Clinical Information:     Clinical History:     Specimen Source: Chest, Right    Release to patient: Immediate    Send normal result to authorizing provider's In Basket if patient is active on MyChart: Yes          Skin cancer screening  Upper body skin examination performed today including at least 6 points as noted in physical examination. Suspicious lesions noted.    Recommend daily sun protection/avoidance and use of at least SPF 30, broad spectrum sunscreen (OTC drug).     Actinic keratoses  Cryosurgery Procedure Note    Verbal consent from the patient is obtained including, but not limited to, risk of hypopigmentation/hyperpigmentation, scar, recurrence of lesion. The patient is aware of the precancerous quality and need for treatment of these lesions. Liquid nitrogen cryosurgery is applied to the 37 actinic keratoses, as detailed in the physical exam, to produce a freeze injury. The patient is aware that blisters  may form and is instructed on wound care with gentle cleansing and use of vaseline ointment to keep moist until healed. The patient is supplied a handout on cryosurgery and is instructed to call if lesions do not completely resolve.    Seborrheic keratosis  These are benign inherited growths without a malignant potential. Reassurance given to patient. No treatment is necessary.     Hemangioma, unspecified site  This is a benign vascular lesion. Reassurance given. No treatment required.     Solar lentigo  This is a benign hyperpigmented sun induced lesion. Recommend daily sun protection/avoidance and use of at least SPF 30, broad spectrum sunscreen (OTC drug) will reduce the number of new lesions. Treatment of these benign lesions are considered cosmetic.    Neoplasm of uncertain behavior of skin  -     Specimen to Pathology, Dermatology  Shave biopsy procedure note:    Shave biopsy performed after verbal consent including risk of infection, scar, recurrence, need for additional treatment of site. Area prepped with alcohol, anesthetized with approximately 1.0cc of 1% lidocaine with epinephrine. Lesional tissue shaved with dermablade. Hemostasis achieved with application of aluminum chloride. No complications. Dressing applied. Wound care explained.                       LOS NUMBER AND COMPLEXITY OF PROBLEMS    COMPLEXITY OF DATA RISK TOTAL TIME (m)   14845  78114 [] 1 self-limited or minor problem [x] Minimal to none [] No treatment recommended or patient to monitor 15-29  10-19   31376  79865 Low  [] 2 or > self limited or minor problems  [] 1 stable chronic illness  [] 1 acute, uncomplicated illness or injury Limited (2)  [] Prior external notes from each unique source  [] Review result of each unique test  [] Order each unique test [x]  Low  OTC medications, minor skin biopsy 30-44 20-29   75071  88289 Moderate  []  1 or > chronic illness with progression, exacerbation or SE of treatment  [x]  2 or more stable  chronic illnesses  []  1 acute illness with systemic symptoms  []  1 acute complicated injury  []  1 undiagnosed new problem with uncertain prognosis Moderate (1/3 below)  []  3 or more data items        *Now includes assessment requiring independent historian  []  Independent interpretation of a test  []  Discuss management/test with another provider Moderate  []  Prescription drug mgmt  []  Minor surgery with risk discussed  []  Mgmt limited by social determinates 45-59  30-39   17394  59569 High  []  1 or more chronic illness with severe exacerbation, progression or SE of treatment  []  1 acute or chronic illness/injury that poses a threat to life or bodily function Extensive (2/3 below)  []  3 or more data items        *Now includes assessment requiring independent historian.  []  Independent interpretation of a test  []  Discuss management/test with another provider High  []  Major surgery with risk discussed  []  Drug therapy requiring intensive monitoring for toxicity  []  Hospitalization  []  Decision for DNR 60-74  40-54      No follow-ups on file.    Ann Sanches MD, FAAD  [facility]

## 2025-04-11 NOTE — PATIENT INSTRUCTIONS
Shave Biopsy Wound Care    Your doctor has performed a shave biopsy today.  A band aid and vaseline ointment has been placed over the site.  This should remain in place for NO LONGER THAN 48 hours.  It is fine to remove the bandaid after 24 hours, if the area is no longer bleeding. It is recommended that you keep the area dry (do not wet)) for the first 24 hours.  After 24 hours, wash the area with warm soap and water and apply Vaseline jelly.  Many patients prefer to use Neosporin or Bacitracin ointment.  This is acceptable; however, know that you can develop an allergy to this medication even if you have used it safely for years.  It is important to keep the area moist.  Letting it dry out and get air slows healing time, and will worsen the scar.        If you notice increasing redness, tenderness, pain, or yellow drainage at the biopsy site, please notify your doctor.  These are signs of an infection.    If your biopsy site is bleeding, apply firm pressure for 15 minutes straight.  Repeat for another 15 minutes, if it is still bleeding.   If the surgical site continues to bleed, then please contact your doctor.      For [facility] users:   You will receive your biopsy results in [facility] as soon as they are available. Please be assured that your physician/provider will review your results and will then determine what further treatment, evaluation, or planning is required. You should be contacted by your physician's/provider's office within 5 business days of receiving your results; If not, please reach out to directly. This is one more way [facility] is putting you first.     [facility] [place] [address]/ [phone] FAX/ [email or web address]     CRYOSURGERY      Your doctor has used a method called cryosurgery to treat your skin condition. Cryosurgery refers to the use of very cold substances to treat a variety of skin conditions such as warts, pre-skin cancers, molluscum contagiosum,  sun spots, and several benign growths. The substance we use in cryosurgery is liquid nitrogen and is so cold (-195 degrees Celsius) that is burns when administered.     Following treatment in the office, the skin may immediately burn and become red. You may find the area around the lesion is affected as well. It is sometimes necessary to treat not only the lesion, but a small area of the surrounding normal skin to achieve a good response.     A blister, and even a blood filled blister, may form after treatment.   This is a normal response. If the blister is painful, it is acceptable to sterilize a needle and with rubbing alcohol and gently pop the blister. It is important that you gently wash the area with soap and warm water as the blister fluid may contain wart virus if a wart was treated. Do no remove the roof of the blister.     The area treated can take anywhere from 1-3 weeks to heal. Healing time depends on the kind skin lesion treated, the location, and how aggressively the lesion was treated. It is recommended that the areas treated are covered with Vaseline or bacitracin ointment and a band-aid. If a band-aid is not practical, just ointment applied several times per day will do. Keeping these areas moist will speed the healing time.    Treatment with liquid nitrogen can leave a scar. In dark skin, it may be a light or dark scar, in light skin it may be a white or pink scar. These will generally fade with time, but may never go away completely.     If you have any concerns after your treatment, please feel free to call the office.       [facility] [place] [address]/ [phone] FAX/ [email or web address]

## 2025-04-15 ENCOUNTER — RESULTS FOLLOW-UP (OUTPATIENT)
Dept: DERMATOLOGY | Facility: CLINIC | Age: 85
End: 2025-04-15

## 2025-04-16 DIAGNOSIS — D75.839 THROMBOCYTOSIS: ICD-10-CM

## 2025-04-16 RX ORDER — ASPIRIN 81 MG/1
81 TABLET ORAL
Qty: 100 TABLET | Refills: 0 | Status: SHIPPED | OUTPATIENT
Start: 2025-04-16

## 2025-05-13 ENCOUNTER — PATIENT MESSAGE (OUTPATIENT)
Dept: INTERNAL MEDICINE | Facility: CLINIC | Age: 85
End: 2025-05-13
Payer: MEDICARE

## 2025-05-16 ENCOUNTER — PATIENT MESSAGE (OUTPATIENT)
Dept: HEMATOLOGY/ONCOLOGY | Facility: CLINIC | Age: 85
End: 2025-05-16
Payer: MEDICARE

## 2025-05-20 DIAGNOSIS — I10 BENIGN ESSENTIAL HTN: ICD-10-CM

## 2025-05-20 RX ORDER — AMLODIPINE BESYLATE 10 MG/1
TABLET ORAL
Qty: 90 TABLET | Refills: 3 | Status: SHIPPED | OUTPATIENT
Start: 2025-05-20

## 2025-05-20 NOTE — TELEPHONE ENCOUNTER
Refill Decision Note   Bharathi Jaqueline  is requesting a refill authorization.  Brief Assessment and Rationale for Refill:  Approve     Medication Therapy Plan:         Comments:     Note composed:2:06 PM 05/20/2025

## 2025-05-20 NOTE — TELEPHONE ENCOUNTER
No care due was identified.  [facility] Embedded Care Due Messages. Reference number: [id number].   5/20/2025 9:03:25 AM CDT

## 2025-06-13 ENCOUNTER — RESULTS FOLLOW-UP (OUTPATIENT)
Dept: HEMATOLOGY/ONCOLOGY | Facility: CLINIC | Age: 85
End: 2025-06-13

## 2025-06-13 ENCOUNTER — LAB VISIT (OUTPATIENT)
Dept: LAB | Facility: HOSPITAL | Age: 85
End: 2025-06-13
Attending: INTERNAL MEDICINE
Payer: MEDICARE

## 2025-06-13 DIAGNOSIS — D47.3 ESSENTIAL (HEMORRHAGIC) THROMBOCYTHEMIA: ICD-10-CM

## 2025-06-13 LAB
ABSOLUTE EOSINOPHIL (OHS): 0.13 K/UL
ABSOLUTE MONOCYTE (OHS): 0.53 K/UL (ref 0.3–1)
ABSOLUTE NEUTROPHIL COUNT (OHS): 6.53 K/UL (ref 1.8–7.7)
BASOPHILS # BLD AUTO: 0.03 K/UL
BASOPHILS NFR BLD AUTO: 0.4 %
ERYTHROCYTE [DISTWIDTH] IN BLOOD BY AUTOMATED COUNT: 13.8 % (ref 11.5–14.5)
HCT VFR BLD AUTO: 44.1 % (ref 40–54)
HGB BLD-MCNC: 14.4 GM/DL (ref 14–18)
IMM GRANULOCYTES # BLD AUTO: 0.04 K/UL (ref 0–0.04)
IMM GRANULOCYTES NFR BLD AUTO: 0.5 % (ref 0–0.5)
LYMPHOCYTES # BLD AUTO: 0.99 K/UL (ref 1–4.8)
MCH RBC QN AUTO: 32.8 PG (ref 27–31)
MCHC RBC AUTO-ENTMCNC: 32.7 G/DL (ref 32–36)
MCV RBC AUTO: 101 FL (ref 82–98)
NUCLEATED RBC (/100WBC) (OHS): 0 /100 WBC
PLATELET # BLD AUTO: 423 K/UL (ref 150–450)
PMV BLD AUTO: 9.2 FL (ref 9.2–12.9)
RBC # BLD AUTO: 4.39 M/UL (ref 4.6–6.2)
RELATIVE EOSINOPHIL (OHS): 1.6 %
RELATIVE LYMPHOCYTE (OHS): 12 % (ref 18–48)
RELATIVE MONOCYTE (OHS): 6.4 % (ref 4–15)
RELATIVE NEUTROPHIL (OHS): 79.1 % (ref 38–73)
WBC # BLD AUTO: 8.25 K/UL (ref 3.9–12.7)

## 2025-06-13 PROCEDURE — 36415 COLL VENOUS BLD VENIPUNCTURE: CPT

## 2025-06-13 PROCEDURE — 85025 COMPLETE CBC W/AUTO DIFF WBC: CPT

## 2025-07-02 ENCOUNTER — TELEPHONE (OUTPATIENT)
Dept: AUDIOLOGY | Facility: CLINIC | Age: 85
End: 2025-07-02
Payer: MEDICARE

## 2025-07-02 ENCOUNTER — CLINICAL SUPPORT (OUTPATIENT)
Dept: AUDIOLOGY | Facility: CLINIC | Age: 85
End: 2025-07-02
Payer: MEDICARE

## 2025-07-02 DIAGNOSIS — H90.3 HEARING LOSS, SENSORINEURAL, ASYMMETRICAL: Primary | ICD-10-CM

## 2025-07-02 NOTE — PROGRESS NOTES
Bharathi Parsons was seen 07/02/2025 for a hearing aid follow-up appointment.  Hearing aids cleaned and checked. Once filters, SMALL power domes and retention wires were replaced aids are in good working order.    Patient will call for any future hearing aid follow-up appointments as needed.

## 2025-07-02 NOTE — TELEPHONE ENCOUNTER
Copied from CRM #[id number]. Topic: Appointments - Appointment Access  >> Jul 2, 2025 11:03 AM Minnie wrote:  .Type:  Needs Medical Advice    Who Called: Bharathi   Symptoms (please be specific): hearing aids not working correctly    How long has patient had these symptoms:  few days   Pharmacy name and phone #:  .  [facility] [address] [address] [address]  [address] [address] [facility] [address]  Phone: [phone] Fax: [phone]  Would the patient rather a call back or a response via [facility]? Call   Best Call Back Number: .[phone]   Additional Information: Pt requesting a call back

## 2025-07-22 ENCOUNTER — TELEPHONE (OUTPATIENT)
Dept: ALLERGY | Facility: CLINIC | Age: 85
End: 2025-07-22
Payer: MEDICARE

## 2025-07-24 DIAGNOSIS — D75.839 THROMBOCYTOSIS: ICD-10-CM

## 2025-07-24 RX ORDER — ASPIRIN 81 MG/1
81 TABLET ORAL
Qty: 100 TABLET | Refills: 0 | Status: SHIPPED | OUTPATIENT
Start: 2025-07-24

## 2025-08-11 ENCOUNTER — OFFICE VISIT (OUTPATIENT)
Dept: HEMATOLOGY/ONCOLOGY | Facility: CLINIC | Age: 85
End: 2025-08-11
Payer: MEDICARE

## 2025-08-11 VITALS
WEIGHT: 169.75 LBS | HEART RATE: 89 BPM | OXYGEN SATURATION: 97 % | SYSTOLIC BLOOD PRESSURE: 159 MMHG | DIASTOLIC BLOOD PRESSURE: 96 MMHG | HEIGHT: 71 IN | RESPIRATION RATE: 20 BRPM | BODY MASS INDEX: 23.77 KG/M2 | TEMPERATURE: 99 F

## 2025-08-11 DIAGNOSIS — N18.31 STAGE 3A CHRONIC KIDNEY DISEASE: ICD-10-CM

## 2025-08-11 DIAGNOSIS — Z79.69 IMMUNODEFICIENCY DUE TO CHEMOTHERAPY: ICD-10-CM

## 2025-08-11 DIAGNOSIS — D84.821 IMMUNODEFICIENCY DUE TO CHEMOTHERAPY: ICD-10-CM

## 2025-08-11 DIAGNOSIS — D47.3 ESSENTIAL (HEMORRHAGIC) THROMBOCYTHEMIA: Primary | ICD-10-CM

## 2025-08-11 DIAGNOSIS — T45.1X5A IMMUNODEFICIENCY DUE TO CHEMOTHERAPY: ICD-10-CM

## 2025-08-11 DIAGNOSIS — I10 ESSENTIAL HYPERTENSION: ICD-10-CM

## 2025-08-11 DIAGNOSIS — E78.00 HYPERCHOLESTEROLEMIA: ICD-10-CM

## 2025-08-11 DIAGNOSIS — D75.839 THROMBOCYTOSIS: ICD-10-CM

## 2025-08-11 PROCEDURE — 99999 PR PBB SHADOW E&M-EST. PATIENT-LVL IV: CPT | Mod: PBBFAC,,, | Performed by: INTERNAL MEDICINE

## 2025-08-11 PROCEDURE — 99214 OFFICE O/P EST MOD 30 MIN: CPT | Mod: S$PBB,,, | Performed by: INTERNAL MEDICINE

## 2025-08-11 PROCEDURE — 99214 OFFICE O/P EST MOD 30 MIN: CPT | Mod: PBBFAC | Performed by: INTERNAL MEDICINE

## 2025-08-11 RX ORDER — HYDROXYUREA 500 MG/1
500 CAPSULE ORAL DAILY
Qty: 30 CAPSULE | Refills: 11 | Status: SHIPPED | OUTPATIENT
Start: 2025-08-11 | End: 2026-08-11

## 2025-08-22 ENCOUNTER — OFFICE VISIT (OUTPATIENT)
Dept: DERMATOLOGY | Facility: CLINIC | Age: 85
End: 2025-08-22
Payer: MEDICARE

## 2025-08-22 DIAGNOSIS — L81.4 SOLAR LENTIGO: ICD-10-CM

## 2025-08-22 DIAGNOSIS — D18.00 HEMANGIOMA, UNSPECIFIED SITE: ICD-10-CM

## 2025-08-22 DIAGNOSIS — L57.0 ACTINIC KERATOSES: ICD-10-CM

## 2025-08-22 DIAGNOSIS — Z12.83 SKIN CANCER SCREENING: Primary | ICD-10-CM

## 2025-08-22 PROCEDURE — 99999 PR PBB SHADOW E&M-EST. PATIENT-LVL III: CPT | Mod: PBBFAC,,, | Performed by: STUDENT IN AN ORGANIZED HEALTH CARE EDUCATION/TRAINING PROGRAM

## 2025-08-22 PROCEDURE — 99213 OFFICE O/P EST LOW 20 MIN: CPT | Mod: PBBFAC | Performed by: STUDENT IN AN ORGANIZED HEALTH CARE EDUCATION/TRAINING PROGRAM
